# Patient Record
Sex: MALE | Race: BLACK OR AFRICAN AMERICAN | NOT HISPANIC OR LATINO | Employment: FULL TIME | URBAN - NONMETROPOLITAN AREA
[De-identification: names, ages, dates, MRNs, and addresses within clinical notes are randomized per-mention and may not be internally consistent; named-entity substitution may affect disease eponyms.]

---

## 2018-02-12 ENCOUNTER — OFFICE VISIT CONVERTED (OUTPATIENT)
Dept: FAMILY MEDICINE CLINIC | Age: 68
End: 2018-02-12
Attending: NURSE PRACTITIONER

## 2018-02-16 ENCOUNTER — OFFICE VISIT CONVERTED (OUTPATIENT)
Dept: FAMILY MEDICINE CLINIC | Age: 68
End: 2018-02-16
Attending: NURSE PRACTITIONER

## 2018-02-27 ENCOUNTER — OFFICE VISIT CONVERTED (OUTPATIENT)
Dept: FAMILY MEDICINE CLINIC | Age: 68
End: 2018-02-27
Attending: NURSE PRACTITIONER

## 2018-06-14 ENCOUNTER — OFFICE VISIT CONVERTED (OUTPATIENT)
Dept: FAMILY MEDICINE CLINIC | Age: 68
End: 2018-06-14
Attending: NURSE PRACTITIONER

## 2018-10-23 ENCOUNTER — OFFICE VISIT CONVERTED (OUTPATIENT)
Dept: FAMILY MEDICINE CLINIC | Age: 68
End: 2018-10-23
Attending: NURSE PRACTITIONER

## 2018-11-12 ENCOUNTER — OFFICE VISIT CONVERTED (OUTPATIENT)
Dept: FAMILY MEDICINE CLINIC | Age: 68
End: 2018-11-12
Attending: NURSE PRACTITIONER

## 2018-11-26 ENCOUNTER — OFFICE VISIT CONVERTED (OUTPATIENT)
Dept: FAMILY MEDICINE CLINIC | Age: 68
End: 2018-11-26
Attending: NURSE PRACTITIONER

## 2018-12-01 ENCOUNTER — OFFICE VISIT CONVERTED (OUTPATIENT)
Dept: FAMILY MEDICINE CLINIC | Age: 68
End: 2018-12-01
Attending: FAMILY MEDICINE

## 2018-12-03 ENCOUNTER — OFFICE VISIT (OUTPATIENT)
Dept: SURGERY | Facility: CLINIC | Age: 68
End: 2018-12-03

## 2018-12-03 VITALS — WEIGHT: 234 LBS | BODY MASS INDEX: 33.5 KG/M2 | HEART RATE: 56 BPM | OXYGEN SATURATION: 98 % | HEIGHT: 70 IN

## 2018-12-03 DIAGNOSIS — Z12.11 SCREEN FOR COLON CANCER: Primary | ICD-10-CM

## 2018-12-03 DIAGNOSIS — R10.30 LOWER ABDOMINAL PAIN: ICD-10-CM

## 2018-12-03 PROCEDURE — 99204 OFFICE O/P NEW MOD 45 MIN: CPT | Performed by: SURGERY

## 2018-12-03 RX ORDER — MULTIVIT-MIN/FOLIC/VIT K/LYCOP 400-300MCG
1 TABLET ORAL DAILY
COMMUNITY
End: 2022-05-20

## 2018-12-03 RX ORDER — EZETIMIBE 10 MG/1
10 TABLET ORAL
COMMUNITY
Start: 2018-10-25 | End: 2018-12-03

## 2018-12-03 RX ORDER — ALLOPURINOL 300 MG/1
300 TABLET ORAL DAILY
COMMUNITY
End: 2021-09-08

## 2018-12-03 RX ORDER — AMLODIPINE BESYLATE 10 MG/1
10 TABLET ORAL DAILY
COMMUNITY
Start: 2018-11-02 | End: 2021-09-08

## 2018-12-03 RX ORDER — ATENOLOL 50 MG/1
50 TABLET ORAL DAILY
COMMUNITY
End: 2021-09-08

## 2018-12-03 NOTE — H&P (VIEW-ONLY)
SUMMARY (A/P):    68-year-old gentleman with fairly vague symptoms of abdominal pain.  Given that he is overdue surveillance colonoscopy think this would be the best place to start with his further workup.  If colonoscopy is unrevealing then CT scan of the abdomen and pelvis would be the appropriate next step.  Additionally, I recommended that he use Lotrimin cream in the right inguinal region to see if this will help with that particular symptom.      CC:    Abdominal pain    HPI:    68-year-old gentleman presents with at least several month history of intermittent moderate pain in the mid lower abdomen as well as anteriorly on both lower extremities.  Reports associated malodorous drainage from the right inguinal region.    PSH:    Open right inguinal hernia repair 6/4/2014  Colonoscopy 2006  Coronary artery bypass grafting 2003  Lumbar disc surgery 1990 and 1992    PMH:    Arthritis  Coronary artery disease  Hypertension  Hyperlipidemia    FAMILY HISTORY:    Negative for colorectal cancer    SOCIAL HISTORY:   Denies tobacco use  Occasional alcohol use    ALLERGIES: reviewed, in Epic    MEDICATIONS: reviewed, in Epic    ROS:  No chest pain or shortness of air.  All other systems reviewed and negative other than presenting complaints.    PHYSICAL EXAM:   Constitutional: Well-developed well-nourished, no acute distress  Vital signs: Heart rate 56, weight 234 pounds, height 70 inches, BMI 33.6  Eyes: Conjunctiva normal, sclera nonicteric  ENMT: Hearing grossly normal, oral mucosa moist  Neck: Supple, no palpable mass, normal thyroid, trachea midline  Respiratory: Clear to auscultation, normal inspiratory effort  Cardiovascular: Regular rate, no murmur, no carotid bruit, no peripheral edema, no jugular venous distention  Gastrointestinal: Soft, tender to palpation in the lower abdomen but certainly with no peritoneal signs, no palpable mass, no hepatosplenomegaly, negative for hernia, bowel sounds  normal  Genitourinary: Testicles are descended and normal with no visible or palpable evidence of inguinal hernia.  There is prominence of the cord structures bilaterally concerning for but certainly not diagnostic of varicocele.  Lymphatics (palpable nodes):  cervical-negative, inguinal-negative  Skin:  Warm, dry, minimal evidence of cutaneous candidiasis in the right inguinal region  Musculoskeletal: Symmetric strength, normal gait  Psychiatric: Alert and oriented ×3, normal affect     SCOTTIE ALLEN M.D.

## 2018-12-11 ENCOUNTER — OFFICE VISIT CONVERTED (OUTPATIENT)
Dept: CARDIOLOGY | Facility: CLINIC | Age: 68
End: 2018-12-11
Attending: INTERNAL MEDICINE

## 2018-12-11 ENCOUNTER — CONVERSION ENCOUNTER (OUTPATIENT)
Dept: CARDIOLOGY | Facility: CLINIC | Age: 68
End: 2018-12-11

## 2018-12-14 ENCOUNTER — ANESTHESIA EVENT (OUTPATIENT)
Dept: GASTROENTEROLOGY | Facility: HOSPITAL | Age: 68
End: 2018-12-14

## 2018-12-14 ENCOUNTER — HOSPITAL ENCOUNTER (OUTPATIENT)
Facility: HOSPITAL | Age: 68
Setting detail: HOSPITAL OUTPATIENT SURGERY
Discharge: HOME | End: 2018-12-14
Attending: SURGERY | Admitting: SURGERY

## 2018-12-14 ENCOUNTER — ANESTHESIA (OUTPATIENT)
Dept: GASTROENTEROLOGY | Facility: HOSPITAL | Age: 68
End: 2018-12-14

## 2018-12-14 VITALS
BODY MASS INDEX: 37.04 KG/M2 | DIASTOLIC BLOOD PRESSURE: 86 MMHG | OXYGEN SATURATION: 100 % | TEMPERATURE: 98.4 F | HEIGHT: 66 IN | WEIGHT: 230.44 LBS | SYSTOLIC BLOOD PRESSURE: 159 MMHG | HEART RATE: 64 BPM | RESPIRATION RATE: 16 BRPM

## 2018-12-14 DIAGNOSIS — Z12.11 SCREEN FOR COLON CANCER: ICD-10-CM

## 2018-12-14 PROCEDURE — 45380 COLONOSCOPY AND BIOPSY: CPT | Performed by: SURGERY

## 2018-12-14 PROCEDURE — 25010000002 PROPOFOL 10 MG/ML EMULSION: Performed by: ANESTHESIOLOGY

## 2018-12-14 PROCEDURE — 88305 TISSUE EXAM BY PATHOLOGIST: CPT | Performed by: SURGERY

## 2018-12-14 RX ORDER — LIDOCAINE HYDROCHLORIDE 20 MG/ML
INJECTION, SOLUTION INFILTRATION; PERINEURAL AS NEEDED
Status: DISCONTINUED | OUTPATIENT
Start: 2018-12-14 | End: 2018-12-14 | Stop reason: SURG

## 2018-12-14 RX ORDER — SODIUM CHLORIDE 0.9 % (FLUSH) 0.9 %
3 SYRINGE (ML) INJECTION AS NEEDED
Status: DISCONTINUED | OUTPATIENT
Start: 2018-12-14 | End: 2018-12-14 | Stop reason: HOSPADM

## 2018-12-14 RX ORDER — PROPOFOL 10 MG/ML
VIAL (ML) INTRAVENOUS AS NEEDED
Status: DISCONTINUED | OUTPATIENT
Start: 2018-12-14 | End: 2018-12-14 | Stop reason: SURG

## 2018-12-14 RX ORDER — LIDOCAINE HYDROCHLORIDE 10 MG/ML
0.5 INJECTION, SOLUTION INFILTRATION; PERINEURAL ONCE AS NEEDED
Status: DISCONTINUED | OUTPATIENT
Start: 2018-12-14 | End: 2018-12-14 | Stop reason: HOSPADM

## 2018-12-14 RX ORDER — SODIUM CHLORIDE, SODIUM LACTATE, POTASSIUM CHLORIDE, CALCIUM CHLORIDE 600; 310; 30; 20 MG/100ML; MG/100ML; MG/100ML; MG/100ML
1000 INJECTION, SOLUTION INTRAVENOUS CONTINUOUS
Status: DISCONTINUED | OUTPATIENT
Start: 2018-12-14 | End: 2018-12-14 | Stop reason: HOSPADM

## 2018-12-14 RX ADMIN — PROPOFOL 200 MG: 10 INJECTION, EMULSION INTRAVENOUS at 07:10

## 2018-12-14 RX ADMIN — LIDOCAINE HYDROCHLORIDE 100 MG: 20 INJECTION, SOLUTION INFILTRATION; PERINEURAL at 07:08

## 2018-12-14 RX ADMIN — SODIUM CHLORIDE, POTASSIUM CHLORIDE, SODIUM LACTATE AND CALCIUM CHLORIDE 1000 ML: 600; 310; 30; 20 INJECTION, SOLUTION INTRAVENOUS at 06:37

## 2018-12-14 RX ADMIN — PROPOFOL 200 MG: 10 INJECTION, EMULSION INTRAVENOUS at 07:08

## 2018-12-14 NOTE — OP NOTE
PREOPERATIVE DIAGNOSIS:  Screening    POSTOPERATIVE DIAGNOSIS AND FINDINGS:  Small sigmoid polyp    PROCEDURE:  Colonoscopy to cecum with cold biopsy removal of polyp    SURGEON:  Elliott Harding MD    ANESTHESIA:  MAC    SPECIMEN(S):  Polyp (s)    DESCRIPTION:  In decubitus position digital rectal exam was normal. Colonoscope inserted under direct visualization of lumen to cecum confirmed by visualization of ileocecal valve and appendiceal orifice.    Scope slowly withdrawn circumferentially examining all mucosal surfaces.    Quality of bowel preparation good.    Maureen mucosa or malodorous a small sigmoid polyp removed completely with the cold biopsy forceps.    Tolerated well.    RECOMMENDATION FOR FUTURE SURVEILLANCE:  To be determined based on polyp pathology and issued as separate report    Elliott Harding M.D.

## 2018-12-14 NOTE — ANESTHESIA PREPROCEDURE EVALUATION
Anesthesia Evaluation     Patient summary reviewed and Nursing notes reviewed   NPO Solid Status: > 8 hours  NPO Liquid Status: > 8 hours           Airway   Mallampati: II  TM distance: >3 FB  Neck ROM: limited  Possible difficult intubation  Dental    (+) upper dentures    Pulmonary - normal exam   Cardiovascular - normal exam    Patient on routine beta blocker and Beta blocker given within 24 hours of surgery    (+) hypertension 2 medications or greater, past MI  >12 months, CAD, CABG,       Neuro/Psych  GI/Hepatic/Renal/Endo    (+) obesity,       Musculoskeletal     Abdominal    Substance History      OB/GYN          Other                      Anesthesia Plan    ASA 3     MAC     Anesthetic plan, all risks, benefits, and alternatives have been provided, discussed and informed consent has been obtained with: patient.

## 2018-12-14 NOTE — ANESTHESIA POSTPROCEDURE EVALUATION
"Patient: Mohamud Jarrett    Procedure Summary     Date:  12/14/18 Room / Location:   [facility]    Anesthesia Start:  0700 Anesthesia Stop:  0732    Procedure:  COLONOSCOPY TO CECUM WITH COLD BIOPSY POLYPECTOMY (N/A ) Diagnosis:       Screen for colon cancer      (Screen for colon cancer [Z12.11])    Surgeon:  Elliott Harding MD Provider:  Haydee Mari MD    Anesthesia Type:  MAC ASA Status:  3          Anesthesia Type: MAC  Last vitals  BP   149/81 (12/14/18 0630)   Temp   36.9 °C (98.4 °F) (12/14/18 0630)   Pulse   58 (12/14/18 0630)   Resp   16 (12/14/18 0630)     SpO2   98 % (12/14/18 0630)     Post Anesthesia Care and Evaluation    Patient location during evaluation: PACU  Patient participation: complete - patient participated  Level of consciousness: awake  Pain score: 0  Pain management: adequate  Airway patency: patent  Anesthetic complications: No anesthetic complications    Cardiovascular status: acceptable  Respiratory status: acceptable  Hydration status: acceptable    Comments: Blood pressure 149/81, pulse 58, temperature 36.9 °C (98.4 °F), temperature source Oral, resp. rate 16, height 167.6 cm (66\"), weight 105 kg (230 lb 7 oz), SpO2 98 %.    No anesthesia care post op    "

## 2018-12-17 LAB
LAB AP CASE REPORT: NORMAL
PATH REPORT.FINAL DX SPEC: NORMAL
PATH REPORT.GROSS SPEC: NORMAL

## 2018-12-19 ENCOUNTER — DOCUMENTATION (OUTPATIENT)
Dept: SURGERY | Facility: CLINIC | Age: 68
End: 2018-12-19

## 2018-12-19 NOTE — PROGRESS NOTES
ENDOSCOPY FOLLOW UP NOTE    Colonoscopy 12/14/2018    Indication:  Screening    Findings:  Small sigmoid polyp    Pathology:  Tubular adenoma    Recommendations:  Five-year surveillance

## 2018-12-20 ENCOUNTER — TELEPHONE (OUTPATIENT)
Dept: SURGERY | Facility: CLINIC | Age: 68
End: 2018-12-20

## 2018-12-20 NOTE — TELEPHONE ENCOUNTER
----- Message from Elliott Harding MD sent at 12/19/2018  6:25 AM EST -----  Please let him know that he had a small benign polyp and five-year surveillance is recommended-put in computer for reminder

## 2018-12-27 ENCOUNTER — TELEPHONE (OUTPATIENT)
Dept: SURGERY | Facility: CLINIC | Age: 68
End: 2018-12-27

## 2019-01-30 ENCOUNTER — OFFICE VISIT CONVERTED (OUTPATIENT)
Dept: FAMILY MEDICINE CLINIC | Age: 69
End: 2019-01-30
Attending: NURSE PRACTITIONER

## 2019-01-30 ENCOUNTER — HOSPITAL ENCOUNTER (OUTPATIENT)
Dept: OTHER | Facility: HOSPITAL | Age: 69
Discharge: HOME | End: 2019-01-30
Attending: NURSE PRACTITIONER

## 2019-01-30 LAB
ALBUMIN SERPL-MCNC: 3.7 G/DL (ref 3.5–5)
ALBUMIN/GLOB SERPL: 1.3 {RATIO} (ref 1.4–2.6)
ALP SERPL-CCNC: 66 U/L (ref 56–155)
ALT SERPL-CCNC: 25 U/L (ref 10–40)
ANION GAP SERPL CALC-SCNC: 15 MMOL/L (ref 8–19)
AST SERPL-CCNC: 27 U/L (ref 15–50)
BILIRUB SERPL-MCNC: 0.35 MG/DL (ref 0.2–1.3)
BUN SERPL-MCNC: 16 MG/DL (ref 5–25)
BUN/CREAT SERPL: 13 {RATIO} (ref 6–20)
CALCIUM SERPL-MCNC: 9.2 MG/DL (ref 8.7–10.4)
CHLORIDE SERPL-SCNC: 100 MMOL/L (ref 99–111)
CHOLEST SERPL-MCNC: 266 MG/DL (ref 107–200)
CHOLEST/HDLC SERPL: 5 {RATIO} (ref 3–6)
CONV CO2: 29 MMOL/L (ref 22–32)
CONV TOTAL PROTEIN: 6.6 G/DL (ref 6.3–8.2)
CREAT UR-MCNC: 1.2 MG/DL (ref 0.7–1.2)
GFR SERPLBLD BASED ON 1.73 SQ M-ARVRAT: >60 ML/MIN/{1.73_M2}
GLOBULIN UR ELPH-MCNC: 2.9 G/DL (ref 2–3.5)
GLUCOSE SERPL-MCNC: 91 MG/DL (ref 70–99)
HDLC SERPL-MCNC: 53 MG/DL (ref 40–60)
LDLC SERPL CALC-MCNC: 179 MG/DL (ref 70–100)
OSMOLALITY SERPL CALC.SUM OF ELEC: 291 MOSM/KG (ref 273–304)
POTASSIUM SERPL-SCNC: 3.9 MMOL/L (ref 3.5–5.3)
SODIUM SERPL-SCNC: 140 MMOL/L (ref 135–147)
TRIGL SERPL-MCNC: 172 MG/DL (ref 40–150)
VLDLC SERPL-MCNC: 34 MG/DL (ref 5–37)

## 2019-01-31 LAB
CONV HEPATITIS B SURFACE AG W CONFIRMATION RE: NEGATIVE
HAV IGM SERPL QL IA: NEGATIVE
HBV CORE IGM SERPL QL IA: NEGATIVE
HCV AB SER DONR QL: <0.1 S/CO RATIO (ref 0–0.9)

## 2019-06-06 ENCOUNTER — OFFICE VISIT (OUTPATIENT)
Dept: SURGERY | Facility: CLINIC | Age: 69
End: 2019-06-06

## 2019-06-06 VITALS — HEIGHT: 70 IN | WEIGHT: 242.8 LBS | HEART RATE: 58 BPM | OXYGEN SATURATION: 98 % | BODY MASS INDEX: 34.76 KG/M2

## 2019-06-06 DIAGNOSIS — R10.31 RIGHT INGUINAL PAIN: Primary | ICD-10-CM

## 2019-06-06 PROCEDURE — 99214 OFFICE O/P EST MOD 30 MIN: CPT | Performed by: SURGERY

## 2019-06-06 RX ORDER — MELOXICAM 15 MG/1
15 TABLET ORAL DAILY
Qty: 14 TABLET | Refills: 0 | Status: SHIPPED | OUTPATIENT
Start: 2019-06-06 | End: 2019-06-20

## 2019-06-08 NOTE — PROGRESS NOTES
IMPRESSION & PLAN:  1.  Right inguinal pain without evidence of recurrent hernia.  This likely represents inguinal strain and I have recommended and prescribed Mobic 15 mg p.o. daily for 2 weeks.  His recent creatinine was normal.  He is to return if his symptoms worsen or do not resolve.  2.  Personal history of adenomatous polyps, last colonoscopy 2018, I reminded him that he is due surveillance in 2023.      CC: Right inguinal pain    HPI: 69-year-old gentleman who underwent open incarcerated right inguinal hernia repair on 6/24/2014 presents with 1 month history of mild to moderate intermittent right inguinal pain that is worse with activity but has no associated bulge or mass.    ROS: Negative for unexpected weight loss.  Negative for fever chills or night sweats.  Negative for adenopathy on self-exam.     PHYSICAL EXAM:   Constitutional: Well-developed well-nourished, no acute distress  Vital signs: HR 58, weight 242 pounds, height 70 inches, BMI 34.8  Discussed with patient increased perioperative risks associated with obesity including increased risks of DVT, infection, seromas, poor wound healing and hernias (with abdominal surgery).  Eyes: Conjunctiva normal, sclera nonicteric  ENMT: Hearing grossly normal, oral mucosa moist  Neck: Supple, no palpable mass, normal thyroid, trachea midline  Respiratory: Clear to auscultation, normal inspiratory effort  Cardiovascular: Regular rate, no murmur, no carotid bruit, no peripheral edema, no jugular venous distention  Gastrointestinal: Soft, nontender, no palpable mass, no hepatosplenomegaly, negative for hernia, bowel sounds normal  Genitourinary: Testicles are descended and normal.  No palpable or visible mass.  No evidence of hernia on either side.  Lymphatics (palpable nodes):  cervical-negative, inguinal-negative  Skin:  Warm, dry, no rash on visualized skin surfaces  Musculoskeletal: Symmetric strength, normal gait  Psychiatric: Alert and oriented ×3, normal  affect     SCOTTIE ALLEN M.D.

## 2019-06-25 ENCOUNTER — TELEPHONE (OUTPATIENT)
Dept: SURGERY | Facility: CLINIC | Age: 69
End: 2019-06-25

## 2019-06-25 NOTE — TELEPHONE ENCOUNTER
Advised pt to be seen on Thursday per RMP. Pt unable to come due to work schedule. He said he would call back next week to schedule an appt.

## 2019-08-09 ENCOUNTER — OFFICE VISIT CONVERTED (OUTPATIENT)
Dept: FAMILY MEDICINE CLINIC | Age: 69
End: 2019-08-09
Attending: NURSE PRACTITIONER

## 2019-10-17 ENCOUNTER — OFFICE VISIT CONVERTED (OUTPATIENT)
Dept: FAMILY MEDICINE CLINIC | Age: 69
End: 2019-10-17
Attending: NURSE PRACTITIONER

## 2019-11-21 ENCOUNTER — OFFICE VISIT (OUTPATIENT)
Dept: SURGERY | Facility: CLINIC | Age: 69
End: 2019-11-21

## 2019-11-21 ENCOUNTER — TRANSCRIBE ORDERS (OUTPATIENT)
Dept: SURGERY | Facility: CLINIC | Age: 69
End: 2019-11-21

## 2019-11-21 VITALS — HEART RATE: 61 BPM | BODY MASS INDEX: 35.28 KG/M2 | HEIGHT: 70 IN | OXYGEN SATURATION: 98 % | WEIGHT: 246.4 LBS

## 2019-11-21 DIAGNOSIS — R10.31 RIGHT INGUINAL PAIN: Primary | ICD-10-CM

## 2019-11-21 DIAGNOSIS — R10.30 INGUINAL PAIN, UNSPECIFIED LATERALITY: ICD-10-CM

## 2019-11-21 PROCEDURE — 99213 OFFICE O/P EST LOW 20 MIN: CPT | Performed by: SURGERY

## 2019-11-21 RX ORDER — FEXOFENADINE HCL 180 MG/1
1 TABLET ORAL AS NEEDED
COMMUNITY
Start: 2017-06-22 | End: 2021-09-08 | Stop reason: SDUPTHER

## 2019-11-22 NOTE — PROGRESS NOTES
"IMPRESSION & PLAN:  69-year-old gentleman returns with persistent right inguinal pain.  No changes on exam.  Indicates that Mobic \"got rid of pain\", but recurred within weeks after stopping.  I have recommended proceeding with pain clinic appointment to evaluate for steroid injections locally.    CC: Inguinal pain    HPI: 69-year-old gentleman whom I last saw on June of this year for right inguinal pain and he did not have evidence of recurrent hernia.  I placed him on Mobic for 2 weeks and he indicated that this essentially resolved his symptoms but then symptoms recurred after cessation of Mobic.  Surgical history includes open right inguinal hernia repair for incarcerated right inguinal hernia on June 4, 2014    PE:    Awake, alert  Lungs normal inspiratory effort  Heart RRR  Abdomen: Soft and nontender, no evidence of recurrent hernia    "

## 2019-12-19 ENCOUNTER — ANESTHESIA (OUTPATIENT)
Dept: PAIN MEDICINE | Facility: HOSPITAL | Age: 69
End: 2019-12-19

## 2019-12-19 ENCOUNTER — HOSPITAL ENCOUNTER (OUTPATIENT)
Dept: PAIN MEDICINE | Facility: HOSPITAL | Age: 69
Discharge: HOME | End: 2019-12-19
Admitting: ANESTHESIOLOGY

## 2019-12-19 ENCOUNTER — ANESTHESIA EVENT (OUTPATIENT)
Dept: PAIN MEDICINE | Facility: HOSPITAL | Age: 69
End: 2019-12-19

## 2019-12-19 VITALS
BODY MASS INDEX: 32.93 KG/M2 | WEIGHT: 230 LBS | HEART RATE: 60 BPM | TEMPERATURE: 97.8 F | DIASTOLIC BLOOD PRESSURE: 91 MMHG | HEIGHT: 70 IN | OXYGEN SATURATION: 97 % | SYSTOLIC BLOOD PRESSURE: 158 MMHG | RESPIRATION RATE: 18 BRPM

## 2019-12-19 DIAGNOSIS — G57.91 NEURITIS OF LOWER EXTREMITY, RIGHT: Primary | ICD-10-CM

## 2019-12-19 PROCEDURE — G0463 HOSPITAL OUTPT CLINIC VISIT: HCPCS

## 2019-12-19 NOTE — ANESTHESIA PROCEDURE NOTES
PAIN Trigger point injection.      Patient reassessed immediately prior to procedure      Performed by  Anesthesiologist: Mp Paniagua MD  Additional Notes  There was no procedure.  The patient's appointment was for a consult only.

## 2019-12-19 NOTE — H&P
[facility]    History and Physical    Patient Name: Mohamud Jarrett  :  1950  MRN:  [id number]  Date of Admission: 2019    Subjective     Patient is a 69 y.o. male presents with chief complaint of chronic, intermitent, excruciating right groin pain.  Onset of symptoms was gradual starting several months ago.  Symptoms are associated/aggravated by activity, lifting, lying down, standing, twisting or walking for more than a few minutes. Symptoms improve with ice and rest.  On a pain scale from 0-10, he rates his pain as a 10 while at rest and also a 10 with activity.  He describes the pain is sharp and stabbing in nature.  His pain began several months after undergoing an open right inguinal hernia repair.    The following portions of the patients history were reviewed and updated as appropriate: current medications, allergies, past medical history, past surgical history, past family history, past social history and problem list                Objective     Past Medical History:   Past Medical History:   Diagnosis Date   • Arthritis    • CAD (coronary artery disease)    • Gout    • Heart attack (CMS/HCC)    • Hyperlipidemia    • Hypertension      Past Surgical History:   Past Surgical History:   Procedure Laterality Date   • CATARACT EXTRACTION W/ INTRAOCULAR LENS IMPLANT Right    • COLONOSCOPY N/A    • COLONOSCOPY N/A 2018    Procedure: COLONOSCOPY TO CECUM WITH COLD BIOPSY POLYPECTOMY;  Surgeon: Elliott Harding MD;  Location: [facility] ENDOSCOPY;  Service: General   • CORONARY ARTERY BYPASS GRAFT N/A 2003   • INGUINAL HERNIA REPAIR Right 2014    Open incarcerated inguinal hernia repair-Dr. Elliott Harding   • LUMBAR DISC SURGERY N/A ,     L4-L5, X2     Family History:   Family History   Problem Relation Age of Onset   • Heart disease Mother    • Heart disease Father      Social History:   Social History     Tobacco Use   • Smoking status: Never Smoker   • Smokeless  "tobacco: Never Used   Substance Use Topics   • Alcohol use: Yes     Comment: occassionally   • Drug use: No       Vital Signs Range for the last 24 hours  Temperature: Temp:  [36.6 °C (97.8 °F)] 36.6 °C (97.8 °F)   Temp Source: Temp src: Oral   BP: BP: (158)/(91) 158/91   Pulse: Heart Rate:  [60] 60   Respirations: Resp:  [18] 18   SPO2: SpO2:  [97 %] 97 %   O2 Amount (l/min):     O2 Devices     Weight: Weight:  [104 kg (230 lb)] 104 kg (230 lb)     Flowsheet Rows      First Filed Value   Admission Height  177.8 cm (70\") Documented at 12/19/2019 0845   Admission Weight  104 kg (230 lb) Documented at 12/19/2019 0845          --------------------------------------------------------------------------------    Current Outpatient Medications   Medication Sig Dispense Refill   • allopurinol (ZYLOPRIM) 300 MG tablet Take 300 mg by mouth Daily.     • amLODIPine (NORVASC) 10 MG tablet Take 10 mg by mouth Daily.     • aspirin 81 MG tablet Take 81 mg by mouth Daily.     • atenolol (TENORMIN) 50 MG tablet Take 50 mg by mouth Daily.     • fexofenadine (ALLEGRA) 180 MG tablet Take 1 tablet by mouth As Needed.     • Flaxseed, Linseed, (FLAX SEED OIL PO) Take 1 capsule by mouth Daily.     • Multiple Vitamin (ONE-A-DAY MENS) tablet Take 1 tablet by mouth Daily.       No current facility-administered medications for this encounter.        --------------------------------------------------------------------------------  Assessment/Plan      Anesthesia Evaluation     Patient summary reviewed and Nursing notes reviewed   NPO Solid Status: > 8 hours  NPO Liquid Status: > 2 hours           Airway   Mallampati: II  TM distance: >3 FB  Neck ROM: full  Dental - normal exam     Pulmonary - negative pulmonary ROS and normal exam    breath sounds clear to auscultation  Cardiovascular - normal exam    Rhythm: regular  Rate: normal    (+) hypertension, past MI , CAD, CABG, hyperlipidemia,   (-) angina, orthopnea, PND, URIBE      Neuro/Psych- " negative ROS  GI/Hepatic/Renal/Endo    (+) obesity,       Musculoskeletal     Abdominal  - normal exam    Abdomen: soft.  Bowel sounds: normal.   Substance History - negative use     OB/GYN negative ob/gyn ROS         Other   arthritis,          Phys Exam Other: Examination of his right groin area shows some marked tenderness but no signs of inflammation.           Diagnosis and Plan    Treatment Plan  ASA 3      Procedures: Ilioinguinal Nerve Block and Trigger Point Injection 1 or 2 muscle groups, With ultrasound,      Anesthetic plan and risks discussed with patient.          Diagnosis:    Right ilioinguinal neuritis and trigger points

## 2020-01-07 ENCOUNTER — ANESTHESIA (OUTPATIENT)
Dept: PAIN MEDICINE | Facility: HOSPITAL | Age: 70
End: 2020-01-07

## 2020-01-07 ENCOUNTER — HOSPITAL ENCOUNTER (OUTPATIENT)
Dept: PAIN MEDICINE | Facility: HOSPITAL | Age: 70
Discharge: HOME | End: 2020-01-07
Admitting: ANESTHESIOLOGY

## 2020-01-07 ENCOUNTER — ANESTHESIA EVENT (OUTPATIENT)
Dept: PAIN MEDICINE | Facility: HOSPITAL | Age: 70
End: 2020-01-07

## 2020-01-07 VITALS
OXYGEN SATURATION: 96 % | HEART RATE: 66 BPM | RESPIRATION RATE: 16 BRPM | DIASTOLIC BLOOD PRESSURE: 87 MMHG | SYSTOLIC BLOOD PRESSURE: 154 MMHG

## 2020-01-07 DIAGNOSIS — G57.91 ILIOINGUINAL NEURALGIA OF RIGHT SIDE: Primary | ICD-10-CM

## 2020-01-07 DIAGNOSIS — G57.91 NEURITIS OF LOWER EXTREMITY, RIGHT: ICD-10-CM

## 2020-01-07 PROCEDURE — 25010000002 MIDAZOLAM PER 1 MG: Performed by: ANESTHESIOLOGY

## 2020-01-07 PROCEDURE — 25010000002 METHYLPREDNISOLONE PER 80 MG: Performed by: ANESTHESIOLOGY

## 2020-01-07 RX ORDER — LIDOCAINE HYDROCHLORIDE 10 MG/ML
1 INJECTION, SOLUTION INFILTRATION; PERINEURAL ONCE
Status: DISCONTINUED | OUTPATIENT
Start: 2020-01-07 | End: 2020-01-08 | Stop reason: HOSPADM

## 2020-01-07 RX ORDER — SODIUM CHLORIDE 0.9 % (FLUSH) 0.9 %
3 SYRINGE (ML) INJECTION EVERY 12 HOURS SCHEDULED
Status: DISCONTINUED | OUTPATIENT
Start: 2020-01-07 | End: 2020-01-08 | Stop reason: HOSPADM

## 2020-01-07 RX ORDER — FENTANYL CITRATE 50 UG/ML
50 INJECTION, SOLUTION INTRAMUSCULAR; INTRAVENOUS AS NEEDED
Status: DISCONTINUED | OUTPATIENT
Start: 2020-01-07 | End: 2020-01-08 | Stop reason: HOSPADM

## 2020-01-07 RX ORDER — SODIUM CHLORIDE 0.9 % (FLUSH) 0.9 %
3-10 SYRINGE (ML) INJECTION AS NEEDED
Status: DISCONTINUED | OUTPATIENT
Start: 2020-01-07 | End: 2020-01-08 | Stop reason: HOSPADM

## 2020-01-07 RX ORDER — METHYLPREDNISOLONE ACETATE 80 MG/ML
80 INJECTION, SUSPENSION INTRA-ARTICULAR; INTRALESIONAL; INTRAMUSCULAR; SOFT TISSUE ONCE
Status: COMPLETED | OUTPATIENT
Start: 2020-01-07 | End: 2020-01-07

## 2020-01-07 RX ORDER — BUPIVACAINE HYDROCHLORIDE 2.5 MG/ML
30 INJECTION, SOLUTION INFILTRATION; PERINEURAL ONCE
Status: COMPLETED | OUTPATIENT
Start: 2020-01-07 | End: 2020-01-07

## 2020-01-07 RX ORDER — MIDAZOLAM HYDROCHLORIDE 1 MG/ML
1 INJECTION INTRAMUSCULAR; INTRAVENOUS
Status: DISCONTINUED | OUTPATIENT
Start: 2020-01-07 | End: 2020-01-08 | Stop reason: HOSPADM

## 2020-01-07 RX ORDER — LIDOCAINE HYDROCHLORIDE 10 MG/ML
0.5 INJECTION, SOLUTION INFILTRATION; PERINEURAL ONCE AS NEEDED
Status: DISCONTINUED | OUTPATIENT
Start: 2020-01-07 | End: 2020-01-08 | Stop reason: HOSPADM

## 2020-01-07 RX ADMIN — MIDAZOLAM 2 MG: 1 INJECTION INTRAMUSCULAR; INTRAVENOUS at 08:23

## 2020-01-07 RX ADMIN — BUPIVACAINE HYDROCHLORIDE 30 ML: 2.5 INJECTION, SOLUTION EPIDURAL; INFILTRATION; INTRACAUDAL; PERINEURAL at 08:25

## 2020-01-07 RX ADMIN — METHYLPREDNISOLONE ACETATE 80 MG: 80 INJECTION, SUSPENSION INTRA-ARTICULAR; INTRALESIONAL; INTRAMUSCULAR; SOFT TISSUE at 08:25

## 2020-01-07 NOTE — ANESTHESIA PROCEDURE NOTES
PAIN Peripheral block    Pre-sedation assessment completed: 1/7/2020 8:10 AM    Patient reassessed immediately prior to procedure    Start time: 1/7/2020 8:10 AM  Stop time: 1/7/2020 8:28 AM  Reason for block: procedure for pain  Performed by  Anesthesiologist: Lincoln Evans MD  Preanesthetic Checklist  Completed: patient identified, site marked, surgical consent, pre-op evaluation, timeout performed, IV checked, risks and benefits discussed and monitors and equipment checked  Prep:  Pt Position: supine  Sterile barriers:cap, gloves, mask and sterile barriers  Prep: ChloraPrep  Patient monitoring: blood pressure monitoring, continuous pulse oximetry and EKG  Procedure  Sedation:yes  Performed under: local infiltration  Guidance:landmark technique    Laterality:right  Block Type:ilioinguinal  Injection Technique:single-shot  Needle Type:Quincke  Needle Gauge:25 G  Resistance on Injection: none          Medications  Depomedrol:80 mg  Comment:bupivicaine 0.25 % - 20cc    Post Assessment  Injection Assessment: negative aspiration for heme, no paresthesia on injection and incremental injection  Patient Tolerance:comfortable throughout block  Complications:no  Additional Notes  Dx : right ilioinguinal neuralgia    RTC 2 weeks or prn

## 2020-01-07 NOTE — H&P
INTERVAL HISTORY:    The patient returns for the first ilioinguinal  steroid injection today.  They have received n/a % improvement since their last injection with a pain level of 7 /10 at its worst recently.    Conservative measures tried in the interim. Daily activities are still affected by the pain.    Radiology reports and/or previous notes have been reviewed and are consistent with their diagnosis of Post-Op Diagnosis Codes:     * Ilioinguinal neuralgia of right side [G57.91]    Alert and oriented  MP - 2  Lungs - clear  CV - rrr    Diagnosis:  Post-Op Diagnosis Codes:     * Ilioinguinal neuralgia of right side [G57.91]      Plan: right ilioinguinal steroid injection        Target : L4-5    I have encouraged them to continue:  1.  Physical therapy exercises at home as prescribed by physical therapy or from the pain clinic handout (given to the patient).  Continuation of these exercises every day, or multiple times per week, even when the patient has good pain relief, was stressed to the patient as a preventative measure to decrease the frequency and severity of future pain episodes.  2.  Continue pain medicines as already prescribed.  If patient not currently taking any, it is recommended to begin Acetaminophen 1000 mg po q 8 hours.  If other medicines containing Acetaminophen are currently prescribed, maintain daily dose at 3000mg.    3.  If they can tolerate NSAIDS, it is recommended to take Ibuprofen 600 mg po q 6 hours for 7 days during pain exacerbations.   Alternatively, they may substitute an NSAID of their choice (e.g. Aleve)  4.  Heat and ice to the affected area as tolerated for pain control.  It was discussed that heating pads can cause burns.  5.  Low impact exercise such as walking or water exercise was recommended to maintain overall health and aid in weight control.   6.  Follow up as needed for subsequent injections.  7.  Patient was counseled to abstain from tobacco products.

## 2020-01-28 ENCOUNTER — ANESTHESIA EVENT (OUTPATIENT)
Dept: PAIN MEDICINE | Facility: HOSPITAL | Age: 70
End: 2020-01-28

## 2020-01-28 ENCOUNTER — ANESTHESIA (OUTPATIENT)
Dept: PAIN MEDICINE | Facility: HOSPITAL | Age: 70
End: 2020-01-28

## 2020-01-28 ENCOUNTER — HOSPITAL ENCOUNTER (OUTPATIENT)
Dept: PAIN MEDICINE | Facility: HOSPITAL | Age: 70
Discharge: HOME | End: 2020-01-28
Admitting: ANESTHESIOLOGY

## 2020-01-28 VITALS
DIASTOLIC BLOOD PRESSURE: 94 MMHG | RESPIRATION RATE: 16 BRPM | TEMPERATURE: 98 F | SYSTOLIC BLOOD PRESSURE: 159 MMHG | OXYGEN SATURATION: 97 % | HEART RATE: 66 BPM

## 2020-01-28 DIAGNOSIS — G57.91 ILIOINGUINAL NEURALGIA OF RIGHT SIDE: ICD-10-CM

## 2020-01-28 PROCEDURE — 25010000002 MIDAZOLAM PER 1 MG: Performed by: ANESTHESIOLOGY

## 2020-01-28 PROCEDURE — 25010000002 METHYLPREDNISOLONE PER 80 MG: Performed by: ANESTHESIOLOGY

## 2020-01-28 PROCEDURE — C1755 CATHETER, INTRASPINAL: HCPCS

## 2020-01-28 RX ORDER — BUPIVACAINE HYDROCHLORIDE 2.5 MG/ML
30 INJECTION, SOLUTION INFILTRATION; PERINEURAL ONCE
Status: COMPLETED | OUTPATIENT
Start: 2020-01-28 | End: 2020-01-28

## 2020-01-28 RX ORDER — SODIUM CHLORIDE 0.9 % (FLUSH) 0.9 %
3-10 SYRINGE (ML) INJECTION AS NEEDED
Status: DISCONTINUED | OUTPATIENT
Start: 2020-01-28 | End: 2020-01-29 | Stop reason: HOSPADM

## 2020-01-28 RX ORDER — METHYLPREDNISOLONE ACETATE 80 MG/ML
80 INJECTION, SUSPENSION INTRA-ARTICULAR; INTRALESIONAL; INTRAMUSCULAR; SOFT TISSUE ONCE
Status: COMPLETED | OUTPATIENT
Start: 2020-01-28 | End: 2020-01-28

## 2020-01-28 RX ORDER — LIDOCAINE HYDROCHLORIDE 10 MG/ML
1 INJECTION, SOLUTION INFILTRATION; PERINEURAL ONCE
Status: DISCONTINUED | OUTPATIENT
Start: 2020-01-28 | End: 2020-01-29 | Stop reason: HOSPADM

## 2020-01-28 RX ORDER — LIDOCAINE HYDROCHLORIDE 10 MG/ML
0.5 INJECTION, SOLUTION INFILTRATION; PERINEURAL ONCE AS NEEDED
Status: DISCONTINUED | OUTPATIENT
Start: 2020-01-28 | End: 2020-01-29 | Stop reason: HOSPADM

## 2020-01-28 RX ORDER — CHLORAL HYDRATE 500 MG
CAPSULE ORAL
COMMUNITY
End: 2022-05-20

## 2020-01-28 RX ORDER — MIDAZOLAM HYDROCHLORIDE 1 MG/ML
1 INJECTION INTRAMUSCULAR; INTRAVENOUS
Status: DISCONTINUED | OUTPATIENT
Start: 2020-01-28 | End: 2020-01-29 | Stop reason: HOSPADM

## 2020-01-28 RX ORDER — SODIUM CHLORIDE 0.9 % (FLUSH) 0.9 %
3 SYRINGE (ML) INJECTION EVERY 12 HOURS SCHEDULED
Status: DISCONTINUED | OUTPATIENT
Start: 2020-01-28 | End: 2020-01-29 | Stop reason: HOSPADM

## 2020-01-28 RX ORDER — FENTANYL CITRATE 50 UG/ML
50 INJECTION, SOLUTION INTRAMUSCULAR; INTRAVENOUS AS NEEDED
Status: DISCONTINUED | OUTPATIENT
Start: 2020-01-28 | End: 2020-01-29 | Stop reason: HOSPADM

## 2020-01-28 RX ADMIN — BUPIVACAINE HYDROCHLORIDE 30 ML: 2.5 INJECTION, SOLUTION EPIDURAL; INFILTRATION; INTRACAUDAL; PERINEURAL at 07:52

## 2020-01-28 RX ADMIN — MIDAZOLAM 2 MG: 1 INJECTION INTRAMUSCULAR; INTRAVENOUS at 07:50

## 2020-01-28 RX ADMIN — METHYLPREDNISOLONE ACETATE 80 MG: 80 INJECTION, SUSPENSION INTRA-ARTICULAR; INTRALESIONAL; INTRAMUSCULAR; SOFT TISSUE at 07:52

## 2020-01-28 NOTE — INTERVAL H&P NOTE
[facility]  H&P reviewed. No changes since last visit.  Patient states   25-50% improvement since the last procedure/injection. Pain level 0-7/10.    Diagnosis     * Ilioinguinal neuralgia of right side [G57.91]      Airway assessed since last visit. Airway class equals: 2.

## 2020-01-28 NOTE — ANESTHESIA PROCEDURE NOTES
Right ilioinguinal nerve block    Pre-sedation assessment completed: 1/28/2020 7:44 AM    Patient reassessed immediately prior to procedure    Patient location during procedure: pain clinic  Start Time: 1/28/2020 7:44 AM  Stop Time: 1/28/2020 7:54 AM  Indication:at surgeon's request and procedure for pain  Performed By  Anesthesiologist: Lincoln Evans MD  Preanesthetic Checklist  Completed: patient identified, site marked, surgical consent, pre-op evaluation, timeout performed, IV checked, risks and benefits discussed and monitors and equipment checked  Additional Notes  Dx:  Post-Op Diagnosis Codes:     * Ilioinguinal neuralgia of right side (G57.91)  80 mg depomedrol in epid    Plan : return to clinic as needed  Prep:  Patient position: supine.  Sterile Tech:cap, gloves, mask and sterile barrier  Prep:chlorhexidine gluconate and isopropyl alcohol  Monitoring:blood pressure monitoring, EKG and continuous pulse oximetry  Procedure:Sedation: yes     Guidance: c arm pa and lat and loss of resistance  Epidural location: right ilioinguinal nerve block.  Interspace: interlaminar.  Needle Gauge:22 G  Aspiration:negative  Medications:  Depomedrol:80 mg  Preservative Free Saline:0mL  Comments:bupivacaine 0.25 %  - 20cc  Post Assessment:  Post-procedure: bandaid.  Pt Tolerance:patient tolerated the procedure well with no apparent complications  Complications:no

## 2020-03-25 ENCOUNTER — OFFICE VISIT CONVERTED (OUTPATIENT)
Dept: FAMILY MEDICINE CLINIC | Age: 70
End: 2020-03-25
Attending: FAMILY MEDICINE

## 2020-04-07 ENCOUNTER — OFFICE VISIT CONVERTED (OUTPATIENT)
Dept: FAMILY MEDICINE CLINIC | Age: 70
End: 2020-04-07
Attending: NURSE PRACTITIONER

## 2020-06-03 ENCOUNTER — HOSPITAL ENCOUNTER (OUTPATIENT)
Dept: PAIN MEDICINE | Facility: HOSPITAL | Age: 70
Discharge: HOME | End: 2020-06-03
Admitting: ANESTHESIOLOGY

## 2020-06-03 ENCOUNTER — ANESTHESIA EVENT (OUTPATIENT)
Dept: PAIN MEDICINE | Facility: HOSPITAL | Age: 70
End: 2020-06-03

## 2020-06-03 ENCOUNTER — ANESTHESIA (OUTPATIENT)
Dept: PAIN MEDICINE | Facility: HOSPITAL | Age: 70
End: 2020-06-03

## 2020-06-03 VITALS
RESPIRATION RATE: 16 BRPM | HEART RATE: 76 BPM | DIASTOLIC BLOOD PRESSURE: 99 MMHG | SYSTOLIC BLOOD PRESSURE: 159 MMHG | OXYGEN SATURATION: 97 %

## 2020-06-03 DIAGNOSIS — G57.91 NEURITIS OF LOWER EXTREMITY, RIGHT: Primary | ICD-10-CM

## 2020-06-03 PROCEDURE — C9290 INJ, BUPIVACAINE LIPOSOME: HCPCS | Performed by: ANESTHESIOLOGY

## 2020-06-03 PROCEDURE — 25010000002 MIDAZOLAM PER 1 MG: Performed by: ANESTHESIOLOGY

## 2020-06-03 PROCEDURE — 25010000003 BUPIVACAINE LIPOSOME 1.3 % SUSPENSION: Performed by: ANESTHESIOLOGY

## 2020-06-03 RX ORDER — LIDOCAINE HYDROCHLORIDE 10 MG/ML
1 INJECTION, SOLUTION INFILTRATION; PERINEURAL ONCE AS NEEDED
Status: DISCONTINUED | OUTPATIENT
Start: 2020-06-03 | End: 2020-06-04 | Stop reason: HOSPADM

## 2020-06-03 RX ORDER — SODIUM CHLORIDE 0.9 % (FLUSH) 0.9 %
1-10 SYRINGE (ML) INJECTION AS NEEDED
Status: DISCONTINUED | OUTPATIENT
Start: 2020-06-03 | End: 2020-06-04 | Stop reason: HOSPADM

## 2020-06-03 RX ORDER — FENTANYL CITRATE 50 UG/ML
50 INJECTION, SOLUTION INTRAMUSCULAR; INTRAVENOUS AS NEEDED
Status: DISCONTINUED | OUTPATIENT
Start: 2020-06-03 | End: 2020-06-04 | Stop reason: HOSPADM

## 2020-06-03 RX ORDER — SODIUM CHLORIDE 0.9 % (FLUSH) 0.9 %
3-10 SYRINGE (ML) INJECTION AS NEEDED
Status: DISCONTINUED | OUTPATIENT
Start: 2020-06-03 | End: 2020-06-04 | Stop reason: HOSPADM

## 2020-06-03 RX ORDER — SODIUM CHLORIDE 0.9 % (FLUSH) 0.9 %
3 SYRINGE (ML) INJECTION EVERY 12 HOURS SCHEDULED
Status: DISCONTINUED | OUTPATIENT
Start: 2020-06-03 | End: 2020-06-04 | Stop reason: HOSPADM

## 2020-06-03 RX ORDER — MIDAZOLAM HYDROCHLORIDE 1 MG/ML
1 INJECTION INTRAMUSCULAR; INTRAVENOUS AS NEEDED
Status: DISCONTINUED | OUTPATIENT
Start: 2020-06-03 | End: 2020-06-04 | Stop reason: HOSPADM

## 2020-06-03 RX ORDER — BUPIVACAINE HYDROCHLORIDE 2.5 MG/ML
30 INJECTION, SOLUTION EPIDURAL; INFILTRATION; INTRACAUDAL ONCE
Status: COMPLETED | OUTPATIENT
Start: 2020-06-03 | End: 2020-06-03

## 2020-06-03 RX ADMIN — MIDAZOLAM 2 MG: 1 INJECTION INTRAMUSCULAR; INTRAVENOUS at 08:39

## 2020-06-03 RX ADMIN — BUPIVACAINE 133 MG: 13.3 INJECTION, SUSPENSION, LIPOSOMAL INFILTRATION at 08:40

## 2020-06-03 RX ADMIN — BUPIVACAINE HYDROCHLORIDE 30 ML: 2.5 INJECTION, SOLUTION EPIDURAL; INFILTRATION; INTRACAUDAL; PERINEURAL at 08:38

## 2020-06-03 NOTE — H&P
INTERVAL HISTORY:    The patient returns for another Ilioinguinal nerve block injection today.  They have received 10-20% improvement since their last injection with a pain level of 8-10/10 at its worst recently.    Conservative measures tried in the interim     Current Outpatient Medications on File Prior to Encounter   Medication Sig Dispense Refill   • allopurinol (ZYLOPRIM) 300 MG tablet Take 300 mg by mouth Daily.     • amLODIPine (NORVASC) 10 MG tablet Take 10 mg by mouth Daily.     • atenolol (TENORMIN) 50 MG tablet Take 50 mg by mouth Daily.     • fexofenadine (ALLEGRA) 180 MG tablet Take 1 tablet by mouth As Needed.     • Flaxseed, Linseed, (FLAX SEED OIL PO) Take 1 capsule by mouth Daily.     • Multiple Vitamin (ONE-A-DAY MENS) tablet Take 1 tablet by mouth Daily.     • aspirin 81 MG tablet Take 81 mg by mouth Daily.     • Omega-3 Fatty Acids (FISH OIL) 1000 MG capsule capsule Take  by mouth Daily With Breakfast.       No current facility-administered medications on file prior to encounter.        Past Medical History:   Diagnosis Date   • Arthritis    • CAD (coronary artery disease)    • Gout    • Heart attack (CMS/HCC)    • Hyperlipidemia    • Hypertension    • Neuritis of lower extremity, right 12/19/2019       No hematologic infectious or constitutional symptoms  Negative screen for AUBREE      Exam:  BP (!) 185/102 Comment: pt did not take bp med this morning  Airway Mallampatti 2  Alert and oriented      Diagnosis:  Post-Op Diagnosis Codes:     * Ilioinguinal neuralgia of right side [G57.91]    Plan:  TAP (transversus abdominus plane) Block under ultrasound guidance    I have encouraged them to continue:  1.  Physical therapy exercises at home as prescribed by physical therapy or from the pain clinic handout (given to the patient).  Continuation of these exercises every day, or multiple times per week, even when the patient has good pain relief, was stressed to the patient as a preventative measure to  decrease the frequency and severity of future pain episodes.  2.  Continue pain medicines as already prescribed.  If patient not currently taking any, it is recommended to begin Acetaminophen 1000 mg po q 8 hours.  If other medicines containing Acetaminophen are currently prescribed, maintain daily dose at 3000mg.    3.  If they can tolerate NSAIDS, it is recommended to take Ibuprofen 600 mg po q 6 hours for 7 days during pain exacerbations.   Alternatively, they may substitute an NSAID of their choice (e.g. Aleve)  4.  Heat and ice to the affected area as tolerated for pain control.  It was discussed that heating pads can cause burns.  5.  Low impact exercise such as walking or water exercise was recommended to maintain overall health and aid in weight control.   6.  Follow up as needed for subsequent injections.  7.  Patient was counseled to abstain from tobacco products.

## 2020-06-03 NOTE — ANESTHESIA PROCEDURE NOTES
PAIN TAP block    Pre-sedation assessment completed: 6/3/2020 8:30 AM    Patient reassessed immediately prior to procedure    Patient location during procedure: pain clinic  Start time: 6/3/2020 8:39 AM  Stop time: 6/3/2020 8:46 AM  Reason for block: at surgeon's request  Performed by  Anesthesiologist: Guilherme Newton MD  Assisted by: Sarai Mohr RN  Preanesthetic Checklist  Completed: patient identified, site marked, surgical consent, pre-op evaluation, timeout performed, IV checked, risks and benefits discussed and monitors and equipment checked  Prep:  Pt Position: supine  Sterile barriers:gloves, mask and sterile barriers  Prep: ChloraPrep  Patient monitoring: blood pressure monitoring, continuous pulse oximetry and EKG  Procedure  Sedation:yes  Performed under: local infiltration  Guidance:ultrasound guided  ULTRASOUND INTERPRETATION. Using ultrasound guidance a 22 G gauge needle was placed in close proximity to the nerve, at which point, under ultrasound guidance anesthetic was injected in the area of the nerve and spread of the anesthesia was seen on ultrasound in close proximity thereto.  There were no abnormalities seen on ultrasound; a digital image was taken; and the patient tolerated the procedure with no complications. Images:still images obtained    Laterality:right  Block Type:TAP  Injection Technique:single-shot  Needle Type:echogenic  Needle Gauge:22 G  Resistance on Injection: none          Medications  Comment:Bupiv 0.25% 10cc  Exparel 1.3% 10 cc    Post Assessment  Injection Assessment: negative aspiration for heme, no paresthesia on injection and incremental injection  Patient Tolerance:comfortable throughout block  Complications:no

## 2020-06-09 ENCOUNTER — TELEPHONE (OUTPATIENT)
Dept: SURGERY | Facility: CLINIC | Age: 70
End: 2020-06-09

## 2020-06-09 NOTE — TELEPHONE ENCOUNTER
Pt cld and has completed the pain mgmt injections.  He does not feel like they have helped at all, especially the 3rd one.  He would like to know what his next step should be?

## 2020-06-19 ENCOUNTER — TELEPHONE (OUTPATIENT)
Dept: SURGERY | Facility: CLINIC | Age: 70
End: 2020-06-19

## 2020-06-19 RX ORDER — MELOXICAM 15 MG/1
15 TABLET ORAL DAILY
Qty: 14 TABLET | Refills: 0 | Status: SHIPPED | OUTPATIENT
Start: 2020-06-19 | End: 2020-07-03

## 2020-07-03 RX ORDER — MELOXICAM 15 MG/1
15 TABLET ORAL DAILY
Qty: 30 TABLET | Refills: 0 | Status: SHIPPED | OUTPATIENT
Start: 2020-07-03 | End: 2020-08-02

## 2020-07-30 ENCOUNTER — OFFICE VISIT CONVERTED (OUTPATIENT)
Dept: FAMILY MEDICINE CLINIC | Age: 70
End: 2020-07-30
Attending: NURSE PRACTITIONER

## 2020-08-05 RX ORDER — MELOXICAM 15 MG/1
TABLET ORAL
Qty: 30 TABLET | Refills: 0 | OUTPATIENT
Start: 2020-08-05

## 2020-08-05 NOTE — TELEPHONE ENCOUNTER
I told him at the time of his last prescription that he needed to get in with his primary care for any further refills as his kidney function needs to be monitored while on this medication

## 2020-10-08 ENCOUNTER — HOSPITAL ENCOUNTER (OUTPATIENT)
Dept: OTHER | Facility: HOSPITAL | Age: 70
Discharge: HOME | End: 2020-10-08
Attending: NURSE PRACTITIONER

## 2020-10-08 LAB
25(OH)D3 SERPL-MCNC: 11.4 NG/ML (ref 30–100)
ALBUMIN SERPL-MCNC: 3.6 G/DL (ref 3.5–5)
ALBUMIN/GLOB SERPL: 1.4 {RATIO} (ref 1.4–2.6)
ALP SERPL-CCNC: 72 U/L (ref 56–155)
ALT SERPL-CCNC: 20 U/L (ref 10–40)
ANION GAP SERPL CALC-SCNC: 14 MMOL/L (ref 8–19)
AST SERPL-CCNC: 28 U/L (ref 15–50)
BILIRUB SERPL-MCNC: 0.34 MG/DL (ref 0.2–1.3)
BUN SERPL-MCNC: 24 MG/DL (ref 5–25)
BUN/CREAT SERPL: 13 {RATIO} (ref 6–20)
CALCIUM SERPL-MCNC: 9.7 MG/DL (ref 8.7–10.4)
CHLORIDE SERPL-SCNC: 106 MMOL/L (ref 99–111)
CHOLEST SERPL-MCNC: 214 MG/DL (ref 107–200)
CHOLEST/HDLC SERPL: 4.7 {RATIO} (ref 3–6)
CONV CO2: 25 MMOL/L (ref 22–32)
CONV TOTAL PROTEIN: 6.1 G/DL (ref 6.3–8.2)
CREAT UR-MCNC: 1.91 MG/DL (ref 0.7–1.2)
GFR SERPLBLD BASED ON 1.73 SQ M-ARVRAT: 41 ML/MIN/{1.73_M2}
GLOBULIN UR ELPH-MCNC: 2.5 G/DL (ref 2–3.5)
GLUCOSE SERPL-MCNC: 111 MG/DL (ref 70–99)
HDLC SERPL-MCNC: 46 MG/DL (ref 40–60)
LDLC SERPL CALC-MCNC: 134 MG/DL (ref 70–100)
OSMOLALITY SERPL CALC.SUM OF ELEC: 297 MOSM/KG (ref 273–304)
POTASSIUM SERPL-SCNC: 4.3 MMOL/L (ref 3.5–5.3)
SODIUM SERPL-SCNC: 141 MMOL/L (ref 135–147)
TRIGL SERPL-MCNC: 172 MG/DL (ref 40–150)
VLDLC SERPL-MCNC: 34 MG/DL (ref 5–37)

## 2020-10-11 LAB
ASO AB SERPL-ACNC: 31 [IU]/ML (ref 0–200)
CONV RHEUMATOID FACTOR IGM: 15 [IU]/ML (ref 0–14)
CRP SERPL-MCNC: 2.9 MG/L (ref 0–5)
DSDNA AB SER-ACNC: NEGATIVE [IU]/ML
ENA AB SER IA-ACNC: NEGATIVE {RATIO}
ERYTHROCYTE [SEDIMENTATION RATE] IN BLOOD: 32 MM/H (ref 0–20)
PHOSPHATE SERPL-MCNC: 3.5 MG/DL (ref 2.4–4.5)
URATE SERPL-MCNC: 6.3 MG/DL (ref 3.5–8.5)

## 2021-03-17 ENCOUNTER — OFFICE VISIT CONVERTED (OUTPATIENT)
Dept: FAMILY MEDICINE CLINIC | Age: 71
End: 2021-03-17
Attending: NURSE PRACTITIONER

## 2021-03-17 ENCOUNTER — HOSPITAL ENCOUNTER (OUTPATIENT)
Dept: OTHER | Facility: HOSPITAL | Age: 71
Discharge: HOME | End: 2021-03-17
Attending: NURSE PRACTITIONER

## 2021-03-17 LAB
25(OH)D3 SERPL-MCNC: 11.4 NG/ML (ref 30–100)
ALBUMIN SERPL-MCNC: 3.4 G/DL (ref 3.5–5)
ALBUMIN/GLOB SERPL: 1.3 {RATIO} (ref 1.4–2.6)
ALP SERPL-CCNC: 90 U/L (ref 56–155)
ALT SERPL-CCNC: 21 U/L (ref 10–40)
ANION GAP SERPL CALC-SCNC: 17 MMOL/L (ref 8–19)
AST SERPL-CCNC: 27 U/L (ref 15–50)
BILIRUB SERPL-MCNC: 0.25 MG/DL (ref 0.2–1.3)
BUN SERPL-MCNC: 26 MG/DL (ref 5–25)
BUN/CREAT SERPL: 11 {RATIO} (ref 6–20)
CALCIUM SERPL-MCNC: 9 MG/DL (ref 8.7–10.4)
CHLORIDE SERPL-SCNC: 107 MMOL/L (ref 99–111)
CHOLEST SERPL-MCNC: 259 MG/DL (ref 107–200)
CHOLEST/HDLC SERPL: 5.5 {RATIO} (ref 3–6)
CONV CO2: 26 MMOL/L (ref 22–32)
CONV TOTAL PROTEIN: 6 G/DL (ref 6.3–8.2)
CREAT UR-MCNC: 2.31 MG/DL (ref 0.7–1.2)
GFR SERPLBLD BASED ON 1.73 SQ M-ARVRAT: 32 ML/MIN/{1.73_M2}
GLOBULIN UR ELPH-MCNC: 2.6 G/DL (ref 2–3.5)
GLUCOSE SERPL-MCNC: 113 MG/DL (ref 70–99)
HDLC SERPL-MCNC: 47 MG/DL (ref 40–60)
LDLC SERPL CALC-MCNC: 169 MG/DL (ref 70–100)
OSMOLALITY SERPL CALC.SUM OF ELEC: 308 MOSM/KG (ref 273–304)
POTASSIUM SERPL-SCNC: 4.1 MMOL/L (ref 3.5–5.3)
PSA SERPL-MCNC: 0.62 NG/ML (ref 0–4)
SODIUM SERPL-SCNC: 146 MMOL/L (ref 135–147)
TRIGL SERPL-MCNC: 214 MG/DL (ref 40–150)
VLDLC SERPL-MCNC: 43 MG/DL (ref 5–37)

## 2021-05-16 VITALS
DIASTOLIC BLOOD PRESSURE: 72 MMHG | BODY MASS INDEX: 33.21 KG/M2 | HEART RATE: 66 BPM | SYSTOLIC BLOOD PRESSURE: 151 MMHG | HEIGHT: 70 IN | WEIGHT: 232 LBS

## 2021-05-18 NOTE — PROGRESS NOTES
Mohamud Jarrett 1950     Office/Outpatient Visit    Visit Date: Wed, Jan 30, 2019 09:05 am    Provider: Shara Talley N.P. (Assistant: Maricel Toledo MA)    Location: [facility]        Electronically signed by Shara Talley N.P. on  02/05/2019 08:51:40 AM                             SUBJECTIVE:        CC:     Mr. Jarrett is a 68 year old White male.  This is a follow-up visit.          HPI:     saw cardiology dec 2018 with follow up april 2019         With regard to the hypertension, his current cardiac medication regimen includes amlodipine, atenolol.  He did not bring his blood pressure diary, but says that pressures have been well controlled.  He is tolerating the medication well without side effects.  Compliance with treatment has been good; he takes his medication as directed.          stable on allopurinol.  could not make his appt with ortho dr grace.  has office number and states he will reschedule that appt.          Hyperlipidemia details; current treatment includes Crestor and zetia.  Compliance with treatment has been good; he takes his medication as directed.  He denies experiencing any hypercholesterolemia related symptoms.          is  at [facility] [employer].  needs testing for all forms of hepatitis per employer request.      ROS:     CONSTITUTIONAL:  Negative for chills, fatigue, fever, and weight change.      CARDIOVASCULAR:  Negative for chest pain, palpitations, tachycardia, orthopnea, and edema.      RESPIRATORY:  Negative for cough, dyspnea, and hemoptysis.      MUSCULOSKELETAL:  Negative for arthralgias, back pain, and myalgias.      INTEGUMENTARY:  Negative for rash.      NEUROLOGICAL:  Negative for dizziness, headaches, paresthesias, and weakness.          PMH/FMH/SH:     Last Reviewed on 12/10/2018 07:57 AM by Daniel Escudero    Past Medical History:             PAST MEDICAL HISTORY         Fracture(s): right clavicle;         CURRENT MEDICAL PROVIDERS:     Cardiologist    Nephrologist         PREVENTIVE HEALTH MAINTENANCE             COLORECTAL CANCER SCREENING: Up to date (colonoscopy q10y; sigmoidoscopy q5y; Cologuard q3y) was last done dec 2018, Results are in chart; colonoscopy with the following abnormalities noted-- Polyp(s) next due          Surgical History:         Coronary Artery Bypass Graft: 4-V;      Cataract Removal: left;     back surgery x 2;    nasal fx repair;    carpal tunnel surgery; Procedures: colonoscopy 06  nml     Positive for    Hernia Repair: 2014; right inguinal; ;         Family History:         Positive for Hypertension ( brother; sister ) and Myocardial Infarction ( father; mother; sister ).  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          Tobacco/Alcohol/Supplements:     Last Reviewed on 2019 09:08 AM by Maricel Toledo    Tobacco: He has never smoked.  Non-drinker     Caffeine:  He admits to consuming caffeine via coffee ( 1 serving per day ) and tea ( one gallon servings per day ).          Substance Abuse History:     Last Reviewed on 12/10/2018 07:57 AM by Daniel Escudero    NEGATIVE         Mental Health History:     Last Reviewed on 12/10/2018 07:57 AM by Daniel Escudero        Communicable Diseases (eg STDs):     Last Reviewed on 12/10/2018 07:57 AM by Daniel Escudero            Current Problems:     Last Reviewed on 12/10/2018 07:57 AM by Daniel Escudero    Abnormal laboratory test findings without diagnosis     Impotence     Vitamin D deficiency, unspecified     Unspecified deficiency anemia     Screening for cancer of colon      Proteinuria     Elevated fasting glucose     Hyperlipidemia     Hypertension     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)     CAD     Erectile dysfunction secondary to atherosclerotic disease     Knee pain     Muscle pain         Immunizations:     zzFluzone pf-quadrivalent 3 and up 11/13/2015     Fluzone (3 + years dose) 1/4/2011     Fluzone (3 + years dose) 10/8/2012     Fluzone Quadrivalent (3+ years) 2/1/2017     Influenza A (H1N1), IM (3+ years) Monovalent 11/17/2009     Fluzone High-Dose pf (>=65 yr) 9/19/2017     Fluzone High-Dose pf (>=65 yr) 11/12/2018         Allergies:     Last Reviewed on 12/10/2018 07:57 AM by Daniel Escudero    Zocor: muscle cramps (Adverse Reaction)    Zocor: Myalgia (Adverse Reaction)    peanut butter:        Current Medications:     Last Reviewed on 12/10/2018 07:57 AM by Daniel Escudero    Diclofenac Sodium 1% Topical Gel Apply 2 grams to affected area BID     Rosuvastatin 10mg Tablet Take 1 tablet(s) by mouth daily  at bedtime     Allopurinol 300mg Tablet Take 1 tablet(s) by mouth daily     Amlodipine  10mg Tablet 1 tab daily     Atenolol 50mg Tablet 1 tab daily     Niaspan 500mg Tablets, Extended Release Take 1 tablet(s) by mouth Q PM after supper. Pre-medicate with one aspirin before supper.     Zetia 10mg Tablet 1 tab daily     MAGNESIUM     Ferrous Sulfate     Fish Oil     Vitamin D2     Aspirin (ASA) 81mg Chewable Tablet     generic allergy med from [facility]         OBJECTIVE:        Vitals:         Current: 1/30/2019 9:43:35 AM    Ht:  5 ft, 11.5 in;  Wt: 238 lbs;  BMI: 32.7    T: 97.4 F (oral);  BP: 170/75 mm Hg (right arm, sitting);  P: 57 bpm (right arm (BP Cuff), sitting);  sCr: 1.23 mg/dL;  GFR: 65.64        Repeat:     9:44:14 AM     BP:   166/74mm Hg (right arm, sitting, HR 55)         Exams:     PHYSICAL EXAM:     GENERAL:  well developed and nourished; appropriately groomed; in no apparent distress;     RESPIRATORY: normal respiratory rate and  pattern with no distress; normal breath sounds with no rales, rhonchi, wheezes or rubs;     CARDIOVASCULAR: normal rate; rhythm is regular;  no edema;     MUSCULOSKELETAL:     NEUROLOGIC: mental status: alert and oriented x 3; GROSSLY INTACT     PSYCHIATRIC:  appropriate affect and demeanor; normal speech pattern; grossly normal memory;         ASSESSMENT           414.01   I25.10  CAD              DDx:     401.1   I10  Hypertension              DDx:     274.02   M1A.00X0  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)              DDx:     272.4   E78.5  Hyperlipidemia              DDx:     V82.9   Z13.9  Special screening for other conditions; unspecified              DDx:         ORDERS:         Meds Prescribed:       Refill of: Allopurinol 300mg Tablet Take 1 tablet(s) by mouth daily  #90 (Ninety) tablet(s) Refills: 1       Refill of: Atenolol 50mg Tablet 1 tab daily  #90 (Ninety) tablet(s) Refills: 1       Refill of: Amlodipine  10mg Tablet 1 tab daily  #90 (Ninety) tablet(s) Refills: 1       Refill of: Rosuvastatin 20mg Tablet 1 tab PO qhs  #90 (Ninety) tablet(s) Refills: 1       Refill of: Niaspan (Niacin (Nicotinic Acid)) 500mg Tablets, Extended Release Take 1 tablet(s) by mouth Q PM after supper. Pre-medicate with one aspirin before supper.  #90 (Ninety) tablet(s) Refills: 1       Refill of: Zetia (Ezetimibe) 10mg Tablet 1 tab daily  #90 (Ninety) tablet(s) Refills: 1         Lab Orders:       [id number]  HTN - [facility] CMP AND LIPID: [id number]  (Send-Out)         [id number]  AH4 - [facility] Hepatitis Panel (HAAb, HbcAB, HbsAG, Hep C antibody)  (Send-Out)                   PLAN:          CAD         RECOMMENDATIONS:    - monitor symptoms    - regular monitoring of blood pressure    continue seeing cardiology as he is high risk for MI or stroke.           Hypertension     LABORATORY:  Labs ordered to be performed today include HTN/Lipid Panel: CMP, Lipid.      RECOMMENDATIONS given include: avoid  pseudoephedrine or other stimulants/decongestants in common cold remedies, decrease consumption of alcohol, perform routine monitoring of blood pressure with home blood pressure cuff, exercise, reduction of dietary salt intake, take medication as prescribed, try not to miss doses, continue with cardiology, and Reduce caffiene intake..            Prescriptions:       Refill of: Atenolol 50mg Tablet 1 tab daily  #90 (Ninety) tablet(s) Refills: 1       Refill of: Amlodipine  10mg Tablet 1 tab daily  #90 (Ninety) tablet(s) Refills: 1           [facility]:       [id number] [id number]  (Send-Out)            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)           Prescriptions:       Refill of: Allopurinol 300mg Tablet Take 1 tablet(s) by mouth daily  #90 (Ninety) tablet(s) Refills: 1          Hyperlipidemia         RECOMMENDATIONS given include: alcohol avoidance, exercise, low cholesterol/low fat diet, and weight loss.            Prescriptions:       Refill of: Rosuvastatin 20mg Tablet 1 tab PO qhs  #90 (Ninety) tablet(s) Refills: 1       Refill of: Niaspan (Niacin (Nicotinic Acid)) 500mg Tablets, Extended Release Take 1 tablet(s) by mouth Q PM after supper. Pre-medicate with one aspirin before supper.  #90 (Ninety) tablet(s) Refills: 1       Refill of: Zetia (Ezetimibe) 10mg Tablet 1 tab daily  #90 (Ninety) tablet(s) Refills: 1          Special screening for other conditions; unspecified     LABORATORY:  Labs ordered to be performed today include hepatitis panel.            Orders:       [id number]  [address] Hepatitis Panel (HAAb, HbcAB, HbsAG, Hep C antibody)  (Send-Out)               Patient Recommendations:        For  CAD:         RECOMMENDATIONS:    - monitor symptoms    - regular monitoring of blood pressure    -         For  Hypertension:     Certain decongestants and stimulants (like pseudoephedrine) in common cold remedies raise blood pressure, sometimes to dangerously high  levels. Never take with MAO inhibitors! Avoid alcohol as it can contribute to elevated blood pressure. Begin monitoring your blood pressure by brief nurse visits at our office, a home blood pressure monitor, or by checking on the machines in pharmacies or stores.  Keep a log of the readings. Maintain a regular exercise program. Reduce the amount of salt in your diet.          For  Hyperlipidemia:     Avoid alcohol as it can contribute to elevated blood pressure. Maintain a regular exercise program. Reduce the amount of cholesterol and saturated fat in your diet. Try to lose some weight; even modest weight reduction can improve your blood pressure.              CHARGE CAPTURE           **Please note: ICD descriptions below are intended for billing purposes only and may not represent clinical diagnoses**        Primary Diagnosis:         414.01 CAD            I25.10    Atherosclerotic heart disease of native coronary artery without angina pectoris              Orders:          [id number]   Office/outpatient visit; established patient, level 4  (In-House)           401.1 Hypertension            I10    Essential (primary) hypertension    274.02 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    272.4 Hyperlipidemia            E78.5    Hyperlipidemia, unspecified    V82.9 Special screening for other conditions; unspecified            Z13.9    Encounter for screening, unspecified

## 2021-05-18 NOTE — PROGRESS NOTES
Mohamud Jarrett  1950     Office/Outpatient Visit    Visit Date: Thu, Jul 30, 2020 09:41 am    Provider: Shara Talley N.P. (Assistant: Patricia Hampton MA)    Location: [facility]        Electronically signed by Shara Talley N.P. on  07/30/2020 12:25:59 PM                             Subjective:        CC: Mr. Jarrett is a 70 year old White male.  dicuss meds         HPI:           Patient presents with hyperlipidemia, unspecified.  currently on rosuvastatin and zetia.  saw cardiology dr steen dec 2018.  thinks he had echocardiogram and it was ok.  has not followed up with cardiology.  notes pedal edema x 2 months, mild.  nurse from [facility] told him it could be due to rosuvastatin use.            Additionally, he presents with history of essential (primary) hypertension.  his current cardiac medication regimen includes atenolol , amlodipine.  Compliance with treatment has been good; he takes his medication as directed.  He has been experiencing possible adverse medication effects, including edema.  Mr. Jarrett does not check his blood pressure other than at his clinic appointments.        on supplement.  due for recheck.          saw dr aldridge last month for follow up hernia repair-  told myscle aches due to his back and rec'd meloxicam which helps.  denies side effects.  requests refill of meloxicam.            Additionally, he presents with history of localized edema.  this has been present for 2 months.  The swelling has primarily involved the bilateral ankles.  The degree of edema has been stable recently.  He denies associated symptoms.  Medical history is pertinent for arthritis, coronary artery disease and hypertension.      ROS:     CONSTITUTIONAL:  Negative for chills, fatigue, fever, and weight change.      CARDIOVASCULAR:  Positive for pedal edema.   Negative for chest pain.      RESPIRATORY:  Negative for cough, dyspnea, and hemoptysis.      GASTROINTESTINAL:  Negative for  abdominal pain, heartburn, constipation, diarrhea, and stool changes.      MUSCULOSKELETAL:  Positive for arthralgias.      INTEGUMENTARY:  Negative for rash.      NEUROLOGICAL:  Negative for dizziness, headaches, paresthesias, and weakness.      PSYCHIATRIC:  Negative for anxiety, depression, and sleep disturbances.          Past Medical History / Family History / Social History:         Last Reviewed on 2020 04:15 PM by Shara Talley    Past Medical History:             PAST MEDICAL HISTORY         Fracture(s): right clavicle;     Hospitalizations: (right ileolingual neuritis dec 2019)         CURRENT MEDICAL PROVIDERS:    Cardiologist    Nephrologist         PREVENTIVE HEALTH MAINTENANCE             COLORECTAL CANCER SCREENING: Up to date (colonoscopy q10y; sigmoidoscopy q5y; Cologuard q3y) was last done dec 2018, Results are in chart; colonoscopy with the following abnormalities noted-- Polyp(s) next due          Surgical History:         Coronary Artery Bypass Graft: 4-V;     Cataract Removal: left;     back surgery x 2;    nasal fx repair;    carpal tunnel surgery; Procedures: colonoscopy 06  nml     Positive for    Hernia Repair: 2014; right inguinal; ;         Family History:         Positive for Hypertension ( brother; sister ) and Myocardial Infarction ( father; mother; sister ).  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          Tobacco/Alcohol/Supplements:     Last Reviewed on 2020 08:32 AM by Melissa Clifford    Tobacco: He has never smoked.  Non-drinker     Caffeine:  He admits to consuming caffeine via coffee ( 1 serving per day ) and tea ( one  gallon servings per day ).          Substance Abuse History:     Last Reviewed on 12/10/2018 07:57 AM by Daniel Escudero    NEGATIVE         Mental Health History:     Last Reviewed on 12/10/2018 07:57 AM by Daniel Escudero        Communicable Diseases (eg STDs):     Last Reviewed on 12/10/2018 07:57 AM by Daniel Escudero        Current Problems:     Last Reviewed on 4/07/2020 08:39 AM by Shara Talley    Hyperlipidemia, unspecified    Essential (primary) hypertension    Atherosclerotic heart disease of native coronary artery without angina pectoris    Idiopathic chronic gout, unspecified site, without tophus (tophi)    Other abnormal glucose    Nutritional anemia, unspecified    Vitamin D deficiency, unspecified    Male erectile disorder    Other general symptoms and signs    Pain in left knee    Pain in unspecified joint        Immunizations:     zzFluzone pf-quadrivalent 3 and up 11/13/2015    Fluzone (3 + years dose) 1/4/2011    Fluzone (3 + years dose) 10/8/2012    Fluzone Quadrivalent (3+ years) 2/1/2017    Influenza A (H1N1), IM (3+ years) Monovalent 11/17/2009    Fluzone High-Dose pf (>=65 yr) 9/19/2017    Fluzone High-Dose pf (>=65 yr) 11/12/2018        Allergies:     Last Reviewed on 7/30/2020 09:44 AM by Patricia Hampton    Zocor: muscle cramps  (Adverse Reaction)    Zocor: Myalgia  (Adverse Reaction)    peanut butter:          Current Medications:     Last Reviewed on 7/30/2020 09:44 AM by Patricia Hampton    generic allergy med from [facility] [place]    aspirin 81 mg oral tablet,chewable    atenoloL 50 mg oral tablet [TAKE ONE TABLET BY MOUTH DAILY]    allopurinoL 300 mg oral tablet [TAKE ONE TABLET BY MOUTH DAILY]    Zetia 10 mg oral tablet [1 tab daily]    Niaspan 500mg Tablets, Extended Release [Take 1 tablet(s) by mouth Q PM after supper. Pre-medicate with one aspirin before supper.]    Diclofenac Sodium 1% Topical Gel [Apply 2 grams to affected area BID]    Fish Oil     Vitamin D2     Ferrous Sulfate      MAGNESIUM     amLODIPine 10 mg oral tablet [TAKE ONE TABLET BY MOUTH DAILY]    rosuvastatin 10 mg oral tablet [take 1 tablet (10 mg) by oral route once daily]        Objective:        Vitals:         Historical:     3/25/2020  BP:   171/88 mm Hg ( (left arm, , sitting, );) 10/17/2019  BP:   140/67 mm Hg ( (left arm, , sitting, );) 8/9/2019  BP:   147/78 mm Hg ( (right arm, , sitting, );) 3/25/2020  Wt:   061vnw04/17/2019  Wt:   244lbs    Current: 7/30/2020 9:45:38 AM    Ht:  5 ft, 11.5 in;  Wt: 249.8 lbs;  BMI: 34.4T: 97.8 F (temporal);  BP: 145/80 mm Hg (left arm, sitting);  P: 59 bpm (left arm (BP Cuff), sitting);  sCr: 1.2 mg/dL;  GFR: 66.88        Exams:     PHYSICAL EXAM:     GENERAL:  well developed and nourished; appropriately groomed; in no apparent distress;     NECK: thyroid is non-palpable;     RESPIRATORY: normal respiratory rate and pattern with no distress; normal breath sounds with no rales, rhonchi, wheezes or rubs;     CARDIOVASCULAR: normal rate; rhythm is regular;     Peripheral Pulses: posterior tibial: 2+ amplitude, no bruits; 1+ pedal edema;     MUSCULOSKELETAL:  Normal range of motion, strength and tone;     NEUROLOGIC: mental status: alert and oriented x 3; GROSSLY INTACT     PSYCHIATRIC:  appropriate affect and demeanor; normal speech pattern; grossly normal memory;         Assessment:         E78.5   Hyperlipidemia, unspecified       I10   Essential (primary) hypertension       I25.10   Atherosclerotic heart disease of native coronary artery without angina pectoris       E55.9   Vitamin D deficiency, unspecified       M25.50   Pain in unspecified joint       R60.0   Localized edema       M1A.00X0   Idiopathic chronic gout, unspecified site, without tophus (tophi)           ORDERS:         Meds Prescribed:       [Refilled] Zetia 10 mg oral tablet [1 tab daily], #90 (ninety) tablets, Refills: 1 (one)       [Refilled] atenoloL 50 mg oral tablet [TAKE ONE TABLET BY MOUTH DAILY], #90 (ninety)  tablets, Refills: 1 (one)       [Refilled] amLODIPine 10 mg oral tablet [TAKE ONE TABLET BY MOUTH DAILY], #90 (ninety) tablets, Refills: 1 (one)       [Refilled] allopurinoL 300 mg oral tablet [TAKE ONE TABLET BY MOUTH DAILY], #90 (ninety) tablets, Refills: 1 (one)       [New Rx] meloxicam 15 mg oral tablet [take 1 tablet (15 mg) by oral route once daily prn do not take with ibuprofen or aleve-  take with food], #30 (thirty) tablets, Refills: 1 (one)         Radiology/Test Orders:       3017F  Colorectal CA screen results documented and reviewed (PV)  (In-House)              Lab Orders:       27893  RAPII - H Arthritis Profile  (Send-Out)            09443  VITD - [facility] Vitamin D, 25 Hydroxy  (Send-Out)            10420  HTNLP - [facility] CMP AND LIPID: [id number], [id number]  (Send-Out)              Other Orders:       1101F  Pt screen for fall risk; document no falls in past year or only 1 fall w/o injury in past year (WILBUR)  (In-House)                      Plan:         Hyperlipidemia, unspecified    MIPS Has had no falls or only one fall without injury in the past year Colorectal Cancer Screening is up to date and the results are in the chart           Prescriptions:       [Refilled] Zetia 10 mg oral tablet [1 tab daily], #90 (ninety) tablets, Refills: 1 (one)           Orders:       1101F  Pt screen for fall risk; document no falls in past year or only 1 fall w/o injury in past year (WILBUR)  (In-House)            3017F  Colorectal CA screen results documented and reviewed (PV)  (In-House)              Essential (primary) hypertension    LABORATORY:  Labs ordered to be performed today include HTN/Lipid Panel: CMP, Lipid.            Prescriptions:       [Refilled] atenoloL 50 mg oral tablet [TAKE ONE TABLET BY MOUTH DAILY], #90 (ninety) tablets, Refills: 1 (one)       [Refilled] amLODIPine 10 mg oral tablet [TAKE ONE TABLET BY MOUTH DAILY], #90 (ninety) tablets, Refills: 1 (one)           Orders:       50108  HTNLP - [facility] CMP AND  LIPID: [id number], [id number]  (Send-Out)              Atherosclerotic heart disease of native coronary artery without angina pectoris        RECOMMENDATIONS given include: I am most concerned with him being high risk for MI or stroke and abnormal arhtritis profile (he showed for previous rheumatology appt and was told he would have to be rescheduled and never did)  .  if I am unable to control cholesterol with a statin and zetia kajalout concerns he must see cardiology for evaluation for possible repatha..          Vitamin D deficiency, unspecified    LABORATORY:  Labs ordered to be performed today include Vitamin D.            Orders:       48187  VITD - [facility] Vitamin D, 25 Hydroxy  (Send-Out)              Pain in unspecified joint    LABORATORY:  Labs ordered to be performed today include Arthritis Profile.  must discontinue if stomach upset, elevation of bp, or impaired kidney or liver function.          Prescriptions:       [New Rx] meloxicam 15 mg oral tablet [take 1 tablet (15 mg) by oral route once daily prn do not take with ibuprofen or aleve-  take with food], #30 (thirty) tablets, Refills: 1 (one)           Orders:       98910  RAPII [facility] Arthritis Profile  (Send-Out)              Localized edema        RECOMMENDATIONS given include: limitation of excessive use of the swollen extremity, elevation of the legs as much as possible, restriction of sodium intake, and use of support hose.          Idiopathic chronic gout, unspecified site, without tophus (tophi)          Prescriptions:       [Refilled] allopurinoL 300 mg oral tablet [TAKE ONE TABLET BY MOUTH DAILY], #90 (ninety) tablets, Refills: 1 (one)             Patient Recommendations:        For  Pain in unspecified joint:    I also recommend ^.          For  Localized edema:    Limit excessive use of the swollen extremity. Elevate the swollen extremity as much as possible to reduce swelling. Restrict the use of salt in your diet. Begin wearing support hose/stockings  to improve the circulation in your legs and decrease swelling.              Charge Capture:         Primary Diagnosis:     E78.5  Hyperlipidemia, unspecified           Orders:      29648  Office/outpatient visit; established patient, level 4  (In-House)            1101F  Pt screen for fall risk; document no falls in past year or only 1 fall w/o injury in past year (WILBUR)  (In-House)            3017F  Colorectal CA screen results documented and reviewed (PV)  (In-House)              I10  Essential (primary) hypertension     I25.10  Atherosclerotic heart disease of native coronary artery without angina pectoris     E55.9  Vitamin D deficiency, unspecified     M25.50  Pain in unspecified joint     R60.0  Localized edema     M1A.00X0  Idiopathic chronic gout, unspecified site, without tophus (tophi)

## 2021-05-18 NOTE — PROGRESS NOTES
Mohamud Jarrett 1950     Office/Outpatient Visit    Visit Date: Tue, Oct 23, 2018 01:53 pm    Provider: Shara Talley N.P. (Assistant: Maricel Toledo MA)    Location: [facility]        Electronically signed by Shara Tallye N.P. on  10/31/2018 11:39:17 AM                             SUBJECTIVE:        CC:     Mr. Jarrett is a 68 year old Black or  male.  He presents with left elbow pain, unable to bend. Pt has h/o gout in that elbow.  (PT WANT FLU VACCINE TODAY)         HPI: seen with yamileth faye student     Moderate left elbow pain that is limiting flexion and movement in general. He has had to take off work. Reports taking Allopurinol but its not working.     Elbow pain noted.  This is the left elbow.  The initial onset was 4 days ago.  The patient reports a pain level (between 1 and 10) of a 8.  There was no obvious precipitating event or injury.  The location of the discomfort is lateral.  It does not radiate.  Other details: thinks it is gout.  getting recurrent, frequent episodes of gout despite taking allopurinol 300 mg daily.  only prednisone helps.  pain is making him unable to work and he wants to get back to work as soon as possible.  aware that prednisone may increase blood sugar, cause stomach upset, bone thinning and should not be taken more often than every 90 days.  has not seen rheumatology or cardiology yet.  he missed initial appts.  rheumatology then called to reschedule appt.  now to see both specialties in december. he states he follows a low purine diet.      ROS:     CONSTITUTIONAL:  Negative for chills and fever.      CARDIOVASCULAR:  Negative for chest pain and palpitations.      RESPIRATORY:  Negative for recent cough and dyspnea.  occasional SOA at times (no chest pain)     GASTROINTESTINAL:  Negative for abdominal pain, nausea and vomiting.      MUSCULOSKELETAL:  Positive for arthralgias (left elbow).   Negative for myalgias.       NEUROLOGICAL:  Negative for paresthesias and weakness.      PSYCHIATRIC:  Negative for anxiety and depression.          PMH/FMH/SH:     Last Reviewed on 2018 10:47 AM by Sejal Montes    Past Medical History:             PAST MEDICAL HISTORY         Fracture(s): right clavicle;         CURRENT MEDICAL PROVIDERS:    Nephrologist         Surgical History:         Coronary Artery Bypass Graft: 4-V;      Cataract Removal: left;     back surgery x 2;    nasal fx repair;    carpal tunnel surgery; Procedures: colonoscopy 06  nml     Positive for    Hernia Repair: 2014; right inguinal; ;         Family History:         Positive for Hypertension ( brother; sister ) and Myocardial Infarction ( father; mother; sister ).  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          Tobacco/Alcohol/Supplements:     Last Reviewed on 2018 04:17 PM by Michelle Adamson    Tobacco: He has never smoked.  Non-drinker     Caffeine:  He admits to consuming caffeine via coffee ( 1 serving per day ) and tea ( one gallon servings per day ).          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)     CAD     Erectile dysfunction secondary to atherosclerotic disease     Muscle pain         Immunizations:     zzFluzone pf-quadrivalent 3 and up 2015     Fluzone (3 + years dose)  1/4/2011     Fluzone (3 + years dose) 10/8/2012     Fluzone Quadrivalent (3+ years) 2/1/2017     Influenza A (H1N1), IM (3+ years) Monovalent 11/17/2009     Fluzone High-Dose pf (>=65 yr) 9/19/2017         Allergies:     Last Reviewed on 6/14/2018 04:13 PM by Michelle Adamson    Zocor: muscle cramps (Adverse Reaction)    Zocor: Myalgia (Adverse Reaction)    peanut butter:        Current Medications:     Last Reviewed on 10/23/2018 01:57 PM by Maricel Toledo    Rosuvastatin 10mg Tablet Take 1 tablet(s) by mouth daily  at bedtime     Allopurinol 300mg Tablet Take 1 tablet(s) by mouth daily     Amlodipine  10mg Tablet 1 tab daily     Atenolol 50mg Tablet 1 tab daily     Niaspan 500mg Tablets, Extended Release Take 1 tablet(s) by mouth Q PM after supper. Pre-medicate with one aspirin before supper.     MAGNESIUM     Ferrous Sulfate     Fish Oil     Vitamin D2     Aspirin (ASA) 81mg Chewable Tablet     generic allergy med from [facility]         OBJECTIVE:        Vitals:         Historical:     06/14/2018  BP:   134/88 mm Hg ( (left arm, , sitting, );)     06/14/2018  Wt:   247.5lbs        Current: 10/23/2018 1:59:47 PM    Ht:  5 ft, 11.5 in;  Wt: 243.6 lbs;  BMI: 33.5    T: 98 F (oral);  BP: 147/78 mm Hg (left arm, sitting);  P: 64 bpm (left arm (BP Cuff), sitting);  sCr: 1.23 mg/dL;  GFR: 66.29        Exams:     PHYSICAL EXAM:     GENERAL: Vitals recorded well developed, well nourished;  no apparent distress;     RESPIRATORY: normal appearance and symmetric expansion of chest wall; normal respiratory rate and pattern with no distress; normal breath sounds with no rales, rhonchi, wheezes or rubs;     CARDIOVASCULAR: normal rate; rhythm is regular;     Peripheral Pulses: radial: 2+ L;  trace pedal edema;     MUSCULOSKELETAL: normal gait; decreased range of motion noted in: left elbow flexion and extension;  pain with range of motion in: left elbow;     NEUROLOGIC: mental status: alert and oriented x 3;     PSYCHIATRIC:  appropriate affect and demeanor; normal speech pattern; normal thought and perception;         Procedures:     Elbow pain     1. Kenalog 60 mg given IM in the left hip; administered by SCS;  lot number [id number]; expires 10-19             ASSESSMENT           719.42   M25.522  Elbow pain              DDx:     272.4   E78.5  Hyperlipidemia              DDx:     796.4   R68.89  Abnormal laboratory test findings without diagnosis              DDx:         ORDERS:         Other Orders:       48609  Therapeutic injection  (In-House)           Kenalog, per 10 mg (x6)                 PLAN: high dose flu vaccine not in stock today.  to return next week or go to local pharmacy for high dose flu vaccine.          Elbow pain         RECOMMENDATIONS given include: RICE therapy.            Orders:       94558  Therapeutic injection  (In-House)                     Kenalog, per 10 mg (x6)          Hyperlipidemia         RECOMMENDATIONS given include: exercise, low cholesterol/low fat diet, and printed copies of june 2018 labs and provided to him and discussed results.  he is very high risk for MI or stroke.  he has myalgias on statins previously.  currently tolerating low dose crestor with no problems.   add zetia.  explain that he must see cardiiology.  if unable to get numbers down with statin and zetia will need to be evaluated for alternative methods such as repatha.  there is difficulty getting repatha approved by family practive provider.  hx of MI and need to take all precautions so that he does not experience another MI or stroke.  must see cardiology.  verbalizes understanding..           Abnormal laboratory test findings without diagnosis         RECOMMENDATIONS given include: mildly elevated rheumatoid factor and recurrent gout.  must see rheumatology.  verbalizes understanding..              Patient Recommendations:Follow up with Cardiology and Rheumatology asap due to hyperlipidemia, positive NICHOLE, and joint  pain.         For  Elbow pain:     Rest the affected area and keep it elevated as much as possible. Apply ice over the affected area. Use a compression wrap, such as an Ace bandage.          For  Hyperlipidemia:     Maintain a regular exercise program. Reduce the amount of cholesterol and saturated fat in your diet.              CHARGE CAPTURE           **Please note: ICD descriptions below are intended for billing purposes only and may not represent clinical diagnoses**        Primary Diagnosis:         719.42 Elbow pain            M25.522    Pain in left elbow              Orders:          61071   Office/outpatient visit; established patient, level 3  (In-House)             80918   Therapeutic injection  (In-House)                                           Kenalog, per 10 mg (x6)         272.4 Hyperlipidemia            E78.5    Hyperlipidemia, unspecified    796.4 Abnormal laboratory test findings without diagnosis            R68.89    Other general symptoms and signs        ADDENDUMS:      ____________________________________    Date: 11/09/2018 11:45 AM    Author: Leydi Goldsmith         Visit Note Faxed to:        User Entered Recipient; Number [phone]            Date: 11/09/2018 11:55 AM    Author: Leydi Goldsmith         Visit Note Faxed to:        User Entered Recipient; Number [phone]

## 2021-05-18 NOTE — PROGRESS NOTES
Mohamud Jarrett 1950     Office/Outpatient Visit    Visit Date: Fri, Feb 16, 2018 02:38 pm    Provider: Shara Talley N.P. (Assistant: Deb Shipman MA)    Location: [facility]        Electronically signed by Shara Talley N.P. on  02/17/2018 09:02:27 PM                             SUBJECTIVE:        CC:     Mr. Jarrett is a 67 year old Black or  male.  Right Hand         HPI:         Patient complains of hand pain.  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  pain and swelling noted the next day.  went to [facility].  hand xray negative.  dx with hand contusion.  seen here 2 days ago.  started on prednisone and allopurinol for potential gout (gets frequent flares of gout).  feel that swelling and redness are increasing near abrasion at base of right 2nd finger.  worried about infection..      ROS:     CONSTITUTIONAL:  Negative for chills, fatigue, fever, and weight change.      CARDIOVASCULAR:  Negative for chest pain, palpitations, tachycardia, orthopnea, and edema.      RESPIRATORY:  Negative for cough, dyspnea, and hemoptysis.      MUSCULOSKELETAL:  Positive for right hand pain.      INTEGUMENTARY:  Positive for right hand erythema.      NEUROLOGICAL:  Negative for dizziness, headaches, paresthesias, and weakness.      ENDOCRINE:  Negative for hair loss, heat/cold intolerance, polydipsia, and polyphagia.      PSYCHIATRIC:  Negative for anxiety, depression, and sleep disturbances.          PMH/FMH/SH:     Last Reviewed on 2/12/2018 10:47 AM by Sejal Montes    Past Medical History:             PAST MEDICAL HISTORY         Fracture(s): right clavicle;         CURRENT MEDICAL PROVIDERS:    Nephrologist         Surgical History:         Coronary Artery Bypass Graft: 4-V;      Cataract Removal: left;     back surgery x 2;    nasal fx repair;    carpal tunnel surgery; Procedures:  colonoscopy 06  nml     Positive for    Hernia Repair: 2014; right inguinal; ;         Family History:         Positive for Hypertension ( brother; sister ) and Myocardial Infarction ( father; mother; sister ).  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          Tobacco/Alcohol/Supplements:     Last Reviewed on 2018 10:32 AM by Serena Figueredo    Tobacco: He has never smoked.  Non-drinker     Caffeine:  He admits to consuming caffeine via coffee ( 1 serving per day ) and tea ( one gallon servings per day ).          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)     CAD     Erectile dysfunction secondary to atherosclerotic disease     Hand pain     Muscle pain         Immunizations:     Fluzone pf-quadrivalent 3 and up 2015     Fluzone (3 + years dose) 2011     Fluzone (3 + years dose) 10/8/2012     Fluzone Quadrivalent (3+ years) 2017     Influenza A (H1N1), IM (3+ years) Monovalent 2009     Fluzone High-Dose pf (>=65 yr) 2017         Allergies:     Last Reviewed on 2018 10:28 AM by Serena Figueredo    Zocor: muscle cramps (Adverse Reaction)    Zocor: Myalgia (Adverse Reaction)    peanut butter:        Current Medications:     Last Reviewed on 2018 02:42 PM by Deb Shipman    Allopurinol 300mg Tablet Take 1 tablet(s) by mouth daily     Rosuvastatin 10mg Tablet Take 1  tablet(s) by mouth daily  at bedtime     Amlodipine  10mg Tablet 1 tab daily     Atenolol 50mg Tablet 1 tab daily     Cyclobenzaprine HCl 10mg Tablet 1 tablet AT HS PRN     Niaspan 500mg Tablets, Extended Release Take 1 tablet(s) by mouth Q PM after supper. Pre-medicate with one aspirin before supper.     Prednisone 5mg Tablet Take 8 tablet(s) by mouth on day one, then taper by one pill daily     MAGNESIUM     Ferrous Sulfate     Fish Oil     Vitamin D2     Aspirin (ASA) 325mg [place]     generic allergy med from [facility]         OBJECTIVE:        Vitals:         Current: 2/16/2018 2:41:22 PM    Ht:  5 ft, 11.5 in;  Wt: 250 lbs;  BMI: 34.4    T: 97.3 F (oral);  BP: 153/78 mm Hg (left arm, sitting);  P: 63 bpm (left arm (BP Cuff), sitting);  sCr: 1.23 mg/dL;  GFR: 67.90        Exams:     PHYSICAL EXAM:     GENERAL: no apparent distress;     RESPIRATORY: normal respiratory rate and pattern with no distress; normal breath sounds with no rales, rhonchi, wheezes or rubs;     CARDIOVASCULAR: normal rate; rhythm is regular;     Peripheral Pulses: radial: 2+ R;     SKIN: mild erythema just beneath base of right 2nd finger;     MUSCULOSKELETAL: pain with range of motion in: flexion of right fingers2-3;     NEUROLOGIC: mental status: alert and oriented x 3; GROSSLY INTACT     PSYCHIATRIC:  appropriate affect and demeanor; normal speech pattern; grossly normal memory;         ASSESSMENT           729.5   M79.641  Hand pain              DDx:         ORDERS:         Meds Prescribed:       Doxycycline Monohydrate 100mg Tablet Take 1 tablet(s) by mouth bid x 10 days  #20 (Twenty) tablet(s) Refills: 0         Lab Orders:       [id number]  [facility] - [facility] CBC with 3 part diff  (Send-Out)         [id number]  [facility] [place] [facility] Sedimentation rate, non-automated ESR  (Send-Out)                   PLAN:          Hand pain     LABORATORY:  Labs ordered to be performed today include CBC and ESR.      not improving with steroids.  concerned about infection.  cover  as below.  follow up if not improving.  to ER if worsens.            Prescriptions:       Doxycycline Monohydrate 100mg Tablet Take 1 tablet(s) by mouth bid x 10 days  #20 (Twenty) tablet(s) Refills: 0           Orders:       [id number]  [facility] - [facility] CBC with 3 part diff  (Send-Out)         [id number]  S[facility] - [facility] Sedimentation rate, non-automated ESR  (Send-Out)               CHARGE CAPTURE           **Please note: ICD descriptions below are intended for billing purposes only and may not represent clinical diagnoses**        Primary Diagnosis:         729.5 Hand pain            M79.641    Pain in right hand              Orders:          [id number]   Office/outpatient visit; established patient, level 3  (In-House)

## 2021-05-18 NOTE — PROGRESS NOTES
"Mohamud Jarrett 1950     Office/Outpatient Visit    Visit Date: Fri, Aug 9, 2019 09:55 am    Provider: Shara Talley N.P. (Assistant: Sarah Spurling, MA)    Location: [facility]        Electronically signed by Shara Talley N.P. on  08/13/2019 09:06:28 AM                             SUBJECTIVE:        CC:     Mr. Jarrett is a 69 year old White male.  Having issues w allergies.          HPI:         Allergies noted.  states he has been wheezing \" for a while\" and wonders if due to allergies and the heat.  upon further history wheezing started 2 days ago with dry cough.  afebrile.  denies runny nose.  mild nasal congestion.  has never smoked.  exposed to co worker with pneumonia.      see HPI above         With regard to the hyperlipidemia, stopped rosuvastatin .  states it caused muscle aches and he does not want to take a statin medication.  due to hx MI and CAD he was referred to cardiology for evaluation.  saw cardiology dec 2018  .  has not followed up but is  going to.          In regard to the hypertension, his current cardiac medication regimen includes amlodipine, atenolol.  Mr. Jarrett does not check his blood pressure other than at his clinic appointments.  He is tolerating the medication well without side effects.  Compliance with treatment has been good; he takes his medication as directed.          stable on allopurinol,  denies side effects.  requests refills.  denies any recent episodes of gout.      ROS:     CONSTITUTIONAL:  Negative for chills, fatigue, fever, and weight change.      E/N/T:  Positive for nasal congestion.   Negative for frequent rhinorrhea or sore throat.      CARDIOVASCULAR:  Negative for chest pain, palpitations, tachycardia, orthopnea, and edema.      RESPIRATORY:  Positive for recent cough ( typically dry ) and frequent wheezing.   Negative for dyspnea.      NEUROLOGICAL:  Negative for dizziness, headaches, paresthesias, and weakness.      " ALLERGIC/IMMUNOLOGIC:  Positive for seasonal allergies.      PSYCHIATRIC:  Negative for anxiety, depression, and sleep disturbances.          PMH/FMH/SH:     Last Reviewed on 12/10/2018 07:57 AM by Daniel Escudero    Past Medical History:             PAST MEDICAL HISTORY         Fracture(s): right clavicle;         CURRENT MEDICAL PROVIDERS:    Cardiologist    Nephrologist         PREVENTIVE HEALTH MAINTENANCE             COLORECTAL CANCER SCREENING: Up to date (colonoscopy q10y; sigmoidoscopy q5y; Cologuard q3y) was last done dec 2018, Results are in chart; colonoscopy with the following abnormalities noted-- Polyp(s) next due          Surgical History:         Coronary Artery Bypass Graft: 4-V;      Cataract Removal: left;     back surgery x 2;    nasal fx repair;    carpal tunnel surgery; Procedures: colonoscopy 06  nml     Positive for    Hernia Repair: 2014; right inguinal; ;         Family History:         Positive for Hypertension ( brother; sister ) and Myocardial Infarction ( father; mother; sister ).  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          Tobacco/Alcohol/Supplements:     Last Reviewed on 2019 09:08 AM by Maricel Toledo    Tobacco: He has never smoked.  Non-drinker     Caffeine:  He admits to consuming caffeine via coffee ( 1 serving per day ) and tea ( one gallon servings per day ).          Substance Abuse History:     Last Reviewed on 12/10/2018 07:57 AM by Daniel Escudero    NEGATIVE         Mental Health History:     Last Reviewed on 12/10/2018 07:57 AM by Daniel Escudero        Communicable  Diseases (eg STDs):     Last Reviewed on 12/10/2018 07:57 AM by Daniel Escudero            Current Problems:     Last Reviewed on 12/10/2018 07:57 AM by Daniel Escudero    Abnormal laboratory test findings without diagnosis     Impotence     Vitamin D deficiency, unspecified     Unspecified deficiency anemia     Screening for cancer of colon     Proteinuria     Elevated fasting glucose     Hypertension     Hyperlipidemia     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)     CAD     Erectile dysfunction secondary to atherosclerotic disease     Knee pain     Muscle pain         Immunizations:     zzFluzone pf-quadrivalent 3 and up 11/13/2015     Fluzone (3 + years dose) 1/4/2011     Fluzone (3 + years dose) 10/8/2012     Fluzone Quadrivalent (3+ years) 2/1/2017     Influenza A (H1N1), IM (3+ years) Monovalent 11/17/2009     Fluzone High-Dose pf (>=65 yr) 9/19/2017     Fluzone High-Dose pf (>=65 yr) 11/12/2018         Allergies:     Last Reviewed on 1/30/2019 09:08 AM by Maricel Toledo    Zocor: muscle cramps (Adverse Reaction)    Zocor: Myalgia (Adverse Reaction)    peanut butter:        Current Medications:     Last Reviewed on 8/09/2019 09:59 AM by Spurling, Sarah C    Diclofenac Sodium 1% Topical Gel Apply 2 grams to affected area BID     Allopurinol 300mg Tablet Take 1 tablet(s) by mouth daily     Amlodipine  10mg Tablet 1 tab daily     Atenolol 50mg Tablet 1 tab daily     Niaspan 500mg Tablets, Extended Release Take 1 tablet(s) by mouth Q PM after supper. Pre-medicate with one aspirin before supper.     Zetia 10mg Tablet 1 tab daily     MAGNESIUM     Ferrous Sulfate     Fish Oil     Vitamin D2     Aspirin (ASA) 81mg Chewable Tablet     generic allergy med from [facility]         OBJECTIVE:        Vitals:         Historical:     01/30/2019  BP:   166/74 mm Hg ( (right arm, , sitting, );)     01/30/2019  BP:   170/75 mm Hg ( (right arm, , sitting, );)     01/30/2019  Wt:   238lbs        Current:  8/9/2019 10:02:12 AM    Ht:  5 ft, 11.5 in;  Wt: 243.4 lbs;  BMI: 33.5    T: 97.8 F (oral);  BP: 167/75 mm Hg (left arm, sitting);  P: 63 bpm (left arm (BP Cuff), sitting);  sCr: 1.2 mg/dL;  GFR: 67.04    O2 Sat: 96 % (room air)        Repeat:     10:03:17 AM     BP:   147/78mm Hg (right arm, sitting)         Exams:     PHYSICAL EXAM:     GENERAL: no apparent distress;     E/N/T: EARS: external auditory canal occluded by cerumen on the right;  the left TM is has fluid behind it;  NOSE: nasal mucosa is erythematous;  OROPHARYNX: posterior pharynx, including tonsils, tongue, and uvula are normal;     RESPIRATORY: normal respiratory rate and pattern with no distress; normal breath sounds with no rales, rhonchi, wheezes or rubs;     CARDIOVASCULAR: normal rate; rhythm is regular;     Peripheral Pulses: posterior tibial: 2+ amplitude, no bruits; no edema;     MUSCULOSKELETAL:  Normal range of motion, strength and tone;     NEUROLOGIC: mental status: alert and oriented x 3; GROSSLY INTACT     PSYCHIATRIC:  appropriate affect and demeanor; normal speech pattern; grossly normal memory;         Lab/Test Results:         LABORATORY RESULTS: PFTs performed by pr         Procedures:     Cerumen impaction         Cerumen/Wax removal: Cerumen impaction is noted in the right ear The degree of wax accumulation is quite dense and severe in the right ear.  With marked difficulty, using a syringe irrigation and ear currette, the wax is removed.  Removed from ear was hard balls of wax.  The patient tolerated the procedure poorly.  Complications were discomfort.      Provider returned to room to remove cerumen impaction.  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)              DDx:     388.70   H92.01  Otalgia, NOS              DDx:         ORDERS:         Meds Prescribed:       ProAir HFA (Albuterol) 90mcg/1actuation Oral Inhaler 1-2 puffs q 4-6 hrs PRN  #8.5 (Eight point Five) gm Refills: 0       Refill of: Zetia (Ezetimibe) 10mg Tablet 1 tab daily  #90 (Ninety) tablet(s) Refills: 1       Refill of: Amlodipine  10mg Tablet 1 tab daily  #90 (Ninety) tablet(s) Refills: 1       Refill of: Atenolol 50mg Tablet 1 tab daily  #90 (Ninety) tablet(s) Refills: 1       Refill of: Allopurinol 300mg Tablet Take 1 tablet(s) by mouth daily  #90 (Ninety) tablet(s) Refills: 1       Ofloxacin 0.3% Otic Solution 2-3 drops BID x 7 days  #5 (Five) ml Refills: 0         Procedures Ordered:       [id number]  Removal of impacted cerumen right ear (NURSE)  (In-House)         [id number]  Spirometry, w/graphic record, total & timed vital capacity, expiratory flow rates w/wo max ventilate  (In-House)           Other Orders:       [id number]  Noninvasive ear or pulse oximetry for oxygen saturation; single determination  (In-House)                   PLAN:          Allergies         RECOMMENDATIONS given include: plain antihistamine (claritin, zyrtec, or allegra) and flonase or nasacort nasal spray.  consider trial of singulair or referral to allergist if not improving..           Cerumen impaction         RECOMMENDATIONS given include: right TM noted to be normal after cerumen removal.  right ear canal erythematous.  ear drops sent in as below..            Orders:       [id number]  Removal of impacted cerumen right ear (NURSE)  (In-House)            Wheezing         RECOMMENDATIONS given include: avoidance of heavy exertion, identification and avoidance of asthma triggers, avoidance of cigarette smoke, and restriction noted on PFT.  reports symptoms x 2 days only.  possible bronchitis.  follow up if not improving..            Prescriptions:        ProAir HFA (Albuterol) 90mcg/1actuation Oral Inhaler 1-2 puffs q 4-6 hrs PRN  #8.5 (Eight point Five) gm Refills: 0           Orders:       [id number]  Noninvasive ear or pulse oximetry for oxygen saturation; single determination  (In-House)         13020  Spirometry, w/graphic record, total & timed vital capacity, expiratory flow rates w/wo max ventilate  (In-House)            Hyperlipidemia         RECOMMENDATIONS given include: alcohol avoidance, exercise, low cholesterol/low fat diet, weight loss, and follow up with cardiology.            Prescriptions:       Refill of: Zetia (Ezetimibe) 10mg Tablet 1 tab daily  #90 (Ninety) tablet(s) Refills: 1          Hypertension         RECOMMENDATIONS given include: perform routine monitoring of blood pressure with home blood pressure cuff, take medication as prescribed, try not to miss doses, and follow up with cardiology.            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 Avoid anything that you have been able to identify as a trigger for your asthma (for example, cigarette smoke, cat or dog hair, chemical fumes, etc.). Avoid cigarette smoke.          For  Hyperlipidemia:     Avoid alcohol as it can contribute to elevated blood pressure. Maintain a regular exercise program. Reduce the amount of cholesterol and saturated fat in your diet. Try to lose some weight; even modest weight reduction can improve your blood  pressure.          For  Hypertension:     Begin monitoring your blood pressure by brief nurse visits at our office, a home blood pressure monitor, or by checking on the machines in pharmacies or stores.  Keep a log of the readings.              CHARGE CAPTURE           **Please note: ICD descriptions below are intended for billing purposes only and may not represent clinical diagnoses**        Primary Diagnosis:         477.0 Allergies            J30.9    Allergic rhinitis, unspecified              Orders:          [id number]   Office/outpatient visit; established patient, level 4  (In-House)           380.4 Cerumen impaction            H61.21    Impacted cerumen, right ear              Orders:          [id number]   Removal of impacted cerumen right ear (NURSE)  (In-House)           786.07 Wheezing            R06.2    Wheezing              Orders:          [id number]   Noninvasive ear or pulse oximetry for oxygen saturation; single determination  (In-House)             03477   Spirometry, w/graphic record, total & timed vital capacity, expiratory flow rates w/wo max ventilate  (In-House)           272.4 Hyperlipidemia            E78.5    Hyperlipidemia, unspecified    401.1 Hypertension            I10    Essential (primary) hypertension    274.02 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

## 2021-05-18 NOTE — PROGRESS NOTES
Mohamud Jarrett 1950     Office/Outpatient Visit    Visit Date: Tue, Feb 27, 2018 04:04 pm    Provider: Shara Talley N.P. (Assistant: Carmen Irvin MA)    Location: [facility]        Electronically signed by Shara Talley N.P. on  02/28/2018 11:16:42 AM                             SUBJECTIVE:        CC:     Mr. Jarrett is a 67 year old Black or  male.  This is a follow-up visit.  on hand pain         HPI:         Patient complains of hand pain.  Other details: right hand pain over the last 20 days.  feb 9 hand xray negative- flaget.  took naproxyn.  then restarted allopurinol and completed course of prednisone with improvement of all fingers except right finger #3 with pain radiating into hand.  did receive abrasion right hand at work-  scraped hand against metal.  thought is could be infected due to increased redness.  seen here and has completed doxycycline with improvement of erythema but not of pain.  third finger with stiffness.  does not feel like gout flare..      ROS:     CONSTITUTIONAL:  Negative for chills, fatigue, fever, and weight change.      RESPIRATORY:  Negative for cough, dyspnea, and hemoptysis.      MUSCULOSKELETAL:  Positive for right hand pain.      INTEGUMENTARY:  Negative for rash.      NEUROLOGICAL:  Negative for dizziness, headaches, paresthesias, and weakness.          PMH/FMH/SH:     Last Reviewed on 2/12/2018 10:47 AM by Sejal Montes    Past Medical History:             PAST MEDICAL HISTORY         Fracture(s): right clavicle;         CURRENT MEDICAL PROVIDERS:    Nephrologist         Surgical History:         Coronary Artery Bypass Graft: 4-V;      Cataract Removal: left;     back surgery x 2;    nasal fx repair;    carpal tunnel surgery; Procedures: colonoscopy 06  nml     Positive for    Hernia Repair: june 2014; right inguinal; ;         Family History:         Positive for Hypertension ( brother; sister ) and Myocardial Infarction (  father; mother; sister ).  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          Tobacco/Alcohol/Supplements:     Last Reviewed on 2018 02:39 PM by Deb Shipman    Tobacco: He has never smoked.  Non-drinker     Caffeine:  He admits to consuming caffeine via coffee ( 1 serving per day ) and tea ( one gallon servings per day ).          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)     CAD     Erectile dysfunction secondary to atherosclerotic disease     Hand pain     Muscle pain         Immunizations:     Fluzone pf-quadrivalent 3 and up 2015     Fluzone (3 + years dose) 2011     Fluzone (3 + years dose) 10/8/2012     Fluzone Quadrivalent (3+ years) 2017     Influenza A (H1N1), IM (3+ years) Monovalent 2009     Fluzone High-Dose pf (>=65 yr) 2017         Allergies:     Last Reviewed on 2018 02:39 PM by Deb Shipman    Zocor: muscle cramps (Adverse Reaction)    Zocor: Myalgia (Adverse Reaction)    peanut butter:        Current Medications:     Last Reviewed on 2018 04:08 PM by Carmen Irvin    Allopurinol 300mg Tablet Take 1 tablet(s) by mouth daily     Rosuvastatin 10mg Tablet Take 1 tablet(s) by mouth daily  at bedtime     Amlodipine  10mg Tablet 1 tab daily     Atenolol 50mg Tablet 1 tab daily     Cyclobenzaprine HCl 10mg Tablet 1 tablet AT HS PRN     Niaspan 500mg  Tablets, Extended Release Take 1 tablet(s) by mouth Q PM after supper. Pre-medicate with one aspirin before supper.     MAGNESIUM     Ferrous Sulfate     Fish Oil     Vitamin D2     Aspirin (ASA) 325mg [place]     generic allergy med from [facility]         OBJECTIVE:        Vitals:         Historical:     02/16/2018  BP:   153/78 mm Hg ( (left arm, , sitting, );)     02/16/2018  Wt:   250lbs        Current: 2/27/2018 4:07:16 PM    Ht:  5 ft, 11.5 in;  Wt: 244.2 lbs;  BMI: 33.6    T: 97 F (oral);  BP: 143/78 mm Hg (left arm, sitting);  P: 63 bpm (left arm (BP Cuff), sitting);  sCr: 1.23 mg/dL;  GFR: 67.23        Exams:     PHYSICAL EXAM:     GENERAL:  well developed and nourished; appropriately groomed; in no apparent distress;     RESPIRATORY: normal respiratory rate and pattern with no distress; normal breath sounds with no rales, rhonchi, wheezes or rubs;     CARDIOVASCULAR: normal rate; rhythm is regular;     Peripheral Pulses: radial: 2+ R;  no edema;     MUSCULOSKELETAL: decreased range of motion noted in: flexion right third finger;  pain with range of motion in: flexion right third finger;     NEUROLOGIC: mental status: alert and oriented x 3; GROSSLY INTACT     PSYCHIATRIC:  appropriate affect and demeanor; normal speech pattern; grossly normal memory;         ASSESSMENT           729.5   M79.641  Hand pain              DDx:         ORDERS:         Meds Prescribed:       Diclofenac Sodium 1% Topical Gel Apply 2 gm(s) to affected area qid prn pain  #100 (One [place]) gm Refills: 0         Lab Orders:       [id number]  SED - [facility] Sedimentation rate, non-automated ESR  (Send-Out)           Procedures Ordered:       REFER  Referral to Specialist or Other Facility  (Send-Out)                   PLAN:          Hand pain     LABORATORY:  Labs ordered to be performed today include ESR.      REFERRALS:  Referral initiated to hand specialist.      RECOMMENDATIONS given include: discussed with dr jaffe.  refer to hand  specialist..            Prescriptions:       Diclofenac Sodium 1% Topical Gel Apply 2 gm(s) to affected area qid prn pain  #100 (One [place]) gm Refills: 0           Orders:       [id number]  [facility] Sedimentation rate, non-automated ESR  (Send-Out)         REFER  Referral to Specialist or Other Facility  (Send-Out)               Patient Recommendations:        For  Hand pain:     I also recommend hand specialist.              CHARGE CAPTURE           **Please note: ICD descriptions below are intended for billing purposes only and may not represent clinical diagnoses**        Primary Diagnosis:         729.5 Hand pain            M79.641    Pain in right hand              Orders:          [id number]   Office/outpatient visit; established patient, level 3  (In-House)

## 2021-05-18 NOTE — PROGRESS NOTES
"Mohamud Jarrett 1950     Office/Outpatient Visit    Visit Date: Wed, Mar 25, 2020 9:54 am    Provider: Moe Sorenson MD (Assistant: Patricia Hampton MA )    Location: [facility]        Electronically signed by Moe Sorenson MD on  03/25/2020 10:17:19 PM                             Subjective:        CC: Mr. Jarrett is a 69 year old White male.  feels hot, weak, right ear hurting, sweats, chills, started this morning pt gave consent to do telehealth with Shara Talley 3/25/20 //ael         HPI:           Patient complains of other malaise.  Other details: woke this am with malaise , fatigue and feeling hot.  does not have thermometer to check his temp.  constant right earache.  has never had the flu and does not know what the flu feels like.  discuss possibly coming ot offiice for flu test.  if has flu could call in [employer].  he states he would like to be seen.  put on schedule to be seen..      Arrived at office in resp clinic and says that got up this morning and ears were warm and hurting so he wondered if had the flu.  No fever...but felt \"hot\".  No cough. No N/V. Works for [employer] and they told him don't come in ...go to the doctor.      He does have hx of HTN and BP is up today, but his admits to being a little anxious.      ROS:     CONSTITUTIONAL:  Negative for chills, fatigue and fever.      CARDIOVASCULAR:  Negative for chest pain, orthopnea, paroxysmal nocturnal dyspnea and pedal edema.      RESPIRATORY:  Negative for dyspnea and cough.      GASTROINTESTINAL:  Negative for abdominal pain, heartburn, constipation, diarrhea, and stool changes.      GENITOURINARY:  Negative for dysuria and polyuria.      PSYCHIATRIC:  Negative for anxiety and depression.          Past Medical History / Family History / Social History:         Last Reviewed on 12/10/2018 07:57 AM by Daniel Escudero    Past Medical History:             PAST MEDICAL HISTORY         Fracture(s): right clavicle;     " Hospitalizations: (right ileolingual neuritis dec 2019)         CURRENT MEDICAL PROVIDERS:    Cardiologist    Nephrologist         PREVENTIVE HEALTH MAINTENANCE             COLORECTAL CANCER SCREENING: Up to date (colonoscopy q10y; sigmoidoscopy q5y; Cologuard q3y) was last done dec 2018, Results are in chart; colonoscopy with the following abnormalities noted-- Polyp(s) next due          Surgical History:         Coronary Artery Bypass Graft: 4-V;     Cataract Removal: left;     back surgery x 2;    nasal fx repair;    carpal tunnel surgery; Procedures: colonoscopy 06  nml     Positive for    Hernia Repair: 2014; right inguinal; ;         Family History:         Positive for Hypertension ( brother; sister ) and Myocardial Infarction ( father; mother; sister ).  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          Tobacco/Alcohol/Supplements:     Last Reviewed on 10/17/2019 09:29 AM by Spurling, Sarah C    Tobacco: He has never smoked.  Non-drinker     Caffeine:  He admits to consuming caffeine via coffee ( 1 serving per day ) and tea ( one gallon servings per day ).          Substance Abuse History:     Last Reviewed on 12/10/2018 07:57 AM by Daniel Escudero    NEGATIVE         Mental Health History:     Last Reviewed on 12/10/2018 07:57 AM by Daniel Escudero        Communicable Diseases (eg STDs):     Last Reviewed on 12/10/2018 07:57 AM by Daniel Escudero        Current Problems:     Last Reviewed on 3/25/2020 10:28 AM by Shara Talley    Atherosclerotic heart disease of native coronary artery without angina pectoris    Hyperlipidemia, unspecified    Essential (primary)  hypertension    Idiopathic chronic gout, unspecified site, without tophus (tophi)    Other male erectile dysfunction    Other abnormal glucose    Nutritional anemia, unspecified    Vitamin D deficiency, unspecified    Male erectile disorder    Other general symptoms and signs    Pain in left knee        Immunizations:     zzFluzone pf-quadrivalent 3 and up 11/13/2015    Fluzone (3 + years dose) 1/4/2011    Fluzone (3 + years dose) 10/8/2012    Fluzone Quadrivalent (3+ years) 2/1/2017    Influenza A (H1N1), IM (3+ years) Monovalent 11/17/2009    Fluzone High-Dose pf (>=65 yr) 9/19/2017    Fluzone High-Dose pf (>=65 yr) 11/12/2018        Allergies:     Last Reviewed on 3/25/2020 09:58 AM by Patricia Hampton    Zocor: muscle cramps  (Adverse Reaction)    Zocor: Myalgia  (Adverse Reaction)    peanut butter:          Current Medications:     Last Reviewed on 3/25/2020 09:58 AM by Patricia Hampton    generic allergy med from [facility]    Aspirin (ASA) 81mg Chewable Tablet    Allopurinol 300mg Tablet [Take 1 tablet(s) by mouth daily]    Atenolol 50mg Tablet [1 tab daily ]    Niaspan 500mg Tablets, Extended Release [Take 1 tablet(s) by mouth Q PM after supper. Pre-medicate with one aspirin before supper.]    Diclofenac Sodium 1% Topical Gel [Apply 2 grams to affected area BID]    Fish Oil     Vitamin D2     Ferrous Sulfate     MAGNESIUM    Amlodipine  10mg Tablet [1 tab daily]        Objective:        Vitals:         Current: 3/25/2020 12:25:23 PM    Ht:  5 ft, 11.5 in;  Wt: 244 lbs;  BMI: 33.6T: 96.7 F (oral);  BP: 171/88 mm Hg (left arm, sitting);  P: 60 bpm (left arm (BP Cuff), sitting);  sCr: 1.2 mg/dL;  GFR: 67.11O2 Sat: 98 % (room air)        Exams:     PHYSICAL EXAM:     GENERAL:  well developed and nourished; appropriately groomed; in no apparent distress;     EYES: Nonicteric and with unremarkable lids, iris and pupils;     NECK: carotid exam reveals no bruits;     RESPIRATORY: normal respiratory rate and pattern with no  distress; normal breath sounds with no rales, rhonchi, wheezes or rubs;     CARDIOVASCULAR: normal rate; rhythm is regular;  no systolic murmur;     LYMPHATIC: no enlargement of cervical or facial nodes;     MUSCULOSKELETAL: normal overall tone No pedal edema.;     NEUROLOGIC: No lateralizing deficit.;     PSYCHIATRIC:  appropriate affect and demeanor; normal speech pattern; grossly normal memory;         Lab/Test Results:         Performed by:: [facility] (03/25/2020),     Influenza A and B: Negative (03/25/2020),             Assessment:         R53.81   Other malaise       R53.1   Weakness       I10   Essential (primary) hypertension           ORDERS:         Lab Orders:       [id number]  Infectious agent antigen detection by immunoassay; Influenza  (In-House)            [id number]  Infectious agent antigen detection by immunoassay; Influenza  (In-House)                      Plan:         Other malaise        TESTS/PROCEDURES:  Will proceed with Flu A&B Flu A Flu B to be performed/scheduled now.            Orders:       [id number]  Infectious agent antigen detection by immunoassay; Influenza  (In-House)            [id number]  Infectious agent antigen detection by immunoassay; Influenza  (In-House)              Essential (primary) hypertensionHe was rather anxious today so I suspect his BP may be elevated as result.  Has been pretty good lately.  Will have him check BP a few times and then adjust medications if remains elevated            Charge Capture:         Primary Diagnosis:     R53.81  Other malaise           Orders:      [id number]  Office/outpatient visit; established patient, level 3  (In-House)            [id number]  Infectious agent antigen detection by immunoassay; Influenza  (In-House)            [id number]  Infectious agent antigen detection by immunoassay; Influenza  (In-House)              R53.1  Weakness     I10  Essential (primary) hypertension

## 2021-05-18 NOTE — PROGRESS NOTES
Mohamdu Jarrett  1950     Office/Outpatient Visit    Visit Date: Wed, Mar 17, 2021 08:41 am    Provider: Shara Talley N.P. (Assistant: Patricia Hampton MA)    Location: [facility]        Electronically signed by Shara Talley N.P. on  03/17/2021 07:50:42 PM                             Subjective:        CC: Mr. Jarrett is a 70 year old White male.  This is a follow-up visit.          HPI: covid vaccine at [facility] last week first dose           Patient presents with hyperlipidemia, unspecified.  Current treatment includes rosuvastatin, zetia and a low cholesterol/low fat diet.  Compliance with treatment has been good; he takes his medication as directed and maintains his low cholesterol diet.  He denies experiencing any hypercholesterolemia related symptoms.            With regard to the essential (primary) hypertension, his current cardiac medication regimen includes amlodipine, atenolol.  Compliance with treatment has been good; he takes his medication as directed.  He is tolerating the medication well without side effects.  Mr. Jarrett does not check his blood pressure other than at his clinic appointments.        declines to see cardiology.  last saw cardiology 2018          stable on allopurinol.  denies side effects.  requests refills.  denies recent flare ups of gout.  does continue with diffuse joint pain and elevated rheumatoid factor.            not currently on supplement           Concerning pain in unspecified joint, he was referred to see dr barajas rheumatologist in 2020 for elevated rheumatoid factor.  he states he went to appt and was told they forgot to call and tell him his appt needed to be rescheduled.  he decliined to reschedule.  in addition his kidney function was diminished oct 2020 and was referred to nephrology .  did not attend appt due to mis up with appt.  he states he drinks adequate water intake daily.      ROS:     CONSTITUTIONAL:  Positive for fatigue ( mild ).       CARDIOVASCULAR:  Negative for chest pain, palpitations, tachycardia, orthopnea, and edema.      RESPIRATORY:  Negative for cough, dyspnea, and hemoptysis.      GENITOURINARY:  Negative for dysuria and polyuria.      MUSCULOSKELETAL:  Positive for arthralgias, joint stiffness and myalgias.      INTEGUMENTARY:  Negative for rash.      NEUROLOGICAL:  Negative for dizziness, headaches, paresthesias, and weakness.      PSYCHIATRIC:  Negative for anxiety, depression, and sleep disturbances.          Past Medical History / Family History / Social History:         Last Reviewed on 3/17/2021 07:43 PM by Shara Talley    Past Medical History:             PAST MEDICAL HISTORY         Fracture(s): right clavicle;     Hospitalizations: (right ileolingual neuritis dec 2019)         CURRENT MEDICAL PROVIDERS:    Cardiologist: last seen     Nephrologist: last seen          PREVENTIVE HEALTH MAINTENANCE             COLORECTAL CANCER SCREENING: Up to date (colonoscopy q10y; sigmoidoscopy q5y; Cologuard q3y) was last done dec 2018, Results are in chart; colonoscopy with the following abnormalities noted-- Polyp(s) next due          Surgical History:         Coronary Artery Bypass Graft: 4-V;     Cataract Removal: left;     back surgery x 2;    nasal fx repair;    carpal tunnel surgery; Procedures: colonoscopy 06  nml     Positive for    Hernia Repair: 2014; right inguinal; ;         Family History:         Positive for Hypertension ( brother; sister ) and Myocardial Infarction ( father; mother; sister ).  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           Tobacco/Alcohol/Supplements:     Last Reviewed on 3/17/2021 08:42 AM by Patricia Hampton    Tobacco: He has never smoked.  Non-drinker     Caffeine:  He admits to consuming caffeine via coffee ( 1 serving per day ) and tea ( one gallon servings per day ).          Substance Abuse History:     Last Reviewed on 12/10/2018 07:57 AM by Daniel Escudero    NEGATIVE         Mental Health History:     Last Reviewed on 12/10/2018 07:57 AM by Daniel Escudero        Communicable Diseases (eg STDs):     Last Reviewed on 12/10/2018 07:57 AM by Daniel Escudero        Current Problems:     Last Reviewed on 4/07/2020 08:39 AM by Shara Talley    Hyperlipidemia, unspecified    Essential (primary) hypertension    Atherosclerotic heart disease of native coronary artery without angina pectoris    Idiopathic chronic gout, unspecified site, without tophus (tophi)    Other abnormal glucose    Nutritional anemia, unspecified    Vitamin D deficiency, unspecified    Male erectile disorder    Other general symptoms and signs    Pain in left knee    Pain in unspecified joint    Localized edema    Encounter for immunization    Patient's noncompliance with other medical treatment and regimen    Encounter for screening for malignant neoplasm of prostate        Immunizations:     influenza, high-dose, quadrivalent (FLUZONE HIGH-DOSE QUAD 2020-21) 10/8/2020    zzFluzone pf-quadrivalent 3 and up 11/13/2015    Fluzone (3 + years dose) 1/4/2011    Fluzone (3 + years dose) 10/8/2012    Fluzone Quadrivalent (3+ years) 2/1/2017    Influenza A (H1N1), IM (3+ years) Monovalent 11/17/2009    Fluzone High-Dose pf (>=65 yr) 9/19/2017    Fluzone High-Dose pf (>=65 yr) 11/12/2018        Allergies:     Last Reviewed on 3/17/2021 08:42 AM by Patricia Hampton    Zocor: muscle cramps  (Adverse Reaction)    Zocor: Myalgia  (Adverse Reaction)    peanut butter:          Current Medications:     Last Reviewed on 3/17/2021 08:42 AM by Patricia Hampton    generic allergy  med from [facility]    aspirin 81 mg oral tablet,chewable    atenoloL 50 mg oral tablet [TAKE ONE TABLET BY MOUTH DAILY]    allopurinoL 300 mg oral tablet [TAKE ONE TABLET BY MOUTH DAILY]    Zetia 10 mg oral tablet [1 tab daily]    Niaspan 500mg Tablets, Extended Release [Take 1 tablet(s) by mouth Q PM after supper. Pre-medicate with one aspirin before supper.]    Diclofenac Sodium 1% Topical Gel [Apply 2 grams to affected area BID]    Fish Oil     Vitamin D2     Ferrous Sulfate     MAGNESIUM     amLODIPine 10 mg oral tablet [TAKE ONE TABLET BY MOUTH DAILY]    rosuvastatin 10 mg oral tablet [TAKE ONE TABLET BY MOUTH DAILY]    meloxicam 15 mg oral tablet [TAKE ONE TABLET BY MOUTH DAILY AS NEEDED ** DO NOT TAKE WITH IBUPROFEN OR ALEVE** TAKE WITH FOOD** **MUST CALL MD FOR APPOINTMENT]    cholecalciferol (vitamin D3) 1,250 mcg (50,000 unit) oral capsule [1 tab weekly X 12 wks then take OTC Vit D 2000IU daily]        Objective:        Vitals:         Historical:     7/30/2020  BP:   145/80 mm Hg ( (left arm, , sitting, );) 7/30/2020  Wt:   249.8lbs    Current: 3/17/2021 8:44:18 AM    Ht:  5 ft, 11.5 in;  Wt: 247.6 lbs;  BMI: 34.1T: 97.6 F (temporal);  BP: 174/78 mm Hg (left arm, sitting);  P: 56 bpm (left arm (BP Cuff), sitting);  sCr: 1.91 mg/dL;  GFR: 41.86        Repeat:     8:45:34 AM  BP:   163/79mm Hg (left arm, sitting, P-58) 9:1:13 AM  BP:   144/86mm Hg (left arm, sitting)     Exams:     PHYSICAL EXAM:     GENERAL: no apparent distress;     RESPIRATORY: normal respiratory rate and pattern with no distress;     CARDIOVASCULAR: normal rate; rhythm is regular;     MUSCULOSKELETAL:  Normal range of motion, strength and tone;     NEUROLOGIC: mental status: alert and oriented x 3; GROSSLY INTACT     PSYCHIATRIC:  appropriate affect and demeanor; normal speech pattern; grossly normal memory;         Assessment:         E78.5   Hyperlipidemia, unspecified       I10   Essential (primary) hypertension       I25.10    Atherosclerotic heart disease of native coronary artery without angina pectoris       M1A.00X0   Idiopathic chronic gout, unspecified site, without tophus (tophi)       E55.9   Vitamin D deficiency, unspecified       M25.50   Pain in unspecified joint       Z12.5   Encounter for screening for malignant neoplasm of prostate       Z91.19   Patient's noncompliance with other medical treatment and regimen           ORDERS:         Meds Prescribed:       [Refilled] Zetia 10 mg oral tablet [1 tab daily], #90 (ninety) tablets, Refills: 0 (zero)       [Refilled] rosuvastatin 10 mg oral tablet [TAKE ONE TABLET BY MOUTH DAILY], #90 (ninety) tablets, Refills: 0 (zero)       [Refilled] amLODIPine 10 mg oral tablet [TAKE ONE TABLET BY MOUTH DAILY], #90 (ninety) tablets, Refills: 0 (zero)       [Refilled] atenoloL 50 mg oral tablet [TAKE ONE TABLET BY MOUTH DAILY], #90 (ninety) tablets, Refills: 0 (zero)       [Refilled] allopurinoL 300 mg oral tablet [TAKE ONE TABLET BY MOUTH DAILY], #90 (ninety) tablets, Refills: 0 (zero)         Radiology/Test Orders:       [id number]  Colorectal CA screen results documented and reviewed (PV)  (In-House)              Lab Orders:       [id number]  VITD - [facility] Vitamin D, 25 Hydroxy  (Send-Out)            *  PRSAS Medicare screening PSA  (Send-Out)            [id number]  HTNLP - [facility] CMP AND LIPID: [id number], [id number]  (Send-Out)            APPTO  Appointment need  (In-House)                      Plan:         Hyperlipidemia, unspecified        RECOMMENDATIONS given include: alcohol avoidance, exercise, and low cholesterol/low fat diet.  MIPS Vaccines Flu and Pneumonia updated in Shot record Colorectal Cancer Screening is up to date and the results are in the chart           Prescriptions:       [Refilled] Zetia 10 mg oral tablet [1 tab daily], #90 (ninety) tablets, Refills: 0 (zero)       [Refilled] rosuvastatin 10 mg oral tablet [TAKE ONE TABLET BY MOUTH DAILY], #90 (ninety) tablets, Refills: 0 (zero)            Orders:       [id number]  Colorectal CA screen results documented and reviewed (PV)  (In-House)              Essential (primary) hypertension    LABORATORY:  Labs ordered to be performed today include HTN/Lipid Panel: CMP, Lipid.      RECOMMENDATIONS given include: perform routine monitoring of blood pressure with home blood pressure cuff, reduction of dietary salt intake, and take medication as prescribed, try not to miss doses.            Prescriptions:       [Refilled] amLODIPine 10 mg oral tablet [TAKE ONE TABLET BY MOUTH DAILY], #90 (ninety) tablets, Refills: 0 (zero)       [Refilled] atenoloL 50 mg oral tablet [TAKE ONE TABLET BY MOUTH DAILY], #90 (ninety) tablets, Refills: 0 (zero)           Orders:       [id number]  HTNLP - [facility] CMP AND LIPID: [id number], [id number]  (Send-Out)              Atherosclerotic heart disease of native coronary artery without angina pectoris        RECOMMENDATIONS given include: denies concerns about cardiac status..          Idiopathic chronic gout, unspecified site, without tophus (tophi)          Prescriptions:       [Refilled] allopurinoL 300 mg oral tablet [TAKE ONE TABLET BY MOUTH DAILY], #90 (ninety) tablets, Refills: 0 (zero)         Vitamin D deficiency, unspecified    LABORATORY:  Labs ordered to be performed today include Vitamin D.            Orders:       [id number]  VITD - [facility] Vitamin D, 25 Hydroxy  (Send-Out)              Pain in unspecified joint        copies of october labs printed for pt and reviewed with focus on impaired kidney function and elevated rheumatoid factor.  I cannot stress enought how important it is to see rheumatology and nephrology at this time and as soon as possible.  in my opinion all of the antiinflammatories he is taking for joint pain is affecting his kidney function.  not acting to change this now could result in permanent kidney damage.  he has numbers to rheumatology and nephrology (referrals are still current) and schedule appts asap.  pt verbalizes  understanding.          Encounter for screening for malignant neoplasm of prostate    LABORATORY:  Labs ordered to be performed today include PSA.            Orders:       *  PRSAS Medicare screening PSA  (Send-Out)              Patient's noncompliance with other medical treatment and regimensee above.        FOLLOW-UP: Schedule a follow-up visit in 3 months.:.            Orders:       APPTO  Appointment need  (In-House)                  Patient Recommendations:        For  Hyperlipidemia, unspecified:    Avoid alcohol as it can contribute to elevated blood pressure. Maintain a regular exercise program. Reduce the amount of cholesterol and saturated fat in your diet.          For  Essential (primary) hypertension:    Begin monitoring your blood pressure by brief nurse visits at our office, a home blood pressure monitor, or by checking on the machines in pharmacies or stores.  Keep a log of the readings. Reduce the amount of salt in your diet.          For  Patient's noncompliance with other medical treatment and regimen:    Schedule a follow-up visit in 3 months.                APPOINTMENT INFORMATION:        Monday Tuesday Wednesday Thursday Friday Saturday Sunday            Time:___________________AM  PM   Date:_____________________             Charge Capture:         Primary Diagnosis:     E78.5  Hyperlipidemia, unspecified           Orders:      [id number]  Colorectal CA screen results documented and reviewed (PV)  (In-House)              I10  Essential (primary) hypertension     I25.10  Atherosclerotic heart disease of native coronary artery without angina pectoris     M1A.00X0  Idiopathic chronic gout, unspecified site, without tophus (tophi)     E55.9  Vitamin D deficiency, unspecified     M25.50  Pain in unspecified joint     Z12.5  Encounter for screening for malignant neoplasm of prostate     Z91.19  Patient's noncompliance with other medical treatment and regimen           Orders:      APPTO   Appointment need  (In-House)                  ADDENDUMS:      ____________________________________    Addendum: 03/23/2021 08:08 PM - Shara Talley        add [id number]. lj

## 2021-05-18 NOTE — PROGRESS NOTES
Mohamud Jarrett 1950     Office/Outpatient Visit    Visit Date:  02:42 pm    Provider: Carola Peraza N.P. (Assistant: Jocelyne Shipman LPN)    Location: [facility]        Electronically signed by Carola Peraza N.P. on  2018 09:42:22 AM                             SUBJECTIVE:        CC:     Mr. Jarrett is a 68 year old White male.  He presents with knee pain.          HPI:         Knee pain: Pt states L knee pain x 3 weeks. He was seen in our office with fluid drained from knee. Showed normal counts with elevated WBC.  Pt states no injury. Used otc meds without much relief.     ROS:     CONSTITUTIONAL:  Negative for chills, fatigue, fever and weight change.      CARDIOVASCULAR:  Negative for chest pain, orthopnea, paroxysmal nocturnal dyspnea and pedal edema.      RESPIRATORY:  Negative for dyspnea and cough.      GASTROINTESTINAL:  Negative for abdominal pain, heartburn, constipation, diarrhea, and stool changes.      MUSCULOSKELETAL:  Positive for knee pain.      PSYCHIATRIC:  Negative for anxiety and depression.          PM/FM/:     Last Reviewed on 2018 02:05 PM by Carola Peraza    Past Medical History:             PAST MEDICAL HISTORY         Fracture(s): right clavicle;         CURRENT MEDICAL PROVIDERS:    Nephrologist         Surgical History:         Coronary Artery Bypass Graft: 4-V;      Cataract Removal: left;     back surgery x 2;    nasal fx repair;    carpal tunnel surgery; Procedures: colonoscopy 06  nml     Positive for    Hernia Repair: 2014; right inguinal; ;         Family History:         Positive for Hypertension ( brother; sister ) and Myocardial Infarction ( father; mother; sister ).  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          Tobacco/Alcohol/Supplements:     Last Reviewed on 11/12/2018 02:05 PM by Carola Preaza    Tobacco: He has never smoked.  Non-drinker     Caffeine:  He admits to consuming caffeine via coffee ( 1 serving per day ) and tea ( one gallon servings per day ).          Substance Abuse History:     Last Reviewed on 11/12/2018 02:05 PM by Carola Peraza    NEGATIVE         Mental Health History:     Last Reviewed on 11/12/2018 02:05 PM by Carola Peraza        Communicable Diseases (eg STDs):     Last Reviewed on 11/12/2018 02:05 PM by Carola Peraza            Current Problems:     Last Reviewed on 11/12/2018 02:05 PM by Carola Peraza    Abnormal laboratory test findings without diagnosis     Impotence     Vitamin D deficiency, unspecified     Unspecified deficiency anemia     Screening for cancer of colon     Proteinuria     Elevated fasting glucose     Hyperlipidemia     Hypertension     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)     CAD     Erectile dysfunction secondary to atherosclerotic disease     Knee pain     Elbow pain     Muscle pain         Immunizations:     zzFluzone pf-quadrivalent 3 and up 11/13/2015     Fluzone (3 + years dose) 1/4/2011     Fluzone (3 + years dose) 10/8/2012     Fluzone Quadrivalent (3+ years) 2/1/2017     Influenza A (H1N1), IM (3+ years) Monovalent 11/17/2009     Fluzone High-Dose pf (>=65 yr) 9/19/2017     Fluzone High-Dose pf (>=65 yr) 11/12/2018         Allergies:     Last Reviewed on 11/12/2018 02:05 PM by Carola Peraza    Zocor: muscle cramps (Adverse Reaction)    Zocor: Myalgia (Adverse Reaction)    peanut butter:        Current Medications:     Last Reviewed on 11/12/2018 02:05 PM by Carola Peraza    Rosuvastatin 10mg Tablet Take 1 tablet(s) by mouth daily  at  bedtime     Allopurinol 300mg Tablet Take 1 tablet(s) by mouth daily     Amlodipine  10mg Tablet 1 tab daily     Atenolol 50mg Tablet 1 tab daily     Niaspan 500mg Tablets, Extended Release Take 1 tablet(s) by mouth Q PM after supper. Pre-medicate with one aspirin before supper.     Colchicine 0.6mg Capsules Take two capsules now and one capsule in one hour     Zetia 10mg Tablet 1 tab daily     MAGNESIUM     Ferrous Sulfate     Fish Oil     Vitamin D2     Aspirin (ASA) 81mg Chewable Tablet     generic allergy med from [facility]         OBJECTIVE:        Vitals:         Current: 11/26/2018 2:49:51 PM    Ht:  5 ft, 11.5 in;  Wt: 236.2 lbs;  BMI: 32.5    T: 98 F (oral);  BP: 154/73 mm Hg (right arm, sitting);  P: 58 bpm (right arm (BP Cuff), sitting);  sCr: 1.23 mg/dL;  GFR: 65.43        Exams:     PHYSICAL EXAM:     GENERAL: Vitals recorded well developed, well nourished;  well groomed;  no apparent distress;     EYES: lids and lacrimal system are normal in appearance; extraocular movements intact; conjunctiva and cornea are normal; PERRLA;     NECK:  supple, full ROM; no thyromegaly; no carotid bruits;     RESPIRATORY: normal respiratory rate and pattern with no distress; normal breath sounds with no rales, rhonchi, wheezes or rubs;     CARDIOVASCULAR: normal rate; rhythm is regular;  normal S1; normal S2; no systolic murmur; no cyanosis; no edema;     SKIN:  no significant rashes or lesions; no suspicious moles;     MUSCULOSKELETAL: L knee tenderness and crepitus.;     NEUROLOGICAL:  cranial nerves, motor and sensory function, reflexes, gait and coordination are all intact;     PSYCHIATRIC:  appropriate affect and demeanor; normal speech pattern; grossly normal memory;         ASSESSMENT:           719.46   M25.562  Knee pain              DDx:         ORDERS:         Meds Prescribed:       Bactrim DS (Trimethoprim/Sulfamethoxazole ) Tablet Take 1 tablet(s) by mouth q12h for 10 days  #20 (Twenty) tablet(s) Refills: 0          Radiology/Test Orders:       [id number]  Left  radiologic examination, knee; three views  (Send-Out)                   PLAN:          Knee pain         RADIOLOGY:  I have ordered a left knee x-ray to be done today.            Prescriptions:       Bactrim DS (Trimethoprim/Sulfamethoxazole ) Tablet Take 1 tablet(s) by mouth q12h for 10 days  #20 (Twenty) tablet(s) Refills: 0           Orders:       [id number]  Left  radiologic examination, knee; three views  (Send-Out)             Patient Education Handouts:       Knee Pain - PTC              CHARGE CAPTURE:           Primary Diagnosis:     719.46 Knee pain            M25.562    Pain in left knee              Orders:          [id number]   Office/outpatient visit; established patient, level 3  (In-House)

## 2021-05-18 NOTE — PROGRESS NOTES
Mohamud Jarrett 1950     Office/Outpatient Visit    Visit Date: Thu, Oct 17, 2019 09:26 am    Provider: Shara Talley N.P. (Assistant: Sarah Spurling, MA)    Location: [facility]        Electronically signed by Shara Talley N.P. on  10/22/2019 05:58:22 PM                             SUBJECTIVE:        CC:     Mr. Jarrett is a 69 year old White male.  Pt is here for a cough today.  Wants his flu shot today if he can get it.          HPI:         Mr. Jarrett presents with cough.  It has been present for the past 4 days.  Respiratory symptoms include progressive, dry cough, sinus pressure and wheezing.   He denies chest tightness or shortness of breath.  Other symptoms include nasal congestion, green nasal discharge and sinus pain/pressure.  He denies body aches or fever.  He reports recent exposure to illness from co-workers.  He has already tried to relieve the symptoms with antihistamines.      ROS:     CONSTITUTIONAL:  Positive for chills and fatigue.   Negative for fever.      E/N/T:  Positive for nasal congestion, frequent rhinorrhea and hoarseness.   Negative for sore throat.      CARDIOVASCULAR:  Negative for chest pain, palpitations, tachycardia, orthopnea, and edema.      RESPIRATORY:  Positive for recent cough.      GASTROINTESTINAL:  Negative for diarrhea, nausea and vomiting.          [facility]/:     Last Reviewed on 12/10/2018 07:57 AM by Daniel Escudero    Past Medical History:             PAST MEDICAL HISTORY         Fracture(s): right clavicle;         CURRENT MEDICAL PROVIDERS:    Cardiologist    Nephrologist         PREVENTIVE HEALTH MAINTENANCE             COLORECTAL CANCER SCREENING: Up to date (colonoscopy q10y; sigmoidoscopy q5y; Cologuard q3y) was last done dec 2018, Results are in chart; colonoscopy with the following abnormalities noted-- Polyp(s) next due 2023         Surgical History:         Coronary Artery Bypass Graft: 4-V;      Cataract Removal: left;     back  surgery x 2;    nasal fx repair;    carpal tunnel surgery; Procedures: colonoscopy 06  nml     Positive for    Hernia Repair: 2014; right inguinal; ;         Family History:         Positive for Hypertension ( brother; sister ) and Myocardial Infarction ( father; mother; sister ).  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          Tobacco/Alcohol/Supplements:     Last Reviewed on 10/17/2019 09:29 AM by Spurling, Sarah C    Tobacco: He has never smoked.  Non-drinker     Caffeine:  He admits to consuming caffeine via coffee ( 1 serving per day ) and tea ( one gallon servings per day ).          Substance Abuse History:     Last Reviewed on 12/10/2018 07:57 AM by Daniel Escudero    NEGATIVE         Mental Health History:     Last Reviewed on 12/10/2018 07:57 AM by Daniel Escudero        Communicable Diseases (eg STDs):     Last Reviewed on 12/10/2018 07:57 AM by Daniel Escudero            Current Problems:     Last Reviewed on 12/10/2018 07:57 AM by Daniel Escudero    Abnormal laboratory test findings without diagnosis     Impotence     Vitamin D deficiency, unspecified     Unspecified deficiency anemia     Screening for cancer of colon     Proteinuria     Elevated fasting glucose     Hyperlipidemia     Hypertension     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)     CAD     Erectile dysfunction secondary to atherosclerotic disease     Knee pain     Muscle pain         Immunizations:     zzFluzone pf-quadrivalent 3 and up 2015     Fluzone (3 + years dose) 2011     Fluzone (3 + years dose) 10/8/2012     Fluzone Quadrivalent (3+ years) 2017      Influenza A (H1N1), IM (3+ years) Monovalent 11/17/2009     Fluzone High-Dose pf (>=65 yr) 9/19/2017     Fluzone High-Dose pf (>=65 yr) 11/12/2018         Allergies:     Last Reviewed on 8/09/2019 09:59 AM by Spurling, Sarah C    Zocor: muscle cramps (Adverse Reaction)    Zocor: Myalgia (Adverse Reaction)    peanut butter:        Current Medications:     Last Reviewed on 10/17/2019 09:29 AM by Spurling, Sarah C    Allopurinol 300mg Tablet Take 1 tablet(s) by mouth daily     Amlodipine  10mg Tablet 1 tab daily     Atenolol 50mg Tablet 1 tab daily     Diclofenac Sodium 1% Topical Gel Apply 2 grams to affected area BID     Niaspan 500mg Tablets, Extended Release Take 1 tablet(s) by mouth Q PM after supper. Pre-medicate with one aspirin before supper.     MAGNESIUM     Ferrous Sulfate     Fish Oil     Vitamin D2     Aspirin (ASA) 81mg Chewable Tablet     generic allergy med from [facility]         OBJECTIVE:        Vitals:         Historical:     08/09/2019  BP:   147/78 mm Hg ( (right arm, , sitting, );)     08/09/2019  Wt:   243.4lbs        Current: 10/17/2019 9:31:16 AM    Ht:  5 ft, 11.5 in;  Wt: 244 lbs;  BMI: 33.6    T: 97.7 F (oral);  BP: 140/67 mm Hg (left arm, sitting);  P: 57 bpm (left arm (BP Cuff), sitting);  sCr: 1.2 mg/dL;  GFR: 67.11        Exams:     PHYSICAL EXAM:     GENERAL: tired-appearing;     E/N/T: EARS: bilateral TMs are normal;  NOSE: nasal mucosa is erythematous;  turbinates are mildly swollen bilaterally;  bilateral frontal sinus tenderness present; OROPHARYNX: posterior pharynx, including tonsils, tongue, and uvula are normal;     RESPIRATORY: normal respiratory rate and pattern with no distress; normal breath sounds with no rales, rhonchi, wheezes or rubs;     CARDIOVASCULAR: normal rate; rhythm is regular;     LYMPHATIC: no enlargement of cervical or facial nodes;     MUSCULOSKELETAL:  Normal range of motion, strength and tone;     NEUROLOGIC: mental status: alert and oriented x 3; GROSSLY  INTACT     PSYCHIATRIC:  appropriate affect and demeanor; normal speech pattern; grossly normal memory;         ASSESSMENT           461.1   J01.10  Acute frontal sinusitis              DDx:         ORDERS:         Meds Prescribed:       Amoxicillin 875mg Tablet 1 BID  #14 (Fourteen) tablet(s) Refills: 0                 PLAN:          Acute frontal sinusitis         RECOMMENDATIONS given include: rest, increased oral fluid intake, and symptomatic tx with coricidin hbp.   may return next week for flu shot if improved.  follow up if not improving..            Prescriptions:       Amoxicillin 875mg Tablet 1 BID  #14 (Fourteen) tablet(s) Refills: 0             CHARGE CAPTURE           **Please note: ICD descriptions below are intended for billing purposes only and may not represent clinical diagnoses**        Primary Diagnosis:         461.1 Acute frontal sinusitis            J01.10    Acute frontal sinusitis, unspecified              Orders:          [id number]   Office/outpatient visit; established patient, level 3  (In-House)

## 2021-05-18 NOTE — PROGRESS NOTES
Mohamud Jarrett 1950     Office/Outpatient Visit    Visit Date: Sat, Dec 1, 2018 11:20 am    Provider: Daniel Escudero MD (Assistant: Sarah Spurling, MA)    Location: [facility]        Electronically signed by Daniel Escudero MD on  12/10/2018 08:11:28 AM                             SUBJECTIVE:        CC:     Mr. Jarrett is a 68 year old White male.  Pt came in a few days ago, he first went to the ER, and was Rpua, she drained patients left knee, she did a great job he said, and he has done it again, and he did xrays afterwards, he said that he is grateful for what he did, but something is going on and he is in pain and he wants to see a specialist.          HPI:     Knee pain for a couple weeks. He went to [facility] ER and was told to prop up his leg/knee for the swelling. He then saw Carloa Peraza 11/12 and knee was drained, and this helped. he returned to clinic and had another drainage of his right knee, which again helped but swelling has returned. He has an appointment with orthopedic surgery on 12/10 but he feels he cannot wait that long.     ROS:     CONSTITUTIONAL:  Negative for fatigue and fever.      EYES:  Negative for blurred vision.      E/N/T:  Negative for diminished hearing and nasal congestion.      CARDIOVASCULAR:  Negative for chest pain and palpitations.      RESPIRATORY:  Negative for recent cough and dyspnea.      GASTROINTESTINAL:  Negative for abdominal pain, constipation, diarrhea, nausea and vomiting.      MUSCULOSKELETAL:  Positive for arthralgias.   Negative for myalgias.      NEUROLOGICAL:  Negative for paresthesias and weakness.      PSYCHIATRIC:  Negative for anxiety, depression and sleep disturbance.          PMH/FMH/SH:     Last Reviewed on 12/10/2018 07:57 AM by Daniel Escudero    Past Medical History:             PAST MEDICAL HISTORY         Fracture(s): right clavicle;         CURRENT MEDICAL PROVIDERS:    Nephrologist         Surgical History:          Coronary Artery Bypass Graft: 4-V;      Cataract Removal: left;     back surgery x 2;    nasal fx repair;    carpal tunnel surgery; Procedures: colonoscopy 06  nml     Positive for    Hernia Repair: 2014; right inguinal; ;         Family History:         Positive for Hypertension ( brother; sister ) and Myocardial Infarction ( father; mother; sister ).  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          Tobacco/Alcohol/Supplements:     Last Reviewed on 12/10/2018 07:57 AM by Daniel Escudero    Tobacco: He has never smoked.  Non-drinker     Caffeine:  He admits to consuming caffeine via coffee ( 1 serving per day ) and tea ( one gallon servings per day ).          Substance Abuse History:     Last Reviewed on 12/10/2018 07:57 AM by Daniel Escudero    NEGATIVE         Mental Health History:     Last Reviewed on 12/10/2018 07:57 AM by Daniel Escudero        Communicable Diseases (eg STDs):     Last Reviewed on 12/10/2018 07:57 AM by Daniel Escudero            Current Problems:     Last Reviewed on 12/10/2018 07:57 AM by Daniel Escudero    Abnormal laboratory test findings without diagnosis     Impotence     Vitamin D deficiency, unspecified     Unspecified deficiency anemia     Screening for cancer of colon     Proteinuria     Elevated fasting glucose     Hyperlipidemia     Hypertension     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)     CAD     Erectile dysfunction secondary to atherosclerotic disease     Knee pain     Elbow pain     Muscle pain         Immunizations:     zzFluzone pf-quadrivalent 3 and up 2015      Fluzone (3 + years dose) 1/4/2011     Fluzone (3 + years dose) 10/8/2012     Fluzone Quadrivalent (3+ years) 2/1/2017     Influenza A (H1N1), IM (3+ years) Monovalent 11/17/2009     Fluzone High-Dose pf (>=65 yr) 9/19/2017     Fluzone High-Dose pf (>=65 yr) 11/12/2018         Allergies:     Last Reviewed on 12/10/2018 07:57 AM by Daniel Escudero    Zocor: muscle cramps (Adverse Reaction)    Zocor: Myalgia (Adverse Reaction)    peanut butter:        Current Medications:     Last Reviewed on 12/10/2018 07:57 AM by Daniel Escudero    Rosuvastatin 10mg Tablet Take 1 tablet(s) by mouth daily  at bedtime     Allopurinol 300mg Tablet Take 1 tablet(s) by mouth daily     Amlodipine  10mg Tablet 1 tab daily     Atenolol 50mg Tablet 1 tab daily     Niaspan 500mg Tablets, Extended Release Take 1 tablet(s) by mouth Q PM after supper. Pre-medicate with one aspirin before supper.     Colchicine 0.6mg Capsules Take two capsules now and one capsule in one hour     Zetia 10mg Tablet 1 tab daily     MAGNESIUM     Ferrous Sulfate     Fish Oil     Vitamin D2     Aspirin (ASA) 81mg Chewable Tablet     generic allergy med from [facility]     Diclofenac Sodium 1% Topical Gel Apply 2 grams to affected area BID         OBJECTIVE:        Vitals:         Current: 12/1/2018 11:24:40 AM    Ht:  5 ft, 11.5 in;  Wt: 233.8 lbs;  BMI: 32.2    T: 98.3 F (oral);  BP: 149/77 mm Hg (right arm, sitting);  P: 61 bpm (right arm (BP Cuff), sitting);  sCr: 1.23 mg/dL;  GFR: 65.15        Exams:     PHYSICAL EXAM:     GENERAL: Vitals recorded well developed, well nourished;  well groomed;  no apparent distress;     EYES: lids and lacrimal system are normal in appearance; extraocular movements intact; conjunctiva and cornea are normal; PERRLA;     NECK:  supple, full ROM; no thyromegaly; no carotid bruits;     RESPIRATORY: normal respiratory rate and pattern with no distress; normal breath sounds with no rales, rhonchi, wheezes or rubs;     CARDIOVASCULAR:  "normal rate; rhythm is regular;  normal S1; normal S2; no systolic murmur; no cyanosis; no edema;     SKIN:  no significant rashes or lesions; no suspicious moles;     MUSCULOSKELETAL: gait: affected by a left leg limp;  L knee tenderness, crepitus, and mild swelling.;     NEUROLOGICAL:  cranial nerves, motor and sensory function, reflexes, gait and coordination are all intact;     PSYCHIATRIC:  appropriate affect and demeanor; normal speech pattern; grossly normal memory;         ASSESSMENT           719.46   M25.562  Knee pain              DDx:         ORDERS:         Procedures Ordered:       REFER  Referral to Specialist or Other Facility  (Send-Out; Stat)           Other Orders:         Initiating visit for Comprehensive Care Management  (In-House)                   PLAN:          Knee pain Pt presents with chronic left knee pain s/p drainage x 2 in this clinic recently. Clinic visit was somewhat difficult as patient repeatedly asks \"are you going to give me something for pain?\". When patient is counseled he should see an orthopedic surgeon he replies it will have to be between 9 or 9:30am or else he won't show up because he does not want to miss work. Chart review reveals he has missed several specialists appointments including 3 with cardiology. Pt has an appointment with ortho in 10 days but feels cannot wait that long so a referral request is made to be scheduled as soon as possible.         REFERRALS:  Referral initiated to an orthopedist ( Pt needs a 9 or 9:30am appointment so he doesn't miss work ).  Plan of care review today and extensive time spent with patient due to chronic conditions.            Orders:       REFER  Referral to Specialist or Other Facility  (Send-Out; Stat)           Initiating visit for Comprehensive Care Management  (In-House)               CHARGE CAPTURE           **Please note: ICD descriptions below are intended for billing purposes only and may not represent clinical " diagnoses**        Primary Diagnosis:         719.46 Knee pain            M25.562    Pain in left knee              Orders:          [id number]   Office/outpatient visit; established patient, level 3  (In-House)                Initiating visit for Comprehensive Care Management  (In-House)               ADDENDUMS:      ____________________________________    Addendum: 12/10/2018 09:24 AM - Leydi Goldsmith         Visit Note Faxed to:        User Entered Recipient; Number [phone]

## 2021-05-18 NOTE — PROGRESS NOTES
Mohamud Jarrett 1950     Office/Outpatient Visit    Visit Date: Thu, Jun 14, 2018 04:10 pm    Provider: Shara Talley N.P. (Assistant: Michelle Adamson MA)    Location: [facility]        Electronically signed by Shara Talley N.P. on  06/14/2018 06:44:53 PM                             SUBJECTIVE:        CC:     Mr. Jarrett is a 68 year old Black or  male.  muscle cramps         HPI:         Mr. Jarrett presents with myalgia.  Mr. Jarrett presents for evaluation of pain.  This pain is localized to the shoulders, arms, legs.  He characterizes it as aching.  It is of mild intensity.  He estimates that the frequency of pain is several times daily.  Associated symptoms include arthralgias.  He denies nausea or paresthesias.  Pertinent medical history includes hypertension and gout -  not taking allopurinol due to price increase at [facility].  insurance wants him to use [employer] [place]..          Concerning hypertension, mr. Jarrett presents with hypertension.  His current cardiac medication regimen includes atenolol, amlodipine.  He did not bring his blood pressure diary, but says that pressures have been well controlled.  He has been experiencing possible adverse medication effects, including impotence.  Compliance with treatment has been good; he takes his medication as directed.          Hyperlipidemia details; current treatment includes Crestor and a low cholesterol/low fat diet.  crestor too expensive and restarted left over pravastatin at home.  previous myalgia on statins.  known CAD.      see HPI above.  is following low purine diet more recently.  no current flare.  does get flare once per month.     ROS:     CONSTITUTIONAL:  Negative for chills, fatigue, fever, and weight change.      CARDIOVASCULAR:  Negative for chest pain, palpitations, tachycardia, orthopnea, and edema.      RESPIRATORY:  Negative for cough, dyspnea, and hemoptysis.      GASTROINTESTINAL:  Negative for abdominal  pain, heartburn, constipation, diarrhea, and stool changes.      GENITOURINARY:  Positive for (concerned due to previous ingunal hernia repair surgery.  wants to see dr aldridge again.  denies current hernia.).   Negative for dysuria, hematuria, nocturia, polyuria or change in urine stream.      MUSCULOSKELETAL:  Positive for arthralgias, joint stiffness and (late day) myalgias.      INTEGUMENTARY:  Negative for rash.      NEUROLOGICAL:  Negative for dizziness, headaches, paresthesias, and weakness.      ENDOCRINE:  Negative for hair loss, heat/cold intolerance, polydipsia, and polyphagia.      PSYCHIATRIC:  Negative for anxiety, depression, and sleep disturbances.          PMH/FMH/SH:     Last Reviewed on 2018 10:47 AM by Sejal Montes    Past Medical History:             PAST MEDICAL HISTORY         Fracture(s): right clavicle;         CURRENT MEDICAL PROVIDERS:    Nephrologist         Surgical History:         Coronary Artery Bypass Graft: 4-V;      Cataract Removal: left;     back surgery x 2;    nasal fx repair;    carpal tunnel surgery; Procedures: colonoscopy 06  nml     Positive for    Hernia Repair: 2014; right inguinal; ;         Family History:         Positive for Hypertension ( brother; sister ) and Myocardial Infarction ( father; mother; sister ).  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          Tobacco/Alcohol/Supplements:     Last Reviewed on 2018 04:07 PM by Carmen Irvin    Tobacco: He has never smoked.  Non-drinker     Caffeine:  He admits to consuming caffeine via coffee ( 1 serving per day ) and  tea ( one gallon servings per day ).          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)     CAD     Erectile dysfunction secondary to atherosclerotic disease     Muscle pain         Immunizations:     zzFluzone pf-quadrivalent 3 and up 11/13/2015     Fluzone (3 + years dose) 1/4/2011     Fluzone (3 + years dose) 10/8/2012     Fluzone Quadrivalent (3+ years) 2/1/2017     Influenza A (H1N1), IM (3+ years) Monovalent 11/17/2009     Fluzone High-Dose pf (>=65 yr) 9/19/2017         Allergies:     Last Reviewed on 6/14/2018 04:13 PM by Michelle Adamson    Zocor: muscle cramps (Adverse Reaction)    Zocor: Myalgia (Adverse Reaction)    peanut butter:        Current Medications:     Last Reviewed on 6/14/2018 04:16 PM by Michelle Adamson    Allopurinol 300mg Tablet Take 1 tablet(s) by mouth daily     Rosuvastatin 10mg Tablet Take 1 tablet(s) by mouth daily  at bedtime     Amlodipine  10mg Tablet 1 tab daily     Atenolol 50mg Tablet 1 tab daily     Cyclobenzaprine HCl 10mg Tablet 1 tablet AT HS PRN     Niaspan 500mg Tablets, Extended Release Take 1 tablet(s) by mouth Q PM after supper. Pre-medicate with one aspirin before supper.     MAGNESIUM     Ferrous Sulfate     Fish Oil     Vitamin D2     Aspirin (ASA) 325mg Caplet     generic allergy med from [facility]         OBJECTIVE:        Vitals:         Historical:     02/27/2018  BP:   143/78 mm Hg ( (left arm, , sitting, );)     02/27/2018  Wt:   244.2lbs        Current: 6/14/2018 4:13:26 PM    Ht:  5 ft, 11.5 in;  Wt: 247.5 lbs;  BMI: 34.0    T: 98.1 F (oral);  BP: 147/84 mm Hg (left arm, sitting);  P: 61 bpm (left arm (BP Cuff), sitting);  sCr: 1.23 mg/dL;  GFR: 66.74        Repeat:     4:32:27 PM     BP:   134/88mm Hg (left arm, sitting)          Exams:     PHYSICAL EXAM:     GENERAL:  well developed and nourished; appropriately groomed; in no apparent distress;     RESPIRATORY: normal respiratory rate and pattern with no distress; normal breath sounds with no rales, rhonchi, wheezes or rubs;     CARDIOVASCULAR: normal rate; rhythm is regular;     Peripheral Pulses: posterior tibial: 2+ amplitude, no bruits; no edema;     GENITOURINARY: penis: declines;;     MUSCULOSKELETAL:  Normal range of motion, strength and tone;     NEUROLOGIC: mental status: alert and oriented x 3; GROSSLY INTACT     PSYCHIATRIC:  appropriate affect and demeanor; normal speech pattern; grossly normal memory;         ASSESSMENT           729.1   M79.1  Myalgia              DDx:     401.1   I10  Hypertension              DDx:     272.4   E78.5  Hyperlipidemia              DDx:     274.02   M1A.00X0  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)              DDx:     268.9   E55.9  Vitamin D deficiency, unspecified              DDx:     729.5   M79.604   M79.605  Leg pain              DDx:     302.72   F52.21  Impotence              DDx:         ORDERS:         Meds Prescribed:       Baclofen 10mg Tablet 1 tab po TID prn for pain/muscle pain  #30 (Thirty) tablet(s) Refills: 0       Refill of: Amlodipine  10mg Tablet 1 tab daily  #90 (Ninety) tablet(s) Refills: 1       Refill of: Atenolol 50mg Tablet 1 tab daily  #90 (Ninety) tablet(s) Refills: 1       Refill of: Allopurinol 300mg Tablet Take 1 tablet(s) by mouth daily  #90 (Ninety) tablet(s) Refills: 1         Lab Orders:       38442  HTNLP - H CMP AND LIPID: [id number], 02386  (Send-Out)         85339  CK - [facility]- CK total  (Send-Out)         97050  VITD - H Vitamin D, 25 Hydroxy  (Send-Out)         67405  RAPII - H Arthritis Profile  (Send-Out)         21963  BDCB2 - [facility] CBC w/o diff  (Send-Out)                   PLAN:          Myalgia     LABORATORY:  Labs ordered to be performed today include CK, total.             Prescriptions:       Baclofen 10mg Tablet 1 tab po TID prn for pain/muscle pain  #30 (Thirty) tablet(s) Refills: 0           Orders:       09848  CK - [facility]- CK total  (Send-Out)            Hypertension     LABORATORY:  Labs ordered to be performed today include HTN/Lipid Panel: CMP, Lipid.      REFERRALS:  Referral initiated to a cardiologist ( has known CAD with HTN and hyperlipidemia.  recurrent intolerances to statins.  repatha tx was to be too expensive.  refer to cardiology for furhter evaluation.  [facility] appt preferred. ).            Prescriptions:       Refill of: Amlodipine  10mg Tablet 1 tab daily  #90 (Ninety) tablet(s) Refills: 1       Refill of: Atenolol 50mg Tablet 1 tab daily  #90 (Ninety) tablet(s) Refills: 1           Orders:       43437  HTNLP - H CMP AND LIPID: [id number], 50112  (Send-Out)            Hyperlipidemia see above.         RECOMMENDATIONS given include: exercise and low cholesterol/low fat diet.           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            Orders:       88580  VITD - H Vitamin D, 25 Hydroxy  (Send-Out)            Leg pain     LABORATORY:  Labs ordered to be performed today include Arthritis Profile and CBC W/O DIFF.            Orders:       82032  RAPII - H Arthritis Profile  (Send-Out)         60478  BDCB2 - H CBC w/o diff  (Send-Out)            Impotence         he feels as if due to previous inguinal hernia repair.  could be due to HTN meds.  referring to cardiology for evaluation.              Patient Recommendations:        For  Hyperlipidemia:     Maintain a regular exercise program. Reduce the amount of cholesterol and saturated fat in your diet.          For  Leg pain:     I also recommend ^.               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    268.9 Vitamin D deficiency, unspecified            E55.9    Vitamin D deficiency, unspecified    729.5 Leg pain            M79.604    Pain in right leg           M79.605    Pain in left leg    302.72 Impotence            F52.21    Male erectile disorder        ADDENDUMS:      ____________________________________    Date: 06/15/2018 12:30 PM    Author: Tracey Henderson         Visit Note Faxed to:        Elliott Harding (Surgery, General); Number [phone]            Date: 06/20/2018 10:59 AM    Author: Tracey Henderson         Visit Note Faxed to:        Leonel Antonio  (Cardiology); Number [phone]            Date: 07/11/2018 01:10 PM    Author: Stephanie Mcclure         Visit Note Faxed to:        Evin Mckee  (Surgery, Orthopedic); Number [phone]            Date: 07/11/2018 01:11 PM    Author: Stephanie Mcclure         Visit Note Faxed to:        Sheryl Mckee (Rheumatology); Number [phone]

## 2021-05-18 NOTE — PROGRESS NOTES
Mohamud Jarrett 1950     Office/Outpatient Visit    Visit Date:  01:39 pm    Provider: Carola Peraza N.P. (Assistant: Mukund Ram)    Location: [facility]        Electronically signed by Carola Peraza N.P. on  2018 04:42:19 PM                             SUBJECTIVE:        CC: (has not taken the zetia)     Mr. Jarrett is a 68 year old White male.  knee pain, for about 3 days         HPI:         Patient to be evaluated for knee pain.  Pt states L knee pain x 3 days. He hit his knee at work on a cart. He states the floor was wet and he hit the side of the cart.      ROS:     CONSTITUTIONAL:  Negative for chills, fatigue, fever and weight change.      CARDIOVASCULAR:  Negative for chest pain, orthopnea, paroxysmal nocturnal dyspnea and pedal edema.      RESPIRATORY:  Negative for dyspnea and cough.      GASTROINTESTINAL:  Negative for abdominal pain, heartburn, constipation, diarrhea, and stool changes.      MUSCULOSKELETAL:  Positive for L knee pain.      PSYCHIATRIC:  Negative for anxiety and depression.          PMH/FMH/SH:     Last Reviewed on 2018 02:05 PM by Carola Peraza    Past Medical History:             PAST MEDICAL HISTORY         Fracture(s): right clavicle;         CURRENT MEDICAL PROVIDERS:    Nephrologist         Surgical History:         Coronary Artery Bypass Graft: 4-V;      Cataract Removal: left;     back surgery x 2;    nasal fx repair;    carpal tunnel surgery; Procedures: colonoscopy 06  nml     Positive for    Hernia Repair: 2014; right inguinal; ;         Family History:         Positive for Hypertension ( brother; sister ) and Myocardial Infarction ( father; mother; sister ).  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          Tobacco/Alcohol/Supplements:     Last Reviewed on 11/12/2018 02:05 PM by Carola Peraza    Tobacco: He has never smoked.  Non-drinker     Caffeine:  He admits to consuming caffeine via coffee ( 1 serving per day ) and tea ( one gallon servings per day ).          Substance Abuse History:     Last Reviewed on 11/12/2018 02:05 PM by Carola Peraza    NEGATIVE         Mental Health History:     Last Reviewed on 11/12/2018 02:05 PM by Carola Peraza        Communicable Diseases (eg STDs):     Last Reviewed on 11/12/2018 02:05 PM by Carola Peraza            Current Problems:     Last Reviewed on 11/12/2018 02:05 PM by Carola Peraza    Abnormal laboratory test findings without diagnosis     Impotence     Vitamin D deficiency, unspecified     Unspecified deficiency anemia     Screening for cancer of colon     Proteinuria     Elevated fasting glucose     Hyperlipidemia     Hypertension     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)     CAD     Erectile dysfunction secondary to atherosclerotic disease     Elbow pain     Muscle pain         Immunizations:     zzFluzone pf-quadrivalent 3 and up 11/13/2015     Fluzone (3 + years dose) 1/4/2011     Fluzone (3 + years dose) 10/8/2012     Fluzone Quadrivalent (3+ years) 2/1/2017     Influenza A (H1N1), IM (3+ years) Monovalent 11/17/2009     Fluzone High-Dose pf (>=65 yr) 9/19/2017         Allergies:     Last Reviewed on 11/12/2018 02:05 PM by Carola Peraza    Zocor: muscle cramps (Adverse Reaction)    Zocor: Myalgia (Adverse Reaction)        Current Medications:     Last Reviewed on 11/12/2018 02:05 PM by Carola Peraza    Rosuvastatin 10mg Tablet Take 1 tablet(s) by mouth daily  at bedtime     Allopurinol 300mg Tablet Take 1 tablet(s) by mouth daily      Amlodipine  10mg Tablet 1 tab daily     Atenolol 50mg Tablet 1 tab daily     Niaspan 500mg Tablets, Extended Release Take 1 tablet(s) by mouth Q PM after supper. Pre-medicate with one aspirin before supper.     Prednisone 5mg Tablet 8 pills day 1, 6 pills day 2, 4 pills on day three, 2 pills on day 4, and 1 pill on day 5     Zetia 10mg Tablet 1 tab daily     MAGNESIUM     Ferrous Sulfate     Fish Oil     Vitamin D2     Aspirin (ASA) 81mg Chewable Tablet     generic allergy med from [facility]     Colchicine 0.6mg Capsules Take two capsules now and one capsule in one hour         OBJECTIVE:        Vitals:         Current: 11/12/2018 1:47:16 PM    Ht:  5 ft, 11.5 in;  Wt: 240.8 lbs;  BMI: 33.1    T: 97.5 F (oral);  BP: 158/82 mm Hg (right arm, sitting);  P: 65 bpm (right arm (BP Cuff), sitting);  sCr: 1.23 mg/dL;  GFR: 65.97        Exams:     PHYSICAL EXAM:     GENERAL: Vitals recorded well developed, well nourished;  well groomed;  no apparent distress;     EYES: lids and lacrimal system are normal in appearance; extraocular movements intact; conjunctiva and cornea are normal; PERRLA;     NECK:  supple, full ROM; no thyromegaly; no carotid bruits;     RESPIRATORY: normal respiratory rate and pattern with no distress; normal breath sounds with no rales, rhonchi, wheezes or rubs;     CARDIOVASCULAR: normal rate; rhythm is regular;  normal S1; normal S2; no systolic murmur; no cyanosis; no edema;     GASTROINTESTINAL: nontender, nondistended; no hepatosplenomegaly or masses; no bruits;     SKIN:  no significant rashes or lesions; no suspicious moles;     MUSCULOSKELETAL: L knee tenderness, worse on lateral side. Limited ROM;     NEUROLOGICAL:  cranial nerves, motor and sensory function, reflexes, gait and coordination are all intact;     PSYCHIATRIC:  appropriate affect and demeanor; normal speech pattern; grossly normal memory;         Procedures:     Knee pain     Procedure Note:  Arthrocentesis/Injection     Verbal  informed consent was obtained.  The site is prepped with alcohol.  The joint is injected with 1 cc of Triamcinolone.  The needle is carefully introduced into the joint space. Aspiration of Malodorous yellow brownish fluid, 3cc removed. is obtained.  No complications.  The specimen is sent for routine path plus special studies ( acid fast bacilli, cell count and differential, and bacterial culture ).          Influenza vaccination     1. Influenza high dose 0.5 ml unit dose, ABN signed given IM in the right upper arm; administered by mlb Regarding contraindications to an Influenza vaccine: Denies moderate/severe illness with/without fever; serious reaction to eggs, egg proteins, gentamicin, gelatin, arginine, neomycin or polymixin; serious reaction after recieving previous influenza vaccines; and history of Guillain-Orleans Syndrome.              ASSESSMENT:           719.46   M25.562  Knee pain              DDx:     V04.81   Z23  Influenza vaccination              DDx:         ORDERS:         Lab Orders:       00530  Cell count, miscellaneous body fluid, except blood  (Send-Out)         20008  AFBCS - [facility] Culture, tubercle or other acid-fast bacilli any source  (Send-Out)           Procedures Ordered:       10111  Fluzone High Dose  (In-House)         19778  Arthrocentesis aspir&/inj major jt/bursa w/o us  (In-House)           Other Orders:         Administration of influenza virus vaccine (x1)         Kenalog, per 10 mg (x4)                 PLAN:          Knee pain           Orders:       34893  Cell count, miscellaneous body fluid, except blood  (Send-Out)         41500  AFBCS - [facility] Culture, tubercle or other acid-fast bacilli any source  (Send-Out)         43973  Arthrocentesis aspir&/inj major jt/bursa w/o us  (In-House)                     Kenalog, per 10 mg (x4)           Patient Education Handouts:       Knee Pain - PTC           Influenza vaccination           Orders:       97617  Fluzone High  Dose  (In-House)                     Administration of influenza virus vaccine (x1)             CHARGE CAPTURE:           Primary Diagnosis:     719.46 Knee pain            M25.562    Pain in left knee              Orders:          99391   Office/outpatient visit; established patient, level 3  (In-House)             31484   Arthrocentesis aspir&/inj major jt/bursa w/o us  (In-House)                                           Kenalog, per 10 mg (x4)         V04.81 Influenza vaccination            Z23    Encounter for immunization              Orders:          36882   Fluzone High Dose  (In-House)                                           Administration of influenza virus vaccine (x1)

## 2021-05-18 NOTE — PROGRESS NOTES
Mohamud Jarrett  1950     Office/Outpatient Visit    Visit Date: Tue, Apr 7, 2020 08:32 am    Provider: Shara Talley N.P. (Assistant: Melissa Clifford RN)    Location: [facility]        Electronically signed by Shara Talley N.P. on  04/08/2020 04:26:23 PM                             Subjective:        CC: Mr. Jarrett is a 69 year old White male.  Follow up/medication refills         HPI: telehealth due to covid 19          Patient presents with hyperlipidemia, unspecified.  Current treatment includes zetia.  stopped taking crestor due to concerns of causing muscle aches.  muscle aches have persisted.  seeing pain mgmt for back pain (injections only provide short term relief) and had slight elevation of rheumatoid factor previously for which he was referred to rheumatology.  did not keep appt.  states son was in car accident on day of that appt and never rescheduled.  states he hurts daily.  not worsening..  Compliance with treatment has been poor.  He reports muscle pain.  previously tried lipitor and pravastatin.            Dx with essential (primary) hypertension; his current cardiac medication regimen includes atenolol, amlodipine.            cardiology consult dec 2018.  last labs jan 2019           stable on allopurinol 300 mg daily . denies side effects.  requests refills.      ROS:     CONSTITUTIONAL:  Positive for fatigue.   Negative for chills or fever.      E/N/T:  Negative for hearing problems, E/N/T pain, congestion, rhinorrhea, epistaxis, hoarseness, and dental problems.      CARDIOVASCULAR:  Negative for chest pain, palpitations, tachycardia, orthopnea, and edema.      RESPIRATORY:  Negative for cough, dyspnea, and hemoptysis.      MUSCULOSKELETAL:  Positive for arthralgias, back pain ( recurrent; seeing pain mgmt for epidural injections.  relief only lasts days and not weeks. ), joint stiffness, limb pain ( bilateral leg pain ) and myalgias.      INTEGUMENTARY:  Negative for  rash.      NEUROLOGICAL:  Negative for dizziness, headaches, paresthesias, and weakness.      PSYCHIATRIC:  Negative for anxiety, depression, and sleep disturbances.          Past Medical History / Family History / Social History:         Last Reviewed on 2020 04:15 PM by Shara Talley    Past Medical History:             PAST MEDICAL HISTORY         Fracture(s): right clavicle;     Hospitalizations: (right ileolingual neuritis dec 2019)         CURRENT MEDICAL PROVIDERS:    Cardiologist    Nephrologist         PREVENTIVE HEALTH MAINTENANCE             COLORECTAL CANCER SCREENING: Up to date (colonoscopy q10y; sigmoidoscopy q5y; Cologuard q3y) was last done dec 2018, Results are in chart; colonoscopy with the following abnormalities noted-- Polyp(s) next due          Surgical History:         Coronary Artery Bypass Graft: 4-V;     Cataract Removal: left;     back surgery x 2;    nasal fx repair;    carpal tunnel surgery; Procedures: colonoscopy 06  nml     Positive for    Hernia Repair: 2014; right inguinal; ;         Family History:         Positive for Hypertension ( brother; sister ) and Myocardial Infarction ( father; mother; sister ).  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          Tobacco/Alcohol/Supplements:     Last Reviewed on 2020 08:32 AM by Melissa Clifford    Tobacco: He has never smoked.  Non-drinker     Caffeine:  He admits to consuming caffeine via coffee ( 1 serving per day ) and tea ( one gallon servings per day ).          Substance Abuse History:     Last Reviewed on 12/10/2018 07:57 AM by Daniel Escudero    NEGATIVE         Mental Health  History:     Last Reviewed on 12/10/2018 07:57 AM by Daniel Escudero        Communicable Diseases (eg STDs):     Last Reviewed on 12/10/2018 07:57 AM by Daniel Escudero        Current Problems:     Last Reviewed on 4/07/2020 08:39 AM by Shara Talley    Hyperlipidemia, unspecified    Essential (primary) hypertension    Atherosclerotic heart disease of native coronary artery without angina pectoris    Idiopathic chronic gout, unspecified site, without tophus (tophi)    Other abnormal glucose    Nutritional anemia, unspecified    Vitamin D deficiency, unspecified    Male erectile disorder    Other general symptoms and signs    Pain in left knee        Immunizations:     zzFluzone pf-quadrivalent 3 and up 11/13/2015    Fluzone (3 + years dose) 1/4/2011    Fluzone (3 + years dose) 10/8/2012    Fluzone Quadrivalent (3+ years) 2/1/2017    Influenza A (H1N1), IM (3+ years) Monovalent 11/17/2009    Fluzone High-Dose pf (>=65 yr) 9/19/2017    Fluzone High-Dose pf (>=65 yr) 11/12/2018        Allergies:     Last Reviewed on 4/07/2020 08:33 AM by Melissa Clifford    Zocor: muscle cramps  (Adverse Reaction)    Zocor: Myalgia  (Adverse Reaction)    peanut butter:          Current Medications:     Last Reviewed on 4/07/2020 08:33 AM by Melissa Clifford    generic allergy med from [facility]    aspirin 81 mg oral tablet,chewable    atenoloL 50 mg oral tablet [TAKE ONE TABLET BY MOUTH DAILY]    allopurinoL 300 mg oral tablet [TAKE ONE TABLET BY MOUTH DAILY]    Zetia 10 mg oral tablet [1 tab daily]    Niaspan 500mg Tablets, Extended Release [Take 1 tablet(s) by mouth Q PM after supper. Pre-medicate with one aspirin before supper.]    Diclofenac Sodium 1% Topical Gel [Apply 2 grams to affected area BID]    Fish Oil     Vitamin D2     Ferrous Sulfate     MAGNESIUM     amLODIPine 10 mg oral tablet [TAKE ONE TABLET BY MOUTH DAILY]        Assessment:         E78.5   Hyperlipidemia, unspecified       I10   Essential (primary) hypertension        I25.10   Atherosclerotic heart disease of native coronary artery without angina pectoris       M1A.00X0   Idiopathic chronic gout, unspecified site, without tophus (tophi)       M25.50   Pain in unspecified joint           ORDERS:         Meds Prescribed:       [Refilled] Zetia 10 mg oral tablet [1 tab daily], #90 (ninety) tablets, Refills: 1 (one)       [New Rx] rosuvastatin 10 mg oral tablet [take 1 tablet (10 mg) by oral route once daily], #90 (ninety) tablets, Refills: 1 (one)       [Refilled] amLODIPine 10 mg oral tablet [TAKE ONE TABLET BY MOUTH DAILY], #90 (ninety) tablets, Refills: 1 (one)       [Refilled] atenoloL 50 mg oral tablet [TAKE ONE TABLET BY MOUTH DAILY], #90 (ninety) tablets, Refills: 1 (one)       [Refilled] allopurinoL 300 mg oral tablet [TAKE ONE TABLET BY MOUTH DAILY], #90 (ninety) tablets, Refills: 1 (one)         Lab Orders:       FUTURE  Future order to be done at patients convenience  (Send-Out)            [id number]  [facility] CMP AND LIPID: [id number], [id number]  (Send-Out)              Procedures Ordered:       REFER  Referral to Specialist or Other Facility  (Send-Out)                      Plan:         Hyperlipidemia, unspecified        RECOMMENDATIONS given include: alcohol avoidance, exercise, low cholesterol/low fat diet, and zetia does not work well unless taken with a statin.  if cannot tolerate statins needs to follow up with cardiology to see about alternative methods such as repatha.  he is very high risk for MI or stroke.  he is aware.  regarding his pain strongly recommend he see rheumatology and continue with pain mgmt injections.  high cholesterol and low viatmin d levels can contribute to pain also.  take vitamin d supplement and tx hyperlipidemia.  he wants to try crestor low dose again and will need to get labs done in the near term.  to assess effectiveness.  if he indeed notes increased muscle aches on crestor he will call office so it can be documented and adressed..   Telehealth: Verbal consent obtained for visit to occur via phone call; Total time spent was 20 minutes; [id number]--[facility] E/M 11-20 minutes           Prescriptions:       [Refilled] Zetia 10 mg oral tablet [1 tab daily], #90 (ninety) tablets, Refills: 1 (one)       [New Rx] rosuvastatin 10 mg oral tablet [take 1 tablet (10 mg) by oral route once daily], #90 (ninety) tablets, Refills: 1 (one)         Essential (primary) hypertension        FOLLOW-UP TESTING #1: FOLLOW-UP LABORATORY:  Labs to be scheduled in the future include HTN/Lipid Panel: CMP, Lipid.   Patient to schedule in 2 months.            Prescriptions:       [Refilled] amLODIPine 10 mg oral tablet [TAKE ONE TABLET BY MOUTH DAILY], #90 (ninety) tablets, Refills: 1 (one)       [Refilled] atenoloL 50 mg oral tablet [TAKE ONE TABLET BY MOUTH DAILY], #90 (ninety) tablets, Refills: 1 (one)           Orders:       FUTURE  Future order to be done at patients convenience  (Send-Out)            [id number]  [facility] CMP AND LIPID: [id number], [id number]  (Send-Out)              Atherosclerotic heart disease of native coronary artery without angina pectoriscontrol bp and cholesterol        Idiopathic chronic gout, unspecified site, without tophus (tophi)          Prescriptions:       [Refilled] allopurinoL 300 mg oral tablet [TAKE ONE TABLET BY MOUTH DAILY], #90 (ninety) tablets, Refills: 1 (one)         Pain in unspecified joint        REFERRALS:  Referral initiated to a rheumatologist ( Dr Sheryl Mckee; Dr. Lavinia Pompa ).            Orders:       REFER  Referral to Specialist or Other Facility  (Send-Out)                  Patient Recommendations:        For  Hyperlipidemia, unspecified:    Avoid alcohol as it can contribute to elevated blood pressure. Maintain a regular exercise program. Reduce the amount of cholesterol and saturated fat in your diet.          For  Essential (primary) hypertension:            The following laboratory testing has been ordered: Schedule the above  testing in 2 months.              Charge Capture:         Primary Diagnosis:     E78.5  Hyperlipidemia, unspecified           Orders:      [id number]  Phys/QHP telephone evaluation 11-20 minutes  (In-House)              I10  Essential (primary) hypertension     I25.10  Atherosclerotic heart disease of native coronary artery without angina pectoris     M1A.00X0  Idiopathic chronic gout, unspecified site, without tophus (tophi)     M25.50  Pain in unspecified joint

## 2021-05-18 NOTE — PROGRESS NOTES
Mohamud Jarrett 1950     Office/Outpatient Visit    Visit Date: Mon, Feb 12, 2018 10:23 am    Provider: Sejal Montes N.P. (Assistant: Serena Figueredo MA)    Location: [facility]        Electronically signed by Sejal Montes N.P. on  02/12/2018 11:34:37 AM                             SUBJECTIVE:        CC:     Mr. Jarrett is a 67 year old Black or  male.  He is here today following a transition of care from the emergency department ( [facility] 2/9/18- for contusion given naprosyn 500mg ).          HPI:         Patient complains of hand pain.  Today's visit is for evaluation of right hand pain.  The initial onset of discomfort was 6 days ago.  There was no apparent precipitating injury.  He is left handed.  Mr. Jarrett presents in follow up from ER. He was seen in the ER on 2-9-18.  He was diagnosed with pain and swelling of right hand.  The following radiology tests were done: right hand, no acute bony abnormality.  The patient received the following prescriptions: Naprosyn.  The patient's course has not better.      ROS:     CONSTITUTIONAL:  Negative for fever.      CARDIOVASCULAR:  Negative for chest pain, palpitations, tachycardia, orthopnea, and edema.      RESPIRATORY:  Negative for cough, dyspnea, and hemoptysis.      INTEGUMENTARY:  Positive for redness and swelling in right hand.      NEUROLOGICAL:  Negative for dizziness, headaches, paresthesias, and weakness.          PMH/FMH/SH:     Last Reviewed on 2/12/2018 10:47 AM by Sejal Montes    Past Medical History:             PAST MEDICAL HISTORY         Fracture(s): right clavicle;         CURRENT MEDICAL PROVIDERS:    Nephrologist         Surgical History:         Coronary Artery Bypass Graft: 4-V;      Cataract Removal: left;     back surgery x 2;    nasal fx repair;    carpal tunnel surgery; Procedures: colonoscopy 06  nml     Positive for    Hernia Repair: june 2014; right inguinal; ;         Family History:          Positive for Hypertension ( brother; sister ) and Myocardial Infarction ( father; mother; sister ).  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          Tobacco/Alcohol/Supplements:     Last Reviewed on 2018 10:32 AM by Serena Figueredo    Tobacco: He has never smoked.  Non-drinker     Caffeine:  He admits to consuming caffeine via coffee ( 1 serving per day ) and tea ( one gallon servings per day ).          Substance Abuse History:     NEGATIVE             Immunizations:     Fluzone pf-quadrivalent 3 and up 2015     Fluzone (3 + years dose) 2011     Fluzone (3 + years dose) 10/8/2012     Fluzone Quadrivalent (3+ years) 2017     Influenza A (H1N1), IM (3+ years) Monovalent 2009     Fluzone High-Dose pf (>=65 yr) 2017         Allergies:     Last Reviewed on 2018 10:28 AM by Serena Figueredo    Zocor: muscle cramps (Adverse Reaction)    Zocor: Myalgia (Adverse Reaction)    peanut butter:        Current Medications:     Last Reviewed on 2018 10:32 AM by Serena Figueredo    Rosuvastatin 10mg Tablet Take 1 tablet(s) by mouth daily  at bedtime     Amlodipine  10mg Tablet 1 tab daily     Atenolol 50mg Tablet 1 tab daily     Cyclobenzaprine HCl 10mg Tablet 1 tablet AT HS PRN     Niaspan 500mg Tablets, Extended Release Take 1 tablet(s) by mouth Q PM after supper. Pre-medicate with one aspirin before supper.     MAGNESIUM     Ferrous Sulfate     Fish Oil     Vitamin D2     Aspirin (ASA) 325mg Caplet     [facility]     Allopurinol 300mg Tablet Take 1 tablet(s) by mouth daily (was on  this in the past, most recently on uloric, was out, prefers allopurinol, ab)         OBJECTIVE:        Vitals:         Current: 2/12/2018 10:27:11 AM    Ht:  5 ft, 11.5 in;  Wt: 249 lbs;  BMI: 34.2    T: 97.1 F (oral);  BP: 143/72 mm Hg (left arm, sitting);  P: 63 bpm (left arm (BP Cuff), sitting);  sCr: 1.23 mg/dL;  GFR: 67.79        Exams:     PHYSICAL EXAM:     GENERAL: Vitals recorded well developed, well nourished;  well groomed;  no apparent distress;     RESPIRATORY: normal respiratory rate and pattern with no distress; normal breath sounds with no rales, rhonchi, wheezes or rubs;     CARDIOVASCULAR: normal rate; rhythm is regular;  no systolic murmur;     SKIN: right hand warm to touch; skin intact     MUSCULOSKELETAL: pain with ROM  right hand, swelling and red to dorsum of hand;     PSYCHIATRIC:  appropriate affect and demeanor; normal speech pattern; grossly normal memory;         ASSESSMENT           729.5   M79.641  Hand pain              DDx:     274.02   M1A.00X0  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)              DDx:         ORDERS:         Meds Prescribed:       Prednisone 5mg Tablet Take 8 tablet(s) by mouth on day one, then taper by one pill daily  #36 (Thirty Six) tablet(s) Refills: 0       Refill of: Allopurinol 300mg Tablet Take 1 tablet(s) by mouth daily  #90 (Ninety) tablet(s) Refills: 0                 PLAN:          Hand pain he ran out of uloric, prefers allopurinol, ate at [facility]  and feels like that flared his symptoms, discussed blood glucose and elevated sugars and prednisone, watch his carbs /reviewed his hand x-ray         FOLLOW-UP: Advised to call if there is no improvement in 4 day(s).            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) work on diet, reviewed his last uric acid  lab in 7-2017           Prescriptions:       Refill of: Allopurinol 300mg Tablet Take 1 tablet(s) by mouth daily  #90 (Ninety) tablet(s) Refills: 0             CHARGE CAPTURE           **Please note: ICD descriptions below are intended for billing purposes only and may not represent clinical diagnoses**        Primary Diagnosis:         729.5 Hand pain            M79.641    Pain in right hand              Orders:          92140   Office/outpatient visit; established patient, level 4  (In-House)           274.02 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

## 2021-06-17 ENCOUNTER — TELEPHONE (OUTPATIENT)
Dept: SURGERY | Facility: CLINIC | Age: 71
End: 2021-06-17

## 2021-06-17 NOTE — TELEPHONE ENCOUNTER
Patient called the office today requesting a refill on his Meloxicam. Advised the patient that his last request from 8/2020 was denied and he needs to request this from his PCP. Patient states that he did not know this. I apologized that maybe our office did not notify him, but unfortunately he still needs to contact his PCP for refills. Patient states that he will call his PCP and ask, but if they say no he will call back to request this again. Informed him that if he needs a refill he will need an appointment with Dr. Harding and Dr. Harding will have to approve of an appointment for this type of refill as routine medication still has to be managed by the patient's PCP since kidney function needs to be monitored.

## 2021-07-01 VITALS
WEIGHT: 244 LBS | TEMPERATURE: 97.7 F | HEIGHT: 72 IN | HEART RATE: 57 BPM | BODY MASS INDEX: 33.05 KG/M2 | SYSTOLIC BLOOD PRESSURE: 140 MMHG | DIASTOLIC BLOOD PRESSURE: 67 MMHG

## 2021-07-01 VITALS
HEART RATE: 61 BPM | HEIGHT: 72 IN | SYSTOLIC BLOOD PRESSURE: 134 MMHG | BODY MASS INDEX: 33.52 KG/M2 | DIASTOLIC BLOOD PRESSURE: 88 MMHG | WEIGHT: 247.5 LBS | TEMPERATURE: 98.1 F

## 2021-07-01 VITALS
BODY MASS INDEX: 33.08 KG/M2 | TEMPERATURE: 97 F | HEIGHT: 72 IN | SYSTOLIC BLOOD PRESSURE: 143 MMHG | HEART RATE: 63 BPM | WEIGHT: 244.2 LBS | DIASTOLIC BLOOD PRESSURE: 78 MMHG

## 2021-07-01 VITALS
TEMPERATURE: 97.8 F | OXYGEN SATURATION: 96 % | DIASTOLIC BLOOD PRESSURE: 78 MMHG | SYSTOLIC BLOOD PRESSURE: 147 MMHG | HEART RATE: 63 BPM | BODY MASS INDEX: 32.97 KG/M2 | HEIGHT: 72 IN | WEIGHT: 243.4 LBS

## 2021-07-01 VITALS
HEART RATE: 64 BPM | SYSTOLIC BLOOD PRESSURE: 147 MMHG | WEIGHT: 243.6 LBS | TEMPERATURE: 98 F | HEIGHT: 72 IN | DIASTOLIC BLOOD PRESSURE: 78 MMHG | BODY MASS INDEX: 33 KG/M2

## 2021-07-01 VITALS
HEIGHT: 72 IN | HEART RATE: 63 BPM | BODY MASS INDEX: 33.72 KG/M2 | DIASTOLIC BLOOD PRESSURE: 72 MMHG | SYSTOLIC BLOOD PRESSURE: 143 MMHG | TEMPERATURE: 97.1 F | WEIGHT: 249 LBS

## 2021-07-01 VITALS
HEIGHT: 72 IN | TEMPERATURE: 97.3 F | DIASTOLIC BLOOD PRESSURE: 78 MMHG | HEART RATE: 63 BPM | SYSTOLIC BLOOD PRESSURE: 153 MMHG | BODY MASS INDEX: 33.86 KG/M2 | WEIGHT: 250 LBS

## 2021-07-01 VITALS
HEART RATE: 58 BPM | HEIGHT: 72 IN | BODY MASS INDEX: 31.99 KG/M2 | WEIGHT: 236.2 LBS | TEMPERATURE: 98 F | DIASTOLIC BLOOD PRESSURE: 73 MMHG | SYSTOLIC BLOOD PRESSURE: 154 MMHG

## 2021-07-01 VITALS
TEMPERATURE: 98.3 F | HEIGHT: 72 IN | BODY MASS INDEX: 31.67 KG/M2 | DIASTOLIC BLOOD PRESSURE: 77 MMHG | SYSTOLIC BLOOD PRESSURE: 149 MMHG | HEART RATE: 61 BPM | WEIGHT: 233.8 LBS

## 2021-07-01 VITALS
DIASTOLIC BLOOD PRESSURE: 82 MMHG | TEMPERATURE: 97.5 F | HEART RATE: 65 BPM | WEIGHT: 240.8 LBS | HEIGHT: 72 IN | SYSTOLIC BLOOD PRESSURE: 158 MMHG | BODY MASS INDEX: 32.61 KG/M2

## 2021-07-01 VITALS
TEMPERATURE: 97.4 F | HEART RATE: 57 BPM | WEIGHT: 238 LBS | DIASTOLIC BLOOD PRESSURE: 74 MMHG | HEIGHT: 72 IN | BODY MASS INDEX: 32.23 KG/M2 | SYSTOLIC BLOOD PRESSURE: 166 MMHG

## 2021-07-02 VITALS
DIASTOLIC BLOOD PRESSURE: 80 MMHG | HEART RATE: 59 BPM | WEIGHT: 249.8 LBS | SYSTOLIC BLOOD PRESSURE: 145 MMHG | TEMPERATURE: 97.8 F | HEIGHT: 72 IN | BODY MASS INDEX: 33.83 KG/M2

## 2021-07-02 VITALS
BODY MASS INDEX: 33.54 KG/M2 | SYSTOLIC BLOOD PRESSURE: 144 MMHG | WEIGHT: 247.6 LBS | DIASTOLIC BLOOD PRESSURE: 86 MMHG | HEIGHT: 72 IN | TEMPERATURE: 97.6 F | HEART RATE: 56 BPM

## 2021-07-02 VITALS
BODY MASS INDEX: 33.05 KG/M2 | DIASTOLIC BLOOD PRESSURE: 88 MMHG | SYSTOLIC BLOOD PRESSURE: 171 MMHG | HEIGHT: 72 IN | TEMPERATURE: 96.7 F | HEART RATE: 60 BPM | WEIGHT: 244 LBS | OXYGEN SATURATION: 98 %

## 2021-07-06 ENCOUNTER — OFFICE VISIT (OUTPATIENT)
Dept: FAMILY MEDICINE CLINIC | Age: 71
End: 2021-07-06

## 2021-07-06 ENCOUNTER — TELEPHONE (OUTPATIENT)
Dept: FAMILY MEDICINE CLINIC | Age: 71
End: 2021-07-06

## 2021-07-06 VITALS
DIASTOLIC BLOOD PRESSURE: 75 MMHG | TEMPERATURE: 98.4 F | WEIGHT: 233 LBS | HEIGHT: 71 IN | HEART RATE: 62 BPM | SYSTOLIC BLOOD PRESSURE: 152 MMHG | BODY MASS INDEX: 32.62 KG/M2

## 2021-07-06 DIAGNOSIS — R05.9 COUGH: Primary | ICD-10-CM

## 2021-07-06 DIAGNOSIS — N18.32 STAGE 3B CHRONIC KIDNEY DISEASE (HCC): ICD-10-CM

## 2021-07-06 DIAGNOSIS — R76.8 RHEUMATOID FACTOR POSITIVE: ICD-10-CM

## 2021-07-06 PROBLEM — D63.1 ANEMIA OF CHRONIC RENAL FAILURE: Status: ACTIVE | Noted: 2021-04-12

## 2021-07-06 PROBLEM — N18.9 ANEMIA OF CHRONIC RENAL FAILURE: Status: ACTIVE | Noted: 2021-04-12

## 2021-07-06 PROBLEM — M10.9 GOUT, UNSPECIFIED: Status: ACTIVE | Noted: 2021-07-06

## 2021-07-06 LAB
EXPIRATION DATE: NORMAL
EXPIRATION DATE: NORMAL
FLUAV AG UPPER RESP QL IA.RAPID: NOT DETECTED
FLUBV AG UPPER RESP QL IA.RAPID: NOT DETECTED
INTERNAL CONTROL: NORMAL
INTERNAL CONTROL: NORMAL
Lab: NORMAL
Lab: NORMAL
S PYO AG THROAT QL: NEGATIVE
SARS-COV-2 AG UPPER RESP QL IA.RAPID: NOT DETECTED

## 2021-07-06 PROCEDURE — 87880 STREP A ASSAY W/OPTIC: CPT | Performed by: NURSE PRACTITIONER

## 2021-07-06 PROCEDURE — 99213 OFFICE O/P EST LOW 20 MIN: CPT | Performed by: NURSE PRACTITIONER

## 2021-07-06 PROCEDURE — 87081 CULTURE SCREEN ONLY: CPT | Performed by: NURSE PRACTITIONER

## 2021-07-06 PROCEDURE — 87428 SARSCOV & INF VIR A&B AG IA: CPT | Performed by: NURSE PRACTITIONER

## 2021-07-06 RX ORDER — METHYLPREDNISOLONE 4 MG/1
TABLET ORAL
Qty: 21 TABLET | Refills: 0 | Status: SHIPPED | OUTPATIENT
Start: 2021-07-06 | End: 2021-11-05

## 2021-07-06 RX ORDER — DEXTROMETHORPHAN HYDROBROMIDE AND PROMETHAZINE HYDROCHLORIDE 15; 6.25 MG/5ML; MG/5ML
5 SYRUP ORAL 4 TIMES DAILY PRN
Qty: 180 ML | Refills: 0 | Status: SHIPPED | OUTPATIENT
Start: 2021-07-06 | End: 2022-03-03

## 2021-07-06 RX ORDER — ALBUTEROL SULFATE 90 UG/1
2 AEROSOL, METERED RESPIRATORY (INHALATION) EVERY 4 HOURS PRN
Qty: 8.5 G | Refills: 0 | Status: SHIPPED | OUTPATIENT
Start: 2021-07-06 | End: 2021-09-08 | Stop reason: SDUPTHER

## 2021-07-06 NOTE — ASSESSMENT & PLAN NOTE
It is very important that he reschedule his nephrology appointment.  Treating his chronic pain is very limited with impaired kidney function.  Choices for treatment are limited as his most common medicines taken are too hard on the kidneys.  He will also not metabolize medicines well with impaired kidney function.  He verbalizes understanding for the reason to see nephrology

## 2021-07-06 NOTE — ASSESSMENT & PLAN NOTE
Negative for Covid flu and strep today.  Treat symptomatically with prednisone, Promethazine DM, and albuterol inhaler as needed shortness of breath.  Follow-up if not improving.  Okay to provide a note for work for today and tomorrow.  Promethazine DM may cause drowsiness.

## 2021-07-06 NOTE — ASSESSMENT & PLAN NOTE
Agreeable to referral to a different rheumatologist.  Very important for him to complete his specialist evaluations.

## 2021-07-06 NOTE — TELEPHONE ENCOUNTER
HUB TO READ    CALLED PATIENT TO INFORM HIM HE CAN COME IN OFFICE ANY TIME TODAY TO PICK-UP DOCTOR'S NOTE.

## 2021-07-06 NOTE — PROGRESS NOTES
Chief Complaint  Cough (started yesterday )    Jana Mallory is a 71-year-old male here today with cough of 1 day onset.  He does feel mildly short of breath intermittently.  Denies fever, sudden loss of taste or smell, vomiting, or diarrhea.  No specific ill contacts but does work in the public at [employer].  He has had 2 doses of Covid vaccination.  Symptoms hit him suddenly yesterday and the cough is interrupting his sleep.  On a side note his kidney function was pretty significantly impaired when he was last here March 17.  He was referred to nephrology.  That office states he canceled the appointment.  Patient states that they had to reschedule his appointment and gave him my fax number to call back and work is preventing him from rescheduling the appointment.  He also had an abdominal hernia repair per Dr. Harding surgeon a few months ago and he feels as if he is having more muscle cramping in his legs since the surgery.  Of note he has had some abnormal rheumatological labs in the past and has declined going back to see rheumatology as a gentleman he was seen in [place] was seen a couple times and did nothing for him.  I again stressed that we are limited on how to treat his chronic pain due to the risk to his kidney health and both issues need to be evaluated thoroughly.          Mohamud Jarrett presents to [facility]    Review of Systems   Constitutional: Positive for fatigue. Negative for chills and fever.   HENT: Positive for ear pain, postnasal drip, rhinorrhea and sore throat. Negative for ear discharge and sinus pressure.    Respiratory: Positive for cough and shortness of breath. Negative for wheezing.    Cardiovascular: Negative.    Gastrointestinal: Negative for constipation, diarrhea, nausea and vomiting.   Musculoskeletal: Positive for arthralgias and myalgias.        Objective   Vital Signs:   Vitals:    07/06/21 1102 07/06/21 1106   BP: 170/82 152/75  "  BP Location: Left arm Left arm   Patient Position: Sitting Sitting   Pulse: 63 62   Temp: 98.4 °F (36.9 °C)    Weight: 106 kg (233 lb)    Height: 181.6 cm (71.5\")       Physical Exam  Vitals reviewed.   Constitutional:       General: He is not in acute distress.     Appearance: Normal appearance. He is well-developed.   HENT:      Right Ear: Tympanic membrane normal.      Left Ear: Tympanic membrane normal.      Nose: Nose normal.      Mouth/Throat:      Mouth: Mucous membranes are moist.      Pharynx: No oropharyngeal exudate or posterior oropharyngeal erythema.   Cardiovascular:      Rate and Rhythm: Normal rate and regular rhythm.      Heart sounds: Normal heart sounds.   Pulmonary:      Effort: Pulmonary effort is normal.      Breath sounds: Normal breath sounds.   Musculoskeletal:      Right lower leg: No edema.      Left lower leg: No edema.   Skin:     General: Skin is warm and dry.   Neurological:      General: No focal deficit present.      Mental Status: He is alert.   Psychiatric:         Attention and Perception: Attention normal.         Mood and Affect: Mood and affect normal.         Behavior: Behavior normal.          Result Review :              Assessment and Plan    Diagnoses and all orders for this visit:    1. Cough (Primary)  Assessment & Plan:  Negative for Covid flu and strep today.  Treat symptomatically with prednisone, Promethazine DM, and albuterol inhaler as needed shortness of breath.  Follow-up if not improving.  Okay to provide a note for work for today and tomorrow.  Promethazine DM may cause drowsiness.    Orders:  -     POCT SARS-CoV-2 Antigen SAMIR + Flu  -     POCT rapid strep A  -     methylPREDNISolone (MEDROL) 4 MG dose pack; Take as directed on package instructions.  Dispense: 21 tablet; Refill: 0  -     promethazine-dextromethorphan (PROMETHAZINE-DM) 6.25-15 MG/5ML syrup; Take 5 mL by mouth 4 (Four) Times a Day As Needed for Cough. May cause drowsiness  Dispense: 180 mL; " Refill: 0  -     albuterol sulfate  (90 Base) MCG/ACT inhaler; Inhale 2 puffs Every 4 (Four) Hours As Needed for Wheezing.  Dispense: 8.5 g; Refill: 0    2. Stage 3b chronic kidney disease (CMS/HCC)  Assessment & Plan:  It is very important that he reschedule his nephrology appointment.  Treating his chronic pain is very limited with impaired kidney function.  Choices for treatment are limited as his most common medicines taken are too hard on the kidneys.  He will also not metabolize medicines well with impaired kidney function.  He verbalizes understanding for the reason to see nephrology    Orders:  -     Comprehensive metabolic panel; Future    3. Rheumatoid factor positive  Assessment & Plan:  Agreeable to referral to a different rheumatologist.  Very important for him to complete his specialist evaluations.    Orders:  -     Ambulatory Referral to Rheumatology      Follow Up    No follow-ups on file.  Patient was given instructions and counseling regarding his condition or for health maintenance advice. Please see specific information pulled into the AVS if appropriate.

## 2021-07-06 NOTE — TELEPHONE ENCOUNTER
Caller: Mohamud Jarrett    Relationship: Self    Best call back number: [phone]     What form or medical record are you requesting: WORK EXCUSE    How would you like to receive the form or medical records (pick-up, mail, fax): PICKUP     Timeframe paperwork needed: PATIENT WILL PICKUP THIS AFTERNOON BY 230PM, 07/06/2021

## 2021-07-08 LAB — BACTERIA SPEC AEROBE CULT: NORMAL

## 2021-07-22 ENCOUNTER — TELEPHONE (OUTPATIENT)
Dept: FAMILY MEDICINE CLINIC | Age: 71
End: 2021-07-22

## 2021-09-08 DIAGNOSIS — R05.9 COUGH: ICD-10-CM

## 2021-09-08 RX ORDER — ALLOPURINOL 300 MG/1
TABLET ORAL
Qty: 30 TABLET | Refills: 0 | Status: SHIPPED | OUTPATIENT
Start: 2021-09-08 | End: 2021-10-15

## 2021-09-08 RX ORDER — ROSUVASTATIN CALCIUM 10 MG/1
TABLET, COATED ORAL
Qty: 30 TABLET | Refills: 0 | Status: SHIPPED | OUTPATIENT
Start: 2021-09-08 | End: 2021-11-05

## 2021-09-08 RX ORDER — MELOXICAM 15 MG/1
TABLET ORAL
Qty: 30 TABLET | Refills: 0 | Status: SHIPPED | OUTPATIENT
Start: 2021-09-08 | End: 2021-11-05

## 2021-09-08 RX ORDER — ATENOLOL 50 MG/1
TABLET ORAL
Qty: 30 TABLET | Refills: 0 | Status: SHIPPED | OUTPATIENT
Start: 2021-09-08 | End: 2021-10-15

## 2021-09-08 RX ORDER — AMLODIPINE BESYLATE 10 MG/1
TABLET ORAL
Qty: 30 TABLET | Refills: 0 | Status: SHIPPED | OUTPATIENT
Start: 2021-09-08 | End: 2021-10-15

## 2021-09-08 NOTE — TELEPHONE ENCOUNTER
Caller: Mohamud Jarrett    Relationship: Self    Best call back number:  [phone]    Medication needed:   amLODIPine (NORVASC) 10 MG tablet   allopurinol (ZYLOPRIM) 300 MG tablet   atenolol (TENORMIN) 50 MG tablet    Requested Prescriptions     Pending Prescriptions Disp Refills   • albuterol sulfate  (90 Base) MCG/ACT inhaler 8.5 g 0     Sig: Inhale 2 puffs Every 4 (Four) Hours As Needed for Wheezing.   • fexofenadine (ALLEGRA) 180 MG tablet       Sig: Take 1 tablet by mouth As Needed.   • methylPREDNISolone (MEDROL) 4 MG dose pack 21 tablet 0     Sig: Take as directed on package instructions.       When do you need the refill by: AS SOON AS POSSIBLE    Does the patient have less than a 3 day supply:  [x] Yes  [] No    What is the patient's preferred pharmacy: [facility] [place], [place] - [address] [place] [address] [phone] [facility] [phone] FX

## 2021-09-08 NOTE — TELEPHONE ENCOUNTER
Caller: Mohamud Jarrett    Relationship: Self    Best call back number: [phone]    What specialty or service is being requested: RHEUMATOLOGY    What is the provider, practice or medical service name: UNKNOWN, [facility]    What is the office location: UNKNOWN, [facility]    What is the office phone number: UNKNOWN, [facility]    Any additional details: PATIENT HAS A REFERRAL FOR RHEUMATOLOGY CURRENTLY HOWEVER THE OFFICE HE WAS REFERRED TO CANNOT GET HIM AN APPOINTMENT UNTIL April 2022. HE'D LIKE TO BE REFERRED TO AN OFFICE WITH A SOONER APPOINTMENT.

## 2021-09-09 NOTE — TELEPHONE ENCOUNTER
I have not seen this patient for over 3 years.  I would consider Janet Talley to be his primary care provider.  It is okay with me to refer to rheumatology.  Dr. Pompa would be fine in [place].  Thanks.

## 2021-09-13 RX ORDER — FEXOFENADINE HCL 180 MG/1
180 TABLET ORAL DAILY
Qty: 90 TABLET | Refills: 0 | Status: SHIPPED | OUTPATIENT
Start: 2021-09-13 | End: 2022-03-03

## 2021-09-13 RX ORDER — METHYLPREDNISOLONE 4 MG/1
TABLET ORAL
Qty: 21 TABLET | Refills: 0 | OUTPATIENT
Start: 2021-09-13

## 2021-09-13 RX ORDER — ALBUTEROL SULFATE 90 UG/1
2 AEROSOL, METERED RESPIRATORY (INHALATION) EVERY 4 HOURS PRN
Qty: 8.5 G | Refills: 0 | Status: SHIPPED | OUTPATIENT
Start: 2021-09-13 | End: 2022-03-03

## 2021-09-13 NOTE — TELEPHONE ENCOUNTER
Refilled albuterol and allegra.  Did not refill medrol dose pack.  He needs to see nephrology and rheumatology for his symptoms of pain and diminished kidney function.

## 2021-10-15 RX ORDER — AMLODIPINE BESYLATE 10 MG/1
TABLET ORAL
Qty: 30 TABLET | Refills: 0 | Status: SHIPPED | OUTPATIENT
Start: 2021-10-15 | End: 2021-10-22 | Stop reason: SDUPTHER

## 2021-10-15 RX ORDER — ATENOLOL 50 MG/1
TABLET ORAL
Qty: 30 TABLET | Refills: 0 | Status: SHIPPED | OUTPATIENT
Start: 2021-10-15 | End: 2021-10-22 | Stop reason: SDUPTHER

## 2021-10-15 RX ORDER — ALLOPURINOL 300 MG/1
TABLET ORAL
Qty: 30 TABLET | Refills: 0 | Status: SHIPPED | OUTPATIENT
Start: 2021-10-15 | End: 2021-10-22 | Stop reason: SDUPTHER

## 2021-10-21 RX ORDER — ALLOPURINOL 300 MG/1
TABLET ORAL
Qty: 90 TABLET | OUTPATIENT
Start: 2021-10-21

## 2021-10-21 RX ORDER — AMLODIPINE BESYLATE 10 MG/1
TABLET ORAL
Qty: 90 TABLET | OUTPATIENT
Start: 2021-10-21

## 2021-10-22 RX ORDER — ALLOPURINOL 300 MG/1
300 TABLET ORAL DAILY
Qty: 30 TABLET | Refills: 0 | Status: SHIPPED | OUTPATIENT
Start: 2021-10-22 | End: 2021-11-05

## 2021-10-22 RX ORDER — AMLODIPINE BESYLATE 10 MG/1
10 TABLET ORAL DAILY
Qty: 30 TABLET | Refills: 0 | Status: SHIPPED | OUTPATIENT
Start: 2021-10-22 | End: 2021-11-05

## 2021-10-22 RX ORDER — ATENOLOL 50 MG/1
50 TABLET ORAL DAILY
Qty: 30 TABLET | Refills: 0 | Status: SHIPPED | OUTPATIENT
Start: 2021-10-22 | End: 2021-11-05

## 2021-10-29 ENCOUNTER — TELEPHONE (OUTPATIENT)
Dept: FAMILY MEDICINE CLINIC | Age: 71
End: 2021-10-29

## 2021-10-29 NOTE — TELEPHONE ENCOUNTER
Hub to read      Tried to call patient and VM box was full and could not leave a message. Shara Talley does not have any openings on  11/5 but there are other providers that do.  If he calls back see if he is OK seeing a different provider on 11/5.

## 2021-10-29 NOTE — TELEPHONE ENCOUNTER
Caller: Mohamud Jarrett    Relationship to patient: Self    Best call back number: [phone]     Patient is needing: PATIENT CALLED IN AND SAID THAT HE MISSED HIS APPOINTMENT THIS MORNING . HE WANTS TO SEE SERAFIN AMAYA AGAIN. HE NEEDS AN APPOINTMENT ON Friday TH 5TH. HE ONLY WANTS TO SEE HER AND WANTS A REALLY EARLY APPOINTMENT IF POSSIBLE. HE IS AFRAID OF RUNNING OUT OF MEDICATIONS.PLEASE CALL PATIENT TO SCHEDULE.

## 2021-11-02 NOTE — PROGRESS NOTES
"Chief Complaint  Hypertension (Follow up - med refills ), Hyperlipidemia, and Gout    Subjective          Mohamud Jarrett presents to [facility]  Patient is here for med refills.  He is a patient of Shara Talley.    HLD: He is taking rosuvastatin 10 mg daily.  His last lipid panel was March 2021, which showed elevated triglycerides and cholesterol.  His LDL was 169.  He is tolerating the medication.    HTN: He is taking amlodipine 10 mg daily, chlothalidone 25 mg daily, and atenolol 50 mg daily.  His BP is a little high today.  He feels rushed today as he had to scrap frost of the windows.    Gout: He is taking allopurinol 300 mg daily.  He hasn't had any flare-ups w/ gout.    He had been taking meloxicam since his hernia surgery last year.  He has run out of medication and feels that his upper leg pain and spasms worsened.  He feels that the meloxicam did help w/ his leg pain.        Objective   Vital Signs:   /71 (BP Location: Right arm, Patient Position: Sitting, Cuff Size: Large Adult)   Pulse 65   Temp 98.1 °F (36.7 °C) (Oral)   Ht 181.6 cm (71.5\")   Wt 105 kg (232 lb)   SpO2 99%   BMI 31.91 kg/m²     Physical Exam  Constitutional:       General: He is not in acute distress.     Appearance: Normal appearance. He is normal weight.   HENT:      Head: Normocephalic.   Eyes:      Pupils: Pupils are equal, round, and reactive to light.      Visual Fields: Right eye visual fields normal and left eye visual fields normal.   Neck:      Trachea: Trachea normal.   Cardiovascular:      Rate and Rhythm: Normal rate and regular rhythm.      Heart sounds: Normal heart sounds.   Pulmonary:      Effort: Pulmonary effort is normal.      Breath sounds: Normal breath sounds and air entry.   Musculoskeletal:      Right lower leg: No edema.      Left lower leg: No edema.   Skin:     General: Skin is warm and dry.   Neurological:      Mental Status: He is alert and oriented to person, " place, and time.   Psychiatric:         Mood and Affect: Mood and affect normal.         Behavior: Behavior normal.         Thought Content: Thought content normal.        Result Review :   The following data was reviewed by: POLI Mata on 11/05/2021:  PSA Screen (03/17/2021 09:26)  Vitamin D 25 Hydroxy (03/17/2021 09:26)  Lipid Panel (03/17/2021 09:26)                   Assessment and Plan    Diagnoses and all orders for this visit:    1. Mixed hyperlipidemia (Primary)  Assessment & Plan:  Will check lipid panel    Orders:  -     CBC (No Diff); Future  -     Comprehensive Metabolic Panel; Future  -     Lipid Panel; Future  -     rosuvastatin (CRESTOR) 10 MG tablet; Take 1 tablet by mouth Daily.  Dispense: 90 tablet; Refill: 0    2. Gout, unspecified cause, unspecified chronicity, unspecified site  Assessment & Plan:  Allopurinol refilled.    Orders:  -     CBC (No Diff); Future  -     Comprehensive Metabolic Panel; Future  -     allopurinol (ZYLOPRIM) 300 MG tablet; Take 1 tablet by mouth Daily.  Dispense: 90 tablet; Refill: 0    3. Primary hypertension  Assessment & Plan:  Hypertension is unchanged.  Continue current treatment regimen.  Dietary sodium restriction.  Weight loss.  Continue current medications.  Blood pressure will be reassessed at the next regular appointment.    Orders:  -     CBC (No Diff); Future  -     Comprehensive Metabolic Panel; Future  -     Lipid Panel; Future  -     amLODIPine (NORVASC) 10 MG tablet; Take 1 tablet by mouth Daily.  Dispense: 90 tablet; Refill: 0  -     atenolol (TENORMIN) 50 MG tablet; Take 1 tablet by mouth Daily.  Dispense: 90 tablet; Refill: 0  -     chlorthalidone (HYGROTON) 25 MG tablet; Take 1 tablet by mouth Daily.  Dispense: 90 tablet; Refill: 0    4. Bilateral leg pain  Assessment & Plan:  The patient is to have his labs done before he gets his meloxicam refill.  He does have a history of decreased kidney function, but he has not had it checked in over  a year.  If his labs come back, and his kidney function has not improved, he is not to take the meloxicam.    Orders:  -     CBC (No Diff); Future  -     Comprehensive Metabolic Panel; Future  -     meloxicam (MOBIC) 15 MG tablet; Take 1 tablet by mouth Daily.  Dispense: 90 tablet; Refill: 0        Follow Up   Return in about 3 months (around 2/5/2022).  Patient was given instructions and counseling regarding his condition or for health maintenance advice. Please see specific information pulled into the AVS if appropriate.

## 2021-11-05 ENCOUNTER — OFFICE VISIT (OUTPATIENT)
Dept: FAMILY MEDICINE CLINIC | Age: 71
End: 2021-11-05

## 2021-11-05 VITALS
OXYGEN SATURATION: 99 % | WEIGHT: 232 LBS | HEIGHT: 72 IN | DIASTOLIC BLOOD PRESSURE: 71 MMHG | HEART RATE: 65 BPM | BODY MASS INDEX: 31.42 KG/M2 | SYSTOLIC BLOOD PRESSURE: 151 MMHG | TEMPERATURE: 98.1 F

## 2021-11-05 DIAGNOSIS — M79.604 BILATERAL LEG PAIN: ICD-10-CM

## 2021-11-05 DIAGNOSIS — M10.9 GOUT, UNSPECIFIED CAUSE, UNSPECIFIED CHRONICITY, UNSPECIFIED SITE: ICD-10-CM

## 2021-11-05 DIAGNOSIS — E78.2 MIXED HYPERLIPIDEMIA: Primary | ICD-10-CM

## 2021-11-05 DIAGNOSIS — M79.605 BILATERAL LEG PAIN: ICD-10-CM

## 2021-11-05 DIAGNOSIS — I10 PRIMARY HYPERTENSION: ICD-10-CM

## 2021-11-05 PROCEDURE — 99214 OFFICE O/P EST MOD 30 MIN: CPT | Performed by: NURSE PRACTITIONER

## 2021-11-05 RX ORDER — ATENOLOL 50 MG/1
50 TABLET ORAL DAILY
Qty: 90 TABLET | Refills: 0 | Status: SHIPPED | OUTPATIENT
Start: 2021-11-05 | End: 2022-03-03 | Stop reason: SDUPTHER

## 2021-11-05 RX ORDER — MELOXICAM 15 MG/1
15 TABLET ORAL DAILY
Qty: 90 TABLET | Refills: 0 | Status: SHIPPED | OUTPATIENT
Start: 2021-11-05 | End: 2022-05-20

## 2021-11-05 RX ORDER — CHLORTHALIDONE 25 MG/1
TABLET ORAL
COMMUNITY
Start: 2021-08-18 | End: 2021-11-05

## 2021-11-05 RX ORDER — CHLORTHALIDONE 25 MG/1
25 TABLET ORAL DAILY
Qty: 90 TABLET | Refills: 0 | Status: SHIPPED | OUTPATIENT
Start: 2021-11-05 | End: 2022-03-03 | Stop reason: SDUPTHER

## 2021-11-05 RX ORDER — ALLOPURINOL 300 MG/1
300 TABLET ORAL DAILY
Qty: 90 TABLET | Refills: 0 | Status: SHIPPED | OUTPATIENT
Start: 2021-11-05 | End: 2022-03-03 | Stop reason: SDUPTHER

## 2021-11-05 RX ORDER — ROSUVASTATIN CALCIUM 10 MG/1
10 TABLET, COATED ORAL DAILY
Qty: 90 TABLET | Refills: 0 | Status: SHIPPED | OUTPATIENT
Start: 2021-11-05 | End: 2022-03-03 | Stop reason: SDUPTHER

## 2021-11-05 RX ORDER — AMLODIPINE BESYLATE 10 MG/1
10 TABLET ORAL DAILY
Qty: 90 TABLET | Refills: 0 | Status: SHIPPED | OUTPATIENT
Start: 2021-11-05 | End: 2022-03-03 | Stop reason: SDUPTHER

## 2021-11-05 NOTE — ASSESSMENT & PLAN NOTE
Allopurinol refilled.  
Hypertension is unchanged.  Continue current treatment regimen.  Dietary sodium restriction.  Weight loss.  Continue current medications.  Blood pressure will be reassessed at the next regular appointment.  
The patient is to have his labs done before he gets his meloxicam refill.  He does have a history of decreased kidney function, but he has not had it checked in over a year.  If his labs come back, and his kidney function has not improved, he is not to take the meloxicam.  
Will check lipid panel  
no

## 2021-11-13 NOTE — TELEPHONE ENCOUNTER
I did not address the refills.  Please forward to her thanks.   30-year-old female PMH Marfan syndrome presented to the ED with chest pain, JACKSON and palpitations, sent in by cardiology to see EP. Pacemaker interrogation revealed PVCs up to 312 beats per hour since March 2021. Patient admitted to tele, overnight was sinus 90s-110s without any PVCs. Echo did not reveal any acute valvular abnormalities and signs of heart failure. Losartan stopped, patient now with improved symptoms. 30-year-old female PMH Marfan syndrome presented to the ED with chest pain, JACKSON and palpitations, sent in by cardiology to see EP. Pacemaker interrogation revealed PVCs up to 312 beats per hour since March 2021. Patient admitted to [facility], overnight was sinus 90s-110s without any PVCs. Echo did not reveal any acute valvular abnormalities and signs of heart failure. Losartan stopped, patient now with improved symptoms. On 11/14 she was able to ambulate without any symptoms or PVCs on monitor. Patient was medically optimized, stable and ready for discharge. Plan of care and return precautions were discussed with the patient who verbally stated understanding.

## 2022-03-03 ENCOUNTER — OFFICE VISIT (OUTPATIENT)
Dept: FAMILY MEDICINE CLINIC | Age: 72
End: 2022-03-03

## 2022-03-03 ENCOUNTER — LAB (OUTPATIENT)
Dept: LAB | Facility: HOSPITAL | Age: 72
End: 2022-03-03

## 2022-03-03 VITALS
DIASTOLIC BLOOD PRESSURE: 73 MMHG | BODY MASS INDEX: 31.15 KG/M2 | WEIGHT: 230 LBS | HEIGHT: 72 IN | HEART RATE: 60 BPM | OXYGEN SATURATION: 98 % | SYSTOLIC BLOOD PRESSURE: 154 MMHG

## 2022-03-03 DIAGNOSIS — M79.10 MYALGIA: ICD-10-CM

## 2022-03-03 DIAGNOSIS — E55.9 VITAMIN D DEFICIENCY: ICD-10-CM

## 2022-03-03 DIAGNOSIS — M10.9 GOUT, UNSPECIFIED CAUSE, UNSPECIFIED CHRONICITY, UNSPECIFIED SITE: ICD-10-CM

## 2022-03-03 DIAGNOSIS — R76.8 RHEUMATOID FACTOR POSITIVE: ICD-10-CM

## 2022-03-03 DIAGNOSIS — E78.2 MIXED HYPERLIPIDEMIA: ICD-10-CM

## 2022-03-03 DIAGNOSIS — I10 PRIMARY HYPERTENSION: Primary | ICD-10-CM

## 2022-03-03 DIAGNOSIS — N18.32 STAGE 3B CHRONIC KIDNEY DISEASE: ICD-10-CM

## 2022-03-03 DIAGNOSIS — I10 PRIMARY HYPERTENSION: ICD-10-CM

## 2022-03-03 LAB
ALBUMIN SERPL-MCNC: 3.8 G/DL (ref 3.5–5.2)
ALBUMIN/GLOB SERPL: 1.3 G/DL
ALP SERPL-CCNC: 102 U/L (ref 39–117)
ALT SERPL W P-5'-P-CCNC: 18 U/L (ref 1–41)
ANION GAP SERPL CALCULATED.3IONS-SCNC: 11 MMOL/L (ref 5–15)
AST SERPL-CCNC: 24 U/L (ref 1–40)
BILIRUB SERPL-MCNC: 0.3 MG/DL (ref 0–1.2)
BUN SERPL-MCNC: 55 MG/DL (ref 8–23)
BUN/CREAT SERPL: 15 (ref 7–25)
CALCIUM SPEC-SCNC: 9.1 MG/DL (ref 8.6–10.5)
CHLORIDE SERPL-SCNC: 106 MMOL/L (ref 98–107)
CHOLEST SERPL-MCNC: 281 MG/DL (ref 0–200)
CHROMATIN AB SERPL-ACNC: 20 IU/ML (ref 0–14)
CK SERPL-CCNC: 164 U/L (ref 20–200)
CO2 SERPL-SCNC: 25 MMOL/L (ref 22–29)
CREAT SERPL-MCNC: 3.67 MG/DL (ref 0.76–1.27)
EGFRCR SERPLBLD CKD-EPI 2021: 17.1 ML/MIN/1.73
GLOBULIN UR ELPH-MCNC: 2.9 GM/DL
GLUCOSE SERPL-MCNC: 97 MG/DL (ref 65–99)
HDLC SERPL-MCNC: 41 MG/DL (ref 40–60)
LDLC SERPL CALC-MCNC: 193 MG/DL (ref 0–100)
LDLC/HDLC SERPL: 4.67 {RATIO}
POTASSIUM SERPL-SCNC: 5.2 MMOL/L (ref 3.5–5.2)
PROT SERPL-MCNC: 6.7 G/DL (ref 6–8.5)
SODIUM SERPL-SCNC: 142 MMOL/L (ref 136–145)
TRIGL SERPL-MCNC: 242 MG/DL (ref 0–150)
URATE SERPL-MCNC: 6.3 MG/DL (ref 3.4–7)
VLDLC SERPL-MCNC: 47 MG/DL (ref 5–40)

## 2022-03-03 PROCEDURE — 84550 ASSAY OF BLOOD/URIC ACID: CPT

## 2022-03-03 PROCEDURE — 82306 VITAMIN D 25 HYDROXY: CPT

## 2022-03-03 PROCEDURE — 86431 RHEUMATOID FACTOR QUANT: CPT

## 2022-03-03 PROCEDURE — 80061 LIPID PANEL: CPT

## 2022-03-03 PROCEDURE — 82550 ASSAY OF CK (CPK): CPT

## 2022-03-03 PROCEDURE — 99214 OFFICE O/P EST MOD 30 MIN: CPT | Performed by: NURSE PRACTITIONER

## 2022-03-03 PROCEDURE — 80053 COMPREHEN METABOLIC PANEL: CPT

## 2022-03-03 PROCEDURE — 36415 COLL VENOUS BLD VENIPUNCTURE: CPT

## 2022-03-03 RX ORDER — CHLORTHALIDONE 25 MG/1
25 TABLET ORAL DAILY
Qty: 90 TABLET | Refills: 0 | Status: SHIPPED | OUTPATIENT
Start: 2022-03-03 | End: 2022-05-20

## 2022-03-03 RX ORDER — ROSUVASTATIN CALCIUM 10 MG/1
10 TABLET, COATED ORAL DAILY
Qty: 90 TABLET | Refills: 0 | Status: SHIPPED | OUTPATIENT
Start: 2022-03-03 | End: 2022-05-20

## 2022-03-03 RX ORDER — ATENOLOL 50 MG/1
50 TABLET ORAL DAILY
Qty: 90 TABLET | Refills: 0 | Status: SHIPPED | OUTPATIENT
Start: 2022-03-03 | End: 2022-05-20 | Stop reason: ALTCHOICE

## 2022-03-03 RX ORDER — AMLODIPINE BESYLATE 10 MG/1
10 TABLET ORAL DAILY
Qty: 90 TABLET | Refills: 0 | Status: SHIPPED | OUTPATIENT
Start: 2022-03-03 | End: 2022-08-30 | Stop reason: SDUPTHER

## 2022-03-03 RX ORDER — ALLOPURINOL 300 MG/1
300 TABLET ORAL DAILY
Qty: 90 TABLET | Refills: 0 | Status: SHIPPED | OUTPATIENT
Start: 2022-03-03 | End: 2022-06-12

## 2022-03-03 NOTE — ASSESSMENT & PLAN NOTE
Again explained to patient the importance of seeing a rheumatologist as I do not know the significance of his persistently positive rheumatoid factor.  Common treatments for joint pain are contraindicated with him due to his kidney function.  Declines to see a nephrologist.  I am concerned that he is delaying his care and that when there is a schedule conflict with a specialist he is not calling the office prior to his appointment to reschedule for more suitable time.

## 2022-03-03 NOTE — ASSESSMENT & PLAN NOTE
Current blood pressure seems to be his normal readings.  Blood pressure is stable.  Encouraged him to monitor blood pressure at home and report any elevated readings.

## 2022-03-03 NOTE — PROGRESS NOTES
"Chief Complaint  Hyperlipidemia (3 month ), Hypertension, and Gout    Subjective  Patient is a 71-year-old male here today in follow-up on hypertension.  He takes chlorthalidone 25 mg daily, atenolol 50 mg daily, and amlodipine 10 mg daily.  He denies side effects and requests refills.    For hyperlipidemia he takes rosuvastatin 10 mg at bedtime.  He denies side effects and requests refills.  He is no longer following up with cardiology.  He has had significantly high cholesterol levels in the past and had coronary bypass graft.  He declines to follow-up with cardiology due to his work schedule.    States he has not had any flares of gout since he has been taking allopurinol 300 mg daily.  He denies side effects and requests refills.  He continues to report joint and muscle pain.  Elevated rheumatoid factor and has not seen a rheumatologist yet.  This office has referred him to rheumatologist numerous times and he states he does not keep the appointments due to his work schedule.  He also has chronic kidney disease which complicates his care and that the NSAIDs he states help his symptoms are harmful to his kidneys.  He declines to see nephrology.        Mohamud Jarrett presents to [facility]    Review of Systems   Respiratory: Negative.    Cardiovascular: Negative.    Musculoskeletal: Positive for arthralgias and myalgias.   Neurological: Negative.         Objective   Vital Signs:   Vitals:    03/03/22 1351   BP: 154/73   BP Location: Left arm   Patient Position: Sitting   Cuff Size: Large Adult   Pulse: 60   SpO2: 98%   Weight: 104 kg (230 lb)   Height: 181.6 cm (71.5\")      Physical Exam  Vitals reviewed.   Constitutional:       General: He is not in acute distress.     Appearance: Normal appearance. He is well-developed.   Cardiovascular:      Rate and Rhythm: Normal rate and regular rhythm.      Heart sounds: Normal heart sounds.   Pulmonary:      Effort: Pulmonary effort is " normal.      Breath sounds: Normal breath sounds.   Musculoskeletal:      Right lower leg: No edema.      Left lower leg: No edema.      Comments: Equal hand grasp   Skin:     General: Skin is warm and dry.   Neurological:      General: No focal deficit present.      Mental Status: He is alert.   Psychiatric:         Attention and Perception: Attention normal.         Mood and Affect: Mood and affect normal.         Behavior: Behavior normal.          Result Review :     CMP    CMP 3/17/21   Glucose 113 (A)   BUN 26 (A)   Creatinine 2.31 (A)   Sodium 146   Potassium 4.1   Chloride 107   Calcium 9.0   Albumin 3.4 (A)   Total Bilirubin 0.25   Alkaline Phosphatase 90   AST (SGOT) 27   ALT (SGPT) 21   (A) Abnormal value       Comments are available for some flowsheets but are not being displayed.                 Lipid Panel    Lipid Panel 3/17/21   Total Cholesterol 259 (A)   Triglycerides 214 (A)   HDL Cholesterol 47   VLDL Cholesterol 43 (A)   LDL Cholesterol  169 (A)   (A) Abnormal value       Comments are available for some flowsheets but are not being displayed.               Electrolytes    Electrolytes 3/17/21   Sodium 146   Potassium 4.1   Chloride 107   Calcium 9.0      Comments are available for some flowsheets but are not being displayed.           Renal Profile    Renal Profile 3/17/21   BUN 26 (A)   Creatinine 2.31 (A)   (A) Abnormal value                     Assessment and Plan    Diagnoses and all orders for this visit:    1. Primary hypertension (Primary)  Assessment & Plan:  Current blood pressure seems to be his normal readings.  Blood pressure is stable.  Encouraged him to monitor blood pressure at home and report any elevated readings.    Orders:  -     Lipid panel; Future  -     Comprehensive metabolic panel; Future  -     amLODIPine (NORVASC) 10 MG tablet; Take 1 tablet by mouth Daily.  Dispense: 90 tablet; Refill: 0  -     chlorthalidone (HYGROTON) 25 MG tablet; Take 1 tablet by mouth Daily.   Dispense: 90 tablet; Refill: 0  -     atenolol (TENORMIN) 50 MG tablet; Take 1 tablet by mouth Daily.  Dispense: 90 tablet; Refill: 0    2. Mixed hyperlipidemia  -     Lipid panel; Future  -     Comprehensive metabolic panel; Future  -     rosuvastatin (CRESTOR) 10 MG tablet; Take 1 tablet by mouth Daily.  Dispense: 90 tablet; Refill: 0    3. Rheumatoid factor positive  Assessment & Plan:  Again explained to patient the importance of seeing a rheumatologist as I do not know the significance of his persistently positive rheumatoid factor.  Common treatments for joint pain are contraindicated with him due to his kidney function.  Declines to see a nephrologist.  I am concerned that he is delaying his care and that when there is a schedule conflict with a specialist he is not calling the office prior to his appointment to reschedule for more suitable time.      Orders:  -     Rheumatoid Factor, Quant; Future    4. Stage 3b chronic kidney disease (HCC)  Assessment & Plan:  Stressed the importance of maintaining kidney health and not take medications that could further worsen his kidney function.  Would strongly recommend he see a nephrologist.  Patient declines.      5. Gout, unspecified cause, unspecified chronicity, unspecified site  -     Uric acid; Future  -     allopurinol (ZYLOPRIM) 300 MG tablet; Take 1 tablet by mouth Daily.  Dispense: 90 tablet; Refill: 0    6. Myalgia  -     CK; Future    7. Vitamin D deficiency  -     Vitamin D 25 hydroxy; Future      Follow Up    No follow-ups on file.  Patient was given instructions and counseling regarding his condition or for health maintenance advice. Please see specific information pulled into the AVS if appropriate.

## 2022-03-03 NOTE — ASSESSMENT & PLAN NOTE
Stressed the importance of maintaining kidney health and not take medications that could further worsen his kidney function.  Would strongly recommend he see a nephrologist.  Patient declines.

## 2022-03-04 LAB — 25(OH)D3 SERPL-MCNC: 31.1 NG/ML (ref 30–100)

## 2022-03-07 DIAGNOSIS — N18.4 STAGE 4 CHRONIC KIDNEY DISEASE: Primary | ICD-10-CM

## 2022-03-08 NOTE — PROGRESS NOTES
I agree with referral to nephrologist due to marked worsening of kidney function.  Please check status of referral to nephrology.  Vitamin D and uric acid level are currently normal.  His rheumatoid factor remains elevated and I still recommend he see a rheumatologist.  No sign of muscle breakdown.  Cholesterol levels are higher.  I would hold off on making any changes on cholesterol medicine until he gets his kidney function evaluated.

## 2022-04-14 ENCOUNTER — TELEPHONE (OUTPATIENT)
Dept: SURGERY | Facility: CLINIC | Age: 72
End: 2022-04-14

## 2022-04-14 NOTE — TELEPHONE ENCOUNTER
Patient call stating he had surgery in 2019 of right inguinal hernia. Mr. Mallory stated his been in pain 4 to 5 months now. He will like to see you  soon. Can he see Luigi?   Please advise  Thank you

## 2022-05-18 ENCOUNTER — TELEPHONE (OUTPATIENT)
Dept: FAMILY MEDICINE CLINIC | Age: 72
End: 2022-05-18

## 2022-05-18 NOTE — TELEPHONE ENCOUNTER
Caller: Mohamud Jarrett    Relationship to patient: Self    Best call back number: [phone]    New or established patient?  [] New  [x] Established    Date of discharge: 05/13/2022    Facility discharged from: [facility]     Diagnosis/Symptoms: ISSUES WITH HIS HEART AND KIDNEY     Specialty Only: Did you see a [facility] provider?    [] Yes  [x] No

## 2022-05-20 ENCOUNTER — OFFICE VISIT (OUTPATIENT)
Dept: FAMILY MEDICINE CLINIC | Age: 72
End: 2022-05-20

## 2022-05-20 VITALS
SYSTOLIC BLOOD PRESSURE: 136 MMHG | BODY MASS INDEX: 29.26 KG/M2 | TEMPERATURE: 98.8 F | HEART RATE: 71 BPM | WEIGHT: 216 LBS | DIASTOLIC BLOOD PRESSURE: 75 MMHG | HEIGHT: 72 IN | OXYGEN SATURATION: 98 %

## 2022-05-20 DIAGNOSIS — N18.4 ANEMIA OF CHRONIC RENAL FAILURE, STAGE 4 (SEVERE): ICD-10-CM

## 2022-05-20 DIAGNOSIS — D63.1 ANEMIA OF CHRONIC RENAL FAILURE, STAGE 4 (SEVERE): ICD-10-CM

## 2022-05-20 DIAGNOSIS — N18.4 CHRONIC RENAL DISEASE, STAGE IV: ICD-10-CM

## 2022-05-20 DIAGNOSIS — I10 PRIMARY HYPERTENSION: Primary | ICD-10-CM

## 2022-05-20 DIAGNOSIS — E78.2 MIXED HYPERLIPIDEMIA: ICD-10-CM

## 2022-05-20 PROBLEM — Z12.11 SCREEN FOR COLON CANCER: Status: RESOLVED | Noted: 2018-12-03 | Resolved: 2022-05-20

## 2022-05-20 PROBLEM — R05.9 COUGH: Status: RESOLVED | Noted: 2021-07-06 | Resolved: 2022-05-20

## 2022-05-20 PROBLEM — N18.9 CHRONIC KIDNEY DISEASE: Status: ACTIVE | Noted: 2022-05-20

## 2022-05-20 PROCEDURE — 1111F DSCHRG MED/CURRENT MED MERGE: CPT | Performed by: NURSE PRACTITIONER

## 2022-05-20 PROCEDURE — 99214 OFFICE O/P EST MOD 30 MIN: CPT | Performed by: NURSE PRACTITIONER

## 2022-05-20 RX ORDER — FERROUS SULFATE 325(65) MG
325 TABLET ORAL DAILY
COMMUNITY
Start: 2022-05-13 | End: 2023-03-16 | Stop reason: ALTCHOICE

## 2022-05-20 RX ORDER — ROSUVASTATIN CALCIUM 20 MG/1
20 TABLET, COATED ORAL DAILY
COMMUNITY
Start: 2022-04-27 | End: 2022-06-23 | Stop reason: SDUPTHER

## 2022-05-20 RX ORDER — ASPIRIN 81 MG/1
81 TABLET ORAL DAILY
COMMUNITY
Start: 2022-04-27

## 2022-05-20 RX ORDER — CLOPIDOGREL BISULFATE 75 MG/1
1 TABLET ORAL DAILY
COMMUNITY
Start: 2022-05-13

## 2022-05-20 RX ORDER — BUMETANIDE 1 MG/1
1 TABLET ORAL DAILY
COMMUNITY
Start: 2022-05-13 | End: 2023-03-16 | Stop reason: ALTCHOICE

## 2022-05-20 RX ORDER — CARVEDILOL 25 MG/1
TABLET ORAL 2 TIMES DAILY WITH MEALS
COMMUNITY
Start: 2022-05-17 | End: 2023-03-16 | Stop reason: DRUGHIGH

## 2022-05-20 RX ORDER — ISOSORBIDE MONONITRATE 60 MG/1
2 TABLET, EXTENDED RELEASE ORAL DAILY
COMMUNITY
Start: 2022-05-13 | End: 2022-09-22 | Stop reason: SDUPTHER

## 2022-05-20 RX ORDER — SEVELAMER CARBONATE 800 MG/1
1 TABLET, FILM COATED ORAL 3 TIMES DAILY
COMMUNITY
Start: 2022-05-13 | End: 2023-03-16 | Stop reason: ALTCHOICE

## 2022-05-20 NOTE — ASSESSMENT & PLAN NOTE
Is established with nephrology.  Reports compliance with low-sodium diet and intent to be compliant with specialist appointments

## 2022-05-20 NOTE — ASSESSMENT & PLAN NOTE
Previous intolerance to simvastatin but tolerating rosuvastatin without difficulty.  Continue following with cardiology.

## 2022-05-20 NOTE — PROGRESS NOTES
"Chief Complaint  Hospital Follow Up Visit ([facility] )    Subjective  Patient is a 72-year-old male who is here today to follow-up after his recent hospitalization at [facility].  He initially went to [facility] ER on April 27 with chest pain.  He was therefore transferred to [facility] where he remained and he was discharged on May 13.  He has history of CABG procedure x3 previously.  Chest x-ray noted mild interstitial edema and he had an abnormal Lexiscan Cardiolite stress test.  Echocardiogram noted grade 1 systolic dysfunction.  He also had a left renal biopsy on May 9 and results are not available yet.  His medication regimen for hypertension has been modified with the exception of remaining on amlodipine.  They defer angiography at this time awaiting to see how well he can manage his angina and hypertension on medications.  Patient states he did determine he has 2 blockages.  He will follow-up with nephrologist and cardiology on May 23.  He does note fatigue on Bumex.  He states he is following his low-sodium diet.        Mohamud Jarrett presents to [facility]          Objective   Vital Signs:   Vitals:    05/20/22 1016   BP: 136/75   BP Location: Left arm   Patient Position: Sitting   Cuff Size: Adult   Pulse: 71   Temp: 98.8 °F (37.1 °C)   TempSrc: Oral   SpO2: 98%   Weight: 98 kg (216 lb)   Height: 181.6 cm (71.5\")      Body mass index is 29.71 kg/m².  Physical Exam  Vitals reviewed.   Constitutional:       General: He is not in acute distress.     Appearance: Normal appearance. He is well-developed.   Cardiovascular:      Rate and Rhythm: Normal rate and regular rhythm.      Heart sounds: Normal heart sounds.   Pulmonary:      Effort: Pulmonary effort is normal.      Breath sounds: Normal breath sounds.   Musculoskeletal:      Right lower leg: No edema.      Left lower leg: No edema.   Skin:     General: Skin is warm and dry.   Neurological:      General: No focal " deficit present.      Mental Status: He is alert.   Psychiatric:         Attention and Perception: Attention normal.         Mood and Affect: Mood and affect normal.         Behavior: Behavior normal.          Result Review :     CMP    CMP 3/3/22   Glucose 97   BUN 55 (A)   Creatinine 3.67 (A)   Sodium 142   Potassium 5.2   Chloride 106   Calcium 9.1   Albumin 3.80   Total Bilirubin 0.3   Alkaline Phosphatase 102   AST (SGOT) 24   ALT (SGPT) 18   (A) Abnormal value                  Lipid Panel    Lipid Panel 3/3/22   Total Cholesterol 281 (A)   Triglycerides 242 (A)   HDL Cholesterol 41   VLDL Cholesterol 47 (A)   LDL Cholesterol  193 (A)   LDL/HDL Ratio 4.67   (A) Abnormal value                             Assessment and Plan    Diagnoses and all orders for this visit:    1. Primary hypertension (Primary)  Assessment & Plan:  Blood pressure is under good control today.  Continue management per cardiology.      2. Chronic renal disease, stage IV (HCC)  Assessment & Plan:  Is established with nephrology.  Reports compliance with low-sodium diet and intent to be compliant with specialist appointments        3. Mixed hyperlipidemia  Assessment & Plan:  Previous intolerance to simvastatin but tolerating rosuvastatin without difficulty.  Continue following with cardiology.      4. Anemia of chronic renal failure, stage 4 (severe) (HCC)  Assessment & Plan:  Continue iron supplementation.  Recheck labs in approximately 4 to 6 weeks.        Follow Up    No follow-ups on file.  Patient was given instructions and counseling regarding his condition or for health maintenance advice. Please see specific information pulled into the AVS if appropriate.

## 2022-05-23 ENCOUNTER — LAB (OUTPATIENT)
Dept: LAB | Facility: HOSPITAL | Age: 72
End: 2022-05-23

## 2022-05-23 ENCOUNTER — TRANSCRIBE ORDERS (OUTPATIENT)
Dept: LAB | Facility: HOSPITAL | Age: 72
End: 2022-05-23

## 2022-05-23 DIAGNOSIS — N18.4 CHRONIC KIDNEY DISEASE (CKD), STAGE IV (SEVERE): Primary | ICD-10-CM

## 2022-05-23 DIAGNOSIS — N18.4 CHRONIC KIDNEY DISEASE (CKD), STAGE IV (SEVERE): ICD-10-CM

## 2022-05-23 LAB
ALBUMIN SERPL-MCNC: 3.9 G/DL (ref 3.5–5.2)
ANION GAP SERPL CALCULATED.3IONS-SCNC: 10.5 MMOL/L (ref 5–15)
BASOPHILS # BLD AUTO: 0.09 10*3/MM3 (ref 0–0.2)
BASOPHILS NFR BLD AUTO: 1.3 % (ref 0–1.5)
BUN SERPL-MCNC: 38 MG/DL (ref 8–23)
BUN/CREAT SERPL: 9.9 (ref 7–25)
CALCIUM SPEC-SCNC: 10 MG/DL (ref 8.6–10.5)
CHLORIDE SERPL-SCNC: 103 MMOL/L (ref 98–107)
CO2 SERPL-SCNC: 24.5 MMOL/L (ref 22–29)
CREAT SERPL-MCNC: 3.83 MG/DL (ref 0.76–1.27)
DEPRECATED RDW RBC AUTO: 49.9 FL (ref 37–54)
EGFRCR SERPLBLD CKD-EPI 2021: 16.2 ML/MIN/1.73
EOSINOPHIL # BLD AUTO: 0.84 10*3/MM3 (ref 0–0.4)
EOSINOPHIL NFR BLD AUTO: 11.7 % (ref 0.3–6.2)
ERYTHROCYTE [DISTWIDTH] IN BLOOD BY AUTOMATED COUNT: 13.7 % (ref 12.3–15.4)
GLUCOSE SERPL-MCNC: 100 MG/DL (ref 65–99)
HCT VFR BLD AUTO: 36.1 % (ref 37.5–51)
HGB BLD-MCNC: 11.6 G/DL (ref 13–17.7)
IMM GRANULOCYTES # BLD AUTO: 0.02 10*3/MM3 (ref 0–0.05)
IMM GRANULOCYTES NFR BLD AUTO: 0.3 % (ref 0–0.5)
LYMPHOCYTES # BLD AUTO: 2.1 10*3/MM3 (ref 0.7–3.1)
LYMPHOCYTES NFR BLD AUTO: 29.3 % (ref 19.6–45.3)
MCH RBC QN AUTO: 31.8 PG (ref 26.6–33)
MCHC RBC AUTO-ENTMCNC: 32.1 G/DL (ref 31.5–35.7)
MCV RBC AUTO: 98.9 FL (ref 79–97)
MONOCYTES # BLD AUTO: 0.64 10*3/MM3 (ref 0.1–0.9)
MONOCYTES NFR BLD AUTO: 8.9 % (ref 5–12)
NEUTROPHILS NFR BLD AUTO: 3.47 10*3/MM3 (ref 1.7–7)
NEUTROPHILS NFR BLD AUTO: 48.5 % (ref 42.7–76)
NRBC BLD AUTO-RTO: 0 /100 WBC (ref 0–0.2)
PHOSPHATE SERPL-MCNC: 4.4 MG/DL (ref 2.5–4.5)
PLATELET # BLD AUTO: 298 10*3/MM3 (ref 140–450)
PMV BLD AUTO: 10 FL (ref 6–12)
POTASSIUM SERPL-SCNC: 4.6 MMOL/L (ref 3.5–5.2)
RBC # BLD AUTO: 3.65 10*6/MM3 (ref 4.14–5.8)
SODIUM SERPL-SCNC: 138 MMOL/L (ref 136–145)
WBC NRBC COR # BLD: 7.16 10*3/MM3 (ref 3.4–10.8)

## 2022-05-23 PROCEDURE — 85025 COMPLETE CBC W/AUTO DIFF WBC: CPT

## 2022-05-23 PROCEDURE — 36415 COLL VENOUS BLD VENIPUNCTURE: CPT

## 2022-05-23 PROCEDURE — 80069 RENAL FUNCTION PANEL: CPT

## 2022-06-10 DIAGNOSIS — E78.2 MIXED HYPERLIPIDEMIA: ICD-10-CM

## 2022-06-10 DIAGNOSIS — M10.9 GOUT, UNSPECIFIED CAUSE, UNSPECIFIED CHRONICITY, UNSPECIFIED SITE: ICD-10-CM

## 2022-06-10 DIAGNOSIS — I10 PRIMARY HYPERTENSION: ICD-10-CM

## 2022-06-10 RX ORDER — CHLORTHALIDONE 25 MG/1
TABLET ORAL
Qty: 90 TABLET | Refills: 0 | OUTPATIENT
Start: 2022-06-10

## 2022-06-10 RX ORDER — ROSUVASTATIN CALCIUM 10 MG/1
TABLET, COATED ORAL
Qty: 90 TABLET | Refills: 0 | OUTPATIENT
Start: 2022-06-10

## 2022-06-12 RX ORDER — ALLOPURINOL 300 MG/1
TABLET ORAL
Qty: 90 TABLET | Refills: 0 | Status: SHIPPED | OUTPATIENT
Start: 2022-06-12 | End: 2022-06-23 | Stop reason: SDUPTHER

## 2022-06-23 ENCOUNTER — OFFICE VISIT (OUTPATIENT)
Dept: FAMILY MEDICINE CLINIC | Age: 72
End: 2022-06-23

## 2022-06-23 VITALS
SYSTOLIC BLOOD PRESSURE: 152 MMHG | WEIGHT: 216 LBS | BODY MASS INDEX: 29.26 KG/M2 | HEIGHT: 72 IN | OXYGEN SATURATION: 99 % | DIASTOLIC BLOOD PRESSURE: 84 MMHG | HEART RATE: 75 BPM

## 2022-06-23 DIAGNOSIS — I10 ESSENTIAL (PRIMARY) HYPERTENSION: ICD-10-CM

## 2022-06-23 DIAGNOSIS — E78.2 MIXED HYPERLIPIDEMIA: Primary | ICD-10-CM

## 2022-06-23 DIAGNOSIS — M10.9 GOUT, UNSPECIFIED CAUSE, UNSPECIFIED CHRONICITY, UNSPECIFIED SITE: ICD-10-CM

## 2022-06-23 PROBLEM — D63.1 ANEMIA IN CHRONIC KIDNEY DISEASE: Status: ACTIVE | Noted: 2022-05-23

## 2022-06-23 PROBLEM — E55.9 VITAMIN D DEFICIENCY: Status: ACTIVE | Noted: 2022-05-23

## 2022-06-23 PROBLEM — R80.9 PROTEINURIA: Status: ACTIVE | Noted: 2022-05-23

## 2022-06-23 PROBLEM — I25.810 ATHEROSCLEROSIS OF CORONARY ARTERY BYPASS GRAFT(S) WITHOUT ANGINA PECTORIS: Status: ACTIVE | Noted: 2022-05-23

## 2022-06-23 PROBLEM — R60.9 EDEMA: Status: ACTIVE | Noted: 2022-05-23

## 2022-06-23 PROBLEM — N18.4 CHRONIC KIDNEY DISEASE, STAGE 4 (SEVERE): Status: ACTIVE | Noted: 2022-05-23

## 2022-06-23 PROBLEM — N18.32 STAGE 3B CHRONIC KIDNEY DISEASE: Status: RESOLVED | Noted: 2017-06-30 | Resolved: 2022-06-23

## 2022-06-23 PROBLEM — N18.9 ANEMIA IN CHRONIC KIDNEY DISEASE: Status: ACTIVE | Noted: 2022-05-23

## 2022-06-23 PROCEDURE — 99213 OFFICE O/P EST LOW 20 MIN: CPT | Performed by: NURSE PRACTITIONER

## 2022-06-23 RX ORDER — ALLOPURINOL 300 MG/1
300 TABLET ORAL DAILY
Qty: 90 TABLET | Refills: 0 | Status: SHIPPED | OUTPATIENT
Start: 2022-06-23 | End: 2022-08-30 | Stop reason: SDUPTHER

## 2022-06-23 RX ORDER — ROSUVASTATIN CALCIUM 20 MG/1
20 TABLET, COATED ORAL DAILY
Qty: 90 TABLET | Refills: 0 | Status: SHIPPED | OUTPATIENT
Start: 2022-06-23 | End: 2022-08-30 | Stop reason: SDUPTHER

## 2022-06-23 NOTE — PROGRESS NOTES
"Chief Complaint  Hyperlipidemia (3 month ); Hypertension; Chronic renal disease, stage IV; Anemia of chronic renal failure; and Gout    Subjective  Patient is a 72-year-old male who is here today to follow-up regarding chronic gout.  He states he has had no flares of gout since starting allopurinol which he is currently taking 300 mg once daily.  Denies side effects and requests refills.    For hyperlipidemia he is taking rosuvastatin 20 mg daily.  Denies side effects and requests refills.  Has had previous intolerance and myalgia on previous statin medication.  He is currently following with cardiology and states he will discuss cholesterol levels with him and follow-up visit.          Mohamud Jarrett presents to [facility]          Objective   Vital Signs:   Vitals:    06/23/22 1348   BP: 152/84   BP Location: Left arm   Patient Position: Sitting   Cuff Size: Adult   Pulse: 75   SpO2: 99%   Weight: 98 kg (216 lb)   Height: 181.6 cm (71.5\")      Body mass index is 29.71 kg/m².  Physical Exam  Vitals reviewed.   Constitutional:       General: He is not in acute distress.     Appearance: Normal appearance. He is well-developed.   Neck:      Thyroid: No thyroid mass, thyromegaly or thyroid tenderness.   Cardiovascular:      Rate and Rhythm: Normal rate and regular rhythm.      Pulses:           Posterior tibial pulses are 2+ on the right side and 2+ on the left side.      Heart sounds: Normal heart sounds.   Pulmonary:      Effort: Pulmonary effort is normal.      Breath sounds: Normal breath sounds.   Musculoskeletal:      Right lower leg: No edema.      Left lower leg: No edema.   Skin:     General: Skin is warm and dry.   Neurological:      General: No focal deficit present.      Mental Status: He is alert.   Psychiatric:         Attention and Perception: Attention normal.         Mood and Affect: Mood and affect normal.         Behavior: Behavior normal.          Result Review : "     CMP    CMP 3/3/22 5/23/22   Glucose 97 100 (A)   BUN 55 (A) 38 (A)   Creatinine 3.67 (A) 3.83 (A)   Sodium 142 138   Potassium 5.2 4.6   Chloride 106 103   Calcium 9.1 10.0   Albumin 3.80 3.90   Total Bilirubin 0.3    Alkaline Phosphatase 102    AST (SGOT) 24    ALT (SGPT) 18    (A) Abnormal value            CBC    CBC 5/23/22   WBC 7.16   RBC 3.65 (A)   Hemoglobin 11.6 (A)   Hematocrit 36.1 (A)   MCV 98.9 (A)   MCH 31.8   MCHC 32.1   RDW 13.7   Platelets 298   (A) Abnormal value            CBC w/diff    CBC w/Diff 5/23/22   WBC 7.16   RBC 3.65 (A)   Hemoglobin 11.6 (A)   Hematocrit 36.1 (A)   MCV 98.9 (A)   MCH 31.8   MCHC 32.1   RDW 13.7   Platelets 298   Neutrophil Rel % 48.5   Immature Granulocyte Rel % 0.3   Lymphocyte Rel % 29.3   Monocyte Rel % 8.9   Eosinophil Rel % 11.7 (A)   Basophil Rel % 1.3   (A) Abnormal value            Lipid Panel    Lipid Panel 3/3/22   Total Cholesterol 281 (A)   Triglycerides 242 (A)   HDL Cholesterol 41   VLDL Cholesterol 47 (A)   LDL Cholesterol  193 (A)   LDL/HDL Ratio 4.67   (A) Abnormal value                             Assessment and Plan    Diagnoses and all orders for this visit:    1. Mixed hyperlipidemia (Primary)  -     rosuvastatin (CRESTOR) 20 MG tablet; Take 1 tablet by mouth Daily.  Dispense: 90 tablet; Refill: 0    2. Essential (primary) hypertension  Assessment & Plan:  Continue with cardiology      3. Gout, unspecified cause, unspecified chronicity, unspecified site  -     allopurinol (ZYLOPRIM) 300 MG tablet; Take 1 tablet by mouth Daily.  Dispense: 90 tablet; Refill: 0      Follow Up    No follow-ups on file.  Patient was given instructions and counseling regarding his condition or for health maintenance advice. Please see specific information pulled into the AVS if appropriate.

## 2022-07-11 ENCOUNTER — TRANSCRIBE ORDERS (OUTPATIENT)
Dept: LAB | Facility: HOSPITAL | Age: 72
End: 2022-07-11

## 2022-07-11 ENCOUNTER — LAB (OUTPATIENT)
Dept: LAB | Facility: HOSPITAL | Age: 72
End: 2022-07-11

## 2022-07-11 DIAGNOSIS — N18.4 CHRONIC KIDNEY DISEASE, STAGE IV (SEVERE): Primary | ICD-10-CM

## 2022-07-11 DIAGNOSIS — N18.4 CHRONIC KIDNEY DISEASE, STAGE IV (SEVERE): ICD-10-CM

## 2022-07-11 LAB
25(OH)D3 SERPL-MCNC: 28.9 NG/ML (ref 30–100)
BACTERIA UR QL AUTO: NORMAL /HPF
BILIRUB UR QL STRIP: NEGATIVE
CLARITY UR: CLEAR
COLOR UR: YELLOW
CREAT UR-MCNC: 60.5 MG/DL
FERRITIN SERPL-MCNC: 120 NG/ML (ref 30–400)
GLUCOSE UR STRIP-MCNC: NEGATIVE MG/DL
HGB UR QL STRIP.AUTO: NEGATIVE
HYALINE CASTS UR QL AUTO: NORMAL /LPF
IRON 24H UR-MRATE: 65 MCG/DL (ref 59–158)
IRON SATN MFR SERPL: 18 % (ref 20–50)
KETONES UR QL STRIP: NEGATIVE
LEUKOCYTE ESTERASE UR QL STRIP.AUTO: NEGATIVE
NITRITE UR QL STRIP: NEGATIVE
PH UR STRIP.AUTO: 6 [PH] (ref 5–8)
PROT ?TM UR-MCNC: 192.7 MG/DL
PROT UR QL STRIP: ABNORMAL
PROT/CREAT UR: 3.19 MG/G{CREAT}
PTH-INTACT SERPL-MCNC: 89.5 PG/ML (ref 15–65)
RBC # UR STRIP: NORMAL /HPF
REF LAB TEST METHOD: NORMAL
SP GR UR STRIP: 1.01 (ref 1–1.03)
SQUAMOUS #/AREA URNS HPF: NORMAL /HPF
TIBC SERPL-MCNC: 355 MCG/DL (ref 298–536)
TRANSFERRIN SERPL-MCNC: 238 MG/DL (ref 200–360)
URATE SERPL-MCNC: 5.8 MG/DL (ref 3.4–7)
UROBILINOGEN UR QL STRIP: ABNORMAL
WBC # UR STRIP: NORMAL /HPF

## 2022-07-11 PROCEDURE — 83540 ASSAY OF IRON: CPT

## 2022-07-11 PROCEDURE — 84156 ASSAY OF PROTEIN URINE: CPT

## 2022-07-11 PROCEDURE — 81001 URINALYSIS AUTO W/SCOPE: CPT

## 2022-07-11 PROCEDURE — 82306 VITAMIN D 25 HYDROXY: CPT

## 2022-07-11 PROCEDURE — 82570 ASSAY OF URINE CREATININE: CPT

## 2022-07-11 PROCEDURE — 84466 ASSAY OF TRANSFERRIN: CPT

## 2022-07-11 PROCEDURE — 83970 ASSAY OF PARATHORMONE: CPT

## 2022-07-11 PROCEDURE — 82728 ASSAY OF FERRITIN: CPT

## 2022-07-11 PROCEDURE — 84550 ASSAY OF BLOOD/URIC ACID: CPT

## 2022-07-11 PROCEDURE — 36415 COLL VENOUS BLD VENIPUNCTURE: CPT

## 2022-07-21 ENCOUNTER — LAB (OUTPATIENT)
Dept: LAB | Facility: HOSPITAL | Age: 72
End: 2022-07-21

## 2022-07-21 ENCOUNTER — TRANSCRIBE ORDERS (OUTPATIENT)
Dept: LAB | Facility: HOSPITAL | Age: 72
End: 2022-07-21

## 2022-07-21 DIAGNOSIS — N18.4 CHRONIC KIDNEY DISEASE, STAGE IV (SEVERE): Primary | ICD-10-CM

## 2022-07-21 DIAGNOSIS — N18.4 CHRONIC KIDNEY DISEASE, STAGE IV (SEVERE): ICD-10-CM

## 2022-07-21 LAB
ANION GAP SERPL CALCULATED.3IONS-SCNC: 13 MMOL/L (ref 5–15)
BUN SERPL-MCNC: 58 MG/DL (ref 8–23)
BUN/CREAT SERPL: 12.3 (ref 7–25)
CALCIUM SPEC-SCNC: 9.5 MG/DL (ref 8.6–10.5)
CHLORIDE SERPL-SCNC: 100 MMOL/L (ref 98–107)
CO2 SERPL-SCNC: 25 MMOL/L (ref 22–29)
CREAT SERPL-MCNC: 4.7 MG/DL (ref 0.76–1.27)
EGFRCR SERPLBLD CKD-EPI 2021: 12.6 ML/MIN/1.73
GLUCOSE SERPL-MCNC: 102 MG/DL (ref 65–99)
POTASSIUM SERPL-SCNC: 4.6 MMOL/L (ref 3.5–5.2)
SODIUM SERPL-SCNC: 138 MMOL/L (ref 136–145)

## 2022-07-21 PROCEDURE — 36415 COLL VENOUS BLD VENIPUNCTURE: CPT

## 2022-07-21 PROCEDURE — 80048 BASIC METABOLIC PNL TOTAL CA: CPT

## 2022-08-30 ENCOUNTER — HOSPITAL ENCOUNTER (OUTPATIENT)
Dept: GENERAL RADIOLOGY | Facility: HOSPITAL | Age: 72
Discharge: HOME | End: 2022-08-30

## 2022-08-30 ENCOUNTER — OFFICE VISIT (OUTPATIENT)
Dept: FAMILY MEDICINE CLINIC | Age: 72
End: 2022-08-30

## 2022-08-30 ENCOUNTER — LAB (OUTPATIENT)
Dept: LAB | Facility: HOSPITAL | Age: 72
End: 2022-08-30

## 2022-08-30 ENCOUNTER — TELEPHONE (OUTPATIENT)
Dept: FAMILY MEDICINE CLINIC | Age: 72
End: 2022-08-30

## 2022-08-30 VITALS
DIASTOLIC BLOOD PRESSURE: 75 MMHG | HEART RATE: 65 BPM | WEIGHT: 224 LBS | BODY MASS INDEX: 30.34 KG/M2 | HEIGHT: 72 IN | OXYGEN SATURATION: 98 % | SYSTOLIC BLOOD PRESSURE: 164 MMHG

## 2022-08-30 DIAGNOSIS — E78.2 MIXED HYPERLIPIDEMIA: ICD-10-CM

## 2022-08-30 DIAGNOSIS — Z00.00 WELL ADULT EXAM: Primary | ICD-10-CM

## 2022-08-30 DIAGNOSIS — M54.2 NECK PAIN: ICD-10-CM

## 2022-08-30 DIAGNOSIS — I25.810 ATHEROSCLEROSIS OF CORONARY ARTERY BYPASS GRAFT OF NATIVE HEART WITHOUT ANGINA PECTORIS: ICD-10-CM

## 2022-08-30 DIAGNOSIS — M10.9 GOUT, UNSPECIFIED CAUSE, UNSPECIFIED CHRONICITY, UNSPECIFIED SITE: ICD-10-CM

## 2022-08-30 DIAGNOSIS — I10 PRIMARY HYPERTENSION: ICD-10-CM

## 2022-08-30 DIAGNOSIS — N18.4 CHRONIC KIDNEY DISEASE, STAGE 4 (SEVERE): ICD-10-CM

## 2022-08-30 DIAGNOSIS — Z23 NEED FOR COVID-19 VACCINE: ICD-10-CM

## 2022-08-30 LAB
ALBUMIN SERPL-MCNC: 3.9 G/DL (ref 3.5–5.2)
ALBUMIN/GLOB SERPL: 1.3 G/DL
ALP SERPL-CCNC: 80 U/L (ref 39–117)
ALT SERPL W P-5'-P-CCNC: 8 U/L (ref 1–41)
ANION GAP SERPL CALCULATED.3IONS-SCNC: 7.8 MMOL/L (ref 5–15)
AST SERPL-CCNC: 16 U/L (ref 1–40)
BILIRUB SERPL-MCNC: 0.3 MG/DL (ref 0–1.2)
BUN SERPL-MCNC: 41 MG/DL (ref 8–23)
BUN/CREAT SERPL: 10.4 (ref 7–25)
CALCIUM SPEC-SCNC: 8.9 MG/DL (ref 8.6–10.5)
CHLORIDE SERPL-SCNC: 106 MMOL/L (ref 98–107)
CHOLEST SERPL-MCNC: 144 MG/DL (ref 0–200)
CO2 SERPL-SCNC: 27.2 MMOL/L (ref 22–29)
CREAT SERPL-MCNC: 3.96 MG/DL (ref 0.76–1.27)
EGFRCR SERPLBLD CKD-EPI 2021: 15.5 ML/MIN/1.73
GLOBULIN UR ELPH-MCNC: 2.9 GM/DL
GLUCOSE SERPL-MCNC: 96 MG/DL (ref 65–99)
HDLC SERPL-MCNC: 47 MG/DL (ref 40–60)
LDLC SERPL CALC-MCNC: 74 MG/DL (ref 0–100)
LDLC/HDLC SERPL: 1.51 {RATIO}
POTASSIUM SERPL-SCNC: 4.6 MMOL/L (ref 3.5–5.2)
PROT SERPL-MCNC: 6.8 G/DL (ref 6–8.5)
SODIUM SERPL-SCNC: 141 MMOL/L (ref 136–145)
TRIGL SERPL-MCNC: 130 MG/DL (ref 0–150)
VLDLC SERPL-MCNC: 23 MG/DL (ref 5–40)

## 2022-08-30 PROCEDURE — 36415 COLL VENOUS BLD VENIPUNCTURE: CPT

## 2022-08-30 PROCEDURE — 91305 COVID-19 (PFIZER) 12+ YRS: CPT | Performed by: NURSE PRACTITIONER

## 2022-08-30 PROCEDURE — 72040 X-RAY EXAM NECK SPINE 2-3 VW: CPT

## 2022-08-30 PROCEDURE — 80061 LIPID PANEL: CPT

## 2022-08-30 PROCEDURE — 80053 COMPREHEN METABOLIC PANEL: CPT

## 2022-08-30 PROCEDURE — 1170F FXNL STATUS ASSESSED: CPT | Performed by: NURSE PRACTITIONER

## 2022-08-30 PROCEDURE — 1159F MED LIST DOCD IN RCRD: CPT | Performed by: NURSE PRACTITIONER

## 2022-08-30 PROCEDURE — G0439 PPPS, SUBSEQ VISIT: HCPCS | Performed by: NURSE PRACTITIONER

## 2022-08-30 PROCEDURE — 0054A COVID-19 (PFIZER) 12+ YRS: CPT | Performed by: NURSE PRACTITIONER

## 2022-08-30 RX ORDER — AMLODIPINE BESYLATE 10 MG/1
10 TABLET ORAL DAILY
Qty: 90 TABLET | Refills: 1 | Status: SHIPPED | OUTPATIENT
Start: 2022-08-30 | End: 2022-11-22 | Stop reason: SDUPTHER

## 2022-08-30 RX ORDER — ALLOPURINOL 300 MG/1
300 TABLET ORAL DAILY
Qty: 90 TABLET | Refills: 1 | Status: SHIPPED | OUTPATIENT
Start: 2022-08-30 | End: 2022-11-22 | Stop reason: SDUPTHER

## 2022-08-30 RX ORDER — ROSUVASTATIN CALCIUM 20 MG/1
20 TABLET, COATED ORAL DAILY
Qty: 90 TABLET | Refills: 1 | Status: SHIPPED | OUTPATIENT
Start: 2022-08-30 | End: 2022-11-22 | Stop reason: DRUGHIGH

## 2022-08-30 NOTE — PROGRESS NOTES
Subsequent Medicare Wellness Visit  The ABC's of Medicare Wellness Visit  Chief Complaint   Patient presents with   • Medicare Wellness-subsequent       Subjective   History of Present Illness      Mohamud is a 72 y.o. male who presents   for a Subsequent Medicare Wellness Visit.    He is currently seeing Daniel Lyons cardiology and Dr. Sinclair nephrology for chronic kidney disease.  He would like to return to work but has been advised not to return to work at this time per specialist.  He would like a second opinion from cardiology and nephrology.    For hyperlipidemia he is taking Crestor 20 mg at bedtime.  He denies side effects and requests refills from this office.  Also requests refill of amlodipine 10 mg daily.  Denies medication side effects.  Carvedilol, bumetanide, clopidogrel, and isosorbide are being refilled by cardiology.    Denies episodes of gout since starting allopurinol 300 mg daily.  Denies side effects and requests refills.  He recently declined rheumatology referral again.  I again explained the balance of managing his chronic pain and his abnormal rheumatoid factor and how managing those symptoms can further worsen his kidney function.  He agrees to see a rheumatologist to better manage and evaluate these findings.    Intermittent neck stiffness and pain not related to any injury.  Did go to urgent care in July for evaluation and received a muscle relaxer.  He has not had any imaging of his cervical spine to his knowledge.  He does report some intermittent left upper arm pain not related to any injury.  He has had a complete cardiac work-up as recent as his April hospitalization at [facility] and continues to be managed by cardiology.  He denies any numbness or tingling.    Does the patient have evidence of cognitive impairment?   No    Asprin use counseling:Taking ASA appropriately as indicated    Recent Hospitalizations:  Recently treated at the following:  Other: [facility] april  2022    Advanced Care Planning:  ACP discussion was declined by the patient. Patient has an advance directive (not in EMR), copy requested.    The following portions of the patient's history were reviewed   and updated as appropriate: allergies, current medications, past family history, past medical history, past social history, past surgical history and problem list.    Compared to one year ago, the patient feels his   physical health is the same.  Compared to one year ago, the patient feels his   mental health is the same.    Reviewed chart for potential of high risk medication in the elderly:  yes  Reviewed chart for potential of harmful drug interactions in the elderly:  yes    BMI is >= 30 and <35. (Class 1 Obesity). The following options were offered after discussion;: weight loss educational material (shared in after visit summary)       HEALTH RISK ASSESSMENT BEGIN  Fall Risk Screen:  CYNDIE Fall Risk Assessment was completed, and patient is at LOW risk for falls.Assessment completed on:8/30/2022    Depression Screen:   PHQ-2/PHQ-9 Depression Screening 8/30/2022   Retired PHQ-9 Total Score -   Retired Total Score -   Little Interest or Pleasure in Doing Things 0-->not at all   Feeling Down, Depressed or Hopeless 0-->not at all   PHQ-9: Brief Depression Severity Measure Score 0       Current Medical Providers:  Patient Care Team:  Shara Talley APRN as PCP - General (Nurse Practitioner)  Moe Bower MD as Consulting Physician (Cardiology)  Daniel Lyons APRN (Family Medicine)  Silas Muniz MD as Consulting Physician (Nephrology)  Miriam Corbin APRN as Nurse Practitioner (Nurse Practitioner)    Smoking Status:  Social History     Tobacco Use   Smoking Status Never Smoker   Smokeless Tobacco Never Used       Alcohol Consumption:  Social History     Substance and Sexual Activity   Alcohol Use Yes    Comment: occassionally       Health Habits and Functional and Cognitive  Screening:  Functional & Cognitive Status 8/30/2022   Do you have difficulty preparing food and eating? No   Do you have difficulty bathing yourself, getting dressed or grooming yourself? No   Do you have difficulty using the toilet? No   Do you have difficulty moving around from place to place? No   Do you have trouble with steps or getting out of a bed or a chair? No   Current Diet Well Balanced Diet   Dental Exam Up to date   Eye Exam Up to date   Exercise (times per week) 7 times per week   Current Exercises Include Walking   Do you need help using the phone?  No   Are you deaf or do you have serious difficulty hearing?  No   Do you need help with transportation? No   Do you need help shopping? No   Do you need help preparing meals?  No   Do you need help with housework?  No   Do you need help with laundry? No   Do you need help taking your medications? No   Do you need help managing money? No   Do you ever drive or ride in a car without wearing a seat belt? No   Have you felt unusual stress, anger or loneliness in the last month? No   Who do you live with? Child   If you need help, do you have trouble finding someone available to you? No   Have you been bothered in the last four weeks by sexual problems? No   Do you have difficulty concentrating, remembering or making decisions? No       Age-appropriate Screening Schedule:  Refer to the list below for future screening recommendations based on patient's age, sex and/or medical conditions. Orders for these recommended tests are listed in the plan section. The patient has been provided with a written plan.    Health Maintenance   Topic Date Due   • TDAP/TD VACCINES (1 - Tdap) Never done   • ZOSTER VACCINE (1 of 2) Never done   • INFLUENZA VACCINE  10/01/2022   • LIPID PANEL  03/03/2023     HEALTH RISK ASSESSMENT END    Outpatient Medications Prior to Visit   Medication Sig Dispense Refill   • aspirin 81 MG EC tablet Take 81 mg by mouth Daily.     • bumetanide  "(BUMEX) 1 MG tablet Take 1 tablet by mouth Daily.     • carvedilol (COREG) 12.5 MG tablet 25 mg by mouth twice daily     • clopidogrel (PLAVIX) 75 MG tablet Take 1 tablet by mouth Daily.     • ferrous sulfate 325 (65 FE) MG tablet Take 325 mg by mouth Daily.     • isosorbide mononitrate (IMDUR) 60 MG 24 hr tablet Take 2 tablets by mouth Daily.     • sevelamer (RENVELA) 800 MG tablet Take 1 tablet by mouth 3 (Three) Times a Day.     • allopurinol (ZYLOPRIM) 300 MG tablet Take 1 tablet by mouth Daily. 90 tablet 0   • amLODIPine (NORVASC) 10 MG tablet Take 1 tablet by mouth Daily. 90 tablet 0   • rosuvastatin (CRESTOR) 20 MG tablet Take 1 tablet by mouth Daily. 90 tablet 0     No facility-administered medications prior to visit.       Patient Active Problem List   Diagnosis   • Neuritis of lower extremity, right   • Anemia of chronic renal failure   • Gout   • Essential (primary) hypertension   • Hyperlipidemia   • Seasonal allergies   • Rheumatoid factor positive   • Bilateral leg pain   • Chronic kidney disease, stage 4 (severe) (HCC)   • Anemia in chronic kidney disease   • Atherosclerosis of coronary artery bypass graft(s) without angina pectoris   • Edema   • Proteinuria   • Vitamin D deficiency   • Well adult exam   • Need for COVID-19 vaccine   • Neck pain            Objective      Vitals:    08/30/22 0901   BP: 164/75   BP Location: Left arm   Patient Position: Sitting   Cuff Size: Large Adult   Pulse: 65   SpO2: 98%   Weight: 102 kg (224 lb)   Height: 181.6 cm (71.5\")       Physical Exam  Vitals reviewed.   Constitutional:       General: He is not in acute distress.     Appearance: Normal appearance. He is well-developed.   Cardiovascular:      Rate and Rhythm: Normal rate and regular rhythm.      Pulses:           Posterior tibial pulses are 2+ on the right side and 2+ on the left side.      Heart sounds: Normal heart sounds.   Pulmonary:      Effort: Pulmonary effort is normal.      Breath sounds: Normal " breath sounds.   Musculoskeletal:      Right lower leg: No edema.      Left lower leg: No edema.   Skin:     General: Skin is warm and dry.   Neurological:      General: No focal deficit present.      Mental Status: He is alert.   Psychiatric:         Attention and Perception: Attention normal.         Mood and Affect: Mood and affect normal.         Behavior: Behavior normal.             Result Review :           Lab Results - Last 18 Months   Lab Units 07/21/22  1346 07/11/22  1009 05/23/22  1123 03/03/22  1433 03/17/21  0926   BUN mg/dL 58*  --  38* 55* 26*   CREATININE mg/dL 4.70*  --  3.83* 3.67* 2.31*   SODIUM mmol/L 138  --  138 142 146   POTASSIUM mmol/L 4.6  --  4.6 5.2 4.1   CHLORIDE mmol/L 100  --  103 106 107   CALCIUM mg/dL 9.5  --  10.0 9.1 9.0   ALBUMIN g/dL  --   --  3.90 3.80 3.4*   BILIRUBIN mg/dL  --   --   --  0.3 0.25   ALK PHOS U/L  --   --   --  102 90   AST (SGOT) U/L  --   --   --  24 27   ALT (SGPT) U/L  --   --   --  18 21   CHOLESTEROL mg/dL  --   --   --   --  259*   TRIGLYCERIDES mg/dL  --   --   --  242* 214*   HDL CHOL mg/dL  --   --   --  41 47   VLDL CHOL mg/dL  --   --   --  47* 43*   LDL CHOL mg/dL  --   --   --  193* 169*   LDL/HDL RATIO   --   --   --  4.67  --    CK TOTAL U/L  --   --   --  164  --    WBC 10*3/mm3  --   --  7.16  --   --    RBC 10*6/mm3  --   --  3.65*  --   --    HEMATOCRIT %  --   --  36.1*  --   --    MCV fL  --   --  98.9*  --   --    MCH pg  --   --  31.8  --   --    PSA ng/mL  --   --   --   --  0.62   VIT D 25 HYDROXY ng/ml  --  28.9*  --  31.1 11.4*   URIC ACID mg/dL  --  5.8  --  6.3  --        The following data was reviewed by: POLI Fajardo on 08/30/2022:    Assessment & Plan   Assessment and Plan      Diagnoses and all orders for this visit:    1. Well adult exam (Primary)  Assessment & Plan:  Preventive care measures are discussed.      2. Need for COVID-19 vaccine  -     COVID-19 Vaccine (Pfizer) Gray Cap    3. Neck pain  Assessment &  Plan:  Further treatment pending x-ray results.    Orders:  -     XR Spine Cervical 2 or 3 View; Future    4. Mixed hyperlipidemia  -     Ambulatory Referral to Cardiology  -     Lipid panel; Future  -     rosuvastatin (CRESTOR) 20 MG tablet; Take 1 tablet by mouth Daily.  Dispense: 90 tablet; Refill: 1    5. Gout, unspecified cause, unspecified chronicity, unspecified site  -     allopurinol (ZYLOPRIM) 300 MG tablet; Take 1 tablet by mouth Daily.  Dispense: 90 tablet; Refill: 1    6. Primary hypertension  -     Ambulatory Referral to Cardiology  -     Comprehensive metabolic panel; Future  -     amLODIPine (NORVASC) 10 MG tablet; Take 1 tablet by mouth Daily.  Dispense: 90 tablet; Refill: 1    7. Atherosclerosis of coronary artery bypass graft of native heart without angina pectoris  -     Ambulatory Referral to Cardiology    8. Chronic kidney disease, stage 4 (severe) (HCC)  -     Ambulatory Referral to Nephrology      Medicare Risks and Personalized Health Plan  CMS Preventative Services Quick Reference  Advance Directive Discussion  Cardiovascular risk  Colon Cancer Screening  Immunizations Discussed/Encouraged (specific immunizations; Prevnar 20 (Pneumococcal 20-valent conjugate), Shingrix and COVID19 )    The above risks/problems have been discussed with the patient.  Pertinent information has been shared with the patient in the   After Visit Summary. Follow up plans and orders are seen below   in the Assessment/Plan Section.    I spent 40 minutes caring for Mohamud on this date of service. This time includes time spent by me in the following activities:preparing for the visit, reviewing tests, obtaining and/or reviewing a separately obtained history, performing a medically appropriate examination and/or evaluation , counseling and educating the patient/family/caregiver, ordering medications, tests, or procedures and documenting information in the medical record    Follow Up   No follow-ups on file.     An  After Visit Summary and PPPS were given to the patient.

## 2022-08-30 NOTE — TELEPHONE ENCOUNTER
Patient is agreeable to see Dr. Chicas, rheumatologist regarding positive rheumatoid factor and arthralgia.  Referral note dated August 24 states he declined referral.  Patient agrees to referral today.

## 2022-08-31 ENCOUNTER — TRANSCRIBE ORDERS (OUTPATIENT)
Dept: FAMILY MEDICINE CLINIC | Age: 72
End: 2022-08-31

## 2022-08-31 DIAGNOSIS — R76.8 RHEUMATOID FACTOR POSITIVE: Primary | ICD-10-CM

## 2022-08-31 NOTE — TELEPHONE ENCOUNTER
NOTED. IT LOOKS LIKE THIS REFERRAL WAS FROM 07/2021. I WILL PLACE A NEW ORDER AND PEND IT TO YOU FOR SIGNATURE PLEASE.     THANKS   MLB

## 2022-09-01 NOTE — PROGRESS NOTES
Cholesterol looks  so much better.  Blood sugar and liver function is normal.  Kidney function is stable.

## 2022-09-01 NOTE — PROGRESS NOTES
Moderate degenerative/ arthritic change.  I recommend physical therapy for neck pain.  If symptoms persist from that point , MRI of cervical spine is recommended.

## 2022-09-22 ENCOUNTER — OFFICE VISIT (OUTPATIENT)
Dept: CARDIOLOGY | Facility: CLINIC | Age: 72
End: 2022-09-22

## 2022-09-22 VITALS
WEIGHT: 225 LBS | HEART RATE: 66 BPM | SYSTOLIC BLOOD PRESSURE: 159 MMHG | HEIGHT: 71 IN | BODY MASS INDEX: 31.5 KG/M2 | DIASTOLIC BLOOD PRESSURE: 82 MMHG

## 2022-09-22 DIAGNOSIS — I25.118 CORONARY ARTERY DISEASE INVOLVING NATIVE CORONARY ARTERY OF NATIVE HEART WITH OTHER FORM OF ANGINA PECTORIS: Primary | ICD-10-CM

## 2022-09-22 DIAGNOSIS — E78.2 MIXED DYSLIPIDEMIA: ICD-10-CM

## 2022-09-22 DIAGNOSIS — Z95.1 S/P CABG (CORONARY ARTERY BYPASS GRAFT): ICD-10-CM

## 2022-09-22 DIAGNOSIS — I10 ESSENTIAL HYPERTENSION: ICD-10-CM

## 2022-09-22 DIAGNOSIS — N18.5 CKD (CHRONIC KIDNEY DISEASE) STAGE 5, GFR LESS THAN 15 ML/MIN: ICD-10-CM

## 2022-09-22 PROCEDURE — 99204 OFFICE O/P NEW MOD 45 MIN: CPT | Performed by: INTERNAL MEDICINE

## 2022-09-22 RX ORDER — NITROGLYCERIN 0.4 MG/1
0.4 TABLET SUBLINGUAL
Qty: 60 TABLET | Refills: 3 | Status: SHIPPED | OUTPATIENT
Start: 2022-09-22

## 2022-09-22 RX ORDER — RANOLAZINE 500 MG/1
500 TABLET, EXTENDED RELEASE ORAL 2 TIMES DAILY
Qty: 60 TABLET | Refills: 5 | Status: SHIPPED | OUTPATIENT
Start: 2022-09-22 | End: 2022-10-04 | Stop reason: SDUPTHER

## 2022-09-22 RX ORDER — ISOSORBIDE MONONITRATE 120 MG/1
120 TABLET, EXTENDED RELEASE ORAL DAILY
Qty: 90 TABLET | Refills: 3 | Status: SHIPPED | OUTPATIENT
Start: 2022-09-22 | End: 2023-03-16 | Stop reason: ALTCHOICE

## 2022-09-23 ENCOUNTER — TELEPHONE (OUTPATIENT)
Dept: CARDIOLOGY | Facility: CLINIC | Age: 72
End: 2022-09-23

## 2022-09-23 ENCOUNTER — OFFICE VISIT (OUTPATIENT)
Dept: FAMILY MEDICINE CLINIC | Age: 72
End: 2022-09-23

## 2022-09-23 VITALS
WEIGHT: 227 LBS | BODY MASS INDEX: 31.78 KG/M2 | DIASTOLIC BLOOD PRESSURE: 83 MMHG | SYSTOLIC BLOOD PRESSURE: 166 MMHG | OXYGEN SATURATION: 98 % | HEIGHT: 71 IN | HEART RATE: 76 BPM

## 2022-09-23 DIAGNOSIS — N18.4 CHRONIC KIDNEY DISEASE, STAGE 4 (SEVERE): ICD-10-CM

## 2022-09-23 DIAGNOSIS — I10 ESSENTIAL (PRIMARY) HYPERTENSION: ICD-10-CM

## 2022-09-23 DIAGNOSIS — E78.2 MIXED HYPERLIPIDEMIA: Primary | ICD-10-CM

## 2022-09-23 PROBLEM — R60.9 EDEMA: Status: RESOLVED | Noted: 2022-05-23 | Resolved: 2022-09-23

## 2022-09-23 PROBLEM — Z00.00 WELL ADULT EXAM: Status: RESOLVED | Noted: 2022-08-30 | Resolved: 2022-09-23

## 2022-09-23 PROBLEM — Z23 NEED FOR COVID-19 VACCINE: Status: RESOLVED | Noted: 2022-08-30 | Resolved: 2022-09-23

## 2022-09-23 PROCEDURE — 99213 OFFICE O/P EST LOW 20 MIN: CPT | Performed by: NURSE PRACTITIONER

## 2022-09-23 NOTE — TELEPHONE ENCOUNTER
A PA for [employer] was started and approved.     PA Case: [id number], Status: Approved, Coverage Starts on: 6/24/2022 12:00:00 AM, Coverage Ends on: 9/23/2023 12:00:00 AM.

## 2022-09-23 NOTE — PROGRESS NOTES
"Chief Complaint  Hypertension (3 month - med refills), Hyperlipidemia, Chronic Kidney Disease, Gout, and Atherosclerosis of coronary artery bypass graft(s) without     Subjective  Patient is a 72-year-old male who is in today in follow-up regarding chronic kidney disease and coronary artery disease and hypertension.  He saw interventional cardiologist yesterday, Dr Delgadillo.  The plan is to perform a cardiac catheterization and patient realizes this could worsen his kidney function where dialysis may be required.  Patient feels as if his quality of life currently is decreased as he is not able to be active or work.  He will soon see nephrology for second opinion.    Medications were all refilled at her recent Medicare wellness visit.  Copies of his recent labs are provided to patient and reviewed.  Cholesterol levels are markedly improved.        Mohamud Jarrett presents to [facility]          Objective   Vital Signs:   Vitals:    09/23/22 1540   BP: 166/83   BP Location: Left arm   Patient Position: Sitting   Cuff Size: Large Adult   Pulse: 76   SpO2: 98%   Weight: 103 kg (227 lb)   Height: 180.3 cm (71\")      Body mass index is 31.66 kg/m².  Physical Exam  Vitals reviewed.   Constitutional:       General: He is not in acute distress.     Appearance: Normal appearance. He is well-developed.   Cardiovascular:      Rate and Rhythm: Normal rate and regular rhythm.      Heart sounds: Normal heart sounds.   Pulmonary:      Effort: Pulmonary effort is normal.      Breath sounds: Normal breath sounds.   Musculoskeletal:      Right lower leg: No edema.      Left lower leg: No edema.   Skin:     General: Skin is warm and dry.   Neurological:      General: No focal deficit present.      Mental Status: He is alert.   Psychiatric:         Attention and Perception: Attention normal.         Mood and Affect: Mood and affect normal.         Behavior: Behavior normal.          Result Review : "     CMP    CMP 5/23/22 7/21/22 8/30/22   Glucose 100 (A) 102 (A) 96   BUN 38 (A) 58 (A) 41 (A)   Creatinine 3.83 (A) 4.70 (A) 3.96 (A)   Sodium 138 138 141   Potassium 4.6 4.6 4.6   Chloride 103 100 106   Calcium 10.0 9.5 8.9   Albumin 3.90  3.90   Total Bilirubin   0.3   Alkaline Phosphatase   80   AST (SGOT)   16   ALT (SGPT)   8   (A) Abnormal value            CBC    CBC 5/23/22   WBC 7.16   RBC 3.65 (A)   Hemoglobin 11.6 (A)   Hematocrit 36.1 (A)   MCV 98.9 (A)   MCH 31.8   MCHC 32.1   RDW 13.7   Platelets 298   (A) Abnormal value            CBC w/diff    CBC w/Diff 5/23/22   WBC 7.16   RBC 3.65 (A)   Hemoglobin 11.6 (A)   Hematocrit 36.1 (A)   MCV 98.9 (A)   MCH 31.8   MCHC 32.1   RDW 13.7   Platelets 298   Neutrophil Rel % 48.5   Immature Granulocyte Rel % 0.3   Lymphocyte Rel % 29.3   Monocyte Rel % 8.9   Eosinophil Rel % 11.7 (A)   Basophil Rel % 1.3   (A) Abnormal value            Lipid Panel    Lipid Panel 3/3/22 8/30/22   Total Cholesterol 281 (A) 144   Triglycerides 242 (A) 130   HDL Cholesterol 41 47   VLDL Cholesterol 47 (A) 23   LDL Cholesterol  193 (A) 74   LDL/HDL Ratio 4.67 1.51   (A) Abnormal value                             Assessment and Plan    Diagnoses and all orders for this visit:    1. Mixed hyperlipidemia (Primary)  Assessment & Plan:  Continue care per cardiology.      2. Essential (primary) hypertension  Assessment & Plan:  Continue care per cardiology.      3. Chronic kidney disease, stage 4 (severe) (HCC)  Assessment & Plan:  Continue care per nephrology and follow-up here in 6 months or sooner if concerns.        Follow Up    No follow-ups on file.  Patient was given instructions and counseling regarding his condition or for health maintenance advice. Please see specific information pulled into the AVS if appropriate.

## 2022-10-04 ENCOUNTER — OFFICE VISIT (OUTPATIENT)
Dept: CARDIOLOGY | Facility: CLINIC | Age: 72
End: 2022-10-04

## 2022-10-04 VITALS
WEIGHT: 220 LBS | HEART RATE: 71 BPM | BODY MASS INDEX: 30.8 KG/M2 | HEIGHT: 71 IN | DIASTOLIC BLOOD PRESSURE: 79 MMHG | SYSTOLIC BLOOD PRESSURE: 155 MMHG

## 2022-10-04 DIAGNOSIS — I25.118 CORONARY ARTERY DISEASE INVOLVING NATIVE CORONARY ARTERY OF NATIVE HEART WITH OTHER FORM OF ANGINA PECTORIS: ICD-10-CM

## 2022-10-04 PROBLEM — D63.1 ANEMIA OF CHRONIC RENAL FAILURE: Status: RESOLVED | Noted: 2021-04-12 | Resolved: 2022-10-04

## 2022-10-04 PROBLEM — M54.2 NECK PAIN: Status: RESOLVED | Noted: 2022-08-30 | Resolved: 2022-10-04

## 2022-10-04 PROBLEM — I25.10 CORONARY ARTERY DISEASE: Status: ACTIVE | Noted: 2022-05-23

## 2022-10-04 PROBLEM — N18.9 ANEMIA OF CHRONIC RENAL FAILURE: Status: RESOLVED | Noted: 2021-04-12 | Resolved: 2022-10-04

## 2022-10-04 PROBLEM — G57.91: Status: RESOLVED | Noted: 2019-12-19 | Resolved: 2022-10-04

## 2022-10-04 PROBLEM — M79.605 BILATERAL LEG PAIN: Status: RESOLVED | Noted: 2021-11-05 | Resolved: 2022-10-04

## 2022-10-04 PROBLEM — E55.9 VITAMIN D DEFICIENCY: Status: RESOLVED | Noted: 2022-05-23 | Resolved: 2022-10-04

## 2022-10-04 PROBLEM — R80.9 PROTEINURIA: Status: RESOLVED | Noted: 2022-05-23 | Resolved: 2022-10-04

## 2022-10-04 PROBLEM — M79.604 BILATERAL LEG PAIN: Status: RESOLVED | Noted: 2021-11-05 | Resolved: 2022-10-04

## 2022-10-04 PROBLEM — R76.8 RHEUMATOID FACTOR POSITIVE: Status: RESOLVED | Noted: 2021-07-06 | Resolved: 2022-10-04

## 2022-10-04 PROCEDURE — 99214 OFFICE O/P EST MOD 30 MIN: CPT

## 2022-10-04 RX ORDER — RANOLAZINE 500 MG/1
500 TABLET, EXTENDED RELEASE ORAL 2 TIMES DAILY
Qty: 60 TABLET | Refills: 5 | Status: SHIPPED | OUTPATIENT
Start: 2022-10-04 | End: 2022-10-10

## 2022-10-04 NOTE — PROGRESS NOTES
Chief Complaint  Hypertension, Chest Pain, and Hyperlipidemia    Subjective        History of Present Illness  Mohamud Jarrett presents to [facility] CARDIOLOGY   Mohamud is a 72-year-old male who presents for follow-up.  He has a past medical history significant for coronary artery disease, status post remote CABG about 20 years ago, hypertension, dyslipidemia, chronic kidney disease stage V.  He was seen by Dr. Soares approximately 2 weeks ago after an abnormal myocardial perfusion scan demonstrated inferior/inferolateral scar with ellis-infarct ischemia.  He has been treated medically given his underlying chronic kidney disease.  He continues to complain of left-sided chest discomfort that radiates to his left arm with mild to moderate effort.  He denies any associated symptoms.  He states it is relieved with rest.  He reports it is interfering with his activities of daily living and quality of life.  He has no dyspnea, palpitations, edema, syncope or presyncope.  PMH  Past Medical History:   Diagnosis Date   • Anemia of chronic renal failure 4/12/2021   • Arthritis    • Bilateral leg pain 11/5/2021   • CAD (coronary artery disease)    • Gout    • Heart attack (HCC)    • Hyperlipidemia    • Hypertension    • Neck pain 8/30/2022   • Neuritis of lower extremity, right 12/19/2019   • Proteinuria 5/23/2022   • Rheumatoid factor positive 7/6/2021   • Seasonal allergies 3/13/2014   • Vitamin D deficiency 5/23/2022         ALLERGY  Allergies   Allergen Reactions   • Simvastatin Unknown - High Severity   • Peanut (Diagnostic) Rash   • Peanut Butter Flavor Rash          SURGICALHX  Past Surgical History:   Procedure Laterality Date   • CATARACT EXTRACTION W/ INTRAOCULAR LENS IMPLANT Right    • COLONOSCOPY N/A 2006   • COLONOSCOPY N/A 12/14/2018    Procedure: COLONOSCOPY TO CECUM WITH COLD BIOPSY POLYPECTOMY;  Surgeon: Elliott Harding MD;  Location: [facility] ENDOSCOPY;  Service: General   • CORONARY  ARTERY BYPASS GRAFT N/A 06/20/2003   • INGUINAL HERNIA REPAIR Right 06/04/2014    Open incarcerated inguinal hernia repair-Dr. Elliott Harding   • LUMBAR DISC SURGERY N/A 1990, 1992    L4-L5, X2          SOC  Social History     Socioeconomic History   • Marital status:    Tobacco Use   • Smoking status: Never Smoker   • Smokeless tobacco: Never Used   Vaping Use   • Vaping Use: Never used   Substance and Sexual Activity   • Alcohol use: Not Currently     Comment: occassionally   • Drug use: No   • Sexual activity: Defer         FAMHX  Family History   Problem Relation Age of Onset   • Heart disease Mother    • Heart disease Father           MEDSIGONLY  Current Outpatient Medications on File Prior to Visit   Medication Sig   • allopurinol (ZYLOPRIM) 300 MG tablet Take 1 tablet by mouth Daily.   • amLODIPine (NORVASC) 10 MG tablet Take 1 tablet by mouth Daily.   • aspirin 81 MG EC tablet Take 81 mg by mouth Daily.   • bumetanide (BUMEX) 1 MG tablet Take 1 tablet by mouth Daily.   • carvedilol (COREG) 25 MG tablet 2 (Two) Times a Day With Meals. 25 mg by mouth twice daily   • clopidogrel (PLAVIX) 75 MG tablet Take 1 tablet by mouth Daily.   • ferrous sulfate 325 (65 FE) MG tablet Take 325 mg by mouth Daily.   • isosorbide mononitrate (IMDUR) 120 MG 24 hr tablet Take 1 tablet by mouth Daily.   • nitroglycerin (NITROSTAT) 0.4 MG SL tablet Place 1 tablet under the tongue Every 5 (Five) Minutes As Needed for Chest Pain (Do not take more than 3 at one time. Go to ER or call 911 if chest pain persists). Take no more than 3 doses in 15 minutes.   • rosuvastatin (CRESTOR) 20 MG tablet Take 1 tablet by mouth Daily.   • sevelamer (RENVELA) 800 MG tablet Take 1 tablet by mouth 3 (Three) Times a Day.   • [DISCONTINUED] ranolazine (Ranexa) 500 MG 12 hr tablet Take 1 tablet by mouth 2 (Two) Times a Day.     No current facility-administered medications on file prior to visit.         Objective   Vitals:    10/04/22 1248  "10/04/22 1250   BP: 151/77 155/79   Pulse: 73 71   Weight: 99.8 kg (220 lb)    Height: 180.3 cm (71\")          Physical Exam  Constitutional:       General: He is awake. He is not in acute distress.     Appearance: Normal appearance.   HENT:      Head: Normocephalic.      Nose: Nose normal. No congestion.   Eyes:      Extraocular Movements: Extraocular movements intact.      Conjunctiva/sclera: Conjunctivae normal.      Pupils: Pupils are equal, round, and reactive to light.   Neck:      Thyroid: No thyromegaly.      Vascular: No JVD.   Cardiovascular:      Rate and Rhythm: Normal rate and regular rhythm.      Chest Wall: PMI is not displaced.      Pulses: Normal pulses.      Heart sounds: Normal heart sounds, S1 normal and S2 normal. No murmur heard.    No friction rub. No gallop. No S3 or S4 sounds.   Pulmonary:      Effort: Pulmonary effort is normal.      Breath sounds: Normal breath sounds. No wheezing, rhonchi or rales.   Abdominal:      General: Bowel sounds are normal.      Palpations: Abdomen is soft.      Tenderness: There is no abdominal tenderness.   Musculoskeletal:      Cervical back: No tenderness.      Right lower leg: No edema.      Left lower leg: No edema.   Lymphadenopathy:      Cervical: No cervical adenopathy.   Skin:     General: Skin is warm and dry.      Capillary Refill: Capillary refill takes less than 2 seconds.      Coloration: Skin is not cyanotic.      Findings: No petechiae or rash.      Nails: There is no clubbing.   Neurological:      Mental Status: He is alert.   Psychiatric:         Mood and Affect: Mood normal.         Behavior: Behavior is cooperative.           Result Review     The following data was reviewed by POLI Jha on 10/04/22.    No results found for: PROBNP  CMP    CMP 5/23/22 7/21/22 8/30/22   Glucose 100 (A) 102 (A) 96   BUN 38 (A) 58 (A) 41 (A)   Creatinine 3.83 (A) 4.70 (A) 3.96 (A)   Sodium 138 138 141   Potassium 4.6 4.6 4.6   Chloride 103 100 106 "   Calcium 10.0 9.5 8.9   Albumin 3.90  3.90   Total Bilirubin   0.3   Alkaline Phosphatase   80   AST (SGOT)   16   ALT (SGPT)   8   (A) Abnormal value            CBC w/diff    CBC w/Diff 5/23/22   WBC 7.16   RBC 3.65 (A)   Hemoglobin 11.6 (A)   Hematocrit 36.1 (A)   MCV 98.9 (A)   MCH 31.8   MCHC 32.1   RDW 13.7   Platelets 298   Neutrophil Rel % 48.5   Immature Granulocyte Rel % 0.3   Lymphocyte Rel % 29.3   Monocyte Rel % 8.9   Eosinophil Rel % 11.7 (A)   Basophil Rel % 1.3   (A) Abnormal value             No results found for: TSH   Lipid Panel    Lipid Panel 3/3/22 8/30/22   Total Cholesterol 281 (A) 144   Triglycerides 242 (A) 130   HDL Cholesterol 41 47   VLDL Cholesterol 47 (A) 23   LDL Cholesterol  193 (A) 74   LDL/HDL Ratio 4.67 1.51   (A) Abnormal value              EKG in the office demonstrates sinus rhythm with a rate of 70, repolarization abnormality suggestive of ischemia in the anterolateral leads.           Assessment and Plan   Diagnoses and all orders for this visit:    1. Coronary artery disease involving native coronary artery of native heart with other form of angina pectoris (HCC)  Assessment & Plan:  He was seen in the office 2 weeks ago with the plan at that time to start on Ranexa 500 mg twice daily.  However he never picked up this medication from the pharmacy.  He does report chest pain that interferes with his activities of daily living and advised he is willing to proceed with LHC despite potential consequences to his kidneys.  Advised him to proceed with the Ranexa 500 mg twice daily today and we will follow-up in 2 weeks.  At that time if he continues with exertional angina we will proceed with LHC.    Orders:  -     ranolazine (Ranexa) 500 MG 12 hr tablet; Take 1 tablet by mouth 2 (Two) Times a Day.  Dispense: 60 tablet; Refill: 5          Follow Up   Return in about 2 weeks (around 10/18/2022) for With .    Patient was given instructions and counseling regarding his  condition or for health maintenance advice. Please see specific information pulled into the AVS if appropriate.     Concepción Reyes, APRN  10/04/22  13:24 EDT    Dictated Utilizing Dragon Dictation

## 2022-10-04 NOTE — ASSESSMENT & PLAN NOTE
He was seen in the office 2 weeks ago with the plan at that time to start on Ranexa 500 mg twice daily.  However he never picked up this medication from the pharmacy.  He does report chest pain that interferes with his activities of daily living and advised he is willing to proceed with LHC despite potential consequences to his kidneys.  Advised him to proceed with the Ranexa 500 mg twice daily today and we will follow-up in 2 weeks.  At that time if he continues with exertional angina we will proceed with LHC.

## 2022-10-05 ENCOUNTER — TELEPHONE (OUTPATIENT)
Dept: CARDIOLOGY | Facility: CLINIC | Age: 72
End: 2022-10-05

## 2022-10-05 NOTE — TELEPHONE ENCOUNTER
Pt called and stated that he went to  the Ranexa and its over $80 a bottle, he cant afford this. He wants an alternative.    Please advise.

## 2022-10-07 ENCOUNTER — LAB (OUTPATIENT)
Dept: LAB | Facility: HOSPITAL | Age: 72
End: 2022-10-07

## 2022-10-07 ENCOUNTER — TRANSCRIBE ORDERS (OUTPATIENT)
Dept: ADMINISTRATIVE | Facility: HOSPITAL | Age: 72
End: 2022-10-07

## 2022-10-07 DIAGNOSIS — N18.30 STAGE 3 CHRONIC KIDNEY DISEASE, UNSPECIFIED WHETHER STAGE 3A OR 3B CKD: Primary | ICD-10-CM

## 2022-10-07 DIAGNOSIS — E83.42 HYPOMAGNESEMIA: ICD-10-CM

## 2022-10-07 DIAGNOSIS — I10 ESSENTIAL HYPERTENSION, MALIGNANT: ICD-10-CM

## 2022-10-07 DIAGNOSIS — N18.30 STAGE 3 CHRONIC KIDNEY DISEASE, UNSPECIFIED WHETHER STAGE 3A OR 3B CKD: ICD-10-CM

## 2022-10-07 LAB
ALBUMIN SERPL-MCNC: 4.1 G/DL (ref 3.5–5.2)
ALBUMIN UR-MCNC: 136.6 MG/DL
ALBUMIN/GLOB SERPL: 1.5 G/DL
ALP SERPL-CCNC: 83 U/L (ref 39–117)
ALT SERPL W P-5'-P-CCNC: 12 U/L (ref 1–41)
ANION GAP SERPL CALCULATED.3IONS-SCNC: 11.9 MMOL/L (ref 5–15)
AST SERPL-CCNC: 20 U/L (ref 1–40)
BACTERIA UR QL AUTO: ABNORMAL /HPF
BASOPHILS # BLD AUTO: 0.11 10*3/MM3 (ref 0–0.2)
BASOPHILS NFR BLD AUTO: 1.8 % (ref 0–1.5)
BILIRUB SERPL-MCNC: <0.2 MG/DL (ref 0–1.2)
BILIRUB UR QL STRIP: NEGATIVE
BUN SERPL-MCNC: 59 MG/DL (ref 8–23)
BUN/CREAT SERPL: 11 (ref 7–25)
CALCIUM SPEC-SCNC: 9 MG/DL (ref 8.6–10.5)
CHLORIDE SERPL-SCNC: 107 MMOL/L (ref 98–107)
CLARITY UR: CLEAR
CO2 SERPL-SCNC: 23.1 MMOL/L (ref 22–29)
COLOR UR: YELLOW
CREAT SERPL-MCNC: 5.34 MG/DL (ref 0.76–1.27)
CREAT UR-MCNC: 43 MG/DL
CREAT UR-MCNC: 54 MG/DL
DEPRECATED RDW RBC AUTO: 50.4 FL (ref 37–54)
EGFRCR SERPLBLD CKD-EPI 2021: 10.8 ML/MIN/1.73
EOSINOPHIL # BLD AUTO: 0.4 10*3/MM3 (ref 0–0.4)
EOSINOPHIL NFR BLD AUTO: 6.5 % (ref 0.3–6.2)
ERYTHROCYTE [DISTWIDTH] IN BLOOD BY AUTOMATED COUNT: 14.8 % (ref 12.3–15.4)
GLOBULIN UR ELPH-MCNC: 2.7 GM/DL
GLUCOSE SERPL-MCNC: 104 MG/DL (ref 65–99)
GLUCOSE UR STRIP-MCNC: NEGATIVE MG/DL
HCT VFR BLD AUTO: 33.6 % (ref 37.5–51)
HGB BLD-MCNC: 11.3 G/DL (ref 13–17.7)
HGB UR QL STRIP.AUTO: ABNORMAL
IMM GRANULOCYTES # BLD AUTO: 0.01 10*3/MM3 (ref 0–0.05)
IMM GRANULOCYTES NFR BLD AUTO: 0.2 % (ref 0–0.5)
KETONES UR QL STRIP: NEGATIVE
LEUKOCYTE ESTERASE UR QL STRIP.AUTO: NEGATIVE
LYMPHOCYTES # BLD AUTO: 2.17 10*3/MM3 (ref 0.7–3.1)
LYMPHOCYTES NFR BLD AUTO: 35.1 % (ref 19.6–45.3)
MAGNESIUM SERPL-MCNC: 2.4 MG/DL (ref 1.6–2.4)
MCH RBC QN AUTO: 31.3 PG (ref 26.6–33)
MCHC RBC AUTO-ENTMCNC: 33.6 G/DL (ref 31.5–35.7)
MCV RBC AUTO: 93.1 FL (ref 79–97)
MICROALBUMIN/CREAT UR: 3176.7 MG/G
MONOCYTES # BLD AUTO: 0.82 10*3/MM3 (ref 0.1–0.9)
MONOCYTES NFR BLD AUTO: 13.3 % (ref 5–12)
NEUTROPHILS NFR BLD AUTO: 2.67 10*3/MM3 (ref 1.7–7)
NEUTROPHILS NFR BLD AUTO: 43.1 % (ref 42.7–76)
NITRITE UR QL STRIP: NEGATIVE
NRBC BLD AUTO-RTO: 0 /100 WBC (ref 0–0.2)
PH UR STRIP.AUTO: 5.5 [PH] (ref 5–8)
PHOSPHATE SERPL-MCNC: 5.4 MG/DL (ref 2.5–4.5)
PLATELET # BLD AUTO: 253 10*3/MM3 (ref 140–450)
PMV BLD AUTO: 10.6 FL (ref 6–12)
POTASSIUM SERPL-SCNC: 4.2 MMOL/L (ref 3.5–5.2)
PROT ?TM UR-MCNC: 227 MG/DL
PROT SERPL-MCNC: 6.8 G/DL (ref 6–8.5)
PROT UR QL STRIP: ABNORMAL
PROT/CREAT UR: 4.2 MG/G{CREAT}
PTH-INTACT SERPL-MCNC: 106 PG/ML (ref 15–65)
RBC # BLD AUTO: 3.61 10*6/MM3 (ref 4.14–5.8)
RBC # UR STRIP: ABNORMAL /HPF
REF LAB TEST METHOD: ABNORMAL
SODIUM SERPL-SCNC: 142 MMOL/L (ref 136–145)
SP GR UR STRIP: 1.02 (ref 1–1.03)
SQUAMOUS #/AREA URNS HPF: ABNORMAL /HPF
UROBILINOGEN UR QL STRIP: ABNORMAL
WBC # UR STRIP: ABNORMAL /HPF
WBC NRBC COR # BLD: 6.18 10*3/MM3 (ref 3.4–10.8)

## 2022-10-07 PROCEDURE — 36415 COLL VENOUS BLD VENIPUNCTURE: CPT

## 2022-10-07 PROCEDURE — 83970 ASSAY OF PARATHORMONE: CPT

## 2022-10-07 PROCEDURE — 81001 URINALYSIS AUTO W/SCOPE: CPT

## 2022-10-07 PROCEDURE — 84100 ASSAY OF PHOSPHORUS: CPT

## 2022-10-07 PROCEDURE — 80053 COMPREHEN METABOLIC PANEL: CPT

## 2022-10-07 PROCEDURE — 82043 UR ALBUMIN QUANTITATIVE: CPT

## 2022-10-07 PROCEDURE — 82570 ASSAY OF URINE CREATININE: CPT

## 2022-10-07 PROCEDURE — 85025 COMPLETE CBC W/AUTO DIFF WBC: CPT

## 2022-10-07 PROCEDURE — 83735 ASSAY OF MAGNESIUM: CPT

## 2022-10-07 PROCEDURE — 84156 ASSAY OF PROTEIN URINE: CPT

## 2022-10-10 DIAGNOSIS — I25.118 CORONARY ARTERY DISEASE INVOLVING NATIVE CORONARY ARTERY OF NATIVE HEART WITH OTHER FORM OF ANGINA PECTORIS: ICD-10-CM

## 2022-10-10 RX ORDER — RANOLAZINE 500 MG/1
500 TABLET, EXTENDED RELEASE ORAL 2 TIMES DAILY
Qty: 60 TABLET | Refills: 5 | Status: SHIPPED | OUTPATIENT
Start: 2022-10-10 | End: 2022-10-20 | Stop reason: SDUPTHER

## 2022-10-20 ENCOUNTER — OFFICE VISIT (OUTPATIENT)
Dept: CARDIOLOGY | Facility: CLINIC | Age: 72
End: 2022-10-20

## 2022-10-20 VITALS
BODY MASS INDEX: 31.22 KG/M2 | HEART RATE: 74 BPM | WEIGHT: 223 LBS | DIASTOLIC BLOOD PRESSURE: 83 MMHG | HEIGHT: 71 IN | SYSTOLIC BLOOD PRESSURE: 153 MMHG

## 2022-10-20 DIAGNOSIS — E78.2 MIXED DYSLIPIDEMIA: ICD-10-CM

## 2022-10-20 DIAGNOSIS — N18.5 CKD (CHRONIC KIDNEY DISEASE) STAGE 5, GFR LESS THAN 15 ML/MIN: ICD-10-CM

## 2022-10-20 DIAGNOSIS — I25.118 CORONARY ARTERY DISEASE INVOLVING NATIVE CORONARY ARTERY OF NATIVE HEART WITH OTHER FORM OF ANGINA PECTORIS: Primary | ICD-10-CM

## 2022-10-20 DIAGNOSIS — I10 ESSENTIAL HYPERTENSION: ICD-10-CM

## 2022-10-20 DIAGNOSIS — Z95.1 S/P CABG (CORONARY ARTERY BYPASS GRAFT): ICD-10-CM

## 2022-10-20 PROCEDURE — 99214 OFFICE O/P EST MOD 30 MIN: CPT | Performed by: INTERNAL MEDICINE

## 2022-10-20 RX ORDER — HYDRALAZINE HYDROCHLORIDE 25 MG/1
25 TABLET, FILM COATED ORAL 3 TIMES DAILY
Qty: 90 TABLET | Refills: 11 | Status: SHIPPED | OUTPATIENT
Start: 2022-10-20

## 2022-10-20 RX ORDER — RANOLAZINE 1000 MG/1
1000 TABLET, EXTENDED RELEASE ORAL 2 TIMES DAILY
Qty: 60 TABLET | Refills: 11 | Status: SHIPPED | OUTPATIENT
Start: 2022-10-20 | End: 2023-03-16 | Stop reason: ALTCHOICE

## 2022-10-20 NOTE — PROGRESS NOTES
Chief Complaint  Coronary Artery Disease, S/P CABG, and Hypertension    Subjective      Patient returns clinic to follow-up on CAD/angina.  He has history of CABG about 10 years ago.  In addition, he has history of hypertension, dyslipidemia and chronic kidney disease stage V.  He has abnormal myocardial perfusion scan was trading inferior/inferolateral scar with ellis-infarct ischemia.  He was having anginal pain and cardiac catheterization was recommended.  However, recently he was started on Ranexa 500 mg twice daily and reports resolution of his angina and shortness of breath.  He would like to hold off on cardiac catheterization at this time which is reasonable.  He has no complaints or concerns today.  He has been able to walk a mile and a half without significant limitations.      Past Medical History:   Diagnosis Date   • Anemia of chronic renal failure 4/12/2021   • Arthritis    • Bilateral leg pain 11/5/2021   • CAD (coronary artery disease)    • Gout    • Heart attack (HCC)    • Hyperlipidemia    • Hypertension    • Neck pain 8/30/2022   • Neuritis of lower extremity, right 12/19/2019   • Proteinuria 5/23/2022   • Rheumatoid factor positive 7/6/2021   • Seasonal allergies 3/13/2014   • Vitamin D deficiency 5/23/2022         Current Outpatient Medications:   •  allopurinol (ZYLOPRIM) 300 MG tablet, Take 1 tablet by mouth Daily., Disp: 90 tablet, Rfl: 1  •  amLODIPine (NORVASC) 10 MG tablet, Take 1 tablet by mouth Daily., Disp: 90 tablet, Rfl: 1  •  aspirin 81 MG EC tablet, Take 81 mg by mouth Daily., Disp: , Rfl:   •  bumetanide (BUMEX) 1 MG tablet, Take 1 tablet by mouth Daily., Disp: , Rfl:   •  carvedilol (COREG) 25 MG tablet, 2 (Two) Times a Day With Meals. 25 mg by mouth twice daily, Disp: , Rfl:   •  clopidogrel (PLAVIX) 75 MG tablet, Take 1 tablet by mouth Daily., Disp: , Rfl:   •  ferrous sulfate 325 (65 FE) MG tablet, Take 325 mg by mouth Daily., Disp: , Rfl:   •  isosorbide mononitrate (IMDUR) 120  "MG 24 hr tablet, Take 1 tablet by mouth Daily., Disp: 90 tablet, Rfl: 3  •  nitroglycerin (NITROSTAT) 0.4 MG SL tablet, Place 1 tablet under the tongue Every 5 (Five) Minutes As Needed for Chest Pain (Do not take more than 3 at one time. Go to ER or call 911 if chest pain persists). Take no more than 3 doses in 15 minutes., Disp: 60 tablet, Rfl: 3  •  ranolazine (Ranexa) 1000 MG 12 hr tablet, Take 1 tablet by mouth 2 (Two) Times a Day., Disp: 60 tablet, Rfl: 11  •  rosuvastatin (CRESTOR) 20 MG tablet, Take 1 tablet by mouth Daily., Disp: 90 tablet, Rfl: 1  •  sevelamer (RENVELA) 800 MG tablet, Take 1 tablet by mouth 3 (Three) Times a Day., Disp: , Rfl:   •  hydrALAZINE (APRESOLINE) 25 MG tablet, Take 1 tablet by mouth 3 (Three) Times a Day., Disp: 90 tablet, Rfl: 11    Medications Discontinued During This Encounter   Medication Reason   • ranolazine (Ranexa) 500 MG 12 hr tablet Reorder     Allergies   Allergen Reactions   • Simvastatin Unknown - High Severity   • Peanut (Diagnostic) Rash   • Peanut Butter Flavor Rash        Social History     Tobacco Use   • Smoking status: Never   • Smokeless tobacco: Never   Vaping Use   • Vaping Use: Never used   Substance Use Topics   • Alcohol use: Not Currently     Comment: occassionally   • Drug use: No       Family History   Problem Relation Age of Onset   • Heart disease Mother    • Heart disease Father         Objective     /83 (BP Location: Left arm, Cuff Size: Large Adult)   Pulse 74   Ht 180.3 cm (71\")   Wt 101 kg (223 lb)   BMI 31.10 kg/m²       Physical Exam    General Appearance:   · no acute distress  · Alert and oriented x3  HENT:   · lips not cyanotic  · Atraumatic  Neck:  · No jvd   · supple  Respiratory:  · no respiratory distress  · normal breath sounds  · no rales  Cardiovascular:  · no S3, no S4   · no murmur  · no rub  Extremities  · No cyanosis  · lower extremity edema: none    Skin:   · warm, dry  · No rashes      Result Review :     No results " found for: PROBNP  CMP    CMP 7/21/22 8/30/22 10/7/22   Glucose 102 (A) 96 104 (A)   BUN 58 (A) 41 (A) 59 (A)   Creatinine 4.70 (A) 3.96 (A) 5.34 (A)   Sodium 138 141 142   Potassium 4.6 4.6 4.2   Chloride 100 106 107   Calcium 9.5 8.9 9.0   Albumin  3.90 4.10   Total Bilirubin  0.3 <0.2   Alkaline Phosphatase  80 83   AST (SGOT)  16 20   ALT (SGPT)  8 12   (A) Abnormal value            CBC w/diff    CBC w/Diff 5/23/22 10/7/22   WBC 7.16 6.18   RBC 3.65 (A) 3.61 (A)   Hemoglobin 11.6 (A) 11.3 (A)   Hematocrit 36.1 (A) 33.6 (A)   MCV 98.9 (A) 93.1   MCH 31.8 31.3   MCHC 32.1 33.6   RDW 13.7 14.8   Platelets 298 253   Neutrophil Rel % 48.5 43.1   Immature Granulocyte Rel % 0.3 0.2   Lymphocyte Rel % 29.3 35.1   Monocyte Rel % 8.9 13.3 (A)   Eosinophil Rel % 11.7 (A) 6.5 (A)   Basophil Rel % 1.3 1.8 (A)   (A) Abnormal value             No results found for: TSH   No results found for: FREET4   No results found for: DDIMERQUANT  Magnesium   Date Value Ref Range Status   10/07/2022 2.4 1.6 - 2.4 mg/dL Final      No results found for: DIGOXIN   No results found for: TROPONINT        Lipid Panel    Lipid Panel 3/3/22 8/30/22   Total Cholesterol 281 (A) 144   Triglycerides 242 (A) 130   HDL Cholesterol 41 47   VLDL Cholesterol 47 (A) 23   LDL Cholesterol  193 (A) 74   LDL/HDL Ratio 4.67 1.51   (A) Abnormal value            No results found for: POCTROP                   Diagnoses and all orders for this visit:    1. Coronary artery disease involving native coronary artery of native heart with other form of angina pectoris (HCC) (Primary)  -     ranolazine (Ranexa) 1000 MG 12 hr tablet; Take 1 tablet by mouth 2 (Two) Times a Day.  Dispense: 60 tablet; Refill: 11    2. S/P CABG (coronary artery bypass graft)    3. CKD (chronic kidney disease) stage 5, GFR less than 15 ml/min ([facility])    4. Essential hypertension    5. Mixed dyslipidemia    Other orders  -     hydrALAZINE (APRESOLINE) 25 MG tablet; Take 1 tablet by mouth 3  (Three) Times a Day.  Dispense: 90 tablet; Refill: 11      Assessment:    -CAD: Status post previous CABG.  He was having lifestyle limiting angina which seems to have resolved with Ranexa.  Ranexa dose will be increased with 1000 mg twice daily.  Continue other antianginal and CAD medications.  Of note, recent myocardial perfusion scan showed inferior/inferolateral scar with ellis-infarct ischemia.     -Hypertension: His blood pressure has been elevated.  Hydralazine 25 mg 3 times daily will be added to his current regimen for better blood pressure control.  Blood pressure monitoring was recommended.  He will report to us if his blood pressure remains elevated.    -Chronic kidney disease stage V, followed by nephrology.    -Mixed dyslipidemia: Recent lipid profile was noted.  Continue current medications.      Follow Up     Return in about 3 months (around 1/20/2023) for With Concepción ASHTON.        Patient was given instructions and counseling regarding his condition or for health maintenance advice. Please see specific information pulled into the AVS if appropriate.

## 2022-11-08 ENCOUNTER — TELEPHONE (OUTPATIENT)
Dept: FAMILY MEDICINE CLINIC | Age: 72
End: 2022-11-08

## 2022-11-08 NOTE — TELEPHONE ENCOUNTER
Caller: JHONATHAN AT [facility]    Relationship to patient: Other    Best call back number: [phone]  New or established patient?  [] New  [x] Established    Date of discharge: 10/16/22    Facility discharged from: [facility]    Diagnosis/Symptoms: CORONARY ARTERY DISEASE AND PULMONARY EDEMA     Length of stay (If applicable): 10 DAYS    Specialty Only: Did you see a [employer] provider?    [] Yes  [x] No

## 2022-11-22 ENCOUNTER — OFFICE VISIT (OUTPATIENT)
Dept: FAMILY MEDICINE CLINIC | Age: 72
End: 2022-11-22

## 2022-11-22 VITALS
HEIGHT: 71 IN | DIASTOLIC BLOOD PRESSURE: 78 MMHG | BODY MASS INDEX: 29.82 KG/M2 | SYSTOLIC BLOOD PRESSURE: 144 MMHG | HEART RATE: 86 BPM | OXYGEN SATURATION: 98 % | WEIGHT: 213 LBS

## 2022-11-22 DIAGNOSIS — N18.4 CHRONIC KIDNEY DISEASE, STAGE 4 (SEVERE): ICD-10-CM

## 2022-11-22 DIAGNOSIS — I25.110 CORONARY ARTERY DISEASE INVOLVING NATIVE HEART WITH UNSTABLE ANGINA PECTORIS, UNSPECIFIED VESSEL OR LESION TYPE: ICD-10-CM

## 2022-11-22 DIAGNOSIS — I10 ESSENTIAL (PRIMARY) HYPERTENSION: ICD-10-CM

## 2022-11-22 DIAGNOSIS — M10.9 GOUT, UNSPECIFIED CAUSE, UNSPECIFIED CHRONICITY, UNSPECIFIED SITE: Primary | ICD-10-CM

## 2022-11-22 DIAGNOSIS — I10 PRIMARY HYPERTENSION: ICD-10-CM

## 2022-11-22 PROCEDURE — 99214 OFFICE O/P EST MOD 30 MIN: CPT | Performed by: NURSE PRACTITIONER

## 2022-11-22 RX ORDER — ROSUVASTATIN CALCIUM 40 MG/1
TABLET, COATED ORAL
COMMUNITY
Start: 2022-11-12 | End: 2023-03-16 | Stop reason: ALTCHOICE

## 2022-11-22 RX ORDER — ALLOPURINOL 300 MG/1
300 TABLET ORAL DAILY
Qty: 90 TABLET | Refills: 1 | Status: SHIPPED | OUTPATIENT
Start: 2022-11-22 | End: 2023-03-16 | Stop reason: DRUGHIGH

## 2022-11-22 RX ORDER — AMLODIPINE BESYLATE 10 MG/1
10 TABLET ORAL DAILY
Qty: 90 TABLET | Refills: 1 | Status: SHIPPED | OUTPATIENT
Start: 2022-11-22 | End: 2023-03-16 | Stop reason: ALTCHOICE

## 2022-11-22 NOTE — ASSESSMENT & PLAN NOTE
Refill amlodipine so he does not run out prior to seeing cardiology.  I will defer future refills to cardiologist.  Cardiology will need to be seen on a regular basis.  Also recommend he discuss time off work and further documentation with cardiology and nephrology.

## 2022-11-22 NOTE — PROGRESS NOTES
Chief Complaint  Hospital Follow Up Visit    Subjective          Mohamud Jarrett presents to [facility]     Patient is a 72-year-old male who is here today to follow-up after recent hospitalization at [facility].  He was admitted for cardiac catheterization on November 7 and was discharged on November 12.  He remained in the hospital due to elevated blood pressure.  He did require a couple of cardiac stents and will follow-up with cardiology on December 12.  He was seen by nephrologist and was told he could follow-up with a provider in [place].  He does not want to follow-up with a provider in [place] and is currently waiting to get an appointment with nephrology practice in [place].  He calls the [place] nephrologist office once per week and is told that they are backed up on their schedule and they will contact with appointment time.  In review of epic charting from his hospitalization in [facility], there is a note stating he is going to follow-up with a nephrologist in [place] on January 5.  Patient is not aware of this information.  I have printed this off for his records.  This has the name, and appointment time of nephrology visit along with office number.  I have also included office number of nephrologist that saw him recently at [facility].  It is very important that he see nephrology.  He does not want to start dialysis.  He would rather do an alternate treatment that was discussed with him while at [facility].  Cardiology and nephrology do not recommend that he return to work at this time.  He has not worked in several months but does not want [employer] to release him from employment.  He fully intends to return to work if at all possible.  His dosage of Crestor was increased from 40 mg daily to 40 mg daily during his recent hospitalization.    Regarding hypertension, he does not monitor blood pressure at home but states he will purchase a blood pressure  "cuff to start monitoring blood pressure at home.  He does state he needs a refill of his amlodipine 10 mg daily.  I will provide refill at this time but it would be reasonable to defer future refills to cardiology.    He has had no current symptoms of gout since taking allopurinol 300 mg daily.  Denies side effects and requests refills.  He continues to decline referral to rheumatology despite numerous attempts.  Does have a positive rheumatoid factor     Objective   Vital Signs:   Vitals:    11/22/22 1052 11/22/22 1128   BP: 152/79 144/78   BP Location: Left arm Right arm   Patient Position: Sitting Sitting   Cuff Size: Large Adult Adult   Pulse: 86    SpO2: 98%    Weight: 96.6 kg (213 lb)    Height: 180.3 cm (71\")       Body mass index is 29.71 kg/m².  Physical Exam  Vitals reviewed.   Constitutional:       General: He is not in acute distress.     Appearance: Normal appearance. He is well-developed.   Cardiovascular:      Rate and Rhythm: Normal rate and regular rhythm.      Heart sounds: Normal heart sounds.   Pulmonary:      Effort: Pulmonary effort is normal.      Breath sounds: Normal breath sounds.   Musculoskeletal:      Right lower leg: No edema.      Left lower leg: No edema.   Skin:     General: Skin is warm and dry.   Neurological:      General: No focal deficit present.      Mental Status: He is alert.   Psychiatric:         Attention and Perception: Attention normal.         Mood and Affect: Mood and affect normal.         Behavior: Behavior normal.           Current Outpatient Medications:   •  allopurinol (ZYLOPRIM) 300 MG tablet, Take 1 tablet by mouth Daily., Disp: 90 tablet, Rfl: 1  •  amLODIPine (NORVASC) 10 MG tablet, Take 1 tablet by mouth Daily., Disp: 90 tablet, Rfl: 1  •  aspirin 81 MG EC tablet, Take 81 mg by mouth Daily., Disp: , Rfl:   •  bumetanide (BUMEX) 1 MG tablet, Take 1 tablet by mouth Daily., Disp: , Rfl:   •  carvedilol (COREG) 25 MG tablet, 2 (Two) Times a Day With Meals. 25 mg " by mouth twice daily, Disp: , Rfl:   •  clopidogrel (PLAVIX) 75 MG tablet, Take 1 tablet by mouth Daily., Disp: , Rfl:   •  ferrous sulfate 325 (65 FE) MG tablet, Take 325 mg by mouth Daily., Disp: , Rfl:   •  hydrALAZINE (APRESOLINE) 25 MG tablet, Take 1 tablet by mouth 3 (Three) Times a Day., Disp: 90 tablet, Rfl: 11  •  isosorbide mononitrate (IMDUR) 120 MG 24 hr tablet, Take 1 tablet by mouth Daily., Disp: 90 tablet, Rfl: 3  •  ranolazine (Ranexa) 1000 MG 12 hr tablet, Take 1 tablet by mouth 2 (Two) Times a Day., Disp: 60 tablet, Rfl: 11  •  rosuvastatin (CRESTOR) 40 MG tablet, , Disp: , Rfl:   •  sevelamer (RENVELA) 800 MG tablet, Take 1 tablet by mouth 3 (Three) Times a Day., Disp: , Rfl:   •  nitroglycerin (NITROSTAT) 0.4 MG SL tablet, Place 1 tablet under the tongue Every 5 (Five) Minutes As Needed for Chest Pain (Do not take more than 3 at one time. Go to ER or call 911 if chest pain persists). Take no more than 3 doses in 15 minutes., Disp: 60 tablet, Rfl: 3     Diabetic Foot Exam Performed  Result Review :     CMP    CMP 7/21/22 8/30/22 10/7/22   Glucose 102 (A) 96 104 (A)   BUN 58 (A) 41 (A) 59 (A)   Creatinine 4.70 (A) 3.96 (A) 5.34 (A)   Sodium 138 141 142   Potassium 4.6 4.6 4.2   Chloride 100 106 107   Calcium 9.5 8.9 9.0   Albumin  3.90 4.10   Total Bilirubin  0.3 <0.2   Alkaline Phosphatase  80 83   AST (SGOT)  16 20   ALT (SGPT)  8 12   (A) Abnormal value            CBC    CBC 5/23/22 10/7/22   WBC 7.16 6.18   RBC 3.65 (A) 3.61 (A)   Hemoglobin 11.6 (A) 11.3 (A)   Hematocrit 36.1 (A) 33.6 (A)   MCV 98.9 (A) 93.1   MCH 31.8 31.3   MCHC 32.1 33.6   RDW 13.7 14.8   Platelets 298 253   (A) Abnormal value            CBC w/diff    CBC w/Diff 5/23/22 10/7/22   WBC 7.16 6.18   RBC 3.65 (A) 3.61 (A)   Hemoglobin 11.6 (A) 11.3 (A)   Hematocrit 36.1 (A) 33.6 (A)   MCV 98.9 (A) 93.1   MCH 31.8 31.3   MCHC 32.1 33.6   RDW 13.7 14.8   Platelets 298 253   Neutrophil Rel % 48.5 43.1   Immature Granulocyte Rel %  0.3 0.2   Lymphocyte Rel % 29.3 35.1   Monocyte Rel % 8.9 13.3 (A)   Eosinophil Rel % 11.7 (A) 6.5 (A)   Basophil Rel % 1.3 1.8 (A)   (A) Abnormal value            Lipid Panel    Lipid Panel 3/3/22 8/30/22   Total Cholesterol 281 (A) 144   Triglycerides 242 (A) 130   HDL Cholesterol 41 47   VLDL Cholesterol 47 (A) 23   LDL Cholesterol  193 (A) 74   LDL/HDL Ratio 4.67 1.51   (A) Abnormal value                         Assessment and Plan    Diagnoses and all orders for this visit:    1. Gout, unspecified cause, unspecified chronicity, unspecified site (Primary)  -     allopurinol (ZYLOPRIM) 300 MG tablet; Take 1 tablet by mouth Daily.  Dispense: 90 tablet; Refill: 1    2. Essential (primary) hypertension  Assessment & Plan:  Refill amlodipine so he does not run out prior to seeing cardiology.  I will defer future refills to cardiologist.  Cardiology will need to be seen on a regular basis.  Also recommend he discuss time off work and further documentation with cardiology and nephrology.      3. Chronic kidney disease, stage 4 (severe) (HCC)  Assessment & Plan:  He is to let me know if he is unable to get adequate follow-up through nephrology.      4. Coronary artery disease involving native heart with unstable angina pectoris, unspecified vessel or lesion type (HCC)  Assessment & Plan:  Continue to follow with cardiology as planned      5. Primary hypertension  -     amLODIPine (NORVASC) 10 MG tablet; Take 1 tablet by mouth Daily.  Dispense: 90 tablet; Refill: 1      Follow Up    No follow-ups on file.  Patient was given instructions and counseling regarding his condition or for health maintenance advice. Please see specific information pulled into the AVS if appropriate.

## 2023-03-03 ENCOUNTER — TELEPHONE (OUTPATIENT)
Dept: FAMILY MEDICINE CLINIC | Age: 73
End: 2023-03-03

## 2023-03-03 NOTE — TELEPHONE ENCOUNTER
Caller: [facility]    Relationship to patient: Home Health    Best call back number: [phone]    Patient is needing: SENDING PATIENT BACK TO HOSPITAL FOR SUSPECTED HERNIA. PATIENT FELT A POP WHEN PATIENT WAS STRAINING WHILE HAVING A BOWEL MOVEMENT WITH EXTREME PAIN AND BURNING.

## 2023-03-06 ENCOUNTER — TELEPHONE (OUTPATIENT)
Dept: FAMILY MEDICINE CLINIC | Age: 73
End: 2023-03-06
Payer: MEDICARE

## 2023-03-06 NOTE — TELEPHONE ENCOUNTER
SCHEDULED PT FOR HOSP FU. HE WAS RELEASED ON 03/01/23. HE WAS ADMITTED FOR KIDNEY AND HEART PROBLEMS. WILL BE STARTING DIALYSIS.     MB 03/06/23

## 2023-03-16 ENCOUNTER — OFFICE VISIT (OUTPATIENT)
Dept: FAMILY MEDICINE CLINIC | Age: 73
End: 2023-03-16
Payer: MEDICARE

## 2023-03-16 VITALS
WEIGHT: 178.2 LBS | TEMPERATURE: 98 F | HEART RATE: 85 BPM | DIASTOLIC BLOOD PRESSURE: 78 MMHG | HEIGHT: 71 IN | BODY MASS INDEX: 24.95 KG/M2 | SYSTOLIC BLOOD PRESSURE: 136 MMHG | OXYGEN SATURATION: 99 %

## 2023-03-16 DIAGNOSIS — D63.1 ANEMIA IN STAGE 4 CHRONIC KIDNEY DISEASE: ICD-10-CM

## 2023-03-16 DIAGNOSIS — I50.9 ACUTE ON CHRONIC CONGESTIVE HEART FAILURE, UNSPECIFIED HEART FAILURE TYPE: Primary | ICD-10-CM

## 2023-03-16 DIAGNOSIS — Z99.2 DEPENDENCE ON RENAL DIALYSIS: ICD-10-CM

## 2023-03-16 DIAGNOSIS — N18.4 ANEMIA IN STAGE 4 CHRONIC KIDNEY DISEASE: ICD-10-CM

## 2023-03-16 PROBLEM — D68.9 COAGULATION DEFECT, UNSPECIFIED (HCC): Status: ACTIVE | Noted: 2023-02-25

## 2023-03-16 PROBLEM — T78.2XXA ANAPHYLACTIC SHOCK, UNSPECIFIED, INITIAL ENCOUNTER: Status: ACTIVE | Noted: 2023-02-25

## 2023-03-16 PROBLEM — M10.30 GOUT DUE TO RENAL IMPAIRMENT, UNSPECIFIED SITE: Status: ACTIVE | Noted: 2023-02-25

## 2023-03-16 PROBLEM — I34.0 MITRAL VALVE REGURGITATION: Status: ACTIVE | Noted: 2022-12-05

## 2023-03-16 PROBLEM — D50.9 IRON DEFICIENCY ANEMIA: Status: ACTIVE | Noted: 2023-02-25

## 2023-03-16 PROBLEM — I50.22 CHRONIC SYSTOLIC (CONGESTIVE) HEART FAILURE: Status: ACTIVE | Noted: 2023-02-25

## 2023-03-16 PROBLEM — E78.00 PURE HYPERCHOLESTEROLEMIA: Status: ACTIVE | Noted: 2022-12-12

## 2023-03-16 PROBLEM — Z95.5 STENTED CORONARY ARTERY: Status: ACTIVE | Noted: 2022-12-12

## 2023-03-16 RX ORDER — LISINOPRIL 10 MG/1
10 TABLET ORAL
COMMUNITY
End: 2023-03-16 | Stop reason: ALTCHOICE

## 2023-03-16 RX ORDER — CARVEDILOL 3.12 MG/1
3.12 TABLET ORAL 2 TIMES DAILY WITH MEALS
COMMUNITY

## 2023-03-16 RX ORDER — ASPIRIN 81 MG
1 TABLET, DELAYED RELEASE (ENTERIC COATED) ORAL
COMMUNITY
Start: 2023-03-02

## 2023-03-16 RX ORDER — CLOPIDOGREL BISULFATE 75 MG/1
75 TABLET ORAL DAILY
COMMUNITY
Start: 2023-01-12 | End: 2023-03-16 | Stop reason: SDUPTHER

## 2023-03-16 RX ORDER — LEVOFLOXACIN 250 MG/1
TABLET ORAL
COMMUNITY
Start: 2023-03-01

## 2023-03-16 RX ORDER — ATORVASTATIN CALCIUM 80 MG/1
TABLET, FILM COATED ORAL
COMMUNITY
Start: 2023-02-16

## 2023-03-16 RX ORDER — NITROGLYCERIN 0.4 MG/1
0.4 TABLET SUBLINGUAL DAILY
COMMUNITY
Start: 2022-09-22 | End: 2023-03-16 | Stop reason: SDUPTHER

## 2023-03-16 RX ORDER — ATENOLOL 25 MG/1
25 TABLET ORAL
COMMUNITY

## 2023-03-16 RX ORDER — ALLOPURINOL 100 MG/1
50 TABLET ORAL DAILY
COMMUNITY

## 2023-03-16 RX ORDER — TAMSULOSIN HYDROCHLORIDE 0.4 MG/1
CAPSULE ORAL
COMMUNITY
Start: 2023-03-01

## 2023-03-16 RX ORDER — LANTHANUM CARBONATE 500 MG/1
1 TABLET, CHEWABLE ORAL 3 TIMES DAILY
COMMUNITY
Start: 2023-03-02 | End: 2023-04-04 | Stop reason: ALTCHOICE

## 2023-03-16 RX ORDER — METHOCARBAMOL 500 MG/1
TABLET, FILM COATED ORAL
COMMUNITY
Start: 2023-02-16

## 2023-03-16 RX ORDER — MIDODRINE HYDROCHLORIDE 5 MG/1
TABLET ORAL
COMMUNITY
Start: 2023-03-01 | End: 2023-04-04 | Stop reason: ALTCHOICE

## 2023-03-16 NOTE — PROGRESS NOTES
"Chief Complaint  Hospital Follow Up Visit (Kidney & cardiac issues. Patient started dialysis )    Subjective          Mohamud Jarrett presents to [facility]     Patient is a 72-year-old male who was recently hospitalized at [facility] from February 8 to March 1.  Discharge diagnosis was acute on chronic congestive heart failure and renal failure and has now been placed on dialysis on Tuesdays Thursdays and Saturdays.  He will be seeing Dr. Fournier, cardiology on March 24 and Mina Byrd, vascular surgeon, to change type of dialysis shunt.  Patient states he will change shunt from chest to his arm.  He was treated in the hospital by Dr. Dexter.  He reports his appetite is good he does exhibit fatigue.  He request a handicap placard for his review near miss car as he does tire if he has to ambulate for more than 200 feet without resting.  Denies problems with sleep.  He does have noted anemia due to his chronic kidney disease.  Labs are being monitored routinely by nephrology.     Objective   Vital Signs:   Vitals:    03/16/23 1044   BP: 136/78   BP Location: Left arm   Patient Position: Sitting   Cuff Size: Adult   Pulse: 85   Temp: 98 °F (36.7 °C)   TempSrc: Oral   SpO2: 99%   Weight: 80.8 kg (178 lb 3.2 oz)   Height: 180.3 cm (71\")      Body mass index is 24.85 kg/m².  Physical Exam  Vitals reviewed.   Constitutional:       General: He is not in acute distress.     Appearance: Normal appearance. He is well-developed.   Cardiovascular:      Rate and Rhythm: Normal rate and regular rhythm.      Heart sounds: Normal heart sounds.   Pulmonary:      Effort: Pulmonary effort is normal.      Breath sounds: Normal breath sounds.   Musculoskeletal:      Right lower leg: No edema.      Left lower leg: No edema.   Skin:     General: Skin is warm and dry.   Neurological:      General: No focal deficit present.      Mental Status: He is alert.   Psychiatric:         Attention and " Perception: Attention normal.         Mood and Affect: Mood and affect normal.         Behavior: Behavior normal.           Current Outpatient Medications:   •  allopurinol (ZYLOPRIM) 100 MG tablet, Take 50 mg by mouth Daily., Disp: , Rfl:   •  carvedilol (COREG) 3.125 MG tablet, Take 1 tablet by mouth 2 (Two) Times a Day With Meals., Disp: , Rfl:   •  aspirin 81 MG EC tablet, Take 1 tablet by mouth Daily., Disp: , Rfl:   •  atenolol (TENORMIN) 25 MG tablet, Take 1 tablet by mouth., Disp: , Rfl:   •  atorvastatin (LIPITOR) 80 MG tablet, , Disp: , Rfl:   •  clopidogrel (PLAVIX) 75 MG tablet, Take 1 tablet by mouth Daily., Disp: , Rfl:   •  Docusate Sodium 100 MG capsule, Take 1 tablet by mouth., Disp: , Rfl:   •  hydrALAZINE (APRESOLINE) 25 MG tablet, Take 1 tablet by mouth 3 (Three) Times a Day., Disp: 90 tablet, Rfl: 11  •  lanthanum (FOSRENOL) 500 MG chewable tablet, Chew 1 tablet 3 (Three) Times a Day., Disp: , Rfl:   •  levoFLOXacin (LEVAQUIN) 250 MG tablet, , Disp: , Rfl:   •  linaclotide (LINZESS) 145 MCG capsule capsule, Take 2 capsules by mouth Daily., Disp: , Rfl:   •  methocarbamol (ROBAXIN) 500 MG tablet, , Disp: , Rfl:   •  midodrine (PROAMATINE) 5 MG tablet, , Disp: , Rfl:   •  nitroglycerin (NITROSTAT) 0.4 MG SL tablet, Place 1 tablet under the tongue Every 5 (Five) Minutes As Needed for Chest Pain (Do not take more than 3 at one time. Go to ER or call 911 if chest pain persists). Take no more than 3 doses in 15 minutes., Disp: 60 tablet, Rfl: 3  •  tamsulosin (FLOMAX) 0.4 MG capsule 24 hr capsule, , Disp: , Rfl:    Past Medical History:   Diagnosis Date   • Anemia of chronic renal failure 4/12/2021   • Arthritis    • Bilateral leg pain 11/5/2021   • CAD (coronary artery disease)    • Gout    • Heart attack (HCC)    • Hyperlipidemia    • Hypertension    • Neck pain 8/30/2022   • Neuritis of lower extremity, right 12/19/2019   • Proteinuria 5/23/2022   • Rheumatoid factor positive 7/6/2021   • Seasonal  allergies 3/13/2014   • Vitamin D deficiency 5/23/2022         Result Review :     CMP    CMP 7/21/22 8/30/22 10/7/22   Glucose 102 (A) 96 104 (A)   BUN 58 (A) 41 (A) 59 (A)   Creatinine 4.70 (A) 3.96 (A) 5.34 (A)   eGFR 12.6 (A) 15.5 (A) 10.8 (A)   Sodium 138 141 142   Potassium 4.6 4.6 4.2   Chloride 100 106 107   Calcium 9.5 8.9 9.0   Total Protein  6.8 6.8   Albumin  3.90 4.10   Globulin  2.9 2.7   Total Bilirubin  0.3 <0.2   Alkaline Phosphatase  80 83   AST (SGOT)  16 20   ALT (SGPT)  8 12   Albumin/Globulin Ratio  1.3 1.5   BUN/Creatinine Ratio 12.3 10.4 11.0   Anion Gap 13.0 7.8 11.9   (A) Abnormal value       Comments are available for some flowsheets but are not being displayed.           CBC    CBC 5/23/22 10/7/22   WBC 7.16 6.18   RBC 3.65 (A) 3.61 (A)   Hemoglobin 11.6 (A) 11.3 (A)   Hematocrit 36.1 (A) 33.6 (A)   MCV 98.9 (A) 93.1   MCH 31.8 31.3   MCHC 32.1 33.6   RDW 13.7 14.8   Platelets 298 253   (A) Abnormal value            CBC w/diff    CBC w/Diff 5/23/22 10/7/22   WBC 7.16 6.18   RBC 3.65 (A) 3.61 (A)   Hemoglobin 11.6 (A) 11.3 (A)   Hematocrit 36.1 (A) 33.6 (A)   MCV 98.9 (A) 93.1   MCH 31.8 31.3   MCHC 32.1 33.6   RDW 13.7 14.8   Platelets 298 253   Neutrophil Rel % 48.5 43.1   Immature Granulocyte Rel % 0.3 0.2   Lymphocyte Rel % 29.3 35.1   Monocyte Rel % 8.9 13.3 (A)   Eosinophil Rel % 11.7 (A) 6.5 (A)   Basophil Rel % 1.3 1.8 (A)   (A) Abnormal value            Lipid Panel    Lipid Panel 8/30/22   Total Cholesterol 144   Triglycerides 130   HDL Cholesterol 47   VLDL Cholesterol 23   LDL Cholesterol  74   LDL/HDL Ratio 1.51               Renal Profile    Renal Profile 7/21/22 8/30/22 10/7/22   BUN 58 (A) 41 (A) 59 (A)   Creatinine 4.70 (A) 3.96 (A) 5.34 (A)   (A) Abnormal value                         Assessment and Plan    Diagnoses and all orders for this visit:    1. Acute on chronic congestive heart failure, unspecified heart failure type (HCC) (Primary)  Assessment & Plan:  Continue  per cardiology.      2. Dependence on renal dialysis (HCC)  Assessment & Plan:  Continue per nephrologist.      3. Anemia in stage 4 chronic kidney disease (HCC)  Assessment & Plan:  Form for handicap placard for automobile was provided.  It is certainly reasonable that his anemia and dialysis treatment will contribute to some fatigue and weakness.  Patient denies any other concerns today.  He has appointments lined up with dialysis and specialists.        Follow Up    No follow-ups on file.  Patient was given instructions and counseling regarding his condition or for health maintenance advice. Please see specific information pulled into the AVS if appropriate.

## 2023-03-22 NOTE — ASSESSMENT & PLAN NOTE
Form for handicap placard for automobile was provided.  It is certainly reasonable that his anemia and dialysis treatment will contribute to some fatigue and weakness.  Patient denies any other concerns today.  He has appointments lined up with dialysis and specialists.

## 2023-04-04 ENCOUNTER — OFFICE VISIT (OUTPATIENT)
Dept: FAMILY MEDICINE CLINIC | Age: 73
End: 2023-04-04
Payer: MEDICARE

## 2023-04-04 VITALS
WEIGHT: 178 LBS | OXYGEN SATURATION: 100 % | HEIGHT: 71 IN | HEART RATE: 82 BPM | DIASTOLIC BLOOD PRESSURE: 62 MMHG | SYSTOLIC BLOOD PRESSURE: 129 MMHG | BODY MASS INDEX: 24.92 KG/M2

## 2023-04-04 DIAGNOSIS — N18.4 CHRONIC KIDNEY DISEASE, STAGE 4 (SEVERE): ICD-10-CM

## 2023-04-04 DIAGNOSIS — Z99.2 DEPENDENCE ON RENAL DIALYSIS: ICD-10-CM

## 2023-04-04 DIAGNOSIS — I50.22 CHRONIC SYSTOLIC (CONGESTIVE) HEART FAILURE: Primary | ICD-10-CM

## 2023-04-04 PROBLEM — I50.9 ACUTE ON CHRONIC CONGESTIVE HEART FAILURE: Status: RESOLVED | Noted: 2023-02-07 | Resolved: 2023-04-04

## 2023-04-04 PROBLEM — T78.2XXA ANAPHYLACTIC SHOCK, UNSPECIFIED, INITIAL ENCOUNTER: Status: RESOLVED | Noted: 2023-02-25 | Resolved: 2023-04-04

## 2023-04-04 PROCEDURE — 3078F DIAST BP <80 MM HG: CPT | Performed by: NURSE PRACTITIONER

## 2023-04-04 PROCEDURE — 3074F SYST BP LT 130 MM HG: CPT | Performed by: NURSE PRACTITIONER

## 2023-04-04 PROCEDURE — 99213 OFFICE O/P EST LOW 20 MIN: CPT | Performed by: NURSE PRACTITIONER

## 2023-04-04 RX ORDER — HYDROCODONE BITARTRATE AND ACETAMINOPHEN 5; 325 MG/1; MG/1
TABLET ORAL
COMMUNITY
Start: 2023-03-28 | End: 2023-04-21

## 2023-04-04 RX ORDER — HEPARIN SODIUM 1000 [USP'U]/ML
INJECTION INTRAVENOUS; SUBCUTANEOUS
COMMUNITY
Start: 2023-03-28 | End: 2024-03-23

## 2023-04-04 NOTE — PROGRESS NOTES
"Chief Complaint  paperwork (Discuss FMLA/short term disability )    Subjective          Mohamud Jarrett presents to [facility]     Patient is a 72-year-old male who is currently being treated for congestive heart failure and kidney failure with dependence on renal dialysis.  He was most recently hospitalized from February 8 to March 1.  Has not been to work since February 8.  He is currently established with nephrology and cardiology.  He needs a short-term disability form completed.  He states cardiology and nephrology tell him that primary care provider must complete the form.  Specialist have given the indication of him being off work until July or potentially August.  Patient is continuing with some generalized fatigue and weakness.  While hospitalized he had a device inserted at which point he can receive dialysis 3 times per week.     Objective   Vital Signs:   Vitals:    04/04/23 1003   BP: 129/62   BP Location: Left arm   Patient Position: Sitting   Cuff Size: Large Adult   Pulse: 82   SpO2: 100%   Weight: 80.7 kg (178 lb)   Height: 180.3 cm (71\")      Body mass index is 24.83 kg/m².  Physical Exam  Vitals reviewed.   Constitutional:       General: He is not in acute distress.     Appearance: Normal appearance. He is well-developed.   Cardiovascular:      Rate and Rhythm: Normal rate and regular rhythm.      Heart sounds: Normal heart sounds.   Pulmonary:      Effort: Pulmonary effort is normal.      Breath sounds: Normal breath sounds.   Musculoskeletal:      Right lower leg: No edema.      Left lower leg: No edema.   Skin:     General: Skin is warm and dry.   Neurological:      General: No focal deficit present.      Mental Status: He is alert.   Psychiatric:         Attention and Perception: Attention normal.         Mood and Affect: Mood and affect normal.         Behavior: Behavior normal.           Current Outpatient Medications:   •  allopurinol (ZYLOPRIM) 100 MG " tablet, Take 50 mg by mouth Daily., Disp: , Rfl:   •  atenolol (TENORMIN) 25 MG tablet, Take 1 tablet by mouth., Disp: , Rfl:   •  atorvastatin (LIPITOR) 80 MG tablet, , Disp: , Rfl:   •  carvedilol (COREG) 3.125 MG tablet, Take 1 tablet by mouth 2 (Two) Times a Day With Meals., Disp: , Rfl:   •  clopidogrel (PLAVIX) 75 MG tablet, Take 1 tablet by mouth Daily., Disp: , Rfl:   •  Docusate Sodium 100 MG capsule, Take 1 tablet by mouth., Disp: , Rfl:   •  Heparin Sodium, Porcine, (heparin, porcine,) 1000 units/mL injection, Heparin Sodium (Porcine) 1,000 Units/mL Systemic, Disp: , Rfl:   •  hydrALAZINE (APRESOLINE) 25 MG tablet, Take 1 tablet by mouth 3 (Three) Times a Day., Disp: 90 tablet, Rfl: 11  •  HYDROcodone-acetaminophen (NORCO) 5-325 MG per tablet, , Disp: , Rfl:   •  levoFLOXacin (LEVAQUIN) 250 MG tablet, , Disp: , Rfl:   •  linaclotide (LINZESS) 145 MCG capsule capsule, Take 2 capsules by mouth Daily., Disp: , Rfl:   •  methocarbamol (ROBAXIN) 500 MG tablet, , Disp: , Rfl:   •  Methoxy PEG-Epoetin Beta (MIRCERA IJ), 100 mcg Every 14 (Fourteen) Days., Disp: , Rfl:   •  nitroglycerin (NITROSTAT) 0.4 MG SL tablet, Place 1 tablet under the tongue Every 5 (Five) Minutes As Needed for Chest Pain (Do not take more than 3 at one time. Go to ER or call 911 if chest pain persists). Take no more than 3 doses in 15 minutes., Disp: 60 tablet, Rfl: 3  •  tamsulosin (FLOMAX) 0.4 MG capsule 24 hr capsule, , Disp: , Rfl:   •  aspirin 81 MG EC tablet, Take 1 tablet by mouth Daily., Disp: , Rfl:   •  lanthanum (FOSRENOL) 500 MG chewable tablet, Chew 1 tablet 3 (Three) Times a Day. (Patient not taking: Reported on 4/4/2023), Disp: , Rfl:   •  midodrine (PROAMATINE) 5 MG tablet, , Disp: , Rfl:    Past Medical History:   Diagnosis Date   • Anemia of chronic renal failure 4/12/2021   • Arthritis    • Bilateral leg pain 11/5/2021   • CAD (coronary artery disease)    • Gout    • Heart attack    • Hyperlipidemia    • Hypertension     • Neck pain 8/30/2022   • Neuritis of lower extremity, right 12/19/2019   • Proteinuria 5/23/2022   • Rheumatoid factor positive 7/6/2021   • Seasonal allergies 3/13/2014   • Vitamin D deficiency 5/23/2022         Result Review :     CMP    CMP 7/21/22 8/30/22 10/7/22   Glucose 102 (A) 96 104 (A)   BUN 58 (A) 41 (A) 59 (A)   Creatinine 4.70 (A) 3.96 (A) 5.34 (A)   eGFR 12.6 (A) 15.5 (A) 10.8 (A)   Sodium 138 141 142   Potassium 4.6 4.6 4.2   Chloride 100 106 107   Calcium 9.5 8.9 9.0   Total Protein  6.8 6.8   Albumin  3.90 4.10   Globulin  2.9 2.7   Total Bilirubin  0.3 <0.2   Alkaline Phosphatase  80 83   AST (SGOT)  16 20   ALT (SGPT)  8 12   Albumin/Globulin Ratio  1.3 1.5   BUN/Creatinine Ratio 12.3 10.4 11.0   Anion Gap 13.0 7.8 11.9   (A) Abnormal value       Comments are available for some flowsheets but are not being displayed.           CBC    CBC 5/23/22 10/7/22   WBC 7.16 6.18   RBC 3.65 (A) 3.61 (A)   Hemoglobin 11.6 (A) 11.3 (A)   Hematocrit 36.1 (A) 33.6 (A)   MCV 98.9 (A) 93.1   MCH 31.8 31.3   MCHC 32.1 33.6   RDW 13.7 14.8   Platelets 298 253   (A) Abnormal value            CBC w/diff    CBC w/Diff 5/23/22 10/7/22   WBC 7.16 6.18   RBC 3.65 (A) 3.61 (A)   Hemoglobin 11.6 (A) 11.3 (A)   Hematocrit 36.1 (A) 33.6 (A)   MCV 98.9 (A) 93.1   MCH 31.8 31.3   MCHC 32.1 33.6   RDW 13.7 14.8   Platelets 298 253   Neutrophil Rel % 48.5 43.1   Immature Granulocyte Rel % 0.3 0.2   Lymphocyte Rel % 29.3 35.1   Monocyte Rel % 8.9 13.3 (A)   Eosinophil Rel % 11.7 (A) 6.5 (A)   Basophil Rel % 1.3 1.8 (A)   (A) Abnormal value            Lipid Panel    Lipid Panel 8/30/22   Total Cholesterol 144   Triglycerides 130   HDL Cholesterol 47   VLDL Cholesterol 23   LDL Cholesterol  74   LDL/HDL Ratio 1.51                            Assessment and Plan    Diagnoses and all orders for this visit:    1. Chronic systolic (congestive) heart failure (Primary)  Assessment & Plan:  It is clear my opinion that he would be unable  to work at this time.  The exact length of time that he should not work is ultimately up to his specialists and not me.  I encouraged him to discuss further with his specialist as his care continues.  I will complete his paperwork until August 1, 2023.      2. Dependence on renal dialysis    3. Chronic kidney disease, stage 4 (severe)      Follow Up    No follow-ups on file.  Patient was given instructions and counseling regarding his condition or for health maintenance advice. Please see specific information pulled into the AVS if appropriate.

## 2023-04-04 NOTE — ASSESSMENT & PLAN NOTE
It is clear my opinion that he would be unable to work at this time.  The exact length of time that he should not work is ultimately up to his specialists and not me.  I encouraged him to discuss further with his specialist as his care continues.  I will complete his paperwork until August 1, 2023.

## 2023-04-21 ENCOUNTER — OFFICE VISIT (OUTPATIENT)
Dept: SURGERY | Facility: CLINIC | Age: 73
End: 2023-04-21
Payer: MEDICARE

## 2023-04-21 VITALS — BODY MASS INDEX: 24.98 KG/M2 | WEIGHT: 178.4 LBS | HEIGHT: 71 IN

## 2023-04-21 DIAGNOSIS — K40.90 LEFT INGUINAL HERNIA: Primary | ICD-10-CM

## 2023-04-21 PROCEDURE — 1160F RVW MEDS BY RX/DR IN RCRD: CPT | Performed by: PHYSICIAN ASSISTANT

## 2023-04-21 PROCEDURE — 99204 OFFICE O/P NEW MOD 45 MIN: CPT | Performed by: PHYSICIAN ASSISTANT

## 2023-04-21 PROCEDURE — 1159F MED LIST DOCD IN RCRD: CPT | Performed by: PHYSICIAN ASSISTANT

## 2023-04-21 RX ORDER — MIDODRINE HYDROCHLORIDE 5 MG/1
5 TABLET ORAL DAILY
COMMUNITY

## 2023-04-21 RX ORDER — RANOLAZINE 500 MG/1
500 TABLET, EXTENDED RELEASE ORAL 2 TIMES DAILY
COMMUNITY
Start: 2023-04-10

## 2023-04-21 RX ORDER — LANTHANUM CARBONATE 500 MG/1
500 TABLET, CHEWABLE ORAL DAILY
COMMUNITY

## 2023-04-21 NOTE — H&P (VIEW-ONLY)
ASSESSMENT/PLAN:    This is a 72-year-old gentleman presenting with a symptomatic left inguinal hernia which he wishes to have repaired.  Given his chronic kidney disease and cardiac history, I discussed an open left inguinal hernia repair under monitored anesthesia and he understands the nature of the procedure and the risks including but not limited to bleeding, infection, use of mesh, and recurrence.  We will obtain cardiac clearance prior to the procedure for him to come off of his Plavix for 5 days.  All questions were answered at the time of this visit and they were willing to proceed with all recommendations.    CC:     Left inguinal hernia    HPI:    This is a 72-year-old gentleman presenting to the office today as a self-referral after having last seen Dr. Harding in 2019.  Over the last several weeks he has noticed a painful bulge in his left groin that appeared after a round of constipation.  While straining on the toilet he felt a pop in his left groin and has since had pain in this left groin.  The bulge does come and go particularly with activity.  He denies having nausea, vomiting, abdominal distention, abdominal pain, difficulty with urination or difficulty with bowel movements.    ENDOSCOPY:   • Colonoscopy 2018 with history of polypectomy    SOCIAL HISTORY:   • Denies tobacco use  • Occasional alcohol use    FAMILY HISTORY:    • Colorectal cancer: None    PREVIOUS ABDOMINAL SURGERY    • Open right inguinal hernia repair    OTHER SURGERY  Past Surgical History:   Procedure Laterality Date   • ARTERIOVENOUS FISTULA Right    • CARPAL TUNNEL RELEASE     • CATARACT EXTRACTION W/ INTRAOCULAR LENS IMPLANT     • COLONOSCOPY N/A 2006   • COLONOSCOPY N/A 12/14/2018    Procedure: COLONOSCOPY TO CECUM WITH COLD BIOPSY POLYPECTOMY;  Surgeon: Elliott Harding MD;  Location: [facility] ENDOSCOPY;  Service: General   • CORONARY ANGIOPLASTY WITH STENT PLACEMENT  11/09/2022   • CORONARY ARTERY BYPASS GRAFT N/A  06/20/2003   • INGUINAL HERNIA REPAIR Right 06/04/2014    Open incarcerated inguinal hernia repair-Dr. Elliott Harding   • LUMBAR DISC SURGERY N/A 1990, 1992    L4-L5, X2       PAST MEDICAL HISTORY:    Past Medical History:   Diagnosis Date   • Anemia of chronic renal failure 4/12/2021   • Arthritis    • Bilateral leg pain 11/5/2021   • CAD (coronary artery disease)    • Gout    • Heart attack    • Hyperlipidemia    • Hypertension    • Neck pain 8/30/2022   • Neuritis of lower extremity, right 12/19/2019   • Proteinuria 5/23/2022   • Rheumatoid factor positive 7/6/2021   • Seasonal allergies 3/13/2014   • Vitamin D deficiency 5/23/2022       MEDICATIONS:     Current Outpatient Medications:   •  allopurinol (ZYLOPRIM) 100 MG tablet, Take 50 mg by mouth Daily., Disp: , Rfl:   •  aspirin 81 MG EC tablet, Take 1 tablet by mouth Daily., Disp: , Rfl:   •  atorvastatin (LIPITOR) 80 MG tablet, Take 1 tablet by mouth Daily., Disp: , Rfl:   •  carvedilol (COREG) 3.125 MG tablet, Take 1 tablet by mouth 2 (Two) Times a Day With Meals., Disp: , Rfl:   •  clopidogrel (PLAVIX) 75 MG tablet, Take 1 tablet by mouth Daily., Disp: , Rfl:   •  Docusate Sodium 100 MG capsule, Take 1 tablet by mouth 2 (Two) Times a Day., Disp: , Rfl:   •  Heparin Sodium, Porcine, (heparin, porcine,) 1000 units/mL injection, Heparin Sodium (Porcine) 1,000 Units/mL Systemic, Disp: , Rfl:   •  hydrALAZINE (APRESOLINE) 25 MG tablet, Take 1 tablet by mouth 3 (Three) Times a Day., Disp: 90 tablet, Rfl: 11  •  lanthanum (FOSRENOL) 500 MG chewable tablet, Chew 1 tablet., Disp: , Rfl:   •  linaclotide (LINZESS) 145 MCG capsule capsule, Take 2 capsules by mouth Daily., Disp: , Rfl:   •  methocarbamol (ROBAXIN) 500 MG tablet, Take 1 tablet by mouth 3 (Three) Times a Day., Disp: , Rfl:   •  Methoxy PEG-Epoetin Beta (MIRCERA IJ), 100 mcg Every 14 (Fourteen) Days., Disp: , Rfl:   •  midodrine (PROAMATINE) 5 MG tablet, Take 1 tablet by mouth Daily., Disp: , Rfl:   •   "nitroglycerin (NITROSTAT) 0.4 MG SL tablet, Place 1 tablet under the tongue Every 5 (Five) Minutes As Needed for Chest Pain (Do not take more than 3 at one time. Go to ER or call 911 if chest pain persists). Take no more than 3 doses in 15 minutes., Disp: 60 tablet, Rfl: 3  •  ranolazine (RANEXA) 500 MG 12 hr tablet, Take 1 tablet by mouth 2 (Two) Times a Day., Disp: , Rfl:   •  tamsulosin (FLOMAX) 0.4 MG capsule 24 hr capsule, Take 1 capsule by mouth Daily., Disp: , Rfl:     ALLERGIES:   Allergies   Allergen Reactions   • Peanut (Diagnostic) Shortness Of Breath and Rash   • Peanut Butter Flavor Shortness Of Breath and Rash   • Simvastatin Unknown - High Severity     Other reaction(s): muscle cramps       PHYSICAL EXAM:   • Constitutional: Well-developed well-nourished, no acute distress  • Vital signs:   o Height 71\"  o Weight 178  o BMI 24.9  • Neck: Supple, no palpable mass, trachea midline  • Respiratory: Clear to auscultation, normal inspiratory effort  • Cardiovascular: Regular rate, no murmur, no carotid bruit  • Gastrointestinal: Soft, nontender  • : Moderate sized left inguinal hernia, easily reducible, mildly tender, no right inguinal hernia, normal testes bilaterally  • Psychiatric: Alert and oriented ×3, normal affect         Luigi Cain PA-C    "

## 2023-04-21 NOTE — PROGRESS NOTES
ASSESSMENT/PLAN:    This is a 72-year-old gentleman presenting with a symptomatic left inguinal hernia which he wishes to have repaired.  Given his chronic kidney disease and cardiac history, I discussed an open left inguinal hernia repair under monitored anesthesia and he understands the nature of the procedure and the risks including but not limited to bleeding, infection, use of mesh, and recurrence.  We will obtain cardiac clearance prior to the procedure for him to come off of his Plavix for 5 days.  All questions were answered at the time of this visit and they were willing to proceed with all recommendations.    CC:     Left inguinal hernia    HPI:    This is a 72-year-old gentleman presenting to the office today as a self-referral after having last seen Dr. Harding in 2019.  Over the last several weeks he has noticed a painful bulge in his left groin that appeared after a round of constipation.  While straining on the toilet he felt a pop in his left groin and has since had pain in this left groin.  The bulge does come and go particularly with activity.  He denies having nausea, vomiting, abdominal distention, abdominal pain, difficulty with urination or difficulty with bowel movements.    ENDOSCOPY:   • Colonoscopy 2018 with history of polypectomy    SOCIAL HISTORY:   • Denies tobacco use  • Occasional alcohol use    FAMILY HISTORY:    • Colorectal cancer: None    PREVIOUS ABDOMINAL SURGERY    • Open right inguinal hernia repair    OTHER SURGERY  Past Surgical History:   Procedure Laterality Date   • ARTERIOVENOUS FISTULA Right    • CARPAL TUNNEL RELEASE     • CATARACT EXTRACTION W/ INTRAOCULAR LENS IMPLANT     • COLONOSCOPY N/A 2006   • COLONOSCOPY N/A 12/14/2018    Procedure: COLONOSCOPY TO CECUM WITH COLD BIOPSY POLYPECTOMY;  Surgeon: Elliott Harding MD;  Location: [facility] ENDOSCOPY;  Service: General   • CORONARY ANGIOPLASTY WITH STENT PLACEMENT  11/09/2022   • CORONARY ARTERY BYPASS GRAFT N/A  06/20/2003   • INGUINAL HERNIA REPAIR Right 06/04/2014    Open incarcerated inguinal hernia repair-Dr. Elliott Harding   • LUMBAR DISC SURGERY N/A 1990, 1992    L4-L5, X2       PAST MEDICAL HISTORY:    Past Medical History:   Diagnosis Date   • Anemia of chronic renal failure 4/12/2021   • Arthritis    • Bilateral leg pain 11/5/2021   • CAD (coronary artery disease)    • Gout    • Heart attack    • Hyperlipidemia    • Hypertension    • Neck pain 8/30/2022   • Neuritis of lower extremity, right 12/19/2019   • Proteinuria 5/23/2022   • Rheumatoid factor positive 7/6/2021   • Seasonal allergies 3/13/2014   • Vitamin D deficiency 5/23/2022       MEDICATIONS:     Current Outpatient Medications:   •  allopurinol (ZYLOPRIM) 100 MG tablet, Take 50 mg by mouth Daily., Disp: , Rfl:   •  aspirin 81 MG EC tablet, Take 1 tablet by mouth Daily., Disp: , Rfl:   •  atorvastatin (LIPITOR) 80 MG tablet, Take 1 tablet by mouth Daily., Disp: , Rfl:   •  carvedilol (COREG) 3.125 MG tablet, Take 1 tablet by mouth 2 (Two) Times a Day With Meals., Disp: , Rfl:   •  clopidogrel (PLAVIX) 75 MG tablet, Take 1 tablet by mouth Daily., Disp: , Rfl:   •  Docusate Sodium 100 MG capsule, Take 1 tablet by mouth 2 (Two) Times a Day., Disp: , Rfl:   •  Heparin Sodium, Porcine, (heparin, porcine,) 1000 units/mL injection, Heparin Sodium (Porcine) 1,000 Units/mL Systemic, Disp: , Rfl:   •  hydrALAZINE (APRESOLINE) 25 MG tablet, Take 1 tablet by mouth 3 (Three) Times a Day., Disp: 90 tablet, Rfl: 11  •  lanthanum (FOSRENOL) 500 MG chewable tablet, Chew 1 tablet., Disp: , Rfl:   •  linaclotide (LINZESS) 145 MCG capsule capsule, Take 2 capsules by mouth Daily., Disp: , Rfl:   •  methocarbamol (ROBAXIN) 500 MG tablet, Take 1 tablet by mouth 3 (Three) Times a Day., Disp: , Rfl:   •  Methoxy PEG-Epoetin Beta (MIRCERA IJ), 100 mcg Every 14 (Fourteen) Days., Disp: , Rfl:   •  midodrine (PROAMATINE) 5 MG tablet, Take 1 tablet by mouth Daily., Disp: , Rfl:   •   "nitroglycerin (NITROSTAT) 0.4 MG SL tablet, Place 1 tablet under the tongue Every 5 (Five) Minutes As Needed for Chest Pain (Do not take more than 3 at one time. Go to ER or call 911 if chest pain persists). Take no more than 3 doses in 15 minutes., Disp: 60 tablet, Rfl: 3  •  ranolazine (RANEXA) 500 MG 12 hr tablet, Take 1 tablet by mouth 2 (Two) Times a Day., Disp: , Rfl:   •  tamsulosin (FLOMAX) 0.4 MG capsule 24 hr capsule, Take 1 capsule by mouth Daily., Disp: , Rfl:     ALLERGIES:   Allergies   Allergen Reactions   • Peanut (Diagnostic) Shortness Of Breath and Rash   • Peanut Butter Flavor Shortness Of Breath and Rash   • Simvastatin Unknown - High Severity     Other reaction(s): muscle cramps       PHYSICAL EXAM:   • Constitutional: Well-developed well-nourished, no acute distress  • Vital signs:   o Height 71\"  o Weight 178  o BMI 24.9  • Neck: Supple, no palpable mass, trachea midline  • Respiratory: Clear to auscultation, normal inspiratory effort  • Cardiovascular: Regular rate, no murmur, no carotid bruit  • Gastrointestinal: Soft, nontender  • : Moderate sized left inguinal hernia, easily reducible, mildly tender, no right inguinal hernia, normal testes bilaterally  • Psychiatric: Alert and oriented ×3, normal affect         Luigi Cain PA-C    "

## 2023-04-24 RX ORDER — FERROUS SULFATE 325(65) MG
325 TABLET ORAL
COMMUNITY

## 2023-04-24 RX ORDER — BUMETANIDE 1 MG/1
1 TABLET ORAL DAILY
COMMUNITY

## 2023-04-24 RX ORDER — ROSUVASTATIN CALCIUM 20 MG/1
20 TABLET, COATED ORAL DAILY
COMMUNITY

## 2023-04-25 ENCOUNTER — ANESTHESIA EVENT (OUTPATIENT)
Dept: PERIOP | Facility: HOSPITAL | Age: 73
End: 2023-04-25
Payer: MEDICARE

## 2023-04-25 ENCOUNTER — TELEPHONE (OUTPATIENT)
Dept: FAMILY MEDICINE CLINIC | Age: 73
End: 2023-04-25

## 2023-04-25 NOTE — TELEPHONE ENCOUNTER
Caller: EUGENE    Relationship: Other    Best call back number: [phone]    What form or medical record are you requesting: OFFICE NOTES PERTAINING TO CONDITION PREVENTING PATIENT FROM WORKING AND TREATMENT PLAN     Who is requesting this form or medical record from you: [employer]    How would you like to receive the form or medical records (pick-up, mail, fax): FAX  If fax, what is the fax number: [phone]  CLAIM# [id number]      Timeframe paperwork needed: ASAP    Additional notes: EUGENE FROM [employer] STATED THEY NEED PCP TO FAX OVER OFFICE NOTES REGARDING PATIENT CONDITION AND CURRENT TREATMENT PLAN SINCE PATIENT IS NOT WORKING CURRENTLY  : LUPIS

## 2023-04-26 ENCOUNTER — HOSPITAL ENCOUNTER (OUTPATIENT)
Facility: HOSPITAL | Age: 73
Setting detail: HOSPITAL OUTPATIENT SURGERY
Discharge: HOME | End: 2023-04-26
Attending: SURGERY | Admitting: SURGERY
Payer: MEDICARE

## 2023-04-26 ENCOUNTER — ANESTHESIA (OUTPATIENT)
Dept: PERIOP | Facility: HOSPITAL | Age: 73
End: 2023-04-26
Payer: MEDICARE

## 2023-04-26 VITALS
SYSTOLIC BLOOD PRESSURE: 118 MMHG | HEART RATE: 66 BPM | WEIGHT: 181.6 LBS | TEMPERATURE: 97.5 F | RESPIRATION RATE: 19 BRPM | BODY MASS INDEX: 26.06 KG/M2 | DIASTOLIC BLOOD PRESSURE: 52 MMHG | OXYGEN SATURATION: 96 %

## 2023-04-26 DIAGNOSIS — K40.90 LEFT INGUINAL HERNIA: ICD-10-CM

## 2023-04-26 LAB
ANION GAP SERPL CALCULATED.3IONS-SCNC: 11.1 MMOL/L (ref 5–15)
BUN SERPL-MCNC: 47 MG/DL (ref 8–23)
BUN/CREAT SERPL: 8.6 (ref 7–25)
CALCIUM SPEC-SCNC: 9.4 MG/DL (ref 8.6–10.5)
CHLORIDE SERPL-SCNC: 101 MMOL/L (ref 98–107)
CO2 SERPL-SCNC: 25.9 MMOL/L (ref 22–29)
CREAT SERPL-MCNC: 5.47 MG/DL (ref 0.76–1.27)
EGFRCR SERPLBLD CKD-EPI 2021: 10.5 ML/MIN/1.73
GLUCOSE SERPL-MCNC: 101 MG/DL (ref 65–99)
POTASSIUM SERPL-SCNC: 5.5 MMOL/L (ref 3.5–5.2)
SODIUM SERPL-SCNC: 138 MMOL/L (ref 136–145)

## 2023-04-26 PROCEDURE — C1781 MESH (IMPLANTABLE): HCPCS | Performed by: SURGERY

## 2023-04-26 PROCEDURE — 25010000002 PROPOFOL 200 MG/20ML EMULSION: Performed by: NURSE ANESTHETIST, CERTIFIED REGISTERED

## 2023-04-26 PROCEDURE — 80048 BASIC METABOLIC PNL TOTAL CA: CPT | Performed by: ANESTHESIOLOGY

## 2023-04-26 PROCEDURE — 25010000002 DEXAMETHASONE PER 1 MG: Performed by: ANESTHESIOLOGY

## 2023-04-26 PROCEDURE — 25010000002 MIDAZOLAM PER 1MG: Performed by: ANESTHESIOLOGY

## 2023-04-26 PROCEDURE — 0 CEFAZOLIN SODIUM-DEXTROSE 2-3 GM-%(50ML) RECONSTITUTED SOLUTION: Performed by: PHYSICIAN ASSISTANT

## 2023-04-26 PROCEDURE — 49505 PRP I/HERN INIT REDUC >5 YR: CPT | Performed by: SURGERY

## 2023-04-26 PROCEDURE — 25010000002 ONDANSETRON PER 1 MG: Performed by: ANESTHESIOLOGY

## 2023-04-26 PROCEDURE — 25010000002 PHENYLEPHRINE 10 MG/ML SOLUTION: Performed by: NURSE ANESTHETIST, CERTIFIED REGISTERED

## 2023-04-26 DEVICE — MESH FLUT SHT 3X6IN: Type: IMPLANTABLE DEVICE | Site: ABDOMEN | Status: FUNCTIONAL

## 2023-04-26 RX ORDER — SODIUM CHLORIDE 0.9 % (FLUSH) 0.9 %
10 SYRINGE (ML) INJECTION AS NEEDED
Status: DISCONTINUED | OUTPATIENT
Start: 2023-04-26 | End: 2023-04-26 | Stop reason: HOSPADM

## 2023-04-26 RX ORDER — ONDANSETRON 2 MG/ML
4 INJECTION INTRAMUSCULAR; INTRAVENOUS ONCE AS NEEDED
Status: COMPLETED | OUTPATIENT
Start: 2023-04-26 | End: 2023-04-26

## 2023-04-26 RX ORDER — CEFAZOLIN SODIUM 2 G/50ML
2 SOLUTION INTRAVENOUS ONCE
Status: COMPLETED | OUTPATIENT
Start: 2023-04-26 | End: 2023-04-26

## 2023-04-26 RX ORDER — MIDAZOLAM HYDROCHLORIDE 2 MG/2ML
0.5 INJECTION, SOLUTION INTRAMUSCULAR; INTRAVENOUS
Status: DISCONTINUED | OUTPATIENT
Start: 2023-04-26 | End: 2023-04-26 | Stop reason: HOSPADM

## 2023-04-26 RX ORDER — LIDOCAINE HYDROCHLORIDE 10 MG/ML
0.5 INJECTION, SOLUTION EPIDURAL; INFILTRATION; INTRACAUDAL; PERINEURAL ONCE AS NEEDED
Status: DISCONTINUED | OUTPATIENT
Start: 2023-04-26 | End: 2023-04-26 | Stop reason: HOSPADM

## 2023-04-26 RX ORDER — FAMOTIDINE 10 MG/ML
20 INJECTION, SOLUTION INTRAVENOUS
Status: COMPLETED | OUTPATIENT
Start: 2023-04-26 | End: 2023-04-26

## 2023-04-26 RX ORDER — SODIUM CHLORIDE 0.9 % (FLUSH) 0.9 %
10 SYRINGE (ML) INJECTION EVERY 12 HOURS SCHEDULED
Status: DISCONTINUED | OUTPATIENT
Start: 2023-04-26 | End: 2023-04-26 | Stop reason: HOSPADM

## 2023-04-26 RX ORDER — PROPOFOL 10 MG/ML
INJECTION, EMULSION INTRAVENOUS AS NEEDED
Status: DISCONTINUED | OUTPATIENT
Start: 2023-04-26 | End: 2023-04-26 | Stop reason: SURG

## 2023-04-26 RX ORDER — HYDROCODONE BITARTRATE AND ACETAMINOPHEN 5; 325 MG/1; MG/1
1 TABLET ORAL EVERY 4 HOURS PRN
Qty: 20 TABLET | Refills: 0 | Status: SHIPPED | OUTPATIENT
Start: 2023-04-26 | End: 2023-05-08

## 2023-04-26 RX ORDER — DEXAMETHASONE SODIUM PHOSPHATE 4 MG/ML
8 INJECTION, SOLUTION INTRA-ARTICULAR; INTRALESIONAL; INTRAMUSCULAR; INTRAVENOUS; SOFT TISSUE ONCE
Status: COMPLETED | OUTPATIENT
Start: 2023-04-26 | End: 2023-04-26

## 2023-04-26 RX ORDER — SODIUM CHLORIDE 9 MG/ML
40 INJECTION, SOLUTION INTRAVENOUS AS NEEDED
Status: DISCONTINUED | OUTPATIENT
Start: 2023-04-26 | End: 2023-04-26 | Stop reason: HOSPADM

## 2023-04-26 RX ORDER — KETAMINE HYDROCHLORIDE 10 MG/ML
INJECTION INTRAMUSCULAR; INTRAVENOUS AS NEEDED
Status: DISCONTINUED | OUTPATIENT
Start: 2023-04-26 | End: 2023-04-26 | Stop reason: SURG

## 2023-04-26 RX ORDER — PHENYLEPHRINE HYDROCHLORIDE 10 MG/ML
INJECTION INTRAVENOUS AS NEEDED
Status: DISCONTINUED | OUTPATIENT
Start: 2023-04-26 | End: 2023-04-26 | Stop reason: SURG

## 2023-04-26 RX ORDER — DEXMEDETOMIDINE HYDROCHLORIDE 100 UG/ML
INJECTION, SOLUTION INTRAVENOUS AS NEEDED
Status: DISCONTINUED | OUTPATIENT
Start: 2023-04-26 | End: 2023-04-26 | Stop reason: SURG

## 2023-04-26 RX ORDER — ONDANSETRON 4 MG/1
4 TABLET, FILM COATED ORAL EVERY 6 HOURS PRN
Qty: 10 TABLET | Refills: 0 | Status: SHIPPED | OUTPATIENT
Start: 2023-04-26 | End: 2023-05-08

## 2023-04-26 RX ORDER — SODIUM CHLORIDE, SODIUM LACTATE, POTASSIUM CHLORIDE, CALCIUM CHLORIDE 600; 310; 30; 20 MG/100ML; MG/100ML; MG/100ML; MG/100ML
9 INJECTION, SOLUTION INTRAVENOUS CONTINUOUS
Status: DISCONTINUED | OUTPATIENT
Start: 2023-04-26 | End: 2023-04-26 | Stop reason: HOSPADM

## 2023-04-26 RX ORDER — LIDOCAINE HYDROCHLORIDE AND EPINEPHRINE 10; 10 MG/ML; UG/ML
INJECTION, SOLUTION INFILTRATION; PERINEURAL AS NEEDED
Status: DISCONTINUED | OUTPATIENT
Start: 2023-04-26 | End: 2023-04-26 | Stop reason: HOSPADM

## 2023-04-26 RX ADMIN — FAMOTIDINE 20 MG: 10 INJECTION, SOLUTION INTRAVENOUS at 14:12

## 2023-04-26 RX ADMIN — KETAMINE HYDROCHLORIDE 10 MG: 10 INJECTION INTRAMUSCULAR; INTRAVENOUS at 14:55

## 2023-04-26 RX ADMIN — PROPOFOL INJECTABLE EMULSION 2 MG: 10 INJECTION, EMULSION INTRAVENOUS at 14:47

## 2023-04-26 RX ADMIN — DEXMEDETOMIDINE 8 MCG: 100 INJECTION, SOLUTION, CONCENTRATE INTRAVENOUS at 14:44

## 2023-04-26 RX ADMIN — PROPOFOL INJECTABLE EMULSION 50 MCG/KG/MIN: 10 INJECTION, EMULSION INTRAVENOUS at 14:41

## 2023-04-26 RX ADMIN — PROPOFOL INJECTABLE EMULSION 20 MG: 10 INJECTION, EMULSION INTRAVENOUS at 14:40

## 2023-04-26 RX ADMIN — PHENYLEPHRINE HYDROCHLORIDE 100 MCG: 10 INJECTION INTRAVENOUS at 14:56

## 2023-04-26 RX ADMIN — PHENYLEPHRINE HYDROCHLORIDE 100 MCG: 10 INJECTION INTRAVENOUS at 15:37

## 2023-04-26 RX ADMIN — ONDANSETRON 4 MG: 2 INJECTION INTRAMUSCULAR; INTRAVENOUS at 14:12

## 2023-04-26 RX ADMIN — CEFAZOLIN SODIUM 2 G: 2 SOLUTION INTRAVENOUS at 14:45

## 2023-04-26 RX ADMIN — KETAMINE HYDROCHLORIDE 10 MG: 10 INJECTION INTRAMUSCULAR; INTRAVENOUS at 14:50

## 2023-04-26 RX ADMIN — PROPOFOL INJECTABLE EMULSION 20 MG: 10 INJECTION, EMULSION INTRAVENOUS at 14:43

## 2023-04-26 RX ADMIN — DEXAMETHASONE SODIUM PHOSPHATE 8 MG: 4 INJECTION, SOLUTION INTRAMUSCULAR; INTRAVENOUS at 14:12

## 2023-04-26 RX ADMIN — SODIUM CHLORIDE, POTASSIUM CHLORIDE, SODIUM LACTATE AND CALCIUM CHLORIDE 9 ML/HR: 600; 310; 30; 20 INJECTION, SOLUTION INTRAVENOUS at 13:53

## 2023-04-26 RX ADMIN — DEXMEDETOMIDINE 8 MCG: 100 INJECTION, SOLUTION, CONCENTRATE INTRAVENOUS at 14:50

## 2023-04-26 RX ADMIN — DEXMEDETOMIDINE 8 MCG: 100 INJECTION, SOLUTION, CONCENTRATE INTRAVENOUS at 14:38

## 2023-04-26 RX ADMIN — PROPOFOL INJECTABLE EMULSION 20 MG: 10 INJECTION, EMULSION INTRAVENOUS at 14:53

## 2023-04-26 RX ADMIN — KETAMINE HYDROCHLORIDE 10 MG: 10 INJECTION INTRAMUSCULAR; INTRAVENOUS at 14:40

## 2023-04-26 RX ADMIN — KETAMINE HYDROCHLORIDE 10 MG: 10 INJECTION INTRAMUSCULAR; INTRAVENOUS at 14:44

## 2023-04-26 RX ADMIN — MIDAZOLAM HYDROCHLORIDE 0.5 MG: 1 INJECTION, SOLUTION INTRAMUSCULAR; INTRAVENOUS at 14:28

## 2023-04-26 NOTE — ANESTHESIA POSTPROCEDURE EVALUATION
Patient: Mohamud Jarrett    Procedure Summary     Date: 04/26/23 Room / Location:  [facility] /  [facility]    Anesthesia Start: 1435 Anesthesia Stop: 1550    Procedure: OPEN LEFT INGUINAL HERNIA REPAIR (Left: Abdomen) Diagnosis:       Left inguinal hernia      (Left inguinal hernia [K40.90])    Surgeons: Elliott Harding MD Provider: Edouard Mccain CRNA    Anesthesia Type: MAC ASA Status: 4          Anesthesia Type: MAC    Vitals  Vitals Value Taken Time   /55 04/26/23 1630   Temp     Pulse 68 04/26/23 1630   Resp 22 04/26/23 1630   SpO2 99 % 04/26/23 1630           Post Anesthesia Care and Evaluation    Patient location during evaluation: bedside  Patient participation: complete - patient participated  Level of consciousness: awake and alert  Pain score: 0  Pain management: adequate    Airway patency: patent  Anesthetic complications: No anesthetic complications  PONV Status: none  Cardiovascular status: acceptable  Respiratory status: acceptable  Hydration status: acceptable

## 2023-04-26 NOTE — ANESTHESIA PREPROCEDURE EVALUATION
Anesthesia Evaluation     Patient summary reviewed and Nursing notes reviewed   NPO Solid Status: > 8 hours  NPO Liquid Status: > 2 hours           Airway   Mallampati: III  TM distance: >3 FB  Neck ROM: full  Possible difficult intubation  Dental    (+) upper dentures    Pulmonary - negative pulmonary ROS    breath sounds clear to auscultation  Cardiovascular - normal exam    ECG reviewed  PT is on anticoagulation therapy  Patient on routine beta blocker and Beta blocker not taken-may be given intraoperatively  Rhythm: regular  Rate: normal    (+) hypertension well controlled 2 medications or greater, valvular problems/murmurs MR, past MI  >12 months, CAD, CABG, angina (last episode 1 week ago) with exertion, CHF , hyperlipidemia,       Neuro/Psych  (+) numbness, psychiatric history Anxiety,    GI/Hepatic/Renal/Endo    (+)   renal disease ESRD and dialysis,     Musculoskeletal     (+) neck pain,   Abdominal  - normal exam    Abdomen: soft.   Substance History   (+) alcohol use (occasionally),      OB/GYN          Other   arthritis,      ROS/Med Hx Other: LHC: Diagnostic Summary  LHC:  - 80% stenosis of distal LM extending into ostial LCX, dominant LCX.  - 70% stenosis of proximal RCA.  - LIMA to LAD patent  - SVG to RCA occluded.  - Elevated LVEDP  - No gradient was identified across the aortic valve on pullback.  Final diagnosis: - 80% stenosis of distal LM extending into ostial LCX, dominant LCX.  - 70% stenosis of proximal RCA.  - LIMA to LAD patent  - SVG to RCA occluded.  - Elevated LVEDP    Called [facility] cardiology to discuss cardiac risk assessment. Dr. Harding spoke with cardiologist who cleared patient for surgery. Medical management at this time by cardiologist. Pt to follow up with cards in 2 weeks.                   Anesthesia Plan    ASA 4     MAC     intravenous induction     Anesthetic plan, risks, benefits, and alternatives have been provided, discussed and informed consent has been obtained with:  patient.  Pre-procedure education provided  Use of blood products discussed with patient  Consented to blood products.   Plan discussed with CRNA.        CODE STATUS:

## 2023-04-26 NOTE — OP NOTE
PREOPERATIVE DIAGNOSIS:  Left inguinal hernia    POSTOPERATIVE DIAGNOSIS (FINDINGS):  Moderately large indirect left inguinal hernia    PROCEDURE:  Open left inguinal hernia repair    SURGEON:  Elliott Harding MD    ASSISTANT:  Rebecca Aviles was responsible for performing the following activities: suction, irrigation, suturing, closing, retraction, and placing dressing, and their skilled assistance was necessary for the success of this case.    ANESTHESIA:  MAC is Dr. Harding's, Ljubic    History-surgery and everything went just    EBL:  Minimal    SPECIMEN(S):  none    DESCRIPTION:  In supine position under sedation prepped and draped usual sterile manner.  Initially 10 mL of half percent Marcaine with epinephrine were infiltrated in the subcutaneous plane.  Deeper infiltration was then performed with 1% lidocaine with epinephrine.  An oblique incision was made and carried to and through the external oblique fascia.  The ilioinguinal nerve was identified and divided proximally.  The spermatic cord was skeletonized  a moderately large indirect hernia sac from the cord structures and reducing it to the preperitoneal plane.  A generous medial relaxing incision was made and I closed the conjoined aponeurosis to the transversalis fascia to imbricate a smaller direct inguinal hernia and provide closure of the internal ring to an appropriate size.  A 3 x 6 polypropylene mesh was then placed on the inguinal floor and secured inferiorly to the shelving edge of Poupart's ligament with interrupted 0 Ethibond.  Medially and superiorly to the conjoined aponeurosis with 0 Ethibond.  A slit was made for the cord structures and the inferior aspect of the superior portion of the mesh was stitched to the shelving edge of Poupart's ligament lateral to the cord structures to complete the new internal ring.  Good hemostasis was noted.  External bleak was closed with running 3-0 Vicryl.  Sherine's with running 3-0 Vicryl.  Skin  with 3-0 Vicryl deep dermal and 5-0 Vicryl subcuticular followed by Exofin.  Tolerated well.    Elliott Harding M.D.

## 2023-04-26 NOTE — NURSING NOTE
Discharge instructions reviewed with patient and son at bedside. Education on surgical site infection and new prescriptions given. Patient has stable vital signs and denies pain at this time. No questions or concerns from patient or son. Patient transferred into personal vehicle without issue.

## 2023-04-28 ENCOUNTER — TELEPHONE (OUTPATIENT)
Dept: SURGERY | Facility: CLINIC | Age: 73
End: 2023-04-28
Payer: MEDICARE

## 2023-04-28 NOTE — TELEPHONE ENCOUNTER
Caller: BHARAT QUACH   Best call back number: [phone]    Who are you requesting to speak with (clinical staff, provider,  specific staff member): CLINICAL     What was the call regarding: PT HAD HERNIA REPAIR ON 04/26 W/ DR. ALLEN.   HE STATES HE NOTICED YESTERDAY A LOT OF SWELLING AND SOME PAIN.  HE'S INQUIRING IF HE SHOULD BE CONCERNED.      Do you require a callback:YES

## 2023-04-28 NOTE — TELEPHONE ENCOUNTER
Returned call to patient.  Advised him that pain and swelling is normal following surgery.  Advised patient to take pain medication as directed and apply an ice pack to the left groin area.  He voiced understanding.

## 2023-05-05 ENCOUNTER — TELEPHONE (OUTPATIENT)
Dept: SURGERY | Facility: CLINIC | Age: 73
End: 2023-05-05
Payer: MEDICARE

## 2023-05-05 NOTE — TELEPHONE ENCOUNTER
Patient called to report pain and swelling s/p open left inguinal hernia repair 4/26/23. Pt states he has been applying ice to the area. He is out of his pain medication and has not tried taking ibuprofen or Tylenol. Pt states he does not like taking narcotics. Pt states the area is bruised and that it is uncomfortable when he drives. No fever, redness or drainage. Advised pt that he can take Tylenol or ibuprofen for pain if needed and that he can continue to apply ice to the area in short increments throughout the day. Also advised that he can apply a heating pad to the area if needed and to avoid placing ice/heat directly on the skin. I rescheduled patient's post-op appointment to Monday 5/8.

## 2023-05-08 ENCOUNTER — OFFICE VISIT (OUTPATIENT)
Dept: SURGERY | Facility: CLINIC | Age: 73
End: 2023-05-08
Payer: MEDICARE

## 2023-05-08 DIAGNOSIS — K40.90 LEFT INGUINAL HERNIA: Primary | ICD-10-CM

## 2023-05-11 NOTE — PROGRESS NOTES
CC: Postop    History of Presenting Illness:   This is a 73-year-old male status post open left inguinal hernia repair. He complains of pain and swelling in his left groin at his incision site. He has been alternating ice and heat and has had little improvement in his symptoms. He states he did take some ibuprofen last night and this gave him some relief. Denies any fevers. Reports chills and weight loss, this is chronic since starting dialsysis. Reports normal bowel movements.    Surgical History: Open left inguinal hernia repair with mesh placement 4/26/2023 Dr. Harding    Pathology: None    Review of Systems:  Constitutional: Negative for fevers  Respiratory: negative for SOB, cough, hemoptysis or wheezing  Cardiovascular: negative for chest pain, palpitations or peripheral edema  Gastrointestinal: Positive for left groin pain, swelling, and bruising, denies nausea, vomiting, diarrhea, constipation    Physical Exam:  BMI: 26.06  General: alert and oriented, appropriate, no acute distress  Respiratory: There is good bilateral chest expansion, no use of accessory muscles is noted  Cardiovascular: No jugular venous distention or peripheral edema is seen  Gastrointestinal: Soft, nontender, incision in the left groin with swelling/hematoma and ecchymosis, swelling does not extend into his scrotum    Assessment and Plan:   73-year-old male status post open left inguinal hernia pair with mesh placement.  Overall, he is doing well since surgery.  At his incision site, in the left groin, there is some swelling and likely a hematoma with associated ecchymosis.  Dr. Harding and I reviewed with him this is within normal limits postoperatively. Discussed the swelling and hematoma will resolve over time.  He can take Tylenol as needed for pain, and continue to alternate between ice packs and heat.  Activity restrictions discussed, no heavy lifting more than 15 pounds until 6/1/2023.  Follow-up in the office in 1 month. Call  the office sooner with any questions or concerns.    Yasmin Fox PA-C    [facility] - General Surgery   [address], [address]  [place] [address]    [address], [address]  [place] [address]    Office: [phone]  Fax: [phone]

## 2023-05-18 ENCOUNTER — TELEPHONE (OUTPATIENT)
Dept: FAMILY MEDICINE CLINIC | Age: 73
End: 2023-05-18

## 2023-05-18 NOTE — TELEPHONE ENCOUNTER
Caller: Mohamud Jarrett    Relationship: Self    Best call back number: [phone]    What medication are you requesting: MEDICATION FOR MUSCLE CRAMPS    What are your current symptoms: CRAMPING IN LEGS AND FEET    How long have you been experiencing symptoms: SINCE STARTING DIALYSIS     Have you had these symptoms before:    [] Yes  [x] No    Have you been treated for these symptoms before:   [] Yes  [x] No    If a prescription is needed, what is your preferred pharmacy and phone number: [facility] [id number] [place] - [address] [facility] [phone] [facility] [phone] FX     Additional notes: PATIENT IS HAVING CRAMPS IN FEET AND LEGS WHICH ARE AFFECTING HIS ABILITY TO STAND AND ALSO TO DRIVE   HE FELL AFTER DIALYSIS THIS MORNING HE  HAS AN APPOINTMENT ON 06/01/2023 AND WOULD LIKE A MEDICATION TO LAST AT LEAST UNTIL THEN PLEASE NOTIFY WHEN SENT

## 2023-05-20 NOTE — TELEPHONE ENCOUNTER
Appropriate treatment for muscle cramps depends on cause.  Previously referred to rheumatology for elevated rheumatoid factor and further evaluation.  Potassium mildly elevated per labs done about 2 weeks ago.  Recommend checking ck level, BMP, uric acid and vitamin d to determine appropriate treatment.

## 2023-05-22 DIAGNOSIS — R25.2 MUSCLE CRAMPS: Primary | ICD-10-CM

## 2023-05-22 DIAGNOSIS — N18.4 CHRONIC KIDNEY DISEASE, STAGE 4 (SEVERE): ICD-10-CM

## 2023-05-22 NOTE — TELEPHONE ENCOUNTER
Spoke to pt, he is agreeable to having labs drawn.  He will come tomorrow after dialysis.  I have pended orders.  Can you please sign them.

## 2023-05-23 ENCOUNTER — LAB (OUTPATIENT)
Dept: LAB | Facility: HOSPITAL | Age: 73
End: 2023-05-23
Payer: MEDICARE

## 2023-05-23 DIAGNOSIS — N18.4 CHRONIC KIDNEY DISEASE, STAGE 4 (SEVERE): ICD-10-CM

## 2023-05-23 DIAGNOSIS — R25.2 MUSCLE CRAMPS: ICD-10-CM

## 2023-05-23 LAB
25(OH)D3 SERPL-MCNC: 41.1 NG/ML (ref 30–100)
ANION GAP SERPL CALCULATED.3IONS-SCNC: 13.6 MMOL/L (ref 5–15)
BUN SERPL-MCNC: 22 MG/DL (ref 8–23)
BUN/CREAT SERPL: 6.5 (ref 7–25)
CALCIUM SPEC-SCNC: 9.7 MG/DL (ref 8.6–10.5)
CHLORIDE SERPL-SCNC: 95 MMOL/L (ref 98–107)
CK SERPL-CCNC: 93 U/L (ref 20–200)
CO2 SERPL-SCNC: 29.4 MMOL/L (ref 22–29)
CREAT SERPL-MCNC: 3.4 MG/DL (ref 0.76–1.27)
EGFRCR SERPLBLD CKD-EPI 2021: 18.5 ML/MIN/1.73
GLUCOSE SERPL-MCNC: 91 MG/DL (ref 65–99)
POTASSIUM SERPL-SCNC: 3.8 MMOL/L (ref 3.5–5.2)
SODIUM SERPL-SCNC: 138 MMOL/L (ref 136–145)
URATE SERPL-MCNC: 1.9 MG/DL (ref 3.4–7)

## 2023-05-23 PROCEDURE — 82550 ASSAY OF CK (CPK): CPT

## 2023-05-23 PROCEDURE — 80048 BASIC METABOLIC PNL TOTAL CA: CPT

## 2023-05-23 PROCEDURE — 36415 COLL VENOUS BLD VENIPUNCTURE: CPT

## 2023-05-23 PROCEDURE — 84550 ASSAY OF BLOOD/URIC ACID: CPT

## 2023-05-23 PROCEDURE — 82306 VITAMIN D 25 HYDROXY: CPT

## 2023-05-24 NOTE — PROGRESS NOTES
Patient called to request medication for muscle cramps of legs and feet.  I ordered tests to look for potential underlying cause.  Potassium and sodium are normal.  Uric acid level is low.  Vitamin d level and creatine kinase are normal.  Has severe kidney disease with some improvement of numbers.  I do not have any suggestions for treatment due to concern for his kidney function.  He is anemic and cbc showed some improvement on April 20.  Any further suggestions are appreciated.

## 2023-05-25 ENCOUNTER — TELEPHONE (OUTPATIENT)
Dept: CARDIOLOGY | Facility: CLINIC | Age: 73
End: 2023-05-25

## 2023-05-25 NOTE — TELEPHONE ENCOUNTER
----- Message from POLI Michelle sent at 5/25/2023  9:05 AM EDT -----  Lets have patient discontinue his atorvastatin and see if his muscle cramps improved.  ----- Message -----  From: Concepción Reyes APRN  Sent: 5/24/2023   3:18 PM EDT  To: POLI Michelle      ----- Message -----  From: Shara Talley APRN  Sent: 5/24/2023  10:30 AM EDT  To: Moe Bower MD, John Sinclair MD, #    Patient called to request medication for muscle cramps of legs and feet.  I ordered tests to look for potential underlying cause.  Potassium and sodium are normal.  Uric acid level is low.  Vitamin d level and creatine kinase are normal.  Has severe kidney disease with some improvement of numbers.  I do not have any suggestions for treatment due to concern for his kidney function.  He is anemic and cbc showed some improvement on April 20.  Any further suggestions are appreciated.

## 2023-05-27 DIAGNOSIS — E78.2 MIXED HYPERLIPIDEMIA: ICD-10-CM

## 2023-05-30 RX ORDER — ROSUVASTATIN CALCIUM 10 MG/1
TABLET, COATED ORAL
Qty: 90 TABLET | Refills: 0 | OUTPATIENT
Start: 2023-05-30

## 2023-06-01 ENCOUNTER — OFFICE VISIT (OUTPATIENT)
Dept: FAMILY MEDICINE CLINIC | Age: 73
End: 2023-06-01

## 2023-06-01 VITALS
HEIGHT: 70 IN | HEART RATE: 74 BPM | DIASTOLIC BLOOD PRESSURE: 76 MMHG | BODY MASS INDEX: 26.37 KG/M2 | TEMPERATURE: 98.4 F | WEIGHT: 184.2 LBS | OXYGEN SATURATION: 98 % | SYSTOLIC BLOOD PRESSURE: 167 MMHG

## 2023-06-01 DIAGNOSIS — M79.10 MYALGIA: Primary | ICD-10-CM

## 2023-06-01 DIAGNOSIS — Z23 NEED FOR COVID-19 VACCINE: ICD-10-CM

## 2023-06-01 RX ORDER — AMLODIPINE BESYLATE 10 MG/1
10 TABLET ORAL DAILY
COMMUNITY
Start: 2023-05-27

## 2023-06-01 RX ORDER — CALCIUM ACETATE 667 MG/1
1 TABLET ORAL 3 TIMES DAILY
COMMUNITY
Start: 2023-05-18

## 2023-06-02 NOTE — ASSESSMENT & PLAN NOTE
Patient is not to take atorvastatin per cardiology.  His phone numbers in the chart are updated.  He is to let cardiology know if this change is not beneficial.  He is not to be taking any statin medication and see if it improves his myalgia.  Advised him I have no control over length of dialysis treatments.  Continue to talk to cardiology and nephrology about his concerns.

## 2023-06-02 NOTE — PROGRESS NOTES
"Chief Complaint  Spasms (Muscle spasms in legs, pt believes it may be due to dialysis ) and Fatigue (Pt states he's extremely fatigued since starting dialysis )    Subjective          Mohamud Jarrett presents to [facility]     Patient is a 73-year-old male who is here today to discuss muscle spasms in his legs that he feels as if is due to dialysis.  He also reports fatigue since starting dialysis.  He asked if there is anything I can do to help shorten the duration of his dialysis treatments as it takes a lot of his day.  His dialysis treatments in the hospital were 2-1/2 hours where current outpatient dialysis takes 4 hours.    Regarding myalgia and patient request for medication to treat myalgia and muscle cramps of legs, I recently ordered some lab work that was collected May 23 to include vitamin D and CK level which were both normal.  Uric acid level was not elevated and was actually low, blood sugar, potassium, sodium, calcium were all normal.  Kidney function is abnormal but showing some improvement compared to 1 month ago.  He is end-stage renal disease on dialysis.  I therefore sent a message to his cardiologist and nephrologist to seek their input as I could not find a distinct cause that can be treated.  He is not a candidate for anti-inflammatories due to his kidney function and he is also on anticoagulants for his cardiac condition.  In patient's chart I see that cardiology has been attempting to reach patient and advise him to stop taking atorvastatin and see if that helps his symptoms.  Patient has not been aware of anyone trying to contact him.     Objective   Vital Signs:   Vitals:    06/01/23 1551   BP: 167/76   BP Location: Right arm   Patient Position: Sitting   Cuff Size: Adult   Pulse: 74   Temp: 98.4 °F (36.9 °C)   TempSrc: Oral   SpO2: 98%   Weight: 83.6 kg (184 lb 3.2 oz)   Height: 177.8 cm (70\")      Body mass index is 26.43 kg/m².  Physical Exam  Vitals " IUD Placement Procedure Note    Kelly Nevarez  2003  [id number]    Pt had visit for menorrhagia earlier today and reviewed options for management. She desired IUD. Reviewed insertion procedure as well as expected bleeding pattern and pt wished to proceed.     The patient was counseled on the risks (including including risk of infection, uterine perforation, cramping), benefits (high efficacy, low maintenance birth control, reduced risk of uterine cancer and hyperplasia, improvement in menstrual bleeding), and alternatives of the procedure. Verbal and written consent were obtained.  A UPT was negative prior to the procedure, just finished her menses    Technique: The patient was placed in the dorsal lithotomy position.  A speculum was placed in the vagina and the cervix was cleaned with betadine swabsx3. The anterior cervical lip was grasped with a tenaculum. The Mirena IUD was loaded, advanced to the fundus, pulled back 1cm, deployed and advanced to the fundus. Using the insertor as a sound, the uterus sounded to 8 cm. IUD strings were cut 3cm from the external cervical os. The patient tolerated the procedure well and there were no complications. EBL: 0cc.     Discussed option of returning to clinic for a string check. She will attempt at home and return if not able to palpate      Kathleen Nguyen MD  3/30/2023 10:29 AM      reviewed.   Constitutional:       General: He is not in acute distress.     Appearance: Normal appearance. He is well-developed.   Cardiovascular:      Rate and Rhythm: Normal rate and regular rhythm.      Heart sounds: Normal heart sounds.   Pulmonary:      Effort: Pulmonary effort is normal.      Breath sounds: Normal breath sounds.   Musculoskeletal:      Right lower leg: No edema.      Left lower leg: No edema.   Skin:     General: Skin is warm and dry.   Neurological:      General: No focal deficit present.      Mental Status: He is alert.   Psychiatric:         Attention and Perception: Attention normal.         Mood and Affect: Mood and affect normal.         Behavior: Behavior normal.           Current Outpatient Medications:   •  allopurinol (ZYLOPRIM) 100 MG tablet, Take 50 mg by mouth Daily., Disp: , Rfl:   •  amLODIPine (NORVASC) 10 MG tablet, Take 1 tablet by mouth Daily., Disp: , Rfl:   •  aspirin 81 MG EC tablet, Take 1 tablet by mouth Daily., Disp: , Rfl:   •  bumetanide (BUMEX) 1 MG tablet, Take 1 tablet by mouth Daily., Disp: , Rfl:   •  calcium acetate (PHOSLO) 667 MG tablet, Take 1 tablet by mouth 3 (Three) Times a Day., Disp: , Rfl:   •  carvedilol (COREG) 3.125 MG tablet, Take 1 tablet by mouth 2 (Two) Times a Day With Meals. Take dos, Disp: , Rfl:   •  clopidogrel (PLAVIX) 75 MG tablet, Take 1 tablet by mouth Daily., Disp: , Rfl:   •  Docusate Sodium 100 MG capsule, Take 1 tablet by mouth 2 (Two) Times a Day., Disp: , Rfl:   •  ferrous sulfate 325 (65 FE) MG tablet, Take 1 tablet by mouth Daily With Breakfast., Disp: , Rfl:   •  Heparin Sodium, Porcine, (heparin, porcine,) 1000 units/mL injection, Heparin Sodium (Porcine) 1,000 Units/mL Systemic, Disp: , Rfl:   •  hydrALAZINE (APRESOLINE) 25 MG tablet, Take 1 tablet by mouth 3 (Three) Times a Day. (Patient taking differently: Take 1 tablet by mouth 3 (Three) Times a Day. Take dos), Disp: 90 tablet, Rfl: 11  •  lanthanum (FOSRENOL) 500 MG  chewable tablet, Chew 1 tablet Daily., Disp: , Rfl:   •  linaclotide (LINZESS) 145 MCG capsule capsule, Take 2 capsules by mouth Daily., Disp: , Rfl:   •  midodrine (PROAMATINE) 5 MG tablet, Take 1 tablet by mouth Daily. Take dos, Disp: , Rfl:   •  nitroglycerin (NITROSTAT) 0.4 MG SL tablet, Place 1 tablet under the tongue Every 5 (Five) Minutes As Needed for Chest Pain (Do not take more than 3 at one time. Go to ER or call 911 if chest pain persists). Take no more than 3 doses in 15 minutes., Disp: 60 tablet, Rfl: 3  •  ranolazine (RANEXA) 500 MG 12 hr tablet, Take 1 tablet by mouth 2 (Two) Times a Day. Take dos, Disp: , Rfl:   •  rosuvastatin (CRESTOR) 20 MG tablet, Take 1 tablet by mouth Daily., Disp: , Rfl:   •  tamsulosin (FLOMAX) 0.4 MG capsule 24 hr capsule, Take 1 capsule by mouth Daily., Disp: , Rfl:    Past Medical History:   Diagnosis Date   • A-V fistula     right arm   • Anemia of chronic renal failure 04/12/2021   • Antiplatelet or antithrombotic long-term use     plavix   • Arthritis    • Bilateral leg pain 11/05/2021   • CAD (coronary artery disease)     h/o CABG 2003 and stents 11/2022, Dr Shirley follows   • Chest pain     saw cardiology 4/10/23, pt states better if he takes his medications   • Dialysis patient     Tues, Thursday, Saturday, has chest wall catheter currently   • Gout    • Heart attack    • Hyperlipidemia    • Hypertension    • Kidney disease     stage 4, Dr Ornelas follows   • Neck pain 08/30/2022   • Neuritis of lower extremity, right 12/19/2019   • Proteinuria 05/23/2022   • Rheumatoid factor positive 07/06/2021   • Seasonal allergies 03/13/2014   • Vitamin D deficiency 05/23/2022         Result Review :     CMP        10/7/2022    11:51 4/26/2023    13:35 5/23/2023    08:49   CMP   Glucose 104   101   91     BUN 59   47   22     Creatinine 5.34   5.47   3.40     EGFR 10.8   10.5   18.5     Sodium 142   138   138     Potassium 4.2   5.5   3.8     Chloride 107   101   95     Calcium  9.0   9.4   9.7     Total Protein 6.8       Albumin 4.10       Globulin 2.7       Total Bilirubin <0.2       Alkaline Phosphatase 83       AST (SGOT) 20       ALT (SGPT) 12       Albumin/Globulin Ratio 1.5       BUN/Creatinine Ratio 11.0   8.6   6.5     Anion Gap 11.9   11.1   13.6       CBC        10/7/2022    11:51   CBC   WBC 6.18     RBC 3.61     Hemoglobin 11.3     Hematocrit 33.6     MCV 93.1     MCH 31.3     MCHC 33.6     RDW 14.8     Platelets 253       CBC w/diff        10/7/2022    11:51   CBC w/Diff   WBC 6.18     RBC 3.61     Hemoglobin 11.3     Hematocrit 33.6     MCV 93.1     MCH 31.3     MCHC 33.6     RDW 14.8     Platelets 253     Neutrophil Rel % 43.1     Immature Granulocyte Rel % 0.2     Lymphocyte Rel % 35.1     Monocyte Rel % 13.3     Eosinophil Rel % 6.5     Basophil Rel % 1.8       Lipid Panel        8/30/2022    10:22   Lipid Panel   Total Cholesterol 144     Triglycerides 130     HDL Cholesterol 47     VLDL Cholesterol 23     LDL Cholesterol  74     LDL/HDL Ratio 1.51                        Assessment and Plan    Diagnoses and all orders for this visit:    1. Myalgia (Primary)  Assessment & Plan:  Patient is not to take atorvastatin per cardiology.  His phone numbers in the chart are updated.  He is to let cardiology know if this change is not beneficial.  He is not to be taking any statin medication and see if it improves his myalgia.  Advised him I have no control over length of dialysis treatments.  Continue to talk to cardiology and nephrology about his concerns.      2. Need for COVID-19 vaccine  -     COVID-19 (Pfizer) Bivalent 12+yrs      Follow Up    No follow-ups on file.  Patient was given instructions and counseling regarding his condition or for health maintenance advice. Please see specific information pulled into the AVS if appropriate.

## 2023-06-07 ENCOUNTER — TELEPHONE (OUTPATIENT)
Dept: FAMILY MEDICINE CLINIC | Age: 73
End: 2023-06-07
Payer: MEDICARE

## 2023-06-07 NOTE — TELEPHONE ENCOUNTER
Caller: Mohamud Jarrett    Relationship: Self    Best call back number: [phone]    What form or medical record are you requesting: MEDICAL RECORDS    Who is requesting this form or medical record from you: [facility] SHORT TERM DISABILITY    How would you like to receive the form or medical records (pick-up, mail, fax): FAX  If fax, what is the fax number: [phone]      Timeframe paperwork needed: ASAP    Additional notes: THE PATIENT STATED HE HAS A SHORT-TERM DISABILITY CLAIM AND HIS INSURANCE COMPANY IS NEEDING UPDATED MEDICAL RECORDS. THE INSURANCE COMPANY HAS BEEN TRYING TO GET RECORDS SINCE 05/11/23  FAX: [phone]    HE WOULD LIKE A CALL BACK TO CONFIRM

## 2023-06-08 NOTE — TELEPHONE ENCOUNTER
Pt informed via voicemail that we have not received records request, gave him the fax number for company to send release of info and we will submit to [employer].

## 2023-06-14 PROBLEM — R53.1 WEAKNESS GENERALIZED: Status: ACTIVE | Noted: 2023-06-14

## 2023-06-14 PROBLEM — Z23 NEED FOR COVID-19 VACCINE: Status: RESOLVED | Noted: 2023-06-01 | Resolved: 2023-06-14

## 2023-06-14 PROBLEM — Z91.010 H/O PEANUT ALLERGY: Status: ACTIVE | Noted: 2023-06-14

## 2023-07-26 ENCOUNTER — OFFICE VISIT (OUTPATIENT)
Dept: FAMILY MEDICINE CLINIC | Age: 73
End: 2023-07-26
Payer: MEDICARE

## 2023-07-26 VITALS
HEIGHT: 70 IN | OXYGEN SATURATION: 99 % | SYSTOLIC BLOOD PRESSURE: 156 MMHG | BODY MASS INDEX: 28.35 KG/M2 | WEIGHT: 198 LBS | HEART RATE: 81 BPM | DIASTOLIC BLOOD PRESSURE: 75 MMHG

## 2023-07-26 DIAGNOSIS — S40.021D CONTUSION OF RIGHT UPPER EXTREMITY, SUBSEQUENT ENCOUNTER: ICD-10-CM

## 2023-07-26 DIAGNOSIS — Z99.2 DEPENDENCE ON RENAL DIALYSIS: Primary | ICD-10-CM

## 2023-07-26 PROBLEM — S40.021A CONTUSION OF RIGHT ARM: Status: ACTIVE | Noted: 2023-07-26

## 2023-07-26 PROBLEM — I50.9 CHF (CONGESTIVE HEART FAILURE): Status: ACTIVE | Noted: 2023-07-26

## 2023-07-26 RX ORDER — LIDOCAINE 50 MG/G
PATCH TOPICAL
COMMUNITY
Start: 2023-07-15

## 2023-07-26 NOTE — ASSESSMENT & PLAN NOTE
Advise patient that short-term disability paperwork needs to be completed by nephrology.  I called and talked to the office of both nephrology and dialysis and Jose advised that a nurse practitioner with nephrology would complete his paperwork.  Return to work date depends on when he is released from surgical procedure inserting his dialysis shunt on the right arm.  I have no way of knowing that information.  I encourage patient to call nephrology and talk to Jose.

## 2023-07-26 NOTE — PROGRESS NOTES
"Chief Complaint  Arm Swelling (Follow up - right arm)    Subjective          Mohamud Jarrett presents to [facility]     Patient is a 73-year-old male who is here today to follow-up regarding bruising and swelling of the right arm where access for renal dialysis was initiated 3 weeks ago.  He is currently getting dialysis from report or shunt on his chest.  Has to have this moved to the right arm in order for him to return to work at [employer].  The area started swelling and bruising shortly after he went home and he went to the emergency room for evaluation on July 15.  Was instructed to allow this shunt to rest x2 weeks and he saw Dr. Byrd, nephrologist, 2 days ago.  This shunt will need to be redone.  Patient still needs to have short-term disability paperwork completed.  Patient states his arm is looking much better.  Photo was taken of his right arm.  See media.     Objective   Vital Signs:   Vitals:    07/26/23 1058   BP: 156/75   BP Location: Left arm   Patient Position: Sitting   Cuff Size: Adult   Pulse: 81   SpO2: 99%   Weight: 89.8 kg (198 lb)   Height: 177.8 cm (70\")      Body mass index is 28.41 kg/m².           Physical Exam  Vitals reviewed.   Constitutional:       General: He is not in acute distress.     Appearance: Normal appearance. He is well-developed.   Cardiovascular:      Rate and Rhythm: Normal rate and regular rhythm.      Heart sounds: Normal heart sounds.   Pulmonary:      Effort: Pulmonary effort is normal.      Breath sounds: Normal breath sounds.   Musculoskeletal:      Right lower leg: No edema.      Left lower leg: No edema.   Skin:     General: Skin is warm and dry.   Neurological:      General: No focal deficit present.      Mental Status: He is alert.   Psychiatric:         Attention and Perception: Attention normal.         Mood and Affect: Mood and affect normal.         Behavior: Behavior normal.         Current Outpatient Medications:     " allopurinol (ZYLOPRIM) 100 MG tablet, Take 0.5 tablets by mouth Daily., Disp: , Rfl:     amLODIPine (NORVASC) 10 MG tablet, Take 1 tablet by mouth Daily., Disp: , Rfl:     aspirin 81 MG EC tablet, Take 1 tablet by mouth Daily., Disp: , Rfl:     bumetanide (BUMEX) 1 MG tablet, Take 1 tablet by mouth Daily., Disp: , Rfl:     carvedilol (COREG) 3.125 MG tablet, Take 1 tablet by mouth 2 (Two) Times a Day With Meals. Take dos, Disp: , Rfl:     clopidogrel (PLAVIX) 75 MG tablet, Take 1 tablet by mouth Daily., Disp: , Rfl:     Docusate Sodium 100 MG capsule, Take 1 tablet by mouth 2 (Two) Times a Day., Disp: , Rfl:     Heparin Sodium, Porcine, (heparin, porcine,) 1000 units/mL injection, Heparin Sodium (Porcine) 1,000 Units/mL Systemic, Disp: , Rfl:     hydrALAZINE (APRESOLINE) 25 MG tablet, Take 1 tablet by mouth 3 (Three) Times a Day. (Patient taking differently: Take 1 tablet by mouth 3 (Three) Times a Day. Take dos), Disp: 90 tablet, Rfl: 11    isosorbide mononitrate (IMDUR) 120 MG 24 hr tablet, , Disp: , Rfl:     lanthanum (FOSRENOL) 500 MG chewable tablet, Chew 1 tablet Daily., Disp: , Rfl:     lidocaine (LIDODERM) 5 %, 1 Patch, TransDermal, Daily, Placed over area of inflammation for discomfort as per your discussion with vascular specialist, # 25 Patch, 0 Refill(s), Pharmacy: [facility] [id number], cm, 07/15/23 7:38:00 EDT, CLINICALHEIGHT, 88.6, kg, 07/15/23 7:38:00 EDT, CLINICALWEIGHT, 177.8, Disp: , Rfl:     linaclotide (LINZESS) 145 MCG capsule capsule, Take 2 capsules by mouth Daily., Disp: , Rfl:     midodrine (PROAMATINE) 5 MG tablet, Take 1 tablet by mouth Daily. Take dos, Disp: , Rfl:     nitroglycerin (NITROSTAT) 0.4 MG SL tablet, Place 1 tablet under the tongue Every 5 (Five) Minutes As Needed for Chest Pain (Do not take more than 3 at one time. Go to ER or call 911 if chest pain persists). Take no more than 3 doses in 15 minutes., Disp: 60 tablet, Rfl: 3    ranolazine (RANEXA) 500 MG 12 hr tablet,  Take 1 tablet by mouth 2 (Two) Times a Day. Take dos, Disp: , Rfl:     rosuvastatin (CRESTOR) 20 MG tablet, Take 1 tablet by mouth Daily., Disp: , Rfl:     tamsulosin (FLOMAX) 0.4 MG capsule 24 hr capsule, Take 1 capsule by mouth Daily., Disp: , Rfl:     calcium acetate (PHOSLO) 667 MG tablet, Take 1 tablet by mouth 3 (Three) Times a Day. (Patient not taking: Reported on 7/26/2023), Disp: , Rfl:     ferrous sulfate 325 (65 FE) MG tablet, Take 1 tablet by mouth Daily With Breakfast. (Patient not taking: Reported on 7/26/2023), Disp: , Rfl:     Methoxy PEG-Epoetin Beta (MIRCERA IJ), 60 mcg Every 14 (Fourteen) Days., Disp: , Rfl:    Past Medical History:   Diagnosis Date    A-V fistula     right arm    Anemia of chronic renal failure 04/12/2021    Antiplatelet or antithrombotic long-term use     plavix    Arthritis     Bilateral leg pain 11/05/2021    CAD (coronary artery disease)     h/o CABG 2003 and stents 11/2022, Dr Shirley follows    Chest pain     saw cardiology 4/10/23, pt states better if he takes his medications    Dialysis patient     Tues, Thursday, Saturday, has chest wall catheter currently    Gout     Heart attack     Hyperlipidemia     Hypertension     Kidney disease     stage 4, Dr Ornelas follows    Neck pain 08/30/2022    Neuritis of lower extremity, right 12/19/2019    Proteinuria 05/23/2022    Rheumatoid factor positive 07/06/2021    Seasonal allergies 03/13/2014    Vitamin D deficiency 05/23/2022     Allergies   Allergen Reactions    Peanut (Diagnostic) Shortness Of Breath and Rash    Peanut Butter Flavor Shortness Of Breath and Rash    Simvastatin Myalgia     Other reaction(s): muscle cramps           Result Review :     CMP          4/26/2023    13:35 5/23/2023    08:49 6/16/2023    10:58   CMP   Glucose 101  91     BUN 47  22     Creatinine 5.47  3.40     EGFR 10.5  18.5     Sodium 138  138     Potassium 5.5  3.8  4.5       Chloride 101  95     Calcium 9.4  9.7     BUN/Creatinine Ratio 8.6  6.5      Anion Gap 11.1  13.6        Details          This result is from an external source.             CBC          10/7/2022    11:51   CBC   WBC 6.18    RBC 3.61    Hemoglobin 11.3    Hematocrit 33.6    MCV 93.1    MCH 31.3    MCHC 33.6    RDW 14.8    Platelets 253      CBC w/diff          10/7/2022    11:51   CBC w/Diff   WBC 6.18    RBC 3.61    Hemoglobin 11.3    Hematocrit 33.6    MCV 93.1    MCH 31.3    MCHC 33.6    RDW 14.8    Platelets 253    Neutrophil Rel % 43.1    Immature Granulocyte Rel % 0.2    Lymphocyte Rel % 35.1    Monocyte Rel % 13.3    Eosinophil Rel % 6.5    Basophil Rel % 1.8      Lipid Panel          8/30/2022    10:22   Lipid Panel   Total Cholesterol 144    Triglycerides 130    HDL Cholesterol 47    VLDL Cholesterol 23    LDL Cholesterol  74    LDL/HDL Ratio 1.51                   Assessment and Plan    Diagnoses and all orders for this visit:    1. Dependence on renal dialysis (Primary)  Assessment & Plan:  Advise patient that short-term disability paperwork needs to be completed by nephrology.  I called and talked to the office of both nephrology and dialysis and Jose advised that a nurse practitioner with nephrology would complete his paperwork.  Return to work date depends on when he is released from surgical procedure inserting his dialysis shunt on the right arm.  I have no way of knowing that information.  I encourage patient to call nephrology and talk to Jose.      2. Contusion of right upper extremity, subsequent encounter  Assessment & Plan:  Follow recommendation of surgeon and nephrology.  Patient reports area is improving.          Follow Up    No follow-ups on file.  Patient was given instructions and counseling regarding his condition or for health maintenance advice. Please see specific information pulled into the AVS if appropriate.

## 2023-09-01 ENCOUNTER — TELEPHONE (OUTPATIENT)
Dept: FAMILY MEDICINE CLINIC | Age: 73
End: 2023-09-01
Payer: MEDICARE

## 2023-09-01 NOTE — TELEPHONE ENCOUNTER
Called patient to inform on first no show 9/1/23 at 9:15. Patient did RS, and a letter has been sent.

## 2023-09-06 ENCOUNTER — OFFICE VISIT (OUTPATIENT)
Dept: FAMILY MEDICINE CLINIC | Age: 73
End: 2023-09-06
Payer: MEDICARE

## 2023-09-06 VITALS
SYSTOLIC BLOOD PRESSURE: 150 MMHG | DIASTOLIC BLOOD PRESSURE: 70 MMHG | HEIGHT: 70 IN | WEIGHT: 190 LBS | BODY MASS INDEX: 27.2 KG/M2 | OXYGEN SATURATION: 98 % | HEART RATE: 80 BPM

## 2023-09-06 DIAGNOSIS — T14.8XXA HEMATOMA: Primary | ICD-10-CM

## 2023-09-06 DIAGNOSIS — Z00.00 MEDICARE ANNUAL WELLNESS VISIT, SUBSEQUENT: ICD-10-CM

## 2023-09-06 DIAGNOSIS — E78.2 MIXED HYPERLIPIDEMIA: ICD-10-CM

## 2023-09-06 NOTE — PROGRESS NOTES
Subsequent Medicare Wellness Visit  The ABC's of Medicare Wellness Visit  Chief Complaint   Patient presents with    Medicare Wellness-subsequent    Fall     Patient fell going to car today knot on on left forearm near elbow        Subjective   History of Present Illness      Mohamud is a 73 y.o. male who presents   for a Subsequent Medicare Wellness Visit.    Patient states that he fell on the concrete outside his home earlier today and has a knot on his left elbow.  He denies any concerns or pain.  Patient defers x-ray imaging.      Does the patient have evidence of cognitive impairment?   No    Aspirin use counseling:Taking ASA appropriately as indicated    Recent Hospitalizations:  No hospitalization(s) within the last year.    Advanced Care Planning:  ACP discussion was held with the patient during this visit. Patient has an advance directive (not in EMR), copy requested.    The following portions of the patient's history were reviewed   and updated as appropriate: allergies, current medications, past family history, past medical history, past social history, past surgical history, and problem list.    Compared to one year ago, the patient feels his   physical health is better.  Compared to one year ago, the patient feels his   mental health is better.    Reviewed chart for potential of high risk medication in the elderly:  yes  Reviewed chart for potential of harmful drug interactions in the elderly:  yes    BMI is >= 25 and <30. (Overweight) The following options were offered after discussion;: nutrition counseling/recommendations       HEALTH RISK ASSESSMENT BEGIN  Fall Risk Screen:  CYNDIE Fall Risk Assessment was completed, and patient is at MODERATE risk for falls. Assessment completed on:9/6/2023    Depression Screen:       9/6/2023     2:22 PM   PHQ-2/PHQ-9 Depression Screening   Little Interest or Pleasure in Doing Things 0-->not at all   Feeling Down, Depressed or Hopeless 0-->not at all   PHQ-9:  Brief Depression Severity Measure Score 0       Current Medical Providers:  Patient Care Team:  Shara Talley APRN as PCP - General (Nurse Practitioner)  Moe Bower MD as Consulting Physician (Cardiology)  Daniel Lyons APRN (Family Medicine)  Silas Muniz MD as Consulting Physician (Nephrology)  Miriam Corbin APRN as Nurse Practitioner (Nurse Practitioner)  Rich Delgadillo MD as Consulting Physician (Cardiology)  Erasto Shirley MD as Consulting Physician (Cardiology)  Ivan Byrd MD (Transplant Surgery)    Smoking Status:  Social History     Tobacco Use   Smoking Status Never   Smokeless Tobacco Never       Alcohol Consumption:  Social History     Substance and Sexual Activity   Alcohol Use Yes    Comment: occassionally       Health Habits and Functional and Cognitive Screenin/6/2023     2:23 PM   Functional & Cognitive Status   Do you have difficulty preparing food and eating? No   Do you have difficulty bathing yourself, getting dressed or grooming yourself? No   Do you have difficulty using the toilet? No   Do you have difficulty moving around from place to place? No   Do you have trouble with steps or getting out of a bed or a chair? No   Current Diet Low Fat Diet   Dental Exam Not up to date   Eye Exam Not up to date   Exercise (times per week) 0 times per week   Current Exercises Include No Regular Exercise   Do you need help using the phone?  No   Are you deaf or do you have serious difficulty hearing?  No   Do you need help to go to places out of walking distance? No   Do you need help shopping? No   Do you need help preparing meals?  No   Do you need help with housework?  No   Do you need help with laundry? No   Do you need help taking your medications? No   Do you need help managing money? No   Do you ever drive or ride in a car without wearing a seat belt? No   Have you felt unusual stress, anger or loneliness in the last month? No   Who do you live  with? Child   If you need help, do you have trouble finding someone available to you? No   Have you been bothered in the last four weeks by sexual problems? No   Do you have difficulty concentrating, remembering or making decisions? No       Age-appropriate Screening Schedule:  Refer to the list below for future screening recommendations based on patient's age, sex and/or medical conditions. Orders for these recommended tests are listed in the plan section. The patient has been provided with a written plan.    Health Maintenance   Topic Date Due    TDAP/TD VACCINES (1 - Tdap) Never done    LIPID PANEL  08/30/2023    BMI FOLLOWUP  08/30/2023    ZOSTER VACCINE (1 of 2) 09/06/2024 (Originally 5/8/1969)    COVID-19 Vaccine (6 - Moderna series) 10/01/2023    INFLUENZA VACCINE  10/01/2023    COLORECTAL CANCER SCREENING  12/14/2023    ANNUAL WELLNESS VISIT  09/06/2024    HEPATITIS C SCREENING  Completed    Pneumococcal Vaccine 65+  Completed     HEALTH RISK ASSESSMENT END    Outpatient Medications Prior to Visit   Medication Sig Dispense Refill    allopurinol (ZYLOPRIM) 100 MG tablet Take 0.5 tablets by mouth Daily.      amLODIPine (NORVASC) 10 MG tablet Take 1 tablet by mouth Daily.      aspirin 81 MG EC tablet Take 1 tablet by mouth Daily.      bumetanide (BUMEX) 1 MG tablet Take 1 tablet by mouth Daily.      calcium acetate (PHOSLO) 667 MG tablet Take 1 tablet by mouth 3 (Three) Times a Day.      carvedilol (COREG) 3.125 MG tablet Take 1 tablet by mouth 2 (Two) Times a Day With Meals. Take dos      clopidogrel (PLAVIX) 75 MG tablet Take 1 tablet by mouth Daily.      Docusate Sodium 100 MG capsule Take 1 tablet by mouth 2 (Two) Times a Day.      ferrous sulfate 325 (65 FE) MG tablet Take 1 tablet by mouth Daily With Breakfast.      Heparin Sodium, Porcine, (heparin, porcine,) 1000 units/mL injection Heparin Sodium (Porcine) 1,000 Units/mL Systemic      hydrALAZINE (APRESOLINE) 25 MG tablet Take 1 tablet by mouth 3  (Three) Times a Day. (Patient taking differently: Take 1 tablet by mouth 3 (Three) Times a Day. Take dos) 90 tablet 11    isosorbide mononitrate (IMDUR) 120 MG 24 hr tablet       lanthanum (FOSRENOL) 500 MG chewable tablet Chew 1 tablet Daily.      lidocaine (LIDODERM) 5 % 1 Patch, TransDermal, Daily, Placed over area of inflammation for discomfort as per your discussion with vascular specialist, # 25 Patch, 0 Refill(s), Pharmacy: [facility] [id number], cm, 07/15/23 7:38:00 EDT, CLINICALHEIGHT, 88.6, kg, 07/15/23 7:38:00 EDT, CLINICALWEIGHT, 177.8      linaclotide (LINZESS) 145 MCG capsule capsule Take 2 capsules by mouth Daily.      Methoxy PEG-Epoetin Beta (MIRCERA IJ) 60 mcg Every 14 (Fourteen) Days.      midodrine (PROAMATINE) 5 MG tablet Take 1 tablet by mouth Daily. Take dos      nitroglycerin (NITROSTAT) 0.4 MG SL tablet Place 1 tablet under the tongue Every 5 (Five) Minutes As Needed for Chest Pain (Do not take more than 3 at one time. Go to ER or call 911 if chest pain persists). Take no more than 3 doses in 15 minutes. 60 tablet 3    ranolazine (RANEXA) 500 MG 12 hr tablet Take 1 tablet by mouth 2 (Two) Times a Day. Take dos      rosuvastatin (CRESTOR) 20 MG tablet Take 1 tablet by mouth Daily.      tamsulosin (FLOMAX) 0.4 MG capsule 24 hr capsule Take 1 capsule by mouth Daily.       No facility-administered medications prior to visit.       Patient Active Problem List   Diagnosis    Gout    Essential (primary) hypertension    Hyperlipidemia    Chronic kidney disease, stage 4 (severe)    Anemia in chronic kidney disease    Coronary artery disease    Chronic systolic (congestive) heart failure    Coagulation defect, unspecified    Dependence on renal dialysis    Gout due to renal impairment, unspecified site    Iron deficiency anemia    Mitral valve regurgitation    Stented coronary artery    Pure hypercholesterolemia    Left inguinal hernia    Myalgia    H/O peanut allergy    Weakness generalized    CHF  "(congestive heart failure)    Contusion of right arm    Hematoma    Medicare annual wellness visit, subsequent            Objective      Vitals:    09/06/23 1408 09/06/23 1449   BP: 153/73 150/70   BP Location: Left arm Left arm   Patient Position: Sitting Sitting   Cuff Size: Adult Adult   Pulse: 80    SpO2: 98%    Weight: 86.2 kg (190 lb)    Height: 177.8 cm (70\")    PainSc:   8    PainLoc: Arm        Physical Exam  Vitals reviewed.   Constitutional:       General: He is not in acute distress.     Appearance: Normal appearance. He is well-developed.   HENT:      Right Ear: Tympanic membrane normal.      Left Ear: Tympanic membrane normal.   Neck:      Thyroid: No thyromegaly.   Cardiovascular:      Rate and Rhythm: Normal rate and regular rhythm.      Pulses:           Posterior tibial pulses are 2+ on the right side and 2+ on the left side.      Heart sounds: Normal heart sounds.   Pulmonary:      Effort: Pulmonary effort is normal.      Breath sounds: Normal breath sounds.   Musculoskeletal:      Right lower leg: No edema.      Left lower leg: No edema.   Skin:     General: Skin is warm and dry.      Comments: Hematoma left arm below the elbow 2 cm   Neurological:      General: No focal deficit present.      Mental Status: He is alert.   Psychiatric:         Attention and Perception: Attention normal.         Mood and Affect: Mood and affect normal.         Behavior: Behavior normal.           Result Review :           Lab Results - Last 18 Months   Lab Units 07/15/23  0816 06/16/23  1058 05/23/23  0849 04/26/23  1335 02/08/23  1959 02/07/23  1522 11/07/22  0748 10/07/22  1151 08/30/22  1022 07/21/22  1346 07/11/22  1009 05/23/22  1123   BUN mg/dL  --   --  22 47*  --   --   --  59* 41*   < >  --  38*   CREATININE mg/dL  --   --  3.40* 5.47*  --   --   --  5.34* 3.96*   < >  --  3.83*   SODIUM mmol/L  --   --  138 138  --   --   --  142 141   < >  --  138   POTASSIUM mmol/L  --  4.5 3.8 5.5*  --   --   --  4.2 " 4.6   < >  --  4.6   CHLORIDE mmol/L  --   --  95* 101  --   --   --  107 106   < >  --  103   CALCIUM mg/dL  --   --  9.7 9.4  --   --   --  9.0 8.9   < >  --  10.0   ALBUMIN g/dL  --   --   --   --   --   --   --  4.10 3.90  --   --  3.90   BILIRUBIN mg/dL  --   --   --   --   --   --   --  <0.2 0.3  --   --   --    ALK PHOS U/L  --   --   --   --   --   --   --  83 80  --   --   --    AST (SGOT) U/L  --   --   --   --   --   --   --  20 16  --   --   --    ALT (SGPT) U/L  --   --   --   --   --   --   --  12 8  --   --   --    TRIGLYCERIDES mg/dL  --   --   --   --   --   --   --   --  130  --   --   --    HDL CHOL mg/dL  --   --   --   --   --   --   --   --  47  --   --   --    VLDL CHOL mg/dL  --   --   --   --   --   --   --   --  23  --   --   --    LDL CHOL mg/dL  --   --   --   --   --   --   --   --  74  --   --   --    LDL/HDL RATIO   --   --   --   --   --   --   --   --  1.51  --   --   --    CK TOTAL U/L  --   --  93  --   --   --   --   --   --   --   --   --    MICROALB UR mg/dL  --   --   --   --   --   --   --  136.6  --   --   --   --    WBC 10*3/mm3  --   --   --   --   --   --   --  6.18  --   --   --  7.16   RBC 10*6/mm3  --   --   --   --   --   --   --  3.61*  --   --   --  3.65*   HEMATOCRIT %  --   --   --   --   --   --   --  33.6*  --   --   --  36.1*   MCV fL  --   --   --   --   --   --   --  93.1  --   --   --  98.9*   MCH pg  --   --   --   --   --   --   --  31.3  --   --   --  31.8   INR  0.97  --   --   --  1.03 1.05   < >  --   --   --   --   --    VIT D 25 HYDROXY ng/ml  --   --  41.1  --   --   --   --   --   --   --  28.9*  --    URIC ACID mg/dL  --   --  1.9*  --   --   --   --   --   --   --  5.8  --     < > = values in this interval not displayed.       The following data was reviewed by: POLI Fajardo on 09/06/2023:    Assessment & Plan   Assessment and Plan      Diagnoses and all orders for this visit:    1. Hematoma (Primary)  Assessment & Plan:  Noted of left  arm below the elbow.  Patient defers x-ray.  Advise he can put a cold compress on the area for 10-minute intervals and let me know if he is having any persisting concerns      2. Medicare annual wellness visit, subsequent  Assessment & Plan:  Preventive care measures are discussed.  Patient's managed by cardiology and nephrology.      3. Mixed hyperlipidemia  -     Lipid panel; Future        Medicare Risks and Personalized Health Plan  CMS Preventative Services Quick Reference  Advance Directive Discussion  Immunizations Discussed/Encouraged (specific immunizations; Tdap and Shingrix )    The above risks/problems have been discussed with the patient.  Pertinent information has been shared with the patient in the   After Visit Summary. Follow up plans and orders are seen below   in the Assessment/Plan Section.    I spent 40 minutes caring for Mohamud on this date of service. This time includes time spent by me in the following activities:preparing for the visit, reviewing tests, obtaining and/or reviewing a separately obtained history, performing a medically appropriate examination and/or evaluation , counseling and educating the patient/family/caregiver, ordering medications, tests, or procedures, and documenting information in the medical record    Follow Up   No follow-ups on file.     An After Visit Summary and PPPS were given to the patient.

## 2023-09-07 NOTE — ASSESSMENT & PLAN NOTE
Noted of left arm below the elbow.  Patient defers x-ray.  Advise he can put a cold compress on the area for 10-minute intervals and let me know if he is having any persisting concerns

## 2023-09-08 RX ORDER — AMLODIPINE BESYLATE 10 MG/1
TABLET ORAL
Qty: 90 TABLET | Refills: 0 | Status: SHIPPED | OUTPATIENT
Start: 2023-09-08

## 2023-09-18 ENCOUNTER — TELEPHONE (OUTPATIENT)
Dept: FAMILY MEDICINE CLINIC | Age: 73
End: 2023-09-18
Payer: MEDICARE

## 2023-09-18 NOTE — LETTER
September 25, 2023    Mohamud ENGLE Kolby  [address] [facility] [id number]      Dear Mr. Jarrett    Just a friendly reminder you have active lab orders pending from your last office visit.  These orders are in your chart, simply come to the lab at your convenience between 7am-6pm Mon-Fri to have these completed. No appointment is needed. These are fasting labs meaning nothing to eat or drink after midnight the night before your test; however you may drink all the water or black coffee you would like the morning of your lab work.        Thank you,     [facility]     POLI Fajardo

## 2023-10-02 RX ORDER — ISOSORBIDE MONONITRATE 120 MG/1
TABLET, EXTENDED RELEASE ORAL
Qty: 90 TABLET | OUTPATIENT
Start: 2023-10-02

## 2023-12-04 ENCOUNTER — READMISSION MANAGEMENT (OUTPATIENT)
Dept: CALL CENTER | Facility: HOSPITAL | Age: 73
End: 2023-12-04
Payer: MEDICARE

## 2023-12-04 ENCOUNTER — TELEPHONE (OUTPATIENT)
Dept: FAMILY MEDICINE CLINIC | Age: 73
End: 2023-12-04

## 2023-12-04 ENCOUNTER — TRANSITIONAL CARE MANAGEMENT TELEPHONE ENCOUNTER (OUTPATIENT)
Dept: CALL CENTER | Facility: HOSPITAL | Age: 73
End: 2023-12-04
Payer: MEDICARE

## 2023-12-04 NOTE — OUTREACH NOTE
Call Center TCM Note      Flowsheet Row Responses   [facility] patient discharged from? Non-BH  [Spiritism]   Does the patient have one of the following disease processes/diagnoses(primary or secondary)? Other   TCM attempt successful? No   Unsuccessful attempts Attempt 1   Comments HOSP DC FU APPT 12/6/23 1045 am   Does the patient have an appointment with their PCP within 7-14 days of discharge? Yes            Ann Mckeon RN    12/4/2023, 13:00 EST

## 2023-12-04 NOTE — OUTREACH NOTE
Call Center TCM Note      Flowsheet Row Responses   [facility] patient discharged from? Non-   Does the patient have one of the following disease processes/diagnoses(primary or secondary)? Other   TCM attempt successful? Yes   Call start time 1354   Call end time 1357   Discharge diagnosis BLOCKED ARTERY   Does the patient have all medications ordered at discharge? N/A   Is the patient taking all medications as directed (includes completed medication regime)? Yes   Comments HOSP DC FU APPT 12/6/23 1045 am   Does the patient have an appointment with their PCP within 7-14 days of discharge? Yes   Has home health visited the patient within 72 hours of discharge? N/A   Psychosocial issues? No   Did the patient receive a copy of their discharge instructions? Yes   Nursing interventions Reviewed instructions with patient   What is the patient's perception of their health status since discharge? Improving   TCM call completed? Yes   Wrap up additional comments 2 stents placed . Cath site right groin without issues. reviewed s/s of infection to monitor for and restrictions.   Call end time 1357            Ann Mckeon RN    12/4/2023, 13:57 EST

## 2023-12-04 NOTE — TELEPHONE ENCOUNTER
Caller: Mohamud Jarrett    Relationship to patient: Self    Best call back number: [phone]    New or established patient?  [] New  [x] Established    Date of discharge: 11/30/23    Facility discharged from: [facility]     Diagnosis/Symptoms: BLOCKED ARTERY     Length of stay (If applicable):     Specialty Only: Did you see a [facility] health provider?    [] Yes  [x] No  If so, who?

## 2023-12-04 NOTE — OUTREACH NOTE
Prep Survey      Flowsheet Row Responses   [facility] facility patient discharged from? Non-BH   Is LACE score < 7 ? Non-BH Discharge   Eligibility [facility]   Date of Discharge 11/30/23   Discharge Disposition Home or Self Care   Discharge diagnosis BLOCKED ARTERY   Does the patient have one of the following disease processes/diagnoses(primary or secondary)? Other   Prep survey completed? Yes            Ana ENGLE - Registered Nurse

## 2023-12-06 ENCOUNTER — OFFICE VISIT (OUTPATIENT)
Dept: FAMILY MEDICINE CLINIC | Age: 73
End: 2023-12-06
Payer: MEDICARE

## 2023-12-06 VITALS
HEART RATE: 69 BPM | DIASTOLIC BLOOD PRESSURE: 62 MMHG | WEIGHT: 183 LBS | BODY MASS INDEX: 26.2 KG/M2 | SYSTOLIC BLOOD PRESSURE: 144 MMHG | OXYGEN SATURATION: 97 % | HEIGHT: 70 IN

## 2023-12-06 DIAGNOSIS — Z99.2 DEPENDENCE ON RENAL DIALYSIS: ICD-10-CM

## 2023-12-06 DIAGNOSIS — N18.4 CHRONIC KIDNEY DISEASE, STAGE 4 (SEVERE): ICD-10-CM

## 2023-12-06 DIAGNOSIS — D50.8 OTHER IRON DEFICIENCY ANEMIA: Primary | ICD-10-CM

## 2023-12-06 DIAGNOSIS — I50.22 CHRONIC SYSTOLIC (CONGESTIVE) HEART FAILURE: ICD-10-CM

## 2023-12-06 DIAGNOSIS — E78.2 MIXED HYPERLIPIDEMIA: ICD-10-CM

## 2023-12-06 RX ORDER — ALLOPURINOL 300 MG/1
300 TABLET ORAL DAILY
COMMUNITY
Start: 2023-11-10

## 2023-12-06 RX ORDER — SEVELAMER CARBONATE 800 MG/1
TABLET, FILM COATED ORAL
COMMUNITY
Start: 2023-10-02

## 2023-12-06 RX ORDER — LOSARTAN POTASSIUM 50 MG/1
50 TABLET ORAL DAILY
COMMUNITY

## 2023-12-06 RX ORDER — ATORVASTATIN CALCIUM 80 MG/1
TABLET, FILM COATED ORAL
COMMUNITY
Start: 2023-10-10

## 2023-12-06 RX ORDER — BUMETANIDE 1 MG/1
3 TABLET ORAL 2 TIMES DAILY
COMMUNITY

## 2023-12-06 RX ORDER — CARVEDILOL 12.5 MG/1
12.5 TABLET ORAL 2 TIMES DAILY WITH MEALS
COMMUNITY

## 2023-12-06 NOTE — PROGRESS NOTES
"Chief Complaint  Transitional Care Management ([facility] inpt follow up - from 11/25-11/30)    Subjective          Mohamud Jarrett presents to [facility]     Patient is a 73-year-old male who is here today in follow-up of a recent hospitalization.  Admitted to [facility] on November 25 and discharged on November 30.  Diagnosis acute hypoxic respiratory failure and volume overload.  Patient gets dialysis on Tuesday Wednesday and Saturday every week.  Patient states he has had decreased response to last couple of treatments with dialysis.  States he just is not ready do seem fluid as well as it once did.  Patient does state that he had 2 cardiac stents placed while at [facility] and he feels a whole lot better.  He states he went to [facility] last night and could walk on a treadmill and ride the bike for up to 2 hours.  Previous tolerance of exercise was 30 minutes at [facility].  He does still feel as if dialysis treatments are too long and he will continue to discuss with dialysis team at appointment tomorrow.  While at the hospital bumetanide was increased to 3 mg twice daily.  Carvedilol was increased to 12.5 mg twice daily and losartan was entered as a new medication 50 mg once daily.  Other medications remain unchanged.  BNP level was 16,000 initially and was decreased to 4300 on discharge.  Creatinine decreased from 9-7.  History of iron deficiency anemia with most recent hemoglobin level being 10.  He is on iron supplement.  Patient denies concerns today.     Objective   Vital Signs:   Vitals:    12/06/23 1007 12/06/23 1053   BP: 159/68 144/62   BP Location: Left arm Left arm   Patient Position: Sitting Sitting   Cuff Size: Adult Adult   Pulse: 69    SpO2: 97%    Weight: 83 kg (183 lb)    Height: 177.8 cm (70\")        Wt Readings from Last 3 Encounters:   12/06/23 83 kg (183 lb)   09/06/23 86.2 kg (190 lb)   07/26/23 89.8 kg (198 lb)      BP Readings from Last 3 Encounters: "   12/06/23 144/62   09/06/23 150/70   07/26/23 156/75       Body mass index is 26.26 kg/m².             Physical Exam  Vitals reviewed.   Constitutional:       General: He is not in acute distress.     Appearance: Normal appearance. He is well-developed.   Cardiovascular:      Rate and Rhythm: Normal rate and regular rhythm.      Pulses:           Posterior tibial pulses are 2+ on the right side and 2+ on the left side.      Heart sounds: Normal heart sounds.   Pulmonary:      Effort: Pulmonary effort is normal.      Breath sounds: Normal breath sounds.   Musculoskeletal:      Right lower leg: No edema.      Left lower leg: No edema.   Skin:     General: Skin is warm and dry.   Neurological:      General: No focal deficit present.      Mental Status: He is alert.   Psychiatric:         Attention and Perception: Attention normal.         Mood and Affect: Mood and affect normal.         Behavior: Behavior normal.           Current Outpatient Medications:     allopurinol (ZYLOPRIM) 300 MG tablet, Take 1 tablet by mouth Daily., Disp: , Rfl:     amLODIPine (NORVASC) 10 MG tablet, TAKE ONE TABLET BY MOUTH DAILY, Disp: 90 tablet, Rfl: 0    aspirin 81 MG EC tablet, Take 1 tablet by mouth Daily., Disp: , Rfl:     atorvastatin (LIPITOR) 80 MG tablet, , Disp: , Rfl:     calcium acetate (PHOSLO) 667 MG tablet, Take 1 tablet by mouth 3 (Three) Times a Day., Disp: , Rfl:     clopidogrel (PLAVIX) 75 MG tablet, Take 1 tablet by mouth Daily., Disp: , Rfl:     Docusate Sodium 100 MG capsule, Take 1 tablet by mouth 2 (Two) Times a Day., Disp: , Rfl:     ferrous sulfate 325 (65 FE) MG tablet, Take 1 tablet by mouth Daily With Breakfast., Disp: , Rfl:     Heparin Sodium, Porcine, (heparin, porcine,) 1000 units/mL injection, Heparin Sodium (Porcine) 1,000 Units/mL Systemic, Disp: , Rfl:     hydrALAZINE (APRESOLINE) 25 MG tablet, Take 1 tablet by mouth 3 (Three) Times a Day. (Patient taking differently: Take 1 tablet by mouth 3 (Three)  Times a Day. Take dos), Disp: 90 tablet, Rfl: 11    isosorbide mononitrate (IMDUR) 120 MG 24 hr tablet, , Disp: , Rfl:     linaclotide (LINZESS) 145 MCG capsule capsule, Take 2 capsules by mouth Daily., Disp: , Rfl:     nitroglycerin (NITROSTAT) 0.4 MG SL tablet, Place 1 tablet under the tongue Every 5 (Five) Minutes As Needed for Chest Pain (Do not take more than 3 at one time. Go to ER or call 911 if chest pain persists). Take no more than 3 doses in 15 minutes., Disp: 60 tablet, Rfl: 3    ranolazine (RANEXA) 500 MG 12 hr tablet, Take 1 tablet by mouth 2 (Two) Times a Day. Take dos, Disp: , Rfl:     sevelamer (RENVELA) 800 MG tablet, , Disp: , Rfl:     bumetanide (BUMEX) 1 MG tablet, Take 3 tablets by mouth 2 (Two) Times a Day., Disp: , Rfl:     carvedilol (COREG) 12.5 MG tablet, Take 1 tablet by mouth 2 (Two) Times a Day With Meals., Disp: , Rfl:     losartan (COZAAR) 50 MG tablet, Take 1 tablet by mouth Daily., Disp: , Rfl:     tamsulosin (FLOMAX) 0.4 MG capsule 24 hr capsule, Take 1 capsule by mouth Daily., Disp: , Rfl:    Past Medical History:   Diagnosis Date    A-V fistula     right arm    Anemia of chronic renal failure 04/12/2021    Antiplatelet or antithrombotic long-term use     plavix    Arthritis     Bilateral leg pain 11/05/2021    CAD (coronary artery disease)     h/o CABG 2003 and stents 11/2022, Dr Shirley follows    Chest pain     saw cardiology 4/10/23, pt states better if he takes his medications    Dialysis patient     Tues, Thursday, Saturday, has chest wall catheter currently    Gout     Heart attack     Hyperlipidemia     Hypertension     Kidney disease     stage 4, Dr Ornelas follows    Neck pain 08/30/2022    Neuritis of lower extremity, right 12/19/2019    Proteinuria 05/23/2022    Rheumatoid factor positive 07/06/2021    Seasonal allergies 03/13/2014    Vitamin D deficiency 05/23/2022     Allergies   Allergen Reactions    Peanut (Diagnostic) Shortness Of Breath and Rash    Peanut Butter  Flavor Shortness Of Breath and Rash    Simvastatin Myalgia     Other reaction(s): muscle cramps               Result Review :     Common labs          4/26/2023    13:35 5/23/2023    08:49 6/16/2023    10:58   Common Labs   Glucose 101  91     BUN 47  22     Creatinine 5.47  3.40     Sodium 138  138     Potassium 5.5  3.8  4.5       Chloride 101  95     Calcium 9.4  9.7     Uric Acid  1.9        Details          This result is from an external source.                XR chest AP portable    Result Date: 11/6/2023  1. Dense bibasilar airspace infiltrates, consistent with acute pneumonia. Follow-up films recommended. Images reviewed, interpreted, and dictated by Deep Bowles MD             Social History     Tobacco Use   Smoking Status Never   Smokeless Tobacco Never           Assessment and Plan    Diagnoses and all orders for this visit:    1. Other iron deficiency anemia (Primary)  Assessment & Plan:  Continue iron supplement and recheck labs in about 6 weeks time.  Written orders are provided to patient to have these collected alongside labs that are ordered for dialysis management.  Further treatment advice pending lab results.    Orders:  -     CBC w AUTO Differential; Future  -     Iron and TIBC; Future    2. Mixed hyperlipidemia  -     Lipid panel; Future    3. Dependence on renal dialysis    4. Chronic kidney disease, stage 4 (severe)  Assessment & Plan:  Continue per nephrology      5. Chronic systolic (congestive) heart failure  Assessment & Plan:  Patient states Dr. Dexter's office has been in contact with him to schedule follow-up appointment          Follow Up    No follow-ups on file.  Patient was given instructions and counseling regarding his condition or for health maintenance advice. Please see specific information pulled into the AVS if appropriate.

## 2023-12-06 NOTE — ASSESSMENT & PLAN NOTE
Continue iron supplement and recheck labs in about 6 weeks time.  Written orders are provided to patient to have these collected alongside labs that are ordered for dialysis management.  Further treatment advice pending lab results.

## 2024-01-01 ENCOUNTER — HOSPITAL ENCOUNTER (INPATIENT)
Facility: HOSPITAL | Age: 74
LOS: 1 days | End: 2024-11-08
Attending: INTERNAL MEDICINE | Admitting: INTERNAL MEDICINE
Payer: COMMERCIAL

## 2024-01-01 ENCOUNTER — APPOINTMENT (OUTPATIENT)
Dept: CT IMAGING | Facility: HOSPITAL | Age: 74
DRG: 291 | End: 2024-01-01
Payer: MEDICARE

## 2024-01-01 ENCOUNTER — HOSPITAL ENCOUNTER (INPATIENT)
Facility: HOSPITAL | Age: 74
LOS: 9 days | Discharge: STILL ADMITTED OR ADMINISTRATIVE | DRG: 291 | End: 2024-11-07
Attending: EMERGENCY MEDICINE | Admitting: INTERNAL MEDICINE
Payer: MEDICARE

## 2024-01-01 ENCOUNTER — APPOINTMENT (OUTPATIENT)
Dept: NEUROLOGY | Facility: HOSPITAL | Age: 74
DRG: 291 | End: 2024-01-01
Payer: MEDICARE

## 2024-01-01 ENCOUNTER — APPOINTMENT (OUTPATIENT)
Dept: GENERAL RADIOLOGY | Facility: HOSPITAL | Age: 74
DRG: 291 | End: 2024-01-01
Payer: MEDICARE

## 2024-01-01 VITALS
HEART RATE: 84 BPM | DIASTOLIC BLOOD PRESSURE: 72 MMHG | SYSTOLIC BLOOD PRESSURE: 142 MMHG | OXYGEN SATURATION: 96 % | TEMPERATURE: 97 F | RESPIRATION RATE: 18 BRPM | WEIGHT: 164.02 LBS | BODY MASS INDEX: 23.53 KG/M2

## 2024-01-01 VITALS — TEMPERATURE: 99.1 F | SYSTOLIC BLOOD PRESSURE: 127 MMHG | OXYGEN SATURATION: 91 % | DIASTOLIC BLOOD PRESSURE: 65 MMHG

## 2024-01-01 DIAGNOSIS — R18.8 OTHER ASCITES: Primary | ICD-10-CM

## 2024-01-01 DIAGNOSIS — N18.6 END STAGE RENAL DISEASE ON DIALYSIS: ICD-10-CM

## 2024-01-01 DIAGNOSIS — E87.0 HYPERNATREMIA: ICD-10-CM

## 2024-01-01 DIAGNOSIS — J90 PLEURAL EFFUSION: ICD-10-CM

## 2024-01-01 DIAGNOSIS — Z99.2 END STAGE RENAL DISEASE ON DIALYSIS: ICD-10-CM

## 2024-01-01 LAB
ALBUMIN SERPL-MCNC: 2.9 G/DL (ref 3.5–5.2)
ALBUMIN SERPL-MCNC: 3 G/DL (ref 3.5–5.2)
ALBUMIN SERPL-MCNC: 3.4 G/DL (ref 3.5–5.2)
ALBUMIN SERPL-MCNC: 3.5 G/DL (ref 3.5–5.2)
ALBUMIN/GLOB SERPL: 0.7 G/DL
ALBUMIN/GLOB SERPL: 0.8 G/DL
ALBUMIN/GLOB SERPL: 0.9 G/DL
ALP SERPL-CCNC: 231 U/L (ref 39–117)
ALP SERPL-CCNC: 242 U/L (ref 39–117)
ALP SERPL-CCNC: 271 U/L (ref 39–117)
ALP SERPL-CCNC: 279 U/L (ref 39–117)
ALP SERPL-CCNC: 281 U/L (ref 39–117)
ALT SERPL W P-5'-P-CCNC: 5 U/L (ref 1–41)
ALT SERPL W P-5'-P-CCNC: 6 U/L (ref 1–41)
ALT SERPL W P-5'-P-CCNC: 7 U/L (ref 1–41)
ALT SERPL W P-5'-P-CCNC: 7 U/L (ref 1–41)
ALT SERPL W P-5'-P-CCNC: <5 U/L (ref 1–41)
AMMONIA BLD-SCNC: 155 UMOL/L (ref 16–60)
ANION GAP SERPL CALCULATED.3IONS-SCNC: 14.3 MMOL/L (ref 5–15)
ANION GAP SERPL CALCULATED.3IONS-SCNC: 18.9 MMOL/L (ref 5–15)
ANION GAP SERPL CALCULATED.3IONS-SCNC: 19 MMOL/L (ref 5–15)
ANION GAP SERPL CALCULATED.3IONS-SCNC: 19.1 MMOL/L (ref 5–15)
ANION GAP SERPL CALCULATED.3IONS-SCNC: 19.4 MMOL/L (ref 5–15)
ANION GAP SERPL CALCULATED.3IONS-SCNC: 21.3 MMOL/L (ref 5–15)
AST SERPL-CCNC: 15 U/L (ref 1–40)
AST SERPL-CCNC: 15 U/L (ref 1–40)
AST SERPL-CCNC: 16 U/L (ref 1–40)
AST SERPL-CCNC: 17 U/L (ref 1–40)
AST SERPL-CCNC: 18 U/L (ref 1–40)
BACTERIA UR QL AUTO: NORMAL /HPF
BASOPHILS # BLD AUTO: 0.09 10*3/MM3 (ref 0–0.2)
BASOPHILS # BLD AUTO: 0.1 10*3/MM3 (ref 0–0.2)
BASOPHILS # BLD AUTO: 0.11 10*3/MM3 (ref 0–0.2)
BASOPHILS # BLD MANUAL: 0.09 10*3/MM3 (ref 0–0.2)
BASOPHILS NFR BLD AUTO: 2.5 % (ref 0–1.5)
BASOPHILS NFR BLD AUTO: 2.7 % (ref 0–1.5)
BASOPHILS NFR BLD AUTO: 2.9 % (ref 0–1.5)
BASOPHILS NFR BLD MANUAL: 2 % (ref 0–1.5)
BILIRUB SERPL-MCNC: 1 MG/DL (ref 0–1.2)
BILIRUB SERPL-MCNC: 1.1 MG/DL (ref 0–1.2)
BILIRUB SERPL-MCNC: 1.2 MG/DL (ref 0–1.2)
BILIRUB SERPL-MCNC: 1.3 MG/DL (ref 0–1.2)
BILIRUB SERPL-MCNC: 1.6 MG/DL (ref 0–1.2)
BILIRUB UR QL STRIP: NEGATIVE
BUN SERPL-MCNC: 40 MG/DL (ref 8–23)
BUN SERPL-MCNC: 63 MG/DL (ref 8–23)
BUN SERPL-MCNC: 73 MG/DL (ref 8–23)
BUN SERPL-MCNC: 77 MG/DL (ref 8–23)
BUN SERPL-MCNC: 81 MG/DL (ref 8–23)
BUN SERPL-MCNC: 95 MG/DL (ref 8–23)
BUN/CREAT SERPL: 10.3 (ref 7–25)
BUN/CREAT SERPL: 10.6 (ref 7–25)
BUN/CREAT SERPL: 10.7 (ref 7–25)
BUN/CREAT SERPL: 11.4 (ref 7–25)
BUN/CREAT SERPL: 8.2 (ref 7–25)
BUN/CREAT SERPL: 8.7 (ref 7–25)
CALCIUM SPEC-SCNC: 8.5 MG/DL (ref 8.6–10.5)
CALCIUM SPEC-SCNC: 8.5 MG/DL (ref 8.6–10.5)
CALCIUM SPEC-SCNC: 8.6 MG/DL (ref 8.6–10.5)
CALCIUM SPEC-SCNC: 8.8 MG/DL (ref 8.6–10.5)
CALCIUM SPEC-SCNC: 8.9 MG/DL (ref 8.6–10.5)
CALCIUM SPEC-SCNC: 9 MG/DL (ref 8.6–10.5)
CHLORIDE SERPL-SCNC: 100 MMOL/L (ref 98–107)
CHLORIDE SERPL-SCNC: 101 MMOL/L (ref 98–107)
CHLORIDE SERPL-SCNC: 101 MMOL/L (ref 98–107)
CHLORIDE SERPL-SCNC: 98 MMOL/L (ref 98–107)
CHLORIDE SERPL-SCNC: 99 MMOL/L (ref 98–107)
CHLORIDE SERPL-SCNC: 99 MMOL/L (ref 98–107)
CLARITY UR: CLEAR
CO2 SERPL-SCNC: 18.7 MMOL/L (ref 22–29)
CO2 SERPL-SCNC: 20.6 MMOL/L (ref 22–29)
CO2 SERPL-SCNC: 21.9 MMOL/L (ref 22–29)
CO2 SERPL-SCNC: 22 MMOL/L (ref 22–29)
CO2 SERPL-SCNC: 25.7 MMOL/L (ref 22–29)
CO2 SERPL-SCNC: 28.1 MMOL/L (ref 22–29)
COLOR UR: YELLOW
CREAT SERPL-MCNC: 4.9 MG/DL (ref 0.76–1.27)
CREAT SERPL-MCNC: 5.91 MG/DL (ref 0.76–1.27)
CREAT SERPL-MCNC: 7.07 MG/DL (ref 0.76–1.27)
CREAT SERPL-MCNC: 7.26 MG/DL (ref 0.76–1.27)
CREAT SERPL-MCNC: 8.33 MG/DL (ref 0.76–1.27)
CREAT SERPL-MCNC: 9.31 MG/DL (ref 0.76–1.27)
DEPRECATED RDW RBC AUTO: 54.6 FL (ref 37–54)
DEPRECATED RDW RBC AUTO: 54.9 FL (ref 37–54)
DEPRECATED RDW RBC AUTO: 56.4 FL (ref 37–54)
DEPRECATED RDW RBC AUTO: 57.7 FL (ref 37–54)
DEPRECATED RDW RBC AUTO: 58.3 FL (ref 37–54)
DEPRECATED RDW RBC AUTO: 58.8 FL (ref 37–54)
EGFRCR SERPLBLD CKD-EPI 2021: 11.9 ML/MIN/1.73
EGFRCR SERPLBLD CKD-EPI 2021: 5.5 ML/MIN/1.73
EGFRCR SERPLBLD CKD-EPI 2021: 6.3 ML/MIN/1.73
EGFRCR SERPLBLD CKD-EPI 2021: 7.4 ML/MIN/1.73
EGFRCR SERPLBLD CKD-EPI 2021: 7.6 ML/MIN/1.73
EGFRCR SERPLBLD CKD-EPI 2021: 9.5 ML/MIN/1.73
EOSINOPHIL # BLD AUTO: 0.11 10*3/MM3 (ref 0–0.4)
EOSINOPHIL # BLD AUTO: 0.14 10*3/MM3 (ref 0–0.4)
EOSINOPHIL # BLD AUTO: 0.2 10*3/MM3 (ref 0–0.4)
EOSINOPHIL # BLD AUTO: 0.21 10*3/MM3 (ref 0–0.4)
EOSINOPHIL # BLD AUTO: 0.21 10*3/MM3 (ref 0–0.4)
EOSINOPHIL # BLD MANUAL: 0.09 10*3/MM3 (ref 0–0.4)
EOSINOPHIL NFR BLD AUTO: 2.8 % (ref 0.3–6.2)
EOSINOPHIL NFR BLD AUTO: 3.9 % (ref 0.3–6.2)
EOSINOPHIL NFR BLD AUTO: 4.5 % (ref 0.3–6.2)
EOSINOPHIL NFR BLD AUTO: 5.7 % (ref 0.3–6.2)
EOSINOPHIL NFR BLD AUTO: 6.1 % (ref 0.3–6.2)
EOSINOPHIL NFR BLD MANUAL: 2 % (ref 0.3–6.2)
ERYTHROCYTE [DISTWIDTH] IN BLOOD BY AUTOMATED COUNT: 15.5 % (ref 12.3–15.4)
ERYTHROCYTE [DISTWIDTH] IN BLOOD BY AUTOMATED COUNT: 15.8 % (ref 12.3–15.4)
ERYTHROCYTE [DISTWIDTH] IN BLOOD BY AUTOMATED COUNT: 15.9 % (ref 12.3–15.4)
ERYTHROCYTE [DISTWIDTH] IN BLOOD BY AUTOMATED COUNT: 16.3 % (ref 12.3–15.4)
GEN 5 2HR TROPONIN T REFLEX: 242 NG/L
GLOBULIN UR ELPH-MCNC: 3.4 GM/DL
GLOBULIN UR ELPH-MCNC: 3.7 GM/DL
GLOBULIN UR ELPH-MCNC: 3.7 GM/DL
GLOBULIN UR ELPH-MCNC: 3.8 GM/DL
GLOBULIN UR ELPH-MCNC: 4 GM/DL
GLUCOSE SERPL-MCNC: 134 MG/DL (ref 65–99)
GLUCOSE SERPL-MCNC: 65 MG/DL (ref 65–99)
GLUCOSE SERPL-MCNC: 86 MG/DL (ref 65–99)
GLUCOSE SERPL-MCNC: 86 MG/DL (ref 65–99)
GLUCOSE SERPL-MCNC: 88 MG/DL (ref 65–99)
GLUCOSE SERPL-MCNC: 90 MG/DL (ref 65–99)
GLUCOSE UR STRIP-MCNC: NEGATIVE MG/DL
HBV SURFACE AG SERPL QL IA: NORMAL
HCT VFR BLD AUTO: 30 % (ref 37.5–51)
HCT VFR BLD AUTO: 30.2 % (ref 37.5–51)
HCT VFR BLD AUTO: 31.3 % (ref 37.5–51)
HCT VFR BLD AUTO: 31.9 % (ref 37.5–51)
HCT VFR BLD AUTO: 33.9 % (ref 37.5–51)
HCT VFR BLD AUTO: 34.1 % (ref 37.5–51)
HGB BLD-MCNC: 10.1 G/DL (ref 13–17.7)
HGB BLD-MCNC: 10.2 G/DL (ref 13–17.7)
HGB BLD-MCNC: 10.6 G/DL (ref 13–17.7)
HGB BLD-MCNC: 10.7 G/DL (ref 13–17.7)
HGB BLD-MCNC: 9.4 G/DL (ref 13–17.7)
HGB BLD-MCNC: 9.7 G/DL (ref 13–17.7)
HGB UR QL STRIP.AUTO: NEGATIVE
HYALINE CASTS UR QL AUTO: NORMAL /LPF
IMM GRANULOCYTES # BLD AUTO: 0.01 10*3/MM3 (ref 0–0.05)
IMM GRANULOCYTES # BLD AUTO: 0.02 10*3/MM3 (ref 0–0.05)
IMM GRANULOCYTES # BLD AUTO: 0.03 10*3/MM3 (ref 0–0.05)
IMM GRANULOCYTES NFR BLD AUTO: 0.2 % (ref 0–0.5)
IMM GRANULOCYTES NFR BLD AUTO: 0.3 % (ref 0–0.5)
IMM GRANULOCYTES NFR BLD AUTO: 0.3 % (ref 0–0.5)
IMM GRANULOCYTES NFR BLD AUTO: 0.6 % (ref 0–0.5)
IMM GRANULOCYTES NFR BLD AUTO: 0.8 % (ref 0–0.5)
KETONES UR QL STRIP: NEGATIVE
LEUKOCYTE ESTERASE UR QL STRIP.AUTO: NEGATIVE
LYMPHOCYTES # BLD AUTO: 0.59 10*3/MM3 (ref 0.7–3.1)
LYMPHOCYTES # BLD AUTO: 0.69 10*3/MM3 (ref 0.7–3.1)
LYMPHOCYTES # BLD AUTO: 0.74 10*3/MM3 (ref 0.7–3.1)
LYMPHOCYTES # BLD AUTO: 0.88 10*3/MM3 (ref 0.7–3.1)
LYMPHOCYTES # BLD AUTO: 0.98 10*3/MM3 (ref 0.7–3.1)
LYMPHOCYTES # BLD MANUAL: 0.93 10*3/MM3 (ref 0.7–3.1)
LYMPHOCYTES NFR BLD AUTO: 13.2 % (ref 19.6–45.3)
LYMPHOCYTES NFR BLD AUTO: 19.9 % (ref 19.6–45.3)
LYMPHOCYTES NFR BLD AUTO: 20.1 % (ref 19.6–45.3)
LYMPHOCYTES NFR BLD AUTO: 24.8 % (ref 19.6–45.3)
LYMPHOCYTES NFR BLD AUTO: 24.9 % (ref 19.6–45.3)
LYMPHOCYTES NFR BLD MANUAL: 3.1 % (ref 5–12)
MCH RBC QN AUTO: 30.5 PG (ref 26.6–33)
MCH RBC QN AUTO: 30.9 PG (ref 26.6–33)
MCH RBC QN AUTO: 31.2 PG (ref 26.6–33)
MCH RBC QN AUTO: 31.2 PG (ref 26.6–33)
MCH RBC QN AUTO: 31.4 PG (ref 26.6–33)
MCH RBC QN AUTO: 32 PG (ref 26.6–33)
MCHC RBC AUTO-ENTMCNC: 31.3 G/DL (ref 31.5–35.7)
MCHC RBC AUTO-ENTMCNC: 31.3 G/DL (ref 31.5–35.7)
MCHC RBC AUTO-ENTMCNC: 31.4 G/DL (ref 31.5–35.7)
MCHC RBC AUTO-ENTMCNC: 31.7 G/DL (ref 31.5–35.7)
MCHC RBC AUTO-ENTMCNC: 32.1 G/DL (ref 31.5–35.7)
MCHC RBC AUTO-ENTMCNC: 32.6 G/DL (ref 31.5–35.7)
MCV RBC AUTO: 100.3 FL (ref 79–97)
MCV RBC AUTO: 102.1 FL (ref 79–97)
MCV RBC AUTO: 95.7 FL (ref 79–97)
MCV RBC AUTO: 96.2 FL (ref 79–97)
MCV RBC AUTO: 97.7 FL (ref 79–97)
MCV RBC AUTO: 98.5 FL (ref 79–97)
MONOCYTES # BLD AUTO: 0.42 10*3/MM3 (ref 0.1–0.9)
MONOCYTES # BLD AUTO: 0.43 10*3/MM3 (ref 0.1–0.9)
MONOCYTES # BLD AUTO: 0.45 10*3/MM3 (ref 0.1–0.9)
MONOCYTES # BLD AUTO: 0.47 10*3/MM3 (ref 0.1–0.9)
MONOCYTES # BLD AUTO: 0.55 10*3/MM3 (ref 0.1–0.9)
MONOCYTES # BLD: 0.14 10*3/MM3 (ref 0.1–0.9)
MONOCYTES NFR BLD AUTO: 11.4 % (ref 5–12)
MONOCYTES NFR BLD AUTO: 11.7 % (ref 5–12)
MONOCYTES NFR BLD AUTO: 12.1 % (ref 5–12)
MONOCYTES NFR BLD AUTO: 12.3 % (ref 5–12)
MONOCYTES NFR BLD AUTO: 13.2 % (ref 5–12)
NEUTROPHILS # BLD AUTO: 3.29 10*3/MM3 (ref 1.7–7)
NEUTROPHILS NFR BLD AUTO: 1.95 10*3/MM3 (ref 1.7–7)
NEUTROPHILS NFR BLD AUTO: 2.03 10*3/MM3 (ref 1.7–7)
NEUTROPHILS NFR BLD AUTO: 2.2 10*3/MM3 (ref 1.7–7)
NEUTROPHILS NFR BLD AUTO: 2.27 10*3/MM3 (ref 1.7–7)
NEUTROPHILS NFR BLD AUTO: 3.02 10*3/MM3 (ref 1.7–7)
NEUTROPHILS NFR BLD AUTO: 55 % (ref 42.7–76)
NEUTROPHILS NFR BLD AUTO: 57.6 % (ref 42.7–76)
NEUTROPHILS NFR BLD AUTO: 58.7 % (ref 42.7–76)
NEUTROPHILS NFR BLD AUTO: 59.5 % (ref 42.7–76)
NEUTROPHILS NFR BLD AUTO: 67.3 % (ref 42.7–76)
NEUTROPHILS NFR BLD MANUAL: 72.4 % (ref 42.7–76)
NITRITE UR QL STRIP: NEGATIVE
NRBC BLD AUTO-RTO: 0 /100 WBC (ref 0–0.2)
PH UR STRIP.AUTO: 8.5 [PH] (ref 5–8)
PHOSPHATE SERPL-MCNC: 4.5 MG/DL (ref 2.5–4.5)
PHOSPHATE SERPL-MCNC: 5.2 MG/DL (ref 2.5–4.5)
PLAT MORPH BLD: NORMAL
PLATELET # BLD AUTO: 123 10*3/MM3 (ref 140–450)
PLATELET # BLD AUTO: 128 10*3/MM3 (ref 140–450)
PLATELET # BLD AUTO: 133 10*3/MM3 (ref 140–450)
PLATELET # BLD AUTO: 141 10*3/MM3 (ref 140–450)
PLATELET # BLD AUTO: 147 10*3/MM3 (ref 140–450)
PLATELET # BLD AUTO: 200 10*3/MM3 (ref 140–450)
PMV BLD AUTO: 10.5 FL (ref 6–12)
PMV BLD AUTO: 10.9 FL (ref 6–12)
PMV BLD AUTO: 11.2 FL (ref 6–12)
PMV BLD AUTO: 11.3 FL (ref 6–12)
PMV BLD AUTO: 11.5 FL (ref 6–12)
PMV BLD AUTO: 12 FL (ref 6–12)
POTASSIUM SERPL-SCNC: 3.9 MMOL/L (ref 3.5–5.2)
POTASSIUM SERPL-SCNC: 4.1 MMOL/L (ref 3.5–5.2)
POTASSIUM SERPL-SCNC: 4.3 MMOL/L (ref 3.5–5.2)
POTASSIUM SERPL-SCNC: 4.7 MMOL/L (ref 3.5–5.2)
POTASSIUM SERPL-SCNC: 4.7 MMOL/L (ref 3.5–5.2)
POTASSIUM SERPL-SCNC: 5.3 MMOL/L (ref 3.5–5.2)
PROT SERPL-MCNC: 6.3 G/DL (ref 6–8.5)
PROT SERPL-MCNC: 6.6 G/DL (ref 6–8.5)
PROT SERPL-MCNC: 6.9 G/DL (ref 6–8.5)
PROT SERPL-MCNC: 7.1 G/DL (ref 6–8.5)
PROT SERPL-MCNC: 7.3 G/DL (ref 6–8.5)
PROT UR QL STRIP: ABNORMAL
QT INTERVAL: 466 MS
QT INTERVAL: 514 MS
QTC INTERVAL: 489 MS
QTC INTERVAL: 527 MS
RBC # BLD AUTO: 2.99 10*6/MM3 (ref 4.14–5.8)
RBC # BLD AUTO: 3.14 10*6/MM3 (ref 4.14–5.8)
RBC # BLD AUTO: 3.24 10*6/MM3 (ref 4.14–5.8)
RBC # BLD AUTO: 3.27 10*6/MM3 (ref 4.14–5.8)
RBC # BLD AUTO: 3.34 10*6/MM3 (ref 4.14–5.8)
RBC # BLD AUTO: 3.47 10*6/MM3 (ref 4.14–5.8)
RBC # UR STRIP: NORMAL /HPF
REF LAB TEST METHOD: NORMAL
SODIUM SERPL-SCNC: 139 MMOL/L (ref 136–145)
SODIUM SERPL-SCNC: 141 MMOL/L (ref 136–145)
SODIUM SERPL-SCNC: 141 MMOL/L (ref 136–145)
SODIUM SERPL-SCNC: 148 MMOL/L (ref 136–145)
SP GR UR STRIP: 1.01 (ref 1–1.03)
SQUAMOUS #/AREA URNS HPF: NORMAL /HPF
TARGETS BLD QL SMEAR: ABNORMAL
TROPONIN T DELTA: 21 NG/L
TROPONIN T SERPL HS-MCNC: 221 NG/L
UROBILINOGEN UR QL STRIP: ABNORMAL
VARIANT LYMPHS NFR BLD MANUAL: 20.4 % (ref 19.6–45.3)
WBC # UR STRIP: NORMAL /HPF
WBC MORPH BLD: NORMAL
WBC NRBC COR # BLD AUTO: 3.46 10*3/MM3 (ref 3.4–10.8)
WBC NRBC COR # BLD AUTO: 3.55 10*3/MM3 (ref 3.4–10.8)
WBC NRBC COR # BLD AUTO: 3.69 10*3/MM3 (ref 3.4–10.8)
WBC NRBC COR # BLD AUTO: 3.94 10*3/MM3 (ref 3.4–10.8)
WBC NRBC COR # BLD AUTO: 4.48 10*3/MM3 (ref 3.4–10.8)
WBC NRBC COR # BLD AUTO: 4.55 10*3/MM3 (ref 3.4–10.8)

## 2024-01-01 PROCEDURE — G0378 HOSPITAL OBSERVATION PER HR: HCPCS

## 2024-01-01 PROCEDURE — 25010000002 MORPHINE PER 10 MG: Performed by: HOSPITALIST

## 2024-01-01 PROCEDURE — 80053 COMPREHEN METABOLIC PANEL: CPT | Performed by: INTERNAL MEDICINE

## 2024-01-01 PROCEDURE — 99231 SBSQ HOSP IP/OBS SF/LOW 25: CPT | Performed by: INTERNAL MEDICINE

## 2024-01-01 PROCEDURE — A9270 NON-COVERED ITEM OR SERVICE: HCPCS | Performed by: INTERNAL MEDICINE

## 2024-01-01 PROCEDURE — 85025 COMPLETE CBC W/AUTO DIFF WBC: CPT | Performed by: INTERNAL MEDICINE

## 2024-01-01 PROCEDURE — 25010000002 LEVETRIRACETAM PER 10 MG: Performed by: STUDENT IN AN ORGANIZED HEALTH CARE EDUCATION/TRAINING PROGRAM

## 2024-01-01 PROCEDURE — 80069 RENAL FUNCTION PANEL: CPT | Performed by: INTERNAL MEDICINE

## 2024-01-01 PROCEDURE — 97164 PT RE-EVAL EST PLAN CARE: CPT

## 2024-01-01 PROCEDURE — 85025 COMPLETE CBC W/AUTO DIFF WBC: CPT | Performed by: EMERGENCY MEDICINE

## 2024-01-01 PROCEDURE — 25010000002 GLYCOPYRROLATE 0.2 MG/ML SOLUTION: Performed by: HOSPITALIST

## 2024-01-01 PROCEDURE — 93005 ELECTROCARDIOGRAM TRACING: CPT | Performed by: INTERNAL MEDICINE

## 2024-01-01 PROCEDURE — 25010000002 GLYCOPYRROLATE 0.2 MG/ML SOLUTION: Performed by: INTERNAL MEDICINE

## 2024-01-01 PROCEDURE — 81001 URINALYSIS AUTO W/SCOPE: CPT | Performed by: EMERGENCY MEDICINE

## 2024-01-01 PROCEDURE — 36415 COLL VENOUS BLD VENIPUNCTURE: CPT | Performed by: EMERGENCY MEDICINE

## 2024-01-01 PROCEDURE — 25010000002 MORPHINE PER 10 MG: Performed by: INTERNAL MEDICINE

## 2024-01-01 PROCEDURE — 25510000001 IOPAMIDOL PER 1 ML: Performed by: INTERNAL MEDICINE

## 2024-01-01 PROCEDURE — 70496 CT ANGIOGRAPHY HEAD: CPT

## 2024-01-01 PROCEDURE — 93005 ELECTROCARDIOGRAM TRACING: CPT | Performed by: EMERGENCY MEDICINE

## 2024-01-01 PROCEDURE — 93010 ELECTROCARDIOGRAM REPORT: CPT | Performed by: INTERNAL MEDICINE

## 2024-01-01 PROCEDURE — 0042T HC CT CEREBRAL PERFUSION W/WO CONTRAST: CPT

## 2024-01-01 PROCEDURE — 97162 PT EVAL MOD COMPLEX 30 MIN: CPT

## 2024-01-01 PROCEDURE — 25010000002 LORAZEPAM PER 2 MG: Performed by: INTERNAL MEDICINE

## 2024-01-01 PROCEDURE — 99222 1ST HOSP IP/OBS MODERATE 55: CPT | Performed by: INTERNAL MEDICINE

## 2024-01-01 PROCEDURE — 95819 EEG AWAKE AND ASLEEP: CPT | Performed by: PSYCHIATRY & NEUROLOGY

## 2024-01-01 PROCEDURE — 80053 COMPREHEN METABOLIC PANEL: CPT | Performed by: EMERGENCY MEDICINE

## 2024-01-01 PROCEDURE — 25010000002 LEVETRIRACETAM PER 10 MG: Performed by: INTERNAL MEDICINE

## 2024-01-01 PROCEDURE — 92610 EVALUATE SWALLOWING FUNCTION: CPT

## 2024-01-01 PROCEDURE — 71045 X-RAY EXAM CHEST 1 VIEW: CPT

## 2024-01-01 PROCEDURE — 95819 EEG AWAKE AND ASLEEP: CPT

## 2024-01-01 PROCEDURE — 95715 VEEG EA 12-26HR INTMT MNTR: CPT

## 2024-01-01 PROCEDURE — 82140 ASSAY OF AMMONIA: CPT | Performed by: INTERNAL MEDICINE

## 2024-01-01 PROCEDURE — 99214 OFFICE O/P EST MOD 30 MIN: CPT | Performed by: INTERNAL MEDICINE

## 2024-01-01 PROCEDURE — 71250 CT THORAX DX C-: CPT

## 2024-01-01 PROCEDURE — 97530 THERAPEUTIC ACTIVITIES: CPT

## 2024-01-01 PROCEDURE — 85007 BL SMEAR W/DIFF WBC COUNT: CPT | Performed by: EMERGENCY MEDICINE

## 2024-01-01 PROCEDURE — 97110 THERAPEUTIC EXERCISES: CPT

## 2024-01-01 PROCEDURE — 95720 EEG PHY/QHP EA INCR W/VEEG: CPT | Performed by: STUDENT IN AN ORGANIZED HEALTH CARE EDUCATION/TRAINING PROGRAM

## 2024-01-01 PROCEDURE — 25010000002 HYDROMORPHONE PER 4 MG: Performed by: HOSPITALIST

## 2024-01-01 PROCEDURE — 25010000002 LORAZEPAM PER 2 MG: Performed by: STUDENT IN AN ORGANIZED HEALTH CARE EDUCATION/TRAINING PROGRAM

## 2024-01-01 PROCEDURE — 87340 HEPATITIS B SURFACE AG IA: CPT

## 2024-01-01 PROCEDURE — 70498 CT ANGIOGRAPHY NECK: CPT

## 2024-01-01 PROCEDURE — 84484 ASSAY OF TROPONIN QUANT: CPT

## 2024-01-01 PROCEDURE — 74176 CT ABD & PELVIS W/O CONTRAST: CPT

## 2024-01-01 PROCEDURE — 25010000002 DIPHENHYDRAMINE PER 50 MG: Performed by: INTERNAL MEDICINE

## 2024-01-01 PROCEDURE — 63710000001 PANTOPRAZOLE 40 MG TABLET DELAYED-RELEASE: Performed by: INTERNAL MEDICINE

## 2024-01-01 PROCEDURE — 51702 INSERT TEMP BLADDER CATH: CPT

## 2024-01-01 PROCEDURE — 99239 HOSP IP/OBS DSCHRG MGMT >30: CPT | Performed by: INTERNAL MEDICINE

## 2024-01-01 PROCEDURE — 99285 EMERGENCY DEPT VISIT HI MDM: CPT

## 2024-01-01 PROCEDURE — 51798 US URINE CAPACITY MEASURE: CPT

## 2024-01-01 PROCEDURE — 63710000001 ACETAMINOPHEN 325 MG TABLET: Performed by: INTERNAL MEDICINE

## 2024-01-01 PROCEDURE — 84100 ASSAY OF PHOSPHORUS: CPT | Performed by: INTERNAL MEDICINE

## 2024-01-01 PROCEDURE — 5A1D70Z PERFORMANCE OF URINARY FILTRATION, INTERMITTENT, LESS THAN 6 HOURS PER DAY: ICD-10-PCS | Performed by: INTERNAL MEDICINE

## 2024-01-01 PROCEDURE — 63710000001 TAMSULOSIN 0.4 MG CAPSULE: Performed by: INTERNAL MEDICINE

## 2024-01-01 PROCEDURE — 99222 1ST HOSP IP/OBS MODERATE 55: CPT | Performed by: STUDENT IN AN ORGANIZED HEALTH CARE EDUCATION/TRAINING PROGRAM

## 2024-01-01 PROCEDURE — 63710000001 ASPIRIN 81 MG CHEWABLE TABLET: Performed by: INTERNAL MEDICINE

## 2024-01-01 PROCEDURE — 99232 SBSQ HOSP IP/OBS MODERATE 35: CPT | Performed by: NURSE PRACTITIONER

## 2024-01-01 RX ORDER — ACETAMINOPHEN 325 MG/1
650 TABLET ORAL EVERY 4 HOURS PRN
Status: CANCELLED | OUTPATIENT
Start: 2024-01-01

## 2024-01-01 RX ORDER — LORAZEPAM 2 MG/ML
2 CONCENTRATE ORAL
Status: DISCONTINUED | OUTPATIENT
Start: 2024-01-01 | End: 2024-01-01 | Stop reason: HOSPADM

## 2024-01-01 RX ORDER — HYDROMORPHONE HYDROCHLORIDE 1 MG/ML
0.5 INJECTION, SOLUTION INTRAMUSCULAR; INTRAVENOUS; SUBCUTANEOUS
Status: DISPENSED | OUTPATIENT
Start: 2024-01-01 | End: 2024-01-01

## 2024-01-01 RX ORDER — LORAZEPAM 2 MG/ML
2 INJECTION INTRAMUSCULAR
Status: DISCONTINUED | OUTPATIENT
Start: 2024-01-01 | End: 2024-01-01 | Stop reason: HOSPADM

## 2024-01-01 RX ORDER — SODIUM CHLORIDE 0.9 % (FLUSH) 0.9 %
10 SYRINGE (ML) INJECTION AS NEEDED
Status: DISCONTINUED | OUTPATIENT
Start: 2024-01-01 | End: 2024-01-01

## 2024-01-01 RX ORDER — LEVETIRACETAM 500 MG/5ML
500 INJECTION, SOLUTION, CONCENTRATE INTRAVENOUS EVERY 12 HOURS SCHEDULED
Status: DISCONTINUED | OUTPATIENT
Start: 2024-01-01 | End: 2024-01-01

## 2024-01-01 RX ORDER — MORPHINE SULFATE 20 MG/ML
5 SOLUTION ORAL
Status: DISCONTINUED | OUTPATIENT
Start: 2024-01-01 | End: 2024-01-01 | Stop reason: HOSPADM

## 2024-01-01 RX ORDER — ONDANSETRON 4 MG/1
4 TABLET, ORALLY DISINTEGRATING ORAL EVERY 6 HOURS PRN
Status: DISCONTINUED | OUTPATIENT
Start: 2024-01-01 | End: 2024-01-01 | Stop reason: HOSPADM

## 2024-01-01 RX ORDER — MORPHINE SULFATE 20 MG/ML
20 SOLUTION ORAL
Status: CANCELLED | OUTPATIENT
Start: 2024-01-01 | End: 2024-11-13

## 2024-01-01 RX ORDER — DIPHENOXYLATE HYDROCHLORIDE AND ATROPINE SULFATE 2.5; .025 MG/1; MG/1
1 TABLET ORAL
Status: DISCONTINUED | OUTPATIENT
Start: 2024-01-01 | End: 2024-01-01 | Stop reason: HOSPADM

## 2024-01-01 RX ORDER — GLYCOPYRROLATE 0.2 MG/ML
0.4 INJECTION INTRAMUSCULAR; INTRAVENOUS
Status: CANCELLED | OUTPATIENT
Start: 2024-01-01

## 2024-01-01 RX ORDER — MORPHINE SULFATE 4 MG/ML
4 INJECTION, SOLUTION INTRAMUSCULAR; INTRAVENOUS
Status: DISCONTINUED | OUTPATIENT
Start: 2024-01-01 | End: 2024-01-01 | Stop reason: HOSPADM

## 2024-01-01 RX ORDER — ACETAMINOPHEN 325 MG/1
650 TABLET ORAL EVERY 4 HOURS PRN
Status: DISCONTINUED | OUTPATIENT
Start: 2024-01-01 | End: 2024-01-01 | Stop reason: HOSPADM

## 2024-01-01 RX ORDER — LORAZEPAM 2 MG/ML
0.5 CONCENTRATE ORAL
Status: DISCONTINUED | OUTPATIENT
Start: 2024-01-01 | End: 2024-01-01

## 2024-01-01 RX ORDER — ACETAMINOPHEN 160 MG/5ML
650 SOLUTION ORAL EVERY 4 HOURS PRN
Status: CANCELLED | OUTPATIENT
Start: 2024-01-01

## 2024-01-01 RX ORDER — LORAZEPAM 2 MG/ML
1 CONCENTRATE ORAL
Status: DISCONTINUED | OUTPATIENT
Start: 2024-01-01 | End: 2024-01-01 | Stop reason: HOSPADM

## 2024-01-01 RX ORDER — LORAZEPAM 2 MG/ML
1 CONCENTRATE ORAL
Status: CANCELLED | OUTPATIENT
Start: 2024-01-01 | End: 2024-11-12

## 2024-01-01 RX ORDER — ONDANSETRON 4 MG/1
4 TABLET, ORALLY DISINTEGRATING ORAL EVERY 6 HOURS PRN
Status: CANCELLED | OUTPATIENT
Start: 2024-01-01

## 2024-01-01 RX ORDER — HYDROMORPHONE HYDROCHLORIDE 1 MG/ML
0.5 INJECTION, SOLUTION INTRAMUSCULAR; INTRAVENOUS; SUBCUTANEOUS
Status: CANCELLED | OUTPATIENT
Start: 2024-01-01 | End: 2024-11-13

## 2024-01-01 RX ORDER — LEVETIRACETAM 500 MG/5ML
500 INJECTION, SOLUTION, CONCENTRATE INTRAVENOUS EVERY 12 HOURS SCHEDULED
Status: DISCONTINUED | OUTPATIENT
Start: 2024-01-01 | End: 2024-01-01 | Stop reason: HOSPADM

## 2024-01-01 RX ORDER — ACETAMINOPHEN 325 MG/1
650 TABLET ORAL EVERY 4 HOURS PRN
Status: DISCONTINUED | OUTPATIENT
Start: 2024-01-01 | End: 2024-01-01

## 2024-01-01 RX ORDER — GLYCOPYRROLATE 0.2 MG/ML
0.4 INJECTION INTRAMUSCULAR; INTRAVENOUS
Status: DISCONTINUED | OUTPATIENT
Start: 2024-01-01 | End: 2024-01-01 | Stop reason: HOSPADM

## 2024-01-01 RX ORDER — ACETAMINOPHEN 650 MG/1
650 SUPPOSITORY RECTAL EVERY 4 HOURS PRN
Status: DISCONTINUED | OUTPATIENT
Start: 2024-01-01 | End: 2024-01-01 | Stop reason: HOSPADM

## 2024-01-01 RX ORDER — LORAZEPAM 2 MG/ML
0.5 INJECTION INTRAMUSCULAR
Status: DISCONTINUED | OUTPATIENT
Start: 2024-01-01 | End: 2024-01-01

## 2024-01-01 RX ORDER — MORPHINE SULFATE 2 MG/ML
2 INJECTION, SOLUTION INTRAMUSCULAR; INTRAVENOUS
Status: CANCELLED | OUTPATIENT
Start: 2024-01-01 | End: 2024-11-13

## 2024-01-01 RX ORDER — MORPHINE SULFATE 20 MG/ML
10 SOLUTION ORAL
Status: DISCONTINUED | OUTPATIENT
Start: 2024-01-01 | End: 2024-01-01 | Stop reason: HOSPADM

## 2024-01-01 RX ORDER — TAMSULOSIN HYDROCHLORIDE 0.4 MG/1
0.4 CAPSULE ORAL DAILY
Status: DISCONTINUED | OUTPATIENT
Start: 2024-01-01 | End: 2024-01-01 | Stop reason: HOSPADM

## 2024-01-01 RX ORDER — ONDANSETRON 2 MG/ML
4 INJECTION INTRAMUSCULAR; INTRAVENOUS EVERY 6 HOURS PRN
Status: DISCONTINUED | OUTPATIENT
Start: 2024-01-01 | End: 2024-01-01 | Stop reason: HOSPADM

## 2024-01-01 RX ORDER — LORAZEPAM 2 MG/ML
0.5 INJECTION INTRAMUSCULAR
Status: DISCONTINUED | OUTPATIENT
Start: 2024-01-01 | End: 2024-01-01 | Stop reason: HOSPADM

## 2024-01-01 RX ORDER — LORAZEPAM 2 MG/ML
1 INJECTION INTRAMUSCULAR
Status: DISCONTINUED | OUTPATIENT
Start: 2024-01-01 | End: 2024-01-01 | Stop reason: HOSPADM

## 2024-01-01 RX ORDER — GLYCOPYRROLATE 0.2 MG/ML
0.4 INJECTION INTRAMUSCULAR; INTRAVENOUS
Status: DISCONTINUED | OUTPATIENT
Start: 2024-01-01 | End: 2024-01-01

## 2024-01-01 RX ORDER — LORAZEPAM 2 MG/ML
2 INJECTION INTRAMUSCULAR
Status: CANCELLED | OUTPATIENT
Start: 2024-01-01 | End: 2024-11-12

## 2024-01-01 RX ORDER — ONDANSETRON 2 MG/ML
4 INJECTION INTRAMUSCULAR; INTRAVENOUS EVERY 6 HOURS PRN
Status: CANCELLED | OUTPATIENT
Start: 2024-01-01

## 2024-01-01 RX ORDER — LORAZEPAM 2 MG/ML
0.5 CONCENTRATE ORAL
Status: CANCELLED | OUTPATIENT
Start: 2024-01-01 | End: 2024-11-12

## 2024-01-01 RX ORDER — LORAZEPAM 2 MG/ML
0.5 CONCENTRATE ORAL
Status: DISCONTINUED | OUTPATIENT
Start: 2024-01-01 | End: 2024-01-01 | Stop reason: HOSPADM

## 2024-01-01 RX ORDER — DIPHENHYDRAMINE HYDROCHLORIDE 50 MG/ML
25 INJECTION INTRAMUSCULAR; INTRAVENOUS EVERY 6 HOURS PRN
Status: DISCONTINUED | OUTPATIENT
Start: 2024-01-01 | End: 2024-01-01 | Stop reason: HOSPADM

## 2024-01-01 RX ORDER — LORAZEPAM 2 MG/ML
1 INJECTION INTRAMUSCULAR
Status: DISCONTINUED | OUTPATIENT
Start: 2024-01-01 | End: 2024-01-01

## 2024-01-01 RX ORDER — ONDANSETRON 2 MG/ML
4 INJECTION INTRAMUSCULAR; INTRAVENOUS EVERY 6 HOURS PRN
Status: DISCONTINUED | OUTPATIENT
Start: 2024-01-01 | End: 2024-01-01

## 2024-01-01 RX ORDER — LEVETIRACETAM 500 MG/1
500 TABLET ORAL 3 TIMES WEEKLY
Status: DISCONTINUED | OUTPATIENT
Start: 2024-01-01 | End: 2024-01-01

## 2024-01-01 RX ORDER — MORPHINE SULFATE 2 MG/ML
6 INJECTION, SOLUTION INTRAMUSCULAR; INTRAVENOUS
Status: ACTIVE | OUTPATIENT
Start: 2024-01-01 | End: 2024-01-01

## 2024-01-01 RX ORDER — LORAZEPAM 2 MG/ML
1 CONCENTRATE ORAL
Status: DISCONTINUED | OUTPATIENT
Start: 2024-01-01 | End: 2024-01-01

## 2024-01-01 RX ORDER — HYDROMORPHONE HYDROCHLORIDE 2 MG/ML
1.5 INJECTION, SOLUTION INTRAMUSCULAR; INTRAVENOUS; SUBCUTANEOUS
Status: DISCONTINUED | OUTPATIENT
Start: 2024-01-01 | End: 2024-01-01 | Stop reason: HOSPADM

## 2024-01-01 RX ORDER — MORPHINE SULFATE 20 MG/ML
5 SOLUTION ORAL
Status: CANCELLED | OUTPATIENT
Start: 2024-01-01 | End: 2024-11-13

## 2024-01-01 RX ORDER — MORPHINE SULFATE 4 MG/ML
4 INJECTION, SOLUTION INTRAMUSCULAR; INTRAVENOUS
Status: CANCELLED | OUTPATIENT
Start: 2024-01-01 | End: 2024-11-13

## 2024-01-01 RX ORDER — PANTOPRAZOLE SODIUM 40 MG/1
40 TABLET, DELAYED RELEASE ORAL
Status: DISCONTINUED | OUTPATIENT
Start: 2024-01-01 | End: 2024-01-01 | Stop reason: HOSPADM

## 2024-01-01 RX ORDER — LEVETIRACETAM 500 MG/1
500 TABLET ORAL 2 TIMES DAILY
Status: DISCONTINUED | OUTPATIENT
Start: 2024-01-01 | End: 2024-01-01

## 2024-01-01 RX ORDER — ACETAMINOPHEN 650 MG/1
650 SUPPOSITORY RECTAL EVERY 4 HOURS PRN
Status: CANCELLED | OUTPATIENT
Start: 2024-01-01

## 2024-01-01 RX ORDER — ONDANSETRON 4 MG/1
4 TABLET, ORALLY DISINTEGRATING ORAL EVERY 6 HOURS PRN
Status: DISCONTINUED | OUTPATIENT
Start: 2024-01-01 | End: 2024-01-01

## 2024-01-01 RX ORDER — GLYCOPYRROLATE 0.2 MG/ML
0.2 INJECTION INTRAMUSCULAR; INTRAVENOUS
Status: DISCONTINUED | OUTPATIENT
Start: 2024-01-01 | End: 2024-01-01 | Stop reason: HOSPADM

## 2024-01-01 RX ORDER — TAMSULOSIN HYDROCHLORIDE 0.4 MG/1
0.4 CAPSULE ORAL DAILY
Status: CANCELLED | OUTPATIENT
Start: 2024-01-01

## 2024-01-01 RX ORDER — MORPHINE SULFATE 10 MG/ML
6 INJECTION INTRAMUSCULAR; INTRAVENOUS; SUBCUTANEOUS
Status: DISCONTINUED | OUTPATIENT
Start: 2024-01-01 | End: 2024-01-01 | Stop reason: HOSPADM

## 2024-01-01 RX ORDER — DIPHENOXYLATE HYDROCHLORIDE AND ATROPINE SULFATE 2.5; .025 MG/1; MG/1
1 TABLET ORAL
Status: CANCELLED | OUTPATIENT
Start: 2024-01-01

## 2024-01-01 RX ORDER — MORPHINE SULFATE 20 MG/ML
20 SOLUTION ORAL
Status: DISCONTINUED | OUTPATIENT
Start: 2024-01-01 | End: 2024-01-01 | Stop reason: HOSPADM

## 2024-01-01 RX ORDER — MORPHINE SULFATE 20 MG/ML
10 SOLUTION ORAL
Status: ACTIVE | OUTPATIENT
Start: 2024-01-01 | End: 2024-01-01

## 2024-01-01 RX ORDER — HYDROMORPHONE HYDROCHLORIDE 1 MG/ML
0.5 INJECTION, SOLUTION INTRAMUSCULAR; INTRAVENOUS; SUBCUTANEOUS
Status: DISCONTINUED | OUTPATIENT
Start: 2024-01-01 | End: 2024-01-01 | Stop reason: HOSPADM

## 2024-01-01 RX ORDER — METOPROLOL TARTRATE 25 MG/1
12.5 TABLET, FILM COATED ORAL EVERY 12 HOURS SCHEDULED
Status: DISCONTINUED | OUTPATIENT
Start: 2024-01-01 | End: 2024-01-01

## 2024-01-01 RX ORDER — MORPHINE SULFATE 20 MG/ML
5 SOLUTION ORAL
Status: ACTIVE | OUTPATIENT
Start: 2024-01-01 | End: 2024-01-01

## 2024-01-01 RX ORDER — LORAZEPAM 2 MG/ML
1 INJECTION INTRAMUSCULAR
Status: CANCELLED | OUTPATIENT
Start: 2024-01-01 | End: 2024-11-12

## 2024-01-01 RX ORDER — GLYCOPYRROLATE 0.2 MG/ML
0.2 INJECTION INTRAMUSCULAR; INTRAVENOUS
Status: DISCONTINUED | OUTPATIENT
Start: 2024-01-01 | End: 2024-01-01

## 2024-01-01 RX ORDER — LORAZEPAM 2 MG/ML
2 INJECTION INTRAMUSCULAR
Status: DISCONTINUED | OUTPATIENT
Start: 2024-01-01 | End: 2024-01-01

## 2024-01-01 RX ORDER — MORPHINE SULFATE 10 MG/ML
6 INJECTION INTRAMUSCULAR; INTRAVENOUS; SUBCUTANEOUS
Status: CANCELLED | OUTPATIENT
Start: 2024-01-01 | End: 2024-11-13

## 2024-01-01 RX ORDER — MORPHINE SULFATE 20 MG/ML
10 SOLUTION ORAL
Status: CANCELLED | OUTPATIENT
Start: 2024-01-01 | End: 2024-11-13

## 2024-01-01 RX ORDER — HYDROXYZINE HYDROCHLORIDE 25 MG/1
25 TABLET, FILM COATED ORAL 3 TIMES DAILY PRN
Status: DISCONTINUED | OUTPATIENT
Start: 2024-01-01 | End: 2024-01-01

## 2024-01-01 RX ORDER — MORPHINE SULFATE 2 MG/ML
4 INJECTION, SOLUTION INTRAMUSCULAR; INTRAVENOUS
Status: ACTIVE | OUTPATIENT
Start: 2024-01-01 | End: 2024-01-01

## 2024-01-01 RX ORDER — DIPHENHYDRAMINE HYDROCHLORIDE 50 MG/ML
25 INJECTION INTRAMUSCULAR; INTRAVENOUS EVERY 6 HOURS PRN
Status: CANCELLED | OUTPATIENT
Start: 2024-01-01

## 2024-01-01 RX ORDER — NITROGLYCERIN 0.4 MG/1
0.4 TABLET SUBLINGUAL
Status: DISCONTINUED | OUTPATIENT
Start: 2024-01-01 | End: 2024-01-01

## 2024-01-01 RX ORDER — ASPIRIN 81 MG/1
81 TABLET, CHEWABLE ORAL DAILY
Status: DISCONTINUED | OUTPATIENT
Start: 2024-01-01 | End: 2024-01-01

## 2024-01-01 RX ORDER — IOPAMIDOL 755 MG/ML
150 INJECTION, SOLUTION INTRAVASCULAR
Status: COMPLETED | OUTPATIENT
Start: 2024-01-01 | End: 2024-01-01

## 2024-01-01 RX ORDER — ACETAMINOPHEN 160 MG/5ML
650 SOLUTION ORAL EVERY 4 HOURS PRN
Status: DISCONTINUED | OUTPATIENT
Start: 2024-01-01 | End: 2024-01-01 | Stop reason: HOSPADM

## 2024-01-01 RX ORDER — LEVETIRACETAM 500 MG/5ML
500 INJECTION, SOLUTION, CONCENTRATE INTRAVENOUS EVERY 12 HOURS SCHEDULED
Status: CANCELLED | OUTPATIENT
Start: 2024-01-01

## 2024-01-01 RX ORDER — HYDROMORPHONE HYDROCHLORIDE 2 MG/ML
1.5 INJECTION, SOLUTION INTRAMUSCULAR; INTRAVENOUS; SUBCUTANEOUS
Status: CANCELLED | OUTPATIENT
Start: 2024-01-01 | End: 2024-11-13

## 2024-01-01 RX ORDER — ACETAMINOPHEN 160 MG/5ML
650 SOLUTION ORAL EVERY 4 HOURS PRN
Status: DISCONTINUED | OUTPATIENT
Start: 2024-01-01 | End: 2024-01-01

## 2024-01-01 RX ORDER — DIPHENHYDRAMINE HCL 25 MG
25 CAPSULE ORAL EVERY 6 HOURS PRN
Status: DISCONTINUED | OUTPATIENT
Start: 2024-01-01 | End: 2024-01-01

## 2024-01-01 RX ORDER — LORAZEPAM 2 MG/ML
2 CONCENTRATE ORAL
Status: DISCONTINUED | OUTPATIENT
Start: 2024-01-01 | End: 2024-01-01

## 2024-01-01 RX ORDER — PANTOPRAZOLE SODIUM 40 MG/1
40 TABLET, DELAYED RELEASE ORAL
Status: DISCONTINUED | OUTPATIENT
Start: 2024-01-01 | End: 2024-01-01

## 2024-01-01 RX ORDER — TAMSULOSIN HYDROCHLORIDE 0.4 MG/1
0.4 CAPSULE ORAL DAILY
Status: DISCONTINUED | OUTPATIENT
Start: 2024-01-01 | End: 2024-01-01

## 2024-01-01 RX ORDER — LORAZEPAM 2 MG/ML
1 INJECTION INTRAMUSCULAR ONCE
Status: COMPLETED | OUTPATIENT
Start: 2024-01-01 | End: 2024-01-01

## 2024-01-01 RX ORDER — DIPHENHYDRAMINE HCL 25 MG
25 CAPSULE ORAL EVERY 6 HOURS PRN
Status: DISCONTINUED | OUTPATIENT
Start: 2024-01-01 | End: 2024-01-01 | Stop reason: HOSPADM

## 2024-01-01 RX ORDER — GLYCOPYRROLATE 0.2 MG/ML
0.2 INJECTION INTRAMUSCULAR; INTRAVENOUS
Status: CANCELLED | OUTPATIENT
Start: 2024-01-01

## 2024-01-01 RX ORDER — LORAZEPAM 2 MG/ML
0.5 INJECTION INTRAMUSCULAR
Status: CANCELLED | OUTPATIENT
Start: 2024-01-01 | End: 2024-11-12

## 2024-01-01 RX ORDER — ALBUMIN (HUMAN) 12.5 G/50ML
12.5 SOLUTION INTRAVENOUS AS NEEDED
Status: ACTIVE | OUTPATIENT
Start: 2024-01-01 | End: 2024-01-01

## 2024-01-01 RX ORDER — MORPHINE SULFATE 2 MG/ML
2 INJECTION, SOLUTION INTRAMUSCULAR; INTRAVENOUS
Status: DISCONTINUED | OUTPATIENT
Start: 2024-01-01 | End: 2024-01-01 | Stop reason: HOSPADM

## 2024-01-01 RX ORDER — DIPHENOXYLATE HYDROCHLORIDE AND ATROPINE SULFATE 2.5; .025 MG/1; MG/1
1 TABLET ORAL
Status: DISCONTINUED | OUTPATIENT
Start: 2024-01-01 | End: 2024-01-01

## 2024-01-01 RX ORDER — SODIUM CHLORIDE 0.9 % (FLUSH) 0.9 %
10 SYRINGE (ML) INJECTION EVERY 12 HOURS SCHEDULED
Status: DISCONTINUED | OUTPATIENT
Start: 2024-01-01 | End: 2024-01-01

## 2024-01-01 RX ORDER — DIPHENHYDRAMINE HCL 25 MG
25 CAPSULE ORAL EVERY 6 HOURS PRN
Status: CANCELLED | OUTPATIENT
Start: 2024-01-01

## 2024-01-01 RX ORDER — LORAZEPAM 2 MG/ML
2 CONCENTRATE ORAL
Status: CANCELLED | OUTPATIENT
Start: 2024-01-01 | End: 2024-11-12

## 2024-01-01 RX ORDER — ACETAMINOPHEN 325 MG/1
650 TABLET ORAL EVERY 6 HOURS PRN
Status: DISCONTINUED | OUTPATIENT
Start: 2024-01-01 | End: 2024-01-01

## 2024-01-01 RX ORDER — MORPHINE SULFATE 2 MG/ML
2 INJECTION, SOLUTION INTRAMUSCULAR; INTRAVENOUS
Status: DISPENSED | OUTPATIENT
Start: 2024-01-01 | End: 2024-01-01

## 2024-01-01 RX ORDER — PANTOPRAZOLE SODIUM 40 MG/1
40 TABLET, DELAYED RELEASE ORAL
Status: CANCELLED | OUTPATIENT
Start: 2024-01-01

## 2024-01-01 RX ORDER — MORPHINE SULFATE 20 MG/ML
20 SOLUTION ORAL
Status: ACTIVE | OUTPATIENT
Start: 2024-01-01 | End: 2024-01-01

## 2024-01-01 RX ORDER — METOPROLOL TARTRATE 25 MG/1
12.5 TABLET, FILM COATED ORAL EVERY 12 HOURS SCHEDULED
Qty: 30 TABLET | Refills: 0 | Status: SHIPPED | OUTPATIENT
Start: 2024-01-01 | End: 2024-11-27

## 2024-01-01 RX ADMIN — MORPHINE SULFATE 2 MG: 2 INJECTION, SOLUTION INTRAMUSCULAR; INTRAVENOUS at 18:15

## 2024-01-01 RX ADMIN — Medication 10 ML: at 23:11

## 2024-01-01 RX ADMIN — PANTOPRAZOLE SODIUM 40 MG: 40 TABLET, DELAYED RELEASE ORAL at 05:28

## 2024-01-01 RX ADMIN — PANTOPRAZOLE SODIUM 40 MG: 40 TABLET, DELAYED RELEASE ORAL at 17:24

## 2024-01-01 RX ADMIN — MORPHINE SULFATE 2 MG: 2 INJECTION, SOLUTION INTRAMUSCULAR; INTRAVENOUS at 12:32

## 2024-01-01 RX ADMIN — HYDROXYZINE HYDROCHLORIDE 25 MG: 25 TABLET ORAL at 09:35

## 2024-01-01 RX ADMIN — TAMSULOSIN HYDROCHLORIDE 0.4 MG: 0.4 CAPSULE ORAL at 10:35

## 2024-01-01 RX ADMIN — LORAZEPAM 1 MG: 2 INJECTION INTRAMUSCULAR; INTRAVENOUS at 20:27

## 2024-01-01 RX ADMIN — MORPHINE SULFATE 2 MG: 2 INJECTION, SOLUTION INTRAMUSCULAR; INTRAVENOUS at 08:17

## 2024-01-01 RX ADMIN — HYDROXYZINE HYDROCHLORIDE 25 MG: 25 TABLET ORAL at 18:13

## 2024-01-01 RX ADMIN — LEVETIRACETAM 500 MG: 500 TABLET, FILM COATED ORAL at 20:21

## 2024-01-01 RX ADMIN — LORAZEPAM 1 MG: 2 INJECTION INTRAMUSCULAR; INTRAVENOUS at 11:37

## 2024-01-01 RX ADMIN — LORAZEPAM 2 MG: 2 INJECTION INTRAMUSCULAR; INTRAVENOUS at 12:32

## 2024-01-01 RX ADMIN — LEVETIRACETAM 500 MG: 500 TABLET, FILM COATED ORAL at 10:35

## 2024-01-01 RX ADMIN — LEVETIRACETAM 500 MG: 500 TABLET, FILM COATED ORAL at 09:00

## 2024-01-01 RX ADMIN — Medication 10 ML: at 08:14

## 2024-01-01 RX ADMIN — Medication 10 ML: at 22:16

## 2024-01-01 RX ADMIN — LORAZEPAM 1 MG: 2 INJECTION INTRAMUSCULAR; INTRAVENOUS at 18:02

## 2024-01-01 RX ADMIN — MORPHINE SULFATE 2 MG: 2 INJECTION, SOLUTION INTRAMUSCULAR; INTRAVENOUS at 13:39

## 2024-01-01 RX ADMIN — PANTOPRAZOLE SODIUM 40 MG: 40 TABLET, DELAYED RELEASE ORAL at 06:55

## 2024-01-01 RX ADMIN — IOPAMIDOL 150 ML: 755 INJECTION, SOLUTION INTRAVENOUS at 11:33

## 2024-01-01 RX ADMIN — LEVETIRACETAM 500 MG: 100 INJECTION INTRAVENOUS at 14:30

## 2024-01-01 RX ADMIN — LEVETIRACETAM 500 MG: 100 INJECTION INTRAVENOUS at 08:48

## 2024-01-01 RX ADMIN — PANTOPRAZOLE SODIUM 40 MG: 40 TABLET, DELAYED RELEASE ORAL at 10:35

## 2024-01-01 RX ADMIN — LORAZEPAM 2 MG: 2 INJECTION INTRAMUSCULAR; INTRAVENOUS at 18:15

## 2024-01-01 RX ADMIN — Medication 10 ML: at 09:45

## 2024-01-01 RX ADMIN — GLYCOPYRROLATE 0.2 MG: 0.2 INJECTION INTRAMUSCULAR; INTRAVENOUS at 02:52

## 2024-01-01 RX ADMIN — PANTOPRAZOLE SODIUM 40 MG: 40 TABLET, DELAYED RELEASE ORAL at 16:55

## 2024-01-01 RX ADMIN — MORPHINE SULFATE 2 MG: 2 INJECTION, SOLUTION INTRAMUSCULAR; INTRAVENOUS at 09:50

## 2024-01-01 RX ADMIN — LEVETIRACETAM 500 MG: 100 INJECTION INTRAVENOUS at 20:34

## 2024-01-01 RX ADMIN — LEVETIRACETAM 500 MG: 500 TABLET, FILM COATED ORAL at 21:02

## 2024-01-01 RX ADMIN — PANTOPRAZOLE SODIUM 40 MG: 40 TABLET, DELAYED RELEASE ORAL at 08:49

## 2024-01-01 RX ADMIN — LEVETIRACETAM 500 MG: 100 INJECTION INTRAVENOUS at 23:28

## 2024-01-01 RX ADMIN — TAMSULOSIN HYDROCHLORIDE 0.4 MG: 0.4 CAPSULE ORAL at 23:11

## 2024-01-01 RX ADMIN — HYDROXYZINE HYDROCHLORIDE 25 MG: 25 TABLET ORAL at 08:23

## 2024-01-01 RX ADMIN — LORAZEPAM 2 MG: 2 INJECTION INTRAMUSCULAR; INTRAVENOUS at 13:39

## 2024-01-01 RX ADMIN — LEVETIRACETAM 500 MG: 500 TABLET, FILM COATED ORAL at 10:00

## 2024-01-01 RX ADMIN — Medication 10 ML: at 20:22

## 2024-01-01 RX ADMIN — Medication 10 ML: at 20:48

## 2024-01-01 RX ADMIN — GLYCOPYRROLATE 0.2 MG: 0.2 INJECTION INTRAMUSCULAR; INTRAVENOUS at 12:32

## 2024-01-01 RX ADMIN — GLYCOPYRROLATE 0.2 MG: 0.2 INJECTION INTRAMUSCULAR; INTRAVENOUS at 12:04

## 2024-01-01 RX ADMIN — LEVETIRACETAM 500 MG: 100 INJECTION INTRAVENOUS at 10:31

## 2024-01-01 RX ADMIN — MORPHINE SULFATE 2 MG: 2 INJECTION, SOLUTION INTRAMUSCULAR; INTRAVENOUS at 12:04

## 2024-01-01 RX ADMIN — TAMSULOSIN HYDROCHLORIDE 0.4 MG: 0.4 CAPSULE ORAL at 08:23

## 2024-01-01 RX ADMIN — HYDROMORPHONE HYDROCHLORIDE 0.5 MG: 1 INJECTION, SOLUTION INTRAMUSCULAR; INTRAVENOUS; SUBCUTANEOUS at 17:34

## 2024-01-01 RX ADMIN — LEVETIRACETAM 500 MG: 500 TABLET, FILM COATED ORAL at 08:49

## 2024-01-01 RX ADMIN — LEVETIRACETAM 500 MG: 500 TABLET, FILM COATED ORAL at 10:40

## 2024-01-01 RX ADMIN — LORAZEPAM 2 MG: 2 INJECTION INTRAMUSCULAR; INTRAVENOUS at 08:17

## 2024-01-01 RX ADMIN — GLYCOPYRROLATE 0.2 MG: 0.2 INJECTION INTRAMUSCULAR; INTRAVENOUS at 18:15

## 2024-01-01 RX ADMIN — PANTOPRAZOLE SODIUM 40 MG: 40 TABLET, DELAYED RELEASE ORAL at 18:41

## 2024-01-01 RX ADMIN — Medication 10 ML: at 09:35

## 2024-01-01 RX ADMIN — HYDROXYZINE HYDROCHLORIDE 25 MG: 25 TABLET ORAL at 03:00

## 2024-01-01 RX ADMIN — ACETAMINOPHEN 325MG 650 MG: 325 TABLET ORAL at 21:33

## 2024-01-01 RX ADMIN — TAMSULOSIN HYDROCHLORIDE 0.4 MG: 0.4 CAPSULE ORAL at 08:49

## 2024-01-01 RX ADMIN — LEVETIRACETAM 500 MG: 500 TABLET, FILM COATED ORAL at 20:06

## 2024-01-01 RX ADMIN — PANTOPRAZOLE SODIUM 40 MG: 40 TABLET, DELAYED RELEASE ORAL at 09:00

## 2024-01-01 RX ADMIN — PANTOPRAZOLE SODIUM 40 MG: 40 TABLET, DELAYED RELEASE ORAL at 05:10

## 2024-01-01 RX ADMIN — LEVETIRACETAM 500 MG: 500 TABLET, FILM COATED ORAL at 21:53

## 2024-01-01 RX ADMIN — PANTOPRAZOLE SODIUM 40 MG: 40 TABLET, DELAYED RELEASE ORAL at 16:57

## 2024-01-01 RX ADMIN — PANTOPRAZOLE SODIUM 40 MG: 40 TABLET, DELAYED RELEASE ORAL at 16:43

## 2024-01-01 RX ADMIN — ASPIRIN 81 MG: 81 TABLET, CHEWABLE ORAL at 23:11

## 2024-01-01 RX ADMIN — PANTOPRAZOLE SODIUM 40 MG: 40 TABLET, DELAYED RELEASE ORAL at 05:38

## 2024-01-01 RX ADMIN — LEVETIRACETAM 500 MG: 500 TABLET, FILM COATED ORAL at 15:56

## 2024-01-01 RX ADMIN — LEVETIRACETAM 500 MG: 100 INJECTION INTRAVENOUS at 20:31

## 2024-01-01 RX ADMIN — LEVETIRACETAM 500 MG: 500 TABLET, FILM COATED ORAL at 21:00

## 2024-01-01 RX ADMIN — GLYCOPYRROLATE 0.2 MG: 0.2 INJECTION INTRAMUSCULAR; INTRAVENOUS at 08:16

## 2024-01-01 RX ADMIN — Medication 10 ML: at 09:11

## 2024-01-01 RX ADMIN — METOPROLOL TARTRATE 12.5 MG: 25 TABLET, FILM COATED ORAL at 09:31

## 2024-01-01 RX ADMIN — GLYCOPYRROLATE 0.2 MG: 0.2 INJECTION INTRAMUSCULAR; INTRAVENOUS at 23:29

## 2024-01-01 RX ADMIN — TAMSULOSIN HYDROCHLORIDE 0.4 MG: 0.4 CAPSULE ORAL at 10:40

## 2024-01-01 RX ADMIN — PANTOPRAZOLE SODIUM 40 MG: 40 TABLET, DELAYED RELEASE ORAL at 23:11

## 2024-01-01 RX ADMIN — TAMSULOSIN HYDROCHLORIDE 0.4 MG: 0.4 CAPSULE ORAL at 09:31

## 2024-01-01 RX ADMIN — LORAZEPAM 2 MG: 2 INJECTION INTRAMUSCULAR; INTRAVENOUS at 17:43

## 2024-01-01 RX ADMIN — Medication 10 ML: at 20:05

## 2024-01-01 RX ADMIN — METOPROLOL TARTRATE 12.5 MG: 25 TABLET, FILM COATED ORAL at 20:04

## 2024-01-01 RX ADMIN — PANTOPRAZOLE SODIUM 40 MG: 40 TABLET, DELAYED RELEASE ORAL at 17:35

## 2024-01-01 RX ADMIN — TAMSULOSIN HYDROCHLORIDE 0.4 MG: 0.4 CAPSULE ORAL at 10:00

## 2024-01-01 RX ADMIN — MORPHINE SULFATE 2 MG: 2 INJECTION, SOLUTION INTRAMUSCULAR; INTRAVENOUS at 05:12

## 2024-01-01 RX ADMIN — LEVETIRACETAM 500 MG: 100 INJECTION INTRAVENOUS at 09:31

## 2024-01-01 RX ADMIN — PANTOPRAZOLE SODIUM 40 MG: 40 TABLET, DELAYED RELEASE ORAL at 18:02

## 2024-01-01 RX ADMIN — ASPIRIN 81 MG: 81 TABLET, CHEWABLE ORAL at 09:31

## 2024-01-01 RX ADMIN — METOPROLOL TARTRATE 12.5 MG: 25 TABLET, FILM COATED ORAL at 20:59

## 2024-01-01 RX ADMIN — GLYCOPYRROLATE 0.2 MG: 0.2 INJECTION INTRAMUSCULAR; INTRAVENOUS at 17:42

## 2024-01-01 RX ADMIN — LORAZEPAM 1 MG: 2 INJECTION INTRAMUSCULAR; INTRAVENOUS at 09:50

## 2024-01-01 RX ADMIN — ASPIRIN 81 MG: 81 TABLET, CHEWABLE ORAL at 08:23

## 2024-01-01 RX ADMIN — LORAZEPAM 2 MG: 2 INJECTION INTRAMUSCULAR; INTRAVENOUS at 12:04

## 2024-01-01 RX ADMIN — PANTOPRAZOLE SODIUM 40 MG: 40 TABLET, DELAYED RELEASE ORAL at 07:59

## 2024-01-01 RX ADMIN — MORPHINE SULFATE 2 MG: 2 INJECTION, SOLUTION INTRAMUSCULAR; INTRAVENOUS at 20:28

## 2024-01-01 RX ADMIN — PANTOPRAZOLE SODIUM 40 MG: 40 TABLET, DELAYED RELEASE ORAL at 16:52

## 2024-01-01 RX ADMIN — METOPROLOL TARTRATE 12.5 MG: 25 TABLET, FILM COATED ORAL at 22:16

## 2024-01-01 RX ADMIN — LEVETIRACETAM 500 MG: 500 TABLET, FILM COATED ORAL at 13:34

## 2024-01-01 RX ADMIN — DIPHENHYDRAMINE HYDROCHLORIDE 25 MG: 50 INJECTION, SOLUTION INTRAMUSCULAR; INTRAVENOUS at 13:14

## 2024-01-01 RX ADMIN — GLYCOPYRROLATE 0.2 MG: 0.2 INJECTION INTRAMUSCULAR; INTRAVENOUS at 13:39

## 2024-01-01 RX ADMIN — TAMSULOSIN HYDROCHLORIDE 0.4 MG: 0.4 CAPSULE ORAL at 09:00

## 2024-01-01 RX ADMIN — PANTOPRAZOLE SODIUM 40 MG: 40 TABLET, DELAYED RELEASE ORAL at 06:57

## 2024-01-01 RX ADMIN — MORPHINE SULFATE 2 MG: 2 INJECTION, SOLUTION INTRAMUSCULAR; INTRAVENOUS at 17:43

## 2024-01-05 ENCOUNTER — TELEPHONE (OUTPATIENT)
Dept: FAMILY MEDICINE CLINIC | Age: 74
End: 2024-01-05
Payer: MEDICARE

## 2024-01-05 NOTE — TELEPHONE ENCOUNTER
Caller: Mohamud Jarrett    Relationship to patient: Self    Best call back number: [phone]    Patient is needing: PATIENT CALLED IN AND WANTED TO LET PCP KNOW THAT HE MISSED APPOINTMENT 1/3/2024 BECAUSE HE IS CURRENTLY IN [facility] WITH PHENOMENA AND IS HAVING TESTS PERFORMED.PATIENT SAID HE WAS HAVING TROUBLE BREATHING AND PASSED OUT AND IS CURRENTLY IN [facility]

## 2024-02-05 ENCOUNTER — TELEPHONE (OUTPATIENT)
Dept: FAMILY MEDICINE CLINIC | Age: 74
End: 2024-02-05
Payer: MEDICARE

## 2024-02-05 NOTE — TELEPHONE ENCOUNTER
Pt only wants to see Shara and is having fatigue and dizziness and fell down in [place] [place] last night. Shara's next appt isn't till 2/14.  Shara can you work him in somewhere?

## 2024-02-08 RX ORDER — ALLOPURINOL 300 MG/1
300 TABLET ORAL DAILY
Qty: 30 TABLET | Refills: 0 | Status: SHIPPED | OUTPATIENT
Start: 2024-02-08

## 2024-02-08 NOTE — TELEPHONE ENCOUNTER
Patient called back and verified the 300 mg dosage of allopurinol and was not happy about having to schedule a follow up in March. After speaking to nurse was able to get him scheduled for 3/27 at 8:00 am for follow up.

## 2024-02-08 NOTE — TELEPHONE ENCOUNTER
Lmtrc, need to verify dose, per 3/16/23 not dose was changed, but no new rx was sent, pt also needs to schedule f/u with Shara

## 2024-02-16 ENCOUNTER — TELEPHONE (OUTPATIENT)
Dept: FAMILY MEDICINE CLINIC | Age: 74
End: 2024-02-16
Payer: MEDICARE

## 2024-02-23 ENCOUNTER — OFFICE VISIT (OUTPATIENT)
Dept: FAMILY MEDICINE CLINIC | Age: 74
End: 2024-02-23
Payer: MEDICARE

## 2024-02-23 VITALS
DIASTOLIC BLOOD PRESSURE: 75 MMHG | HEIGHT: 70 IN | HEART RATE: 75 BPM | WEIGHT: 178 LBS | OXYGEN SATURATION: 97 % | BODY MASS INDEX: 25.48 KG/M2 | SYSTOLIC BLOOD PRESSURE: 156 MMHG

## 2024-02-23 DIAGNOSIS — Z91.81 PERSONAL HISTORY OF FALL: ICD-10-CM

## 2024-02-23 DIAGNOSIS — D63.1 ANEMIA IN STAGE 4 CHRONIC KIDNEY DISEASE: ICD-10-CM

## 2024-02-23 DIAGNOSIS — N18.4 CHRONIC KIDNEY DISEASE, STAGE 4 (SEVERE): ICD-10-CM

## 2024-02-23 DIAGNOSIS — R42 DIZZINESS: Primary | ICD-10-CM

## 2024-02-23 DIAGNOSIS — N18.4 ANEMIA IN STAGE 4 CHRONIC KIDNEY DISEASE: ICD-10-CM

## 2024-02-23 DIAGNOSIS — I50.22 CHRONIC SYSTOLIC (CONGESTIVE) HEART FAILURE: ICD-10-CM

## 2024-02-23 DIAGNOSIS — Z99.2 DEPENDENCE ON RENAL DIALYSIS: ICD-10-CM

## 2024-02-23 PROBLEM — N25.81 SECONDARY HYPERPARATHYROIDISM OF RENAL ORIGIN: Status: ACTIVE | Noted: 2024-02-02

## 2024-02-23 PROCEDURE — 3078F DIAST BP <80 MM HG: CPT | Performed by: NURSE PRACTITIONER

## 2024-02-23 PROCEDURE — 99214 OFFICE O/P EST MOD 30 MIN: CPT | Performed by: NURSE PRACTITIONER

## 2024-02-23 PROCEDURE — 93000 ELECTROCARDIOGRAM COMPLETE: CPT | Performed by: NURSE PRACTITIONER

## 2024-02-23 PROCEDURE — 1160F RVW MEDS BY RX/DR IN RCRD: CPT | Performed by: NURSE PRACTITIONER

## 2024-02-23 PROCEDURE — 3077F SYST BP >= 140 MM HG: CPT | Performed by: NURSE PRACTITIONER

## 2024-02-23 PROCEDURE — 1159F MED LIST DOCD IN RCRD: CPT | Performed by: NURSE PRACTITIONER

## 2024-02-23 RX ORDER — IPRATROPIUM BROMIDE AND ALBUTEROL SULFATE 2.5; .5 MG/3ML; MG/3ML
SOLUTION RESPIRATORY (INHALATION)
COMMUNITY
Start: 2023-12-28

## 2024-02-23 NOTE — ASSESSMENT & PLAN NOTE
Patient is not showing any signs of orthostatic hypotension.  Does not currently monitor blood pressure at home.  Encourage him to monitor blood pressure at home.  He states he has not discussed dizziness with cardiologist.  He did see cardiology in December.  Patient states he is going to call cardiology.  EKG appears consistent to previous EKG.  He is to go to the emergency room for further evaluation if any symptoms persist or worsen.  He had labs as recently as yesterday per nephrology and is scheduled for dialysis again tomorrow.  Clearly has advanced kidney disease which could lead to some weakness.  For today I am going to check a CT of the head without contrast as a precaution.  Encourage patient to consider physical therapy.  Patient defers at this time.  Agreeable to CT of the head without contrast.

## 2024-02-23 NOTE — PROGRESS NOTES
Chief Complaint  Fall (Patient c/o dizziness and falling, patient states he has fallen 7-8 times in the last month states he is walking and his feet give out on him. States he is falling at home and when he is out and about ) and Dizziness    Subjective          Mohamud Jarrett presents to [facility]     Patient is a 73-year-old male who is here today to discuss dizziness.  States that last week he was in [employer] [place] and became dizzy with walking to the table and fell on the floor.  Did not lose consciousness and did not hit his head.  He states EMS was called and evaluated him but patient declined to go to the emergency room for further evaluation.  He states that EMS staff person told him that is shunt for dialysis in the right arm was likely causing his symptoms.  Patient is concerned about persisting weakness and dizziness.  Patient gets dialysis Tuesday Thursday and Saturday and had labs drawn as recently as yesterday per nephrology.  He states he is due to follow-up with cardiology and needs to call and schedule an appointment.  He does acknowledge missing 2 recent dialysis appointments.  He had COVID and flu on January 3.  Patient also has congestive heart failure and end-stage renal disease on dialysis.  Secondary hyperparathyroidism due to kidney disease.  Yesterday's labs showed a parathyroid hormone level of 363, phosphorus 9, calcium 8.1, potassium 5.5, he also has anemia of chronic kidney disease and his hemoglobin was stable at 10.4 on February 18.  He takes iron.  Patient states he has discussed his concerns with nephrology but not cardiology.  Patient denies having low or high blood pressure and has not stopped taking or run out of any medications that are prescribed by his specialists.     Objective   Vital Signs:   Vitals:    02/23/24 1138 02/23/24 1237 02/23/24 1238 02/23/24 1239   BP: 156/75      BP Location: Left arm      Patient Position: Sitting      Cuff  "Size: Adult      Pulse: 75      SpO2: 98% 95% 95% 97%   Weight: 80.7 kg (178 lb)      Height: 177.8 cm (70\")          Wt Readings from Last 3 Encounters:   02/23/24 80.7 kg (178 lb)   12/06/23 83 kg (183 lb)   09/06/23 86.2 kg (190 lb)      BP Readings from Last 3 Encounters:   02/23/24 156/75   12/06/23 144/62   09/06/23 150/70       Body mass index is 25.54 kg/m².             Physical Exam  Vitals reviewed.   Constitutional:       General: He is not in acute distress.     Appearance: Normal appearance. He is well-developed.   HENT:      Right Ear: Tympanic membrane normal.      Left Ear: Tympanic membrane normal.   Neck:      Vascular: No carotid bruit.   Cardiovascular:      Rate and Rhythm: Normal rate and regular rhythm.      Heart sounds: Normal heart sounds.   Pulmonary:      Effort: Pulmonary effort is normal.      Breath sounds: Normal breath sounds.   Musculoskeletal:         General: Normal range of motion.      Right lower leg: No edema.      Left lower leg: No edema.   Skin:     General: Skin is warm and dry.   Neurological:      General: No focal deficit present.      Mental Status: He is alert.   Psychiatric:         Attention and Perception: Attention normal.         Mood and Affect: Mood and affect normal.         Behavior: Behavior normal.           Current Outpatient Medications:     allopurinol (ZYLOPRIM) 300 MG tablet, Take 1 tablet by mouth Daily., Disp: 30 tablet, Rfl: 0    amLODIPine (NORVASC) 10 MG tablet, TAKE ONE TABLET BY MOUTH DAILY, Disp: 90 tablet, Rfl: 0    aspirin 81 MG EC tablet, Take 1 tablet by mouth Daily., Disp: , Rfl:     atorvastatin (LIPITOR) 80 MG tablet, , Disp: , Rfl:     bumetanide (BUMEX) 1 MG tablet, Take 3 tablets by mouth 2 (Two) Times a Day., Disp: , Rfl:     calcium acetate (PHOSLO) 667 MG tablet, Take 1 tablet by mouth 3 (Three) Times a Day., Disp: , Rfl:     carvedilol (COREG) 12.5 MG tablet, Take 1 tablet by mouth 2 (Two) Times a Day With Meals., Disp: , Rfl:     " clopidogrel (PLAVIX) 75 MG tablet, Take 1 tablet by mouth Daily., Disp: , Rfl:     Docusate Sodium 100 MG capsule, Take 1 tablet by mouth 2 (Two) Times a Day., Disp: , Rfl:     ferrous sulfate 325 (65 FE) MG tablet, Take 1 tablet by mouth Daily With Breakfast., Disp: , Rfl:     Heparin Sodium, Porcine, (heparin, porcine,) 1000 units/mL injection, Heparin Sodium (Porcine) 1,000 Units/mL Systemic, Disp: , Rfl:     hydrALAZINE (APRESOLINE) 25 MG tablet, Take 1 tablet by mouth 3 (Three) Times a Day. (Patient taking differently: Take 1 tablet by mouth 3 (Three) Times a Day. Take dos), Disp: 90 tablet, Rfl: 11    ipratropium-albuterol (DUO-NEB) 0.5-2.5 mg/3 ml nebulizer, , Disp: , Rfl:     isosorbide mononitrate (IMDUR) 120 MG 24 hr tablet, , Disp: , Rfl:     linaclotide (LINZESS) 145 MCG capsule capsule, Take 2 capsules by mouth Daily., Disp: , Rfl:     losartan (COZAAR) 50 MG tablet, Take 1 tablet by mouth Daily., Disp: , Rfl:     nitroglycerin (NITROSTAT) 0.4 MG SL tablet, Place 1 tablet under the tongue Every 5 (Five) Minutes As Needed for Chest Pain (Do not take more than 3 at one time. Go to ER or call 911 if chest pain persists). Take no more than 3 doses in 15 minutes., Disp: 60 tablet, Rfl: 3    ranolazine (RANEXA) 500 MG 12 hr tablet, Take 1 tablet by mouth 2 (Two) Times a Day. Take dos, Disp: , Rfl:     tamsulosin (FLOMAX) 0.4 MG capsule 24 hr capsule, Take 1 capsule by mouth Daily., Disp: , Rfl:    Past Medical History:   Diagnosis Date    A-V fistula     right arm    Anemia of chronic renal failure 04/12/2021    Antiplatelet or antithrombotic long-term use     plavix    Arthritis     Bilateral leg pain 11/05/2021    CAD (coronary artery disease)     h/o CABG 2003 and stents 11/2022, Dr Shirley follows    Chest pain     saw cardiology 4/10/23, pt states better if he takes his medications    Dialysis patient     Tues, Thursday, Saturday, has chest wall catheter currently    Gout     Heart attack      Hyperlipidemia     Hypertension     Kidney disease     stage 4, Dr Ornelas follows    Neck pain 08/30/2022    Neuritis of lower extremity, right 12/19/2019    Proteinuria 05/23/2022    Rheumatoid factor positive 07/06/2021    Seasonal allergies 03/13/2014    Vitamin D deficiency 05/23/2022     Allergies   Allergen Reactions    Peanut (Diagnostic) Shortness Of Breath and Rash    Peanut Butter Flavor Shortness Of Breath and Rash    Simvastatin Myalgia     Other reaction(s): muscle cramps               Result Review :     Common labs          4/26/2023    13:35 5/23/2023    08:49 6/16/2023    10:58   Common Labs   Glucose 101  91     BUN 47  22     Creatinine 5.47  3.40     Sodium 138  138     Potassium 5.5  3.8  4.5       Chloride 101  95     Calcium 9.4  9.7     Uric Acid  1.9        Details          This result is from an external source.                XR chest AP portable    Result Date: 11/6/2023  1. Dense bibasilar airspace infiltrates, consistent with acute pneumonia. Follow-up films recommended. Images reviewed, interpreted, and dictated by Deep Bowles MD           ECG 12 Lead    Date/Time: 2/23/2024 1:26 PM  Performed by: Shara Talley APRN    Authorized by: Shara Talley APRN  Comparison: compared with previous ECG from 10/4/2022  Similar to previous ECG  Rhythm: sinus rhythm  Rate: normal  BPM: 69  ST Segments: ST segments normal  T Waves: T waves normal  Comments: Probable LVH with secondary repol abnormality         Social History     Tobacco Use   Smoking Status Never   Smokeless Tobacco Never           Assessment and Plan    Diagnoses and all orders for this visit:    1. Dizziness (Primary)  Assessment & Plan:  Patient is not showing any signs of orthostatic hypotension.  Does not currently monitor blood pressure at home.  Encourage him to monitor blood pressure at home.  He states he has not discussed dizziness with cardiologist.  He did see cardiology in December.  Patient states he is going  to call cardiology.  EKG appears consistent to previous EKG.  He is to go to the emergency room for further evaluation if any symptoms persist or worsen.  He had labs as recently as yesterday per nephrology and is scheduled for dialysis again tomorrow.  Clearly has advanced kidney disease which could lead to some weakness.  For today I am going to check a CT of the head without contrast as a precaution.  Encourage patient to consider physical therapy.  Patient defers at this time.  Agreeable to CT of the head without contrast.    Orders:  -     ECG 12 Lead  -     CT Head Without Contrast; Future    2. Personal history of fall    3. Chronic systolic (congestive) heart failure    4. Chronic kidney disease, stage 4 (severe)    5. Dependence on renal dialysis    6. Anemia in stage 4 chronic kidney disease        Follow Up    No follow-ups on file.  Patient was given instructions and counseling regarding his condition or for health maintenance advice. Please see specific information pulled into the AVS if appropriate.

## 2024-03-27 ENCOUNTER — TELEPHONE (OUTPATIENT)
Dept: FAMILY MEDICINE CLINIC | Age: 74
End: 2024-03-27

## 2024-03-27 ENCOUNTER — OFFICE VISIT (OUTPATIENT)
Dept: FAMILY MEDICINE CLINIC | Age: 74
End: 2024-03-27
Payer: MEDICARE

## 2024-03-27 VITALS
OXYGEN SATURATION: 100 % | TEMPERATURE: 98.3 F | WEIGHT: 178.6 LBS | DIASTOLIC BLOOD PRESSURE: 80 MMHG | SYSTOLIC BLOOD PRESSURE: 136 MMHG | BODY MASS INDEX: 25.57 KG/M2 | HEIGHT: 70 IN | HEART RATE: 60 BPM

## 2024-03-27 DIAGNOSIS — I50.22 CHRONIC SYSTOLIC (CONGESTIVE) HEART FAILURE: ICD-10-CM

## 2024-03-27 DIAGNOSIS — Z99.2 DEPENDENCE ON RENAL DIALYSIS: ICD-10-CM

## 2024-03-27 DIAGNOSIS — H61.21 IMPACTED CERUMEN OF RIGHT EAR: ICD-10-CM

## 2024-03-27 DIAGNOSIS — N18.4 CHRONIC KIDNEY DISEASE, STAGE 4 (SEVERE): ICD-10-CM

## 2024-03-27 DIAGNOSIS — Z87.39 HISTORY OF GOUT: Primary | ICD-10-CM

## 2024-03-27 PROBLEM — S40.021A CONTUSION OF RIGHT ARM: Status: RESOLVED | Noted: 2023-07-26 | Resolved: 2024-03-27

## 2024-03-27 PROBLEM — T14.8XXA HEMATOMA: Status: RESOLVED | Noted: 2023-09-06 | Resolved: 2024-03-27

## 2024-03-27 RX ORDER — HYDROXYZINE HYDROCHLORIDE 25 MG/1
25 TABLET, FILM COATED ORAL EVERY 8 HOURS PRN
COMMUNITY
Start: 2024-03-26

## 2024-03-27 NOTE — PROGRESS NOTES
"Chief Complaint  Hypertension, Hyperlipidemia, and Chronic Kidney Disease    Subjective          Mohamud Jarrett presents to [facility]     Patient is a 73-year-old male who is here today regarding refill of allopurinol 300 mg once daily.  It had originally been prescribed by another provider outside of this office but this office was notified of refill request on February 8.  A 30-day supply was provided at that time.  Patient receives dialysis 3 days/week on Tuesday Thursday and Saturday.  His most recent uric acid level in the chart is 1.9 on May 23, 2023.  Patient denies any episodes of gout.    Patient states he was recently hospitalized at [facility] for 1 week, dates unspecified.  Having staff call to request discharge summary.  I am able to view CT imaging of the head and abdomen and chest x-ray on March 7.  CT of the chest showed pulmonary congestion.  Ct  abdomen noted ascites     Objective   Vital Signs:   Vitals:    03/27/24 0824 03/27/24 0936   BP: 152/71 136/80   BP Location: Right arm Left arm   Patient Position: Sitting Lying   Cuff Size: Adult Adult   Pulse: 60    Temp: 98.3 °F (36.8 °C)    TempSrc: Oral    SpO2: 100%    Weight: 81 kg (178 lb 9.6 oz)    Height: 177.8 cm (70\")        Wt Readings from Last 3 Encounters:   03/27/24 81 kg (178 lb 9.6 oz)   02/23/24 80.7 kg (178 lb)   12/06/23 83 kg (183 lb)      BP Readings from Last 3 Encounters:   03/27/24 136/80   02/23/24 156/75   12/06/23 144/62       Body mass index is 25.63 kg/m².             Physical Exam  Vitals reviewed.   Constitutional:       General: He is not in acute distress.     Appearance: Normal appearance. He is well-developed.   HENT:      Right Ear: There is impacted cerumen.      Left Ear: Tympanic membrane normal.   Neck:      Thyroid: No thyromegaly.      Vascular: No carotid bruit.   Cardiovascular:      Rate and Rhythm: Normal rate and regular rhythm.      Heart sounds: Normal heart sounds. "   Pulmonary:      Effort: Pulmonary effort is normal.      Breath sounds: Normal breath sounds.   Musculoskeletal:      Right lower leg: No edema.      Left lower leg: No edema.   Skin:     General: Skin is warm and dry.   Neurological:      General: No focal deficit present.      Mental Status: He is alert.   Psychiatric:         Attention and Perception: Attention normal.         Mood and Affect: Mood and affect normal.         Behavior: Behavior normal.           Current Outpatient Medications:     allopurinol (ZYLOPRIM) 300 MG tablet, Take 1 tablet by mouth Daily., Disp: 30 tablet, Rfl: 0    amLODIPine (NORVASC) 10 MG tablet, TAKE ONE TABLET BY MOUTH DAILY, Disp: 90 tablet, Rfl: 0    aspirin 81 MG EC tablet, Take 1 tablet by mouth Daily., Disp: , Rfl:     atorvastatin (LIPITOR) 80 MG tablet, Take 1 tablet by mouth Daily., Disp: , Rfl:     bumetanide (BUMEX) 1 MG tablet, Take 3 tablets by mouth 2 (Two) Times a Day., Disp: , Rfl:     calcium acetate (PHOSLO) 667 MG tablet, Take 1 tablet by mouth 3 (Three) Times a Day., Disp: , Rfl:     carvedilol (COREG) 12.5 MG tablet, Take 1 tablet by mouth 2 (Two) Times a Day With Meals., Disp: , Rfl:     clopidogrel (PLAVIX) 75 MG tablet, Take 1 tablet by mouth Daily., Disp: , Rfl:     Docusate Sodium 100 MG capsule, Take 1 tablet by mouth 2 (Two) Times a Day., Disp: , Rfl:     ferrous sulfate 325 (65 FE) MG tablet, Take 1 tablet by mouth Daily With Breakfast., Disp: , Rfl:     hydrALAZINE (APRESOLINE) 25 MG tablet, Take 1 tablet by mouth 3 (Three) Times a Day. (Patient taking differently: Take 1 tablet by mouth 3 (Three) Times a Day. Take dos), Disp: 90 tablet, Rfl: 11    hydrOXYzine (ATARAX) 25 MG tablet, Take 1 tablet by mouth Every 8 (Eight) Hours As Needed., Disp: , Rfl:     losartan (COZAAR) 50 MG tablet, Take 1 tablet by mouth Daily., Disp: , Rfl:     nitroglycerin (NITROSTAT) 0.4 MG SL tablet, Place 1 tablet under the tongue Every 5 (Five) Minutes As Needed for Chest  Pain (Do not take more than 3 at one time. Go to ER or call 911 if chest pain persists). Take no more than 3 doses in 15 minutes., Disp: 60 tablet, Rfl: 3    ranolazine (RANEXA) 500 MG 12 hr tablet, Take 1 tablet by mouth 2 (Two) Times a Day. Take dos, Disp: , Rfl:     isosorbide mononitrate (IMDUR) 120 MG 24 hr tablet, Take 1 tablet by mouth Daily., Disp: , Rfl:     tamsulosin (FLOMAX) 0.4 MG capsule 24 hr capsule, Take 1 capsule by mouth Daily., Disp: , Rfl:    Past Medical History:   Diagnosis Date    A-V fistula     right arm    Anemia of chronic renal failure 04/12/2021    Antiplatelet or antithrombotic long-term use     plavix    Arthritis     Bilateral leg pain 11/05/2021    CAD (coronary artery disease)     h/o CABG 2003 and stents 11/2022, Dr Shirley follows    Chest pain     saw cardiology 4/10/23, pt states better if he takes his medications    Dialysis patient     Tues, Thursday, Saturday, has chest wall catheter currently    Gout     Heart attack     Hyperlipidemia     Hypertension     Kidney disease     stage 4, Dr Ornelas follows    Neck pain 08/30/2022    Neuritis of lower extremity, right 12/19/2019    Proteinuria 05/23/2022    Rheumatoid factor positive 07/06/2021    Seasonal allergies 03/13/2014    Vitamin D deficiency 05/23/2022     Allergies   Allergen Reactions    Peanut (Diagnostic) Shortness Of Breath and Rash    Peanut Butter Flavor Shortness Of Breath and Rash    Simvastatin Myalgia     Other reaction(s): muscle cramps               Result Review :     Common labs          4/26/2023    13:35 5/23/2023    08:49 6/16/2023    10:58   Common Labs   Glucose 101  91     BUN 47  22     Creatinine 5.47  3.40     Sodium 138  138     Potassium 5.5  3.8  4.5       Chloride 101  95     Calcium 9.4  9.7     Uric Acid  1.9        Details          This result is from an external source.                No Images in the past 120 days found..           Social History     Tobacco Use   Smoking Status Never    Smokeless Tobacco Never           Assessment and Plan    Diagnoses and all orders for this visit:    1. History of gout (Primary)  Assessment & Plan:  Last uric acid level was low.  Discussed with patient we may be able to decrease dose of allopurinol and recheck uric acid level in a couple of months.  Patient states he would like to stop the medication if at all possible.  I have printed a uric acid level order for patient to take with him to dialysis tomorrow.  If level is still low then allopurinol will be discontinued and 2 months later we will recheck a uric acid level again.  Patient is agreeable.    Orders:  -     Uric acid; Future    2. Dependence on renal dialysis  Assessment & Plan:  [facility] discharge summary was obtained.  He was inpatient from March 11 through the 23rd.  Primary diagnosis bleeding AV fistula which was repaired on the inpatient by vascular services.  He had missed his dialysis appointment prior to that on March 7.  He continued on dialysis while in the hospital and he was evaluated for physical therapy and Occupational Therapy case management arranged in-home physical therapy and Occupational Therapy to continue after discharge.      3. Chronic kidney disease, stage 4 (severe)    4. Chronic systolic (congestive) heart failure  Assessment & Plan:  Very important to continue with nephrology and cardiology for his chronic conditions.      5. Impacted cerumen of right ear  Assessment & Plan:  Tympanic membrane normal after cerumen removal.  95% of the cerumen was removed from the ear canal today with both instrumentation and flushing with warm water.  Patient tolerated procedure well.    Orders:  -     Ear Cerumen Removal        Follow Up    No follow-ups on file.  Patient was given instructions and counseling regarding his condition or for health maintenance advice. Please see specific information pulled into the AVS if appropriate.

## 2024-03-27 NOTE — ASSESSMENT & PLAN NOTE
Last uric acid level was low.  Discussed with patient we may be able to decrease dose of allopurinol and recheck uric acid level in a couple of months.  Patient states he would like to stop the medication if at all possible.  I have printed a uric acid level order for patient to take with him to dialysis tomorrow.  If level is still low then allopurinol will be discontinued and 2 months later we will recheck a uric acid level again.  Patient is agreeable.

## 2024-03-27 NOTE — ASSESSMENT & PLAN NOTE
Tympanic membrane normal after cerumen removal.  95% of the cerumen was removed from the ear canal today with both instrumentation and flushing with warm water.  Patient tolerated procedure well.

## 2024-04-02 ENCOUNTER — TELEPHONE (OUTPATIENT)
Dept: FAMILY MEDICINE CLINIC | Age: 74
End: 2024-04-02
Payer: MEDICARE

## 2024-04-02 NOTE — TELEPHONE ENCOUNTER
You ordered a CT of head without contast for pt on 2/23, patient had this completed by another physican in care everywhere ( in chart) on 3/7 at [facility]. Okay to cancel your order ? Please advise. kp

## 2024-04-02 NOTE — ASSESSMENT & PLAN NOTE
[facility] discharge summary was obtained.  He was inpatient from March 11 through the 23rd.  Primary diagnosis bleeding AV fistula which was repaired on the inpatient by vascular services.  He had missed his dialysis appointment prior to that on March 7.  He continued on dialysis while in the hospital and he was evaluated for physical therapy and Occupational Therapy case management arranged in-home physical therapy and Occupational Therapy to continue after discharge.

## 2024-04-02 NOTE — TELEPHONE ENCOUNTER
----- Message from Laura Talley sent at 3/29/2024  8:38 AM EDT -----    ----- Message -----  From: SYSTEM  Sent: 3/29/2024   1:58 AM EDT  To: [facility] Hermelindo Hussein [facility]

## 2024-05-01 ENCOUNTER — TELEPHONE (OUTPATIENT)
Dept: FAMILY MEDICINE CLINIC | Age: 74
End: 2024-05-01
Payer: MEDICARE

## 2024-06-05 ENCOUNTER — OFFICE VISIT (OUTPATIENT)
Dept: FAMILY MEDICINE CLINIC | Age: 74
End: 2024-06-05
Payer: MEDICARE

## 2024-06-05 VITALS
DIASTOLIC BLOOD PRESSURE: 63 MMHG | SYSTOLIC BLOOD PRESSURE: 146 MMHG | HEART RATE: 66 BPM | HEIGHT: 70 IN | OXYGEN SATURATION: 100 % | BODY MASS INDEX: 25.63 KG/M2

## 2024-06-05 DIAGNOSIS — D63.1 ANEMIA IN STAGE 4 CHRONIC KIDNEY DISEASE: ICD-10-CM

## 2024-06-05 DIAGNOSIS — N18.4 ANEMIA IN STAGE 4 CHRONIC KIDNEY DISEASE: ICD-10-CM

## 2024-06-05 DIAGNOSIS — R04.0 EPISTAXIS: Primary | ICD-10-CM

## 2024-06-05 DIAGNOSIS — N18.4 CHRONIC KIDNEY DISEASE, STAGE 4 (SEVERE): ICD-10-CM

## 2024-06-05 DIAGNOSIS — I50.22 CHRONIC SYSTOLIC (CONGESTIVE) HEART FAILURE: ICD-10-CM

## 2024-06-05 DIAGNOSIS — Z99.2 DEPENDENCE ON RENAL DIALYSIS: ICD-10-CM

## 2024-06-05 PROCEDURE — 1160F RVW MEDS BY RX/DR IN RCRD: CPT | Performed by: NURSE PRACTITIONER

## 2024-06-05 PROCEDURE — 30901 CONTROL OF NOSEBLEED: CPT | Performed by: NURSE PRACTITIONER

## 2024-06-05 PROCEDURE — 99214 OFFICE O/P EST MOD 30 MIN: CPT | Performed by: NURSE PRACTITIONER

## 2024-06-05 PROCEDURE — 1159F MED LIST DOCD IN RCRD: CPT | Performed by: NURSE PRACTITIONER

## 2024-06-05 PROCEDURE — 3078F DIAST BP <80 MM HG: CPT | Performed by: NURSE PRACTITIONER

## 2024-06-05 PROCEDURE — 3077F SYST BP >= 140 MM HG: CPT | Performed by: NURSE PRACTITIONER

## 2024-06-05 PROCEDURE — 1125F AMNT PAIN NOTED PAIN PRSNT: CPT | Performed by: NURSE PRACTITIONER

## 2024-06-05 RX ORDER — BUDESONIDE AND FORMOTEROL FUMARATE DIHYDRATE 80; 4.5 UG/1; UG/1
2 AEROSOL RESPIRATORY (INHALATION)
COMMUNITY
Start: 2024-03-30

## 2024-06-05 RX ORDER — UREA 10 %
1 LOTION (ML) TOPICAL
COMMUNITY
Start: 2024-03-30

## 2024-06-05 RX ORDER — SEVELAMER CARBONATE 800 MG/1
3 TABLET, FILM COATED ORAL 3 TIMES DAILY
COMMUNITY
Start: 2024-05-17

## 2024-06-05 NOTE — PROGRESS NOTES
"Chief Complaint  Nose Bleed (Sx started a couple of hours ago )    Subjective        Mohamud Jarrett presents to [facility]  Nose Bleed   The bleeding has been from the right nare. This is a new problem. The current episode started today. The problem occurs constantly. The problem has been unchanged. The bleeding is associated with anticoagulants, aspirin and recent URI. He has tried pressure for the symptoms. The treatment provided no relief.   Was admitted to [facility] for pneumonia, reports has had pneumonia since pneumonia. On dialysis.  Nose bleed began this morning, cannot get it to stop. Reports having bad taste in his mouth.    Objective   Vital Signs:  /63 (BP Location: Left arm, Patient Position: Sitting, Cuff Size: Adult)   Pulse 66   Ht 177.8 cm (70\")   SpO2 100%   BMI 25.63 kg/m²   Estimated body mass index is 25.63 kg/m² as calculated from the following:    Height as of this encounter: 177.8 cm (70\").    Weight as of 3/27/24: 81 kg (178 lb 9.6 oz).               Physical Exam  HENT:      Nose:      Right Nostril: Epistaxis present.   Cardiovascular:      Rate and Rhythm: Normal rate and regular rhythm.   Pulmonary:      Effort: Pulmonary effort is normal. No respiratory distress.      Breath sounds: Normal breath sounds. No stridor. No wheezing, rhonchi or rales.   Abdominal:      General: There is distension.   Skin:     Coloration: Skin is jaundiced.   Neurological:      Mental Status: He is alert.        Result Review :      CMP          3/30/2024    00:00 4/22/2024    00:00 4/29/2024    00:00   CMP   BUN 95     72        Calcium 8.8      9.0          Details          This result is from an external source.             CBC          5/8/2024    00:00 5/22/2024    00:00 5/29/2024    00:00   CBC   Hemoglobin 11.5        34.5     10.7        32.1     10.4        31.2          Details          This result is from an external source.             Renal Profile  "         3/30/2024    00:00 4/22/2024    00:00   Renal Profile   BUN 95     72          Details          This result is from an external source.             Data reviewed : Recent hospitalization notes reviewed.             Assessment and Plan     Diagnoses and all orders for this visit:    1. Epistaxis (Primary)    2. Dependence on renal dialysis    3. Chronic kidney disease, stage 4 (severe)    4. Chronic systolic (congestive) heart failure    5. Anemia in stage 4 chronic kidney disease    Rhino rocket placed, bleeding continued. Labs from recent hospitalization reviewed. Patient's color bad. Sent to the hospital for further evaluation and treatment.       I spent 33 minutes caring for Mohamud on this date of service. This time includes time spent by me in the following activities:preparing for the visit, reviewing tests, obtaining and/or reviewing a separately obtained history, performing a medically appropriate examination and/or evaluation , counseling and educating the patient/family/caregiver, and documenting information in the medical record  Follow Up     Return in 1 week (on 6/12/2024), or if symptoms worsen or fail to improve.  Patient was given instructions and counseling regarding his condition or for health maintenance advice. Please see specific information pulled into the AVS if appropriate.

## 2024-07-01 ENCOUNTER — HOSPITAL ENCOUNTER (INPATIENT)
Facility: HOSPITAL | Age: 74
LOS: 2 days | Discharge: HOME HEALTH | End: 2024-07-06
Attending: EMERGENCY MEDICINE | Admitting: FAMILY MEDICINE
Payer: MEDICARE

## 2024-07-01 ENCOUNTER — APPOINTMENT (OUTPATIENT)
Dept: GENERAL RADIOLOGY | Facility: HOSPITAL | Age: 74
End: 2024-07-01
Payer: MEDICARE

## 2024-07-01 DIAGNOSIS — N18.6 END STAGE RENAL DISEASE: Primary | ICD-10-CM

## 2024-07-01 DIAGNOSIS — E87.5 HYPERKALEMIA: ICD-10-CM

## 2024-07-01 DIAGNOSIS — R26.2 DIFFICULTY IN WALKING: ICD-10-CM

## 2024-07-01 DIAGNOSIS — Z78.9 DECREASED ACTIVITIES OF DAILY LIVING (ADL): ICD-10-CM

## 2024-07-01 LAB
ALBUMIN SERPL-MCNC: 3.7 G/DL (ref 3.5–5.2)
ALBUMIN/GLOB SERPL: 1.2 G/DL
ALP SERPL-CCNC: 239 U/L (ref 39–117)
ALT SERPL W P-5'-P-CCNC: 17 U/L (ref 1–41)
ANION GAP SERPL CALCULATED.3IONS-SCNC: 27.8 MMOL/L (ref 5–15)
AST SERPL-CCNC: 22 U/L (ref 1–40)
BASOPHILS # BLD AUTO: 0.06 10*3/MM3 (ref 0–0.2)
BASOPHILS NFR BLD AUTO: 1.5 % (ref 0–1.5)
BILIRUB SERPL-MCNC: 1.5 MG/DL (ref 0–1.2)
BUN SERPL-MCNC: >112 MG/DL (ref 8–23)
BUN/CREAT SERPL: ABNORMAL
CALCIUM SPEC-SCNC: 9 MG/DL (ref 8.6–10.5)
CHLORIDE SERPL-SCNC: 92 MMOL/L (ref 98–107)
CO2 SERPL-SCNC: 15.2 MMOL/L (ref 22–29)
CREAT SERPL-MCNC: 12.9 MG/DL (ref 0.76–1.27)
D-LACTATE SERPL-SCNC: 1.7 MMOL/L (ref 0.5–2)
D-LACTATE SERPL-SCNC: 2.1 MMOL/L (ref 0.5–2)
DEPRECATED RDW RBC AUTO: 57.9 FL (ref 37–54)
EGFRCR SERPLBLD CKD-EPI 2021: 3.7 ML/MIN/1.73
EOSINOPHIL # BLD AUTO: 0.06 10*3/MM3 (ref 0–0.4)
EOSINOPHIL NFR BLD AUTO: 1.5 % (ref 0.3–6.2)
ERYTHROCYTE [DISTWIDTH] IN BLOOD BY AUTOMATED COUNT: 17.1 % (ref 12.3–15.4)
GLOBULIN UR ELPH-MCNC: 3.2 GM/DL
GLUCOSE SERPL-MCNC: 113 MG/DL (ref 65–99)
HCT VFR BLD AUTO: 29.6 % (ref 37.5–51)
HGB BLD-MCNC: 10.2 G/DL (ref 13–17.7)
HOLD SPECIMEN: NORMAL
HOLD SPECIMEN: NORMAL
IMM GRANULOCYTES # BLD AUTO: 0.01 10*3/MM3 (ref 0–0.05)
IMM GRANULOCYTES NFR BLD AUTO: 0.3 % (ref 0–0.5)
LIPASE SERPL-CCNC: 113 U/L (ref 13–60)
LYMPHOCYTES # BLD AUTO: 0.83 10*3/MM3 (ref 0.7–3.1)
LYMPHOCYTES NFR BLD AUTO: 20.9 % (ref 19.6–45.3)
MCH RBC QN AUTO: 33.1 PG (ref 26.6–33)
MCHC RBC AUTO-ENTMCNC: 34.5 G/DL (ref 31.5–35.7)
MCV RBC AUTO: 96.1 FL (ref 79–97)
MONOCYTES # BLD AUTO: 0.6 10*3/MM3 (ref 0.1–0.9)
MONOCYTES NFR BLD AUTO: 15.1 % (ref 5–12)
NEUTROPHILS NFR BLD AUTO: 2.41 10*3/MM3 (ref 1.7–7)
NEUTROPHILS NFR BLD AUTO: 60.7 % (ref 42.7–76)
NRBC BLD AUTO-RTO: 0.5 /100 WBC (ref 0–0.2)
PLATELET # BLD AUTO: 155 10*3/MM3 (ref 140–450)
PMV BLD AUTO: 11.7 FL (ref 6–12)
POTASSIUM SERPL-SCNC: 6.6 MMOL/L (ref 3.5–5.2)
PROT SERPL-MCNC: 6.9 G/DL (ref 6–8.5)
QT INTERVAL: 550 MS
QTC INTERVAL: 560 MS
RBC # BLD AUTO: 3.08 10*6/MM3 (ref 4.14–5.8)
SODIUM SERPL-SCNC: 135 MMOL/L (ref 136–145)
WBC NRBC COR # BLD AUTO: 3.97 10*3/MM3 (ref 3.4–10.8)
WHOLE BLOOD HOLD COAG: NORMAL
WHOLE BLOOD HOLD SPECIMEN: NORMAL

## 2024-07-01 PROCEDURE — G0257 UNSCHED DIALYSIS ESRD PT HOS: HCPCS

## 2024-07-01 PROCEDURE — 25010000002 CALCIUM GLUCONATE-NACL 1-0.675 GM/50ML-% SOLUTION: Performed by: EMERGENCY MEDICINE

## 2024-07-01 PROCEDURE — 85025 COMPLETE CBC W/AUTO DIFF WBC: CPT

## 2024-07-01 PROCEDURE — G0378 HOSPITAL OBSERVATION PER HR: HCPCS

## 2024-07-01 PROCEDURE — A9270 NON-COVERED ITEM OR SERVICE: HCPCS | Performed by: EMERGENCY MEDICINE

## 2024-07-01 PROCEDURE — 63710000001 GUAIFENESIN-DEXTROMETHORPHAN 100-10 MG/5ML SYRUP: Performed by: FAMILY MEDICINE

## 2024-07-01 PROCEDURE — 99291 CRITICAL CARE FIRST HOUR: CPT

## 2024-07-01 PROCEDURE — 36415 COLL VENOUS BLD VENIPUNCTURE: CPT

## 2024-07-01 PROCEDURE — 80053 COMPREHEN METABOLIC PANEL: CPT | Performed by: EMERGENCY MEDICINE

## 2024-07-01 PROCEDURE — 93010 ELECTROCARDIOGRAM REPORT: CPT | Performed by: INTERNAL MEDICINE

## 2024-07-01 PROCEDURE — A9270 NON-COVERED ITEM OR SERVICE: HCPCS | Performed by: FAMILY MEDICINE

## 2024-07-01 PROCEDURE — 71045 X-RAY EXAM CHEST 1 VIEW: CPT

## 2024-07-01 PROCEDURE — 93005 ELECTROCARDIOGRAM TRACING: CPT | Performed by: EMERGENCY MEDICINE

## 2024-07-01 PROCEDURE — 83690 ASSAY OF LIPASE: CPT | Performed by: EMERGENCY MEDICINE

## 2024-07-01 PROCEDURE — 99223 1ST HOSP IP/OBS HIGH 75: CPT | Performed by: FAMILY MEDICINE

## 2024-07-01 PROCEDURE — 83605 ASSAY OF LACTIC ACID: CPT | Performed by: EMERGENCY MEDICINE

## 2024-07-01 PROCEDURE — 63710000001 SODIUM ZIRCONIUM CYCLOSILICATE 10 G PACK: Performed by: EMERGENCY MEDICINE

## 2024-07-01 RX ORDER — AMOXICILLIN 250 MG
2 CAPSULE ORAL 2 TIMES DAILY PRN
Status: DISCONTINUED | OUTPATIENT
Start: 2024-07-01 | End: 2024-07-07 | Stop reason: HOSPADM

## 2024-07-01 RX ORDER — HEPARIN SODIUM 5000 [USP'U]/ML
5000 INJECTION, SOLUTION INTRAVENOUS; SUBCUTANEOUS EVERY 12 HOURS SCHEDULED
Status: DISCONTINUED | OUTPATIENT
Start: 2024-07-01 | End: 2024-07-07 | Stop reason: HOSPADM

## 2024-07-01 RX ORDER — ONDANSETRON 2 MG/ML
4 INJECTION INTRAMUSCULAR; INTRAVENOUS EVERY 6 HOURS PRN
Status: DISCONTINUED | OUTPATIENT
Start: 2024-07-01 | End: 2024-07-07 | Stop reason: HOSPADM

## 2024-07-01 RX ORDER — SODIUM CHLORIDE 9 MG/ML
40 INJECTION, SOLUTION INTRAVENOUS AS NEEDED
Status: DISCONTINUED | OUTPATIENT
Start: 2024-07-01 | End: 2024-07-07 | Stop reason: HOSPADM

## 2024-07-01 RX ORDER — BISACODYL 5 MG/1
5 TABLET, DELAYED RELEASE ORAL DAILY PRN
Status: DISCONTINUED | OUTPATIENT
Start: 2024-07-01 | End: 2024-07-07 | Stop reason: HOSPADM

## 2024-07-01 RX ORDER — SODIUM CHLORIDE 0.9 % (FLUSH) 0.9 %
10 SYRINGE (ML) INJECTION AS NEEDED
Status: DISCONTINUED | OUTPATIENT
Start: 2024-07-01 | End: 2024-07-07 | Stop reason: HOSPADM

## 2024-07-01 RX ORDER — GUAIFENESIN/DEXTROMETHORPHAN 100-10MG/5
5 SYRUP ORAL EVERY 4 HOURS PRN
Status: DISCONTINUED | OUTPATIENT
Start: 2024-07-01 | End: 2024-07-07 | Stop reason: HOSPADM

## 2024-07-01 RX ORDER — SODIUM CHLORIDE 0.9 % (FLUSH) 0.9 %
10 SYRINGE (ML) INJECTION EVERY 12 HOURS SCHEDULED
Status: DISCONTINUED | OUTPATIENT
Start: 2024-07-01 | End: 2024-07-07 | Stop reason: HOSPADM

## 2024-07-01 RX ORDER — POLYETHYLENE GLYCOL 3350 17 G/17G
17 POWDER, FOR SOLUTION ORAL DAILY PRN
Status: DISCONTINUED | OUTPATIENT
Start: 2024-07-01 | End: 2024-07-07 | Stop reason: HOSPADM

## 2024-07-01 RX ORDER — CALCIUM GLUCONATE 20 MG/ML
1000 INJECTION, SOLUTION INTRAVENOUS ONCE
Status: COMPLETED | OUTPATIENT
Start: 2024-07-01 | End: 2024-07-01

## 2024-07-01 RX ORDER — BISACODYL 10 MG
10 SUPPOSITORY, RECTAL RECTAL DAILY PRN
Status: DISCONTINUED | OUTPATIENT
Start: 2024-07-01 | End: 2024-07-07 | Stop reason: HOSPADM

## 2024-07-01 RX ADMIN — SODIUM BICARBONATE 50 MEQ: 84 INJECTION INTRAVENOUS at 16:31

## 2024-07-01 RX ADMIN — GUAIFENESIN AND DEXTROMETHORPHAN 5 ML: 100; 10 SYRUP ORAL at 21:38

## 2024-07-01 RX ADMIN — SODIUM ZIRCONIUM CYCLOSILICATE 10 G: 10 POWDER, FOR SUSPENSION ORAL at 16:29

## 2024-07-01 RX ADMIN — CALCIUM GLUCONATE 1000 MG: 20 INJECTION, SOLUTION INTRAVENOUS at 16:16

## 2024-07-01 NOTE — ED PROVIDER NOTES
Time: 3:30 PM EDT  Date of encounter:  7/1/2024  Independent Historian/Clinical History and Information was obtained by:   Patient and Family    History is limited by: N/A    Chief Complaint: Shortness of breath, vomiting, weakness      History of Present Illness:  Patient is a 74 y.o. year old male who presents to the emergency department for evaluation of shortness of breath, vomiting and weakness.  This patient presents to the emergency room complaining of shortness of breath vomiting weakness for the last several days.  The patient lives at home and he has missed dialysis for at least a week.  He has been on dialysis for at least the last 6 months and he also has a history of known coronary disease.    HPI    Patient Care Team  Primary Care Provider: Shara Talley APRN    Past Medical History:     Allergies   Allergen Reactions    Peanut (Diagnostic) Shortness Of Breath and Rash    Peanut Butter Flavor Shortness Of Breath and Rash    Simvastatin Myalgia     Other reaction(s): muscle cramps     Past Medical History:   Diagnosis Date    A-V fistula     right arm    Anemia of chronic renal failure 04/12/2021    Antiplatelet or antithrombotic long-term use     plavix    Arthritis     Bilateral leg pain 11/05/2021    CAD (coronary artery disease)     h/o CABG 2003 and stents 11/2022, Dr Shirley follows    Chest pain     saw cardiology 4/10/23, pt states better if he takes his medications    Dialysis patient     Tues, Thursday, Saturday, has chest wall catheter currently    Gout     Heart attack     Hyperlipidemia     Hypertension     Kidney disease     stage 4, Dr Ornelas follows    Neck pain 08/30/2022    Neuritis of lower extremity, right 12/19/2019    Proteinuria 05/23/2022    Rheumatoid factor positive 07/06/2021    Seasonal allergies 03/13/2014    Vitamin D deficiency 05/23/2022     Past Surgical History:   Procedure Laterality Date    ARTERIOVENOUS FISTULA Right     CARDIAC CATHETERIZATION      CARPAL TUNNEL  RELEASE      CATARACT EXTRACTION W/ INTRAOCULAR LENS IMPLANT      COLONOSCOPY N/A 2006    COLONOSCOPY N/A 12/14/2018    Procedure: COLONOSCOPY TO CECUM WITH COLD BIOPSY POLYPECTOMY;  Surgeon: Elliott Harding MD;  Location: [facility] ENDOSCOPY;  Service: General    CORONARY ANGIOPLASTY WITH STENT PLACEMENT  11/09/2022    CORONARY ARTERY BYPASS GRAFT N/A 06/20/2003    INGUINAL HERNIA REPAIR Right 06/04/2014    Open incarcerated inguinal hernia repair-Dr. Elliott Harding    INGUINAL HERNIA REPAIR Left 4/26/2023    Procedure: OPEN LEFT INGUINAL HERNIA REPAIR;  Surgeon: Elliott Harding MD;  Location:  [facility] OR;  Service: General;  Laterality: Left;    LUMBAR DISC SURGERY N/A 1990, 1992    L4-L5, X2     Family History   Problem Relation Age of Onset    Heart disease Mother     Heart disease Father     Malig Hyperthermia Neg Hx        Home Medications:  Prior to Admission medications    Medication Sig Start Date End Date Taking? Authorizing Provider   allopurinol (ZYLOPRIM) 300 MG tablet Take 1 tablet by mouth Daily. 2/8/24   Osmar Landers MD   amLODIPine (NORVASC) 10 MG tablet TAKE ONE TABLET BY MOUTH DAILY 9/8/23   Shara Talley APRN   aspirin 81 MG EC tablet Take 1 tablet by mouth Daily. 4/27/22   Arash Elkins MD   atorvastatin (LIPITOR) 80 MG tablet Take 1 tablet by mouth Daily. 10/10/23   Arash Elkins MD   budesonide-formoterol (SYMBICORT) 80-4.5 MCG/ACT inhaler Inhale 2 puffs. 3/30/24   Arash Elkins MD   bumetanide (BUMEX) 1 MG tablet Take 3 tablets by mouth 2 (Two) Times a Day.    Arash Elkins MD   calcium acetate (PHOSLO) 667 MG tablet Take 1 tablet by mouth 3 (Three) Times a Day. 5/18/23   Arash Elkins MD   carvedilol (COREG) 12.5 MG tablet Take 1 tablet by mouth 2 (Two) Times a Day With Meals.    Arash Elkins MD   clopidogrel (PLAVIX) 75 MG tablet Take 1 tablet by mouth Daily. 5/13/22   Arash Elkins MD   Docusate Sodium 100 MG capsule  Take 1 tablet by mouth 2 (Two) Times a Day. 3/2/23   Arash Elkins MD   ferrous sulfate 325 (65 FE) MG tablet Take 1 tablet by mouth Daily With Breakfast.    Arash Elkins MD   hydrALAZINE (APRESOLINE) 25 MG tablet Take 1 tablet by mouth 3 (Three) Times a Day.  Patient taking differently: Take 1 tablet by mouth 3 (Three) Times a Day. Take dos 10/20/22   Rich Delgadillo MD   hydrOXYzine (ATARAX) 25 MG tablet Take 1 tablet by mouth Every 8 (Eight) Hours As Needed. 3/26/24   Arash Elkins MD   isosorbide mononitrate (IMDUR) 120 MG 24 hr tablet Take 1 tablet by mouth Daily. 6/9/23   Arash Elkins MD   losartan (COZAAR) 50 MG tablet Take 1 tablet by mouth Daily.    Arash Elkins MD   Methoxy PEG-Epoetin Beta (MIRCERA IJ) 75 mcg Every 28 (Twenty-Eight) Days. 6/3/24 6/2/25  Arash Elkins MD   nitroglycerin (NITROSTAT) 0.4 MG SL tablet Place 1 tablet under the tongue Every 5 (Five) Minutes As Needed for Chest Pain (Do not take more than 3 at one time. Go to ER or call 911 if chest pain persists). Take no more than 3 doses in 15 minutes. 9/22/22   Rich Delgadillo MD   ranolazine (RANEXA) 500 MG 12 hr tablet Take 1 tablet by mouth 2 (Two) Times a Day. Take dos 4/10/23   Arash Elkins MD   Renvela 800 MG tablet Take 3 tablets by mouth 3 (Three) Times a Day. 5/17/24   Arash Elkins MD   tamsulosin (FLOMAX) 0.4 MG capsule 24 hr capsule Take 1 capsule by mouth Daily. 3/1/23   Arash Elkins MD   Zinc Sulfate 220 (50 Zn) MG tablet Take 1 tablet by mouth. 3/30/24   Arash Elkins MD        Social History:   Social History     Tobacco Use    Smoking status: Never    Smokeless tobacco: Never   Vaping Use    Vaping status: Never Used   Substance Use Topics    Alcohol use: Yes     Comment: occassionally    Drug use: No         Review of Systems:  Review of Systems   Constitutional:  Negative for chills and fever.   HENT:  Negative for congestion, ear  "pain and sore throat.    Eyes:  Negative for pain.   Respiratory:  Positive for shortness of breath. Negative for cough and chest tightness.    Cardiovascular:  Negative for chest pain.   Gastrointestinal:  Positive for nausea and vomiting. Negative for abdominal pain and diarrhea.   Genitourinary:  Negative for flank pain and hematuria.   Musculoskeletal:  Negative for joint swelling.   Skin:  Negative for pallor.   Neurological:  Negative for seizures and headaches.   All other systems reviewed and are negative.       Physical Exam:  /72 (BP Location: Left arm, Patient Position: Sitting)   Pulse 65   Temp 97.6 °F (36.4 °C) (Oral)   Resp 20   Ht 177.8 cm (70\")   Wt 78.1 kg (172 lb 2.9 oz)   SpO2 96%   BMI 24.71 kg/m²     Physical Exam  Vitals and nursing note reviewed.   Constitutional:       General: He is not in acute distress.     Appearance: Normal appearance. He is ill-appearing. He is not toxic-appearing.   HENT:      Head: Normocephalic and atraumatic.      Right Ear: External ear normal.      Left Ear: External ear normal.      Nose: Nose normal.      Mouth/Throat:      Mouth: Mucous membranes are moist.   Eyes:      General: No scleral icterus.     Pupils: Pupils are equal, round, and reactive to light.   Cardiovascular:      Rate and Rhythm: Normal rate and regular rhythm.      Pulses: Normal pulses.      Heart sounds: Normal heart sounds.   Pulmonary:      Effort: Pulmonary effort is normal. No respiratory distress.      Breath sounds: Normal breath sounds.   Abdominal:      General: Abdomen is flat.      Palpations: Abdomen is soft.      Tenderness: There is no abdominal tenderness.   Musculoskeletal:         General: Normal range of motion.      Cervical back: Normal range of motion and neck supple.   Skin:     General: Skin is warm and dry.      Capillary Refill: Capillary refill takes less than 2 seconds.   Neurological:      General: No focal deficit present.      Mental Status: He is " oriented to person, place, and time.      Motor: Weakness present.      Comments: Generalized weakness.                  Procedures:  Procedures      Medical Decision Making:      Comorbidities that affect care:    Chronic Kidney Disease, Coronary Artery Disease    External Notes reviewed:    Previous Clinic Note: Nephrology clinic note.      The following orders were placed and all results were independently analyzed by me:  Orders Placed This Encounter   Procedures    XR Chest 1 View    [place] Draw    Comprehensive Metabolic Panel    Lipase    Urinalysis With Microscopic If Indicated (No Culture) - Urine, Clean Catch    Lactic Acid, Plasma    CBC Auto Differential    STAT Lactic Acid, Reflex    Renal Function Panel    CBC (No Diff)    NPO Diet NPO Type: Strict NPO    Undress & Gown    Strict Intake & Output    Nephrology  (on-call MD unless specified)    Hospitalist (on-call MD unless specified)    ECG 12 Lead Rhythm Change    Insert Peripheral IV    Hemodialysis Inpatient    CBC & Differential    Green Top (Gel)    Lavender Top    Gold Top - SST    Light Blue Top       Medications Given in the Emergency Department:  Medications   sodium chloride 0.9 % flush 10 mL (has no administration in time range)   sodium zirconium cyclosilicate (LOKELMA) packet 10 g (10 g Oral Given 7/1/24 1629)   calcium gluconate 1000 Mg/50ml 0.675% NaCl IV SOLN (0 mg Intravenous Stopped 7/1/24 1629)   sodium bicarbonate injection 8.4% 50 mEq (50 mEq Intravenous Given 7/1/24 1631)        ED Course:       EKG: Sinus rhythm rate of 62 bpm  First-degree AV block  IVCD  Nonspecific ST change  Prolonged QT interval.    Labs:    Lab Results (last 24 hours)       Procedure Component Value Units Date/Time    CBC & Differential [[id number]]  (Abnormal) Collected: 07/01/24 1425    Specimen: Blood Updated: 07/01/24 1435    Narrative:      The following orders were created for panel order CBC & Differential.  Procedure                                Abnormality         Status                     ---------                               -----------         ------                     CBC Auto Differential[[id number]]        Abnormal            Final result                 Please view results for these tests on the individual orders.    Comprehensive Metabolic Panel [[id number]]  (Abnormal) Collected: 07/01/24 1425    Specimen: Blood Updated: 07/01/24 1510     Glucose 113 mg/dL      BUN >112 mg/dL      Creatinine 12.90 mg/dL      Sodium 135 mmol/L      Potassium 6.6 mmol/L      Comment: Slight hemolysis detected by analyzer. Result may be falsely elevated.        Chloride 92 mmol/L      CO2 15.2 mmol/L      Calcium 9.0 mg/dL      Total Protein 6.9 g/dL      Albumin 3.7 g/dL      ALT (SGPT) 17 U/L      AST (SGOT) 22 U/L      Alkaline Phosphatase 239 U/L      Total Bilirubin 1.5 mg/dL      Globulin 3.2 gm/dL      A/G Ratio 1.2 g/dL      BUN/Creatinine Ratio --     Comment: Unable to calculate.        Anion Gap 27.8 mmol/L      eGFR 3.7 mL/min/1.73      Comment: <15 Indicative of kidney failure       Narrative:      GFR Normal >60  Chronic Kidney Disease <60  Kidney Failure <15    The GFR formula is only valid for adults with stable renal function between ages 18 and 70.    Lipase [[id number]]  (Abnormal) Collected: 07/01/24 1425    Specimen: Blood Updated: 07/01/24 1451     Lipase 113 U/L     Lactic Acid, Plasma [[id number]]  (Abnormal) Collected: 07/01/24 1425    Specimen: Blood Updated: 07/01/24 1510     Lactate 2.1 mmol/L     CBC Auto Differential [[id number]]  (Abnormal) Collected: 07/01/24 1425    Specimen: Blood Updated: 07/01/24 1435     WBC 3.97 10*3/mm3      RBC 3.08 10*6/mm3      Hemoglobin 10.2 g/dL      Hematocrit 29.6 %      MCV 96.1 fL      MCH 33.1 pg      MCHC 34.5 g/dL      RDW 17.1 %      RDW-SD 57.9 fl      MPV 11.7 fL      Platelets 155 10*3/mm3      Neutrophil % 60.7 %      Lymphocyte % 20.9 %      Monocyte % 15.1 %      Eosinophil % 1.5 %       Basophil % 1.5 %      Immature Grans % 0.3 %      Neutrophils, Absolute 2.41 10*3/mm3      Lymphocytes, Absolute 0.83 10*3/mm3      Monocytes, Absolute 0.60 10*3/mm3      Eosinophils, Absolute 0.06 10*3/mm3      Basophils, Absolute 0.06 10*3/mm3      Immature Grans, Absolute 0.01 10*3/mm3      nRBC 0.5 /100 WBC              Imaging:    XR Chest 1 View    Result Date: 7/1/2024  XR CHEST 1 VW Date of Exam: 7/1/2024 3:40 PM EDT Indication: Weakness Comparison: None available. Findings: Sternotomy wires overlie the midline. No focal pulmonary consolidations are evident. Pulmonary vascularity is within normal limits. There is cardiomegaly. Aortic vascular calcification is noted. No pneumothorax or large effusion identified.     Impression: Cardiomegaly. Electronically Signed: Mitchell Ellis MD  7/1/2024 3:59 PM EDT  Workstation ID: [id number]       Differential Diagnosis and Discussion:    Dyspnea: Differential diagnosis includes but is not limited to metabolic acidosis, neurological disorders, psychogenic, asthma, pneumothorax, upper airway obstruction, COPD, pneumonia, noncardiogenic pulmonary edema, interstitial lung disease, anemia, congestive heart failure, and pulmonary embolism  Vomiting: Differential diagnosis includes but is not limited to migraine, labyrinthine disorders, psychogenic, metabolic and endocrine causes, peptic ulcer, gastric outlet obstruction, gastritis, gastroenteritis, appendicitis, intestinal obstruction, paralytic ileus, food poisoning, cholecystitis, acute hepatitis, acute pancreatitis, acute febrile illness, and myocardial infarction.  Weakness: Based on the patient's history, signs, and symptoms, the diffential diagnosis includes but is not limited to meningitis, stroke, sepsis, subarachnoid hemorrhage, intracranial bleeding, encephalitis, acute uti, dehydration, MS, myasthenia gravis, Guillan [place], migraine variant, neuromuscular disorders vertigo, electrolyte imbalance, and metabolic  disorders.    All labs were reviewed and interpreted by me.  All X-rays impressions were independently interpreted by me.  EKG was interpreted by me.    MDM     Amount and/or Complexity of Data Reviewed  Clinical lab tests: reviewed  Tests in the medicine section of CPT®: reviewed  Decide to obtain previous medical records or to obtain history from someone other than the patient: yes         Critical Care Note: Total Critical Care time of 50 minutes. Total critical care time documented does not include time spent on separately billed procedures for services of nurses or physician assistants. I personally saw and examined the patient. I have reviewed all diagnostic interpretations and treatment plans as written. I was present for the key portions of any procedures performed and the inclusive time noted in any critical care statement. Critical care time includes patient management by me, time spent at the patients bedside,  time to review lab and imaging results, discussing patient care, documentation in the medical record, and time spent with family or caregiver.        Patient Care Considerations:    CT ABDOMEN AND PELVIS: I considered ordering a CT scan of the abdomen and pelvis however the patient currently has no abdominal pain or tenderness.      Consultants/Shared Management Plan:    Hospitalist: I have discussed the case with hospitalist who agrees to accept the patient for admission.  Consultant: I have discussed the case with Dr Muniz who states he will put dialysis orders in for the patient.    Social Determinants of Health:    Patient is unable to carry out activities of daily life. Escalation of care is necessary.       Disposition and Care Coordination:    Admit:   Through independent evaluation of the patient's history, physical, and imperical data, the patient meets criteria for inpatient admission to the hospital.        Final diagnoses:   End stage renal disease   Hyperkalemia        ED Disposition        ED Disposition   Decision to Admit    Condition   --    Comment   --               This medical record created using voice recognition software.             Kimani Clay,   07/01/24 1646       Kimani Clay,   07/01/24 1705

## 2024-07-01 NOTE — H&P
[facility]   HISTORY AND PHYSICAL    Patient Name: Mohamud Jarrett  : 1950  MRN: [id number]  Primary Care Physician:  Shara Talley APRN  Date of admission: 2024    Subjective   Subjective     Chief Complaint: Generalized weakness, missed dialysis    HPI:    Mohamud Jarrett is a 74 y.o. male with past medical history of end-stage renal disease on hemodialysis, hypertension, CAD status post CABG, and GERD presented to the ED for evaluation of generalized weakness.  Patient states that he missed over a week of dialysis because he had not been feeling well with generalized weakness, creased appetite, lightheadedness, nausea, and vomiting.  Family was concerned so they brought him to the ED for further evaluation.  In the ED patient's vitals were within normal limits on arrival.  Labs showed that he had severe uremia with BUN greater than 112, hyperkalemia, and elevated lactic acid level to go along with his known renal failure.  Chest x-ray showed cardiomegaly with no acute findings.  When asked she denied any recent fevers, chills, headaches, focal weakness, chest pain, palpitation, shortness of breath, cough, abdominal pain, nausea, vomiting, diarrhea, constipation, dysuria, hematuria, hematochezia, melena, or anxiety.  She is admitted for further evaluation and treatment.    Review of Systems   All systems were reviewed and negative except for: As per HPI    Personal History     Past Medical History:   Diagnosis Date    A-V fistula     right arm    Anemia of chronic renal failure 2021    Antiplatelet or antithrombotic long-term use     plavix    Arthritis     Bilateral leg pain 2021    CAD (coronary artery disease)     h/o CABG  and stents 2022, Dr Shirley follows    Chest pain     saw cardiology 4/10/23, pt states better if he takes his medications    Dialysis patient     Tues, Thursday, Saturday, has chest wall catheter currently    Gout     Heart attack     Hyperlipidemia      Hypertension     Kidney disease     stage 4, Dr Ornelas follows    Neck pain 08/30/2022    Neuritis of lower extremity, right 12/19/2019    Proteinuria 05/23/2022    Rheumatoid factor positive 07/06/2021    Seasonal allergies 03/13/2014    Vitamin D deficiency 05/23/2022       Past Surgical History:   Procedure Laterality Date    ARTERIOVENOUS FISTULA Right     CARDIAC CATHETERIZATION      CARPAL TUNNEL RELEASE      CATARACT EXTRACTION W/ INTRAOCULAR LENS IMPLANT      COLONOSCOPY N/A 2006    COLONOSCOPY N/A 12/14/2018    Procedure: COLONOSCOPY TO CECUM WITH COLD BIOPSY POLYPECTOMY;  Surgeon: Elliott Harding MD;  Location: [facility] ENDOSCOPY;  Service: General    CORONARY ANGIOPLASTY WITH STENT PLACEMENT  11/09/2022    CORONARY ARTERY BYPASS GRAFT N/A 06/20/2003    INGUINAL HERNIA REPAIR Right 06/04/2014    Open incarcerated inguinal hernia repair-Dr. Elliott Harding    INGUINAL HERNIA REPAIR Left 4/26/2023    Procedure: OPEN LEFT INGUINAL HERNIA REPAIR;  Surgeon: Elliott Harding MD;  Location: [facility] OR;  Service: General;  Laterality: Left;    LUMBAR DISC SURGERY N/A 1990, 1992    L4-L5, X2       Family History: family history includes Heart disease in his father and mother. Otherwise pertinent FHx was reviewed and not pertinent to current issue.    Social History:  reports that he has never smoked. He has never used smokeless tobacco. He reports current alcohol use. He reports that he does not use drugs.    Home Medications:  Docusate Sodium, Methoxy PEG-Epoetin Beta, Zinc Sulfate, allopurinol, amLODIPine, aspirin, atorvastatin, budesonide-formoterol, bumetanide, calcium acetate, carvedilol, clopidogrel, ferrous sulfate, hydrALAZINE, hydrOXYzine, isosorbide mononitrate, losartan, nitroglycerin, ranolazine, sevelamer, and tamsulosin      Allergies:  Allergies   Allergen Reactions    Peanut (Diagnostic) Shortness Of Breath and Rash    Peanut Butter Flavor Shortness Of Breath and Rash    Simvastatin Myalgia      Other reaction(s): muscle cramps       Objective   Objective     Vitals:   Temp:  [97.6 °F (36.4 °C)] 97.6 °F (36.4 °C)  Heart Rate:  [65-66] 65  Resp:  [18-20] 20  BP: (135-139)/(64-72) 135/72  Physical Exam    Constitutional: Awake, alert   Eyes: PERRLA, sclerae anicteric, no conjunctival injection   HENT: NCAT, mucous membranes moist   Neck: Supple, no thyromegaly, no lymphadenopathy, trachea midline   Respiratory: Clear to auscultation bilaterally, nonlabored respirations    Cardiovascular: RRR, no murmurs, rubs, or gallops, palpable pedal pulses bilaterally   Gastrointestinal: Positive bowel sounds, soft, nontender, nondistended   Musculoskeletal: No bilateral ankle edema, no clubbing or cyanosis to extremities   Psychiatric: Appropriate affect, cooperative   Neurologic: Oriented x 3, strength symmetric in all extremities, Cranial Nerves grossly intact to confrontation, speech clear   Skin: No rashes     Result Review    Result Review:  I have personally reviewed the results from the time of this admission to 7/1/2024 18:40 EDT and agree with these findings:  [x]  Laboratory list / accordion  []  Microbiology  [x]  Radiology  [x]  EKG/Telemetry   []  Cardiology/Vascular   []  Pathology  []  Old records  []  Other:  Most notable findings include: Hyperkalemia, uremia, electrolyte imbalances, renal failure, anemia.  Chest x-ray negative      Assessment & Plan   Assessment / Plan     Brief Patient Summary:  Mohamud Jarrett is a 74 y.o. male with past medical history of end-stage renal disease on hemodialysis, hypertension, CAD status post CABG, and GERD presented to the ED for evaluation of generalized weakness    Active Hospital Problems:  Active Hospital Problems    Diagnosis     **Hyperkalemia     End stage renal disease     CHF (congestive heart failure)     Weakness generalized     Iron deficiency anemia     Dependence on renal dialysis     Stented coronary artery     Coronary artery disease     Essential  (primary) hypertension      Plan:     Hyperkalemia  -Admit to telemetry  -EKG reviewed  -Patient with multiple missed dialysis sessions  -Patient symptomatic with nausea and vomiting  -Lokelma given in ED  -Patient to have emergent hemodialysis  -Nephrology consulted and following  -Will follow along    End-stage renal disease  -Hemodialysis per nephrology    HTN  -Currently well controlled  -PRN BP meds  -Resume home meds when available  -Titrate if needed    Generalized weakness  -Secondary to above  -Fall precautions    CAD status post CABG  CHF    GI ppx  DVT ppx    CODE STATUS:       Admission Status:  I believe this patient meets observation status.      Electronically signed by Lior Aviles MD, 07/01/24, 6:40 PM EDT.

## 2024-07-01 NOTE — CONSULTS
Consult received.   Spoke to Dr. Child in ER. Missed over a wk of dialysis. Recent PTCA to coronary.  D/w dialysis nurse, proceed with dialysis ASAP.   Labs reviewed. Acidosis, severe hyperkalemia and elevated bun.   Full note will follow in AM.  Please call with any questions.     Electronically signed by Silas Muniz MD, 07/01/24, 4:59 PM EDT.

## 2024-07-02 LAB
ALBUMIN SERPL-MCNC: 3.1 G/DL (ref 3.5–5.2)
ANION GAP SERPL CALCULATED.3IONS-SCNC: 16.8 MMOL/L (ref 5–15)
BUN SERPL-MCNC: 55 MG/DL (ref 8–23)
BUN/CREAT SERPL: 7.1 (ref 7–25)
CALCIUM SPEC-SCNC: 8.6 MG/DL (ref 8.6–10.5)
CHLORIDE SERPL-SCNC: 95 MMOL/L (ref 98–107)
CO2 SERPL-SCNC: 27.2 MMOL/L (ref 22–29)
CREAT SERPL-MCNC: 7.75 MG/DL (ref 0.76–1.27)
DEPRECATED RDW RBC AUTO: 55.9 FL (ref 37–54)
EGFRCR SERPLBLD CKD-EPI 2021: 6.9 ML/MIN/1.73
ERYTHROCYTE [DISTWIDTH] IN BLOOD BY AUTOMATED COUNT: 17 % (ref 12.3–15.4)
GLUCOSE SERPL-MCNC: 88 MG/DL (ref 65–99)
HCT VFR BLD AUTO: 25.7 % (ref 37.5–51)
HGB BLD-MCNC: 8.9 G/DL (ref 13–17.7)
MCH RBC QN AUTO: 32.6 PG (ref 26.6–33)
MCHC RBC AUTO-ENTMCNC: 34.6 G/DL (ref 31.5–35.7)
MCV RBC AUTO: 94.1 FL (ref 79–97)
PHOSPHATE SERPL-MCNC: 6.5 MG/DL (ref 2.5–4.5)
PLATELET # BLD AUTO: 138 10*3/MM3 (ref 140–450)
PMV BLD AUTO: 11 FL (ref 6–12)
POTASSIUM SERPL-SCNC: 4.1 MMOL/L (ref 3.5–5.2)
RBC # BLD AUTO: 2.73 10*6/MM3 (ref 4.14–5.8)
SODIUM SERPL-SCNC: 139 MMOL/L (ref 136–145)
WBC NRBC COR # BLD AUTO: 4.26 10*3/MM3 (ref 3.4–10.8)

## 2024-07-02 PROCEDURE — G0378 HOSPITAL OBSERVATION PER HR: HCPCS

## 2024-07-02 PROCEDURE — 94640 AIRWAY INHALATION TREATMENT: CPT

## 2024-07-02 PROCEDURE — 94799 UNLISTED PULMONARY SVC/PX: CPT

## 2024-07-02 PROCEDURE — 80069 RENAL FUNCTION PANEL: CPT | Performed by: INTERNAL MEDICINE

## 2024-07-02 PROCEDURE — 94760 N-INVAS EAR/PLS OXIMETRY 1: CPT

## 2024-07-02 PROCEDURE — 99232 SBSQ HOSP IP/OBS MODERATE 35: CPT | Performed by: FAMILY MEDICINE

## 2024-07-02 PROCEDURE — 85027 COMPLETE CBC AUTOMATED: CPT | Performed by: INTERNAL MEDICINE

## 2024-07-02 PROCEDURE — 36415 COLL VENOUS BLD VENIPUNCTURE: CPT | Performed by: INTERNAL MEDICINE

## 2024-07-02 PROCEDURE — 25010000002 HEPARIN (PORCINE) PER 1000 UNITS: Performed by: FAMILY MEDICINE

## 2024-07-02 RX ORDER — FLUTICASONE PROPIONATE 50 MCG
2 SPRAY, SUSPENSION (ML) NASAL DAILY
Status: DISCONTINUED | OUTPATIENT
Start: 2024-07-02 | End: 2024-07-07 | Stop reason: HOSPADM

## 2024-07-02 RX ORDER — IPRATROPIUM BROMIDE AND ALBUTEROL SULFATE 2.5; .5 MG/3ML; MG/3ML
3 SOLUTION RESPIRATORY (INHALATION) ONCE
Status: COMPLETED | OUTPATIENT
Start: 2024-07-02 | End: 2024-07-02

## 2024-07-02 RX ORDER — IPRATROPIUM BROMIDE AND ALBUTEROL SULFATE 2.5; .5 MG/3ML; MG/3ML
3 SOLUTION RESPIRATORY (INHALATION) EVERY 4 HOURS PRN
Status: DISCONTINUED | OUTPATIENT
Start: 2024-07-02 | End: 2024-07-07 | Stop reason: HOSPADM

## 2024-07-02 RX ADMIN — FLUTICASONE PROPIONATE 2 SPRAY: 50 SPRAY, METERED NASAL at 14:54

## 2024-07-02 RX ADMIN — GUAIFENESIN AND DEXTROMETHORPHAN 5 ML: 100; 10 SYRUP ORAL at 12:44

## 2024-07-02 RX ADMIN — IPRATROPIUM BROMIDE AND ALBUTEROL SULFATE 3 ML: .5; 3 SOLUTION RESPIRATORY (INHALATION) at 16:17

## 2024-07-02 RX ADMIN — HEPARIN SODIUM 5000 UNITS: 5000 INJECTION INTRAVENOUS; SUBCUTANEOUS at 09:26

## 2024-07-02 RX ADMIN — Medication 10 ML: at 20:36

## 2024-07-02 RX ADMIN — IPRATROPIUM BROMIDE AND ALBUTEROL SULFATE 3 ML: .5; 3 SOLUTION RESPIRATORY (INHALATION) at 23:35

## 2024-07-02 RX ADMIN — GUAIFENESIN AND DEXTROMETHORPHAN 5 ML: 100; 10 SYRUP ORAL at 20:35

## 2024-07-02 RX ADMIN — Medication 10 ML: at 09:00

## 2024-07-02 RX ADMIN — HEPARIN SODIUM 5000 UNITS: 5000 INJECTION INTRAVENOUS; SUBCUTANEOUS at 20:35

## 2024-07-02 NOTE — CONSULTS
Patient Care Team:  Shara Talley APRN as PCP - General (Nurse Practitioner)  Moe Bower MD as Consulting Physician (Cardiology)  Daniel Lyons APRN (Family Medicine)  Silas Muniz MD as Consulting Physician (Nephrology)  Miriam Corbin APRN as Nurse Practitioner (Nurse Practitioner)  Rich Delgadillo MD as Consulting Physician (Cardiology)  Erasto Shirley MD as Consulting Physician (Cardiology)  Ivan Byrd MD (Transplant Surgery)    Chief complaint: ESRD, Hyperkalemia    Subjective     History of Present Illness  73yo with h/o ESRD on dialysis m/w/f at [facility] in [place].  Non-compliant with dialysis treatment.  Had more recently missed 1 week of dialysis.  Had stat dialysis overnight.  He tells me he didn't go to dialysis because he had cough and felt sick.  No complaints today.    Review of Systems   Constitutional:  Negative for chills and diaphoresis.   Respiratory:  Positive for cough. Negative for shortness of breath.    Cardiovascular:  Negative for chest pain and leg swelling.   Gastrointestinal:  Negative for diarrhea, nausea and vomiting.   Genitourinary:  Negative for dysuria.   Neurological:  Negative for headaches.        Past Medical History:   Diagnosis Date    A-V fistula     right arm    Anemia of chronic renal failure 04/12/2021    Antiplatelet or antithrombotic long-term use     plavix    Arthritis     Bilateral leg pain 11/05/2021    CAD (coronary artery disease)     h/o CABG 2003 and stents 11/2022, Dr Shirley follows    Chest pain     saw cardiology 4/10/23, pt states better if he takes his medications    Dialysis patient     Tues, Thursday, Saturday, has chest wall catheter currently    Gout     Heart attack     Hyperlipidemia     Hypertension     Kidney disease     stage 4, Dr Ornelas follows    Neck pain 08/30/2022    Neuritis of lower extremity, right 12/19/2019    Proteinuria 05/23/2022    Rheumatoid factor positive 07/06/2021    Seasonal  allergies 03/13/2014    Vitamin D deficiency 05/23/2022   ,   Past Surgical History:   Procedure Laterality Date    ARTERIOVENOUS FISTULA Right     CARDIAC CATHETERIZATION      CARPAL TUNNEL RELEASE      CATARACT EXTRACTION W/ INTRAOCULAR LENS IMPLANT      COLONOSCOPY N/A 2006    COLONOSCOPY N/A 12/14/2018    Procedure: COLONOSCOPY TO CECUM WITH COLD BIOPSY POLYPECTOMY;  Surgeon: Elliott Harding MD;  Location: [facility] ENDOSCOPY;  Service: General    CORONARY ANGIOPLASTY WITH STENT PLACEMENT  11/09/2022    CORONARY ARTERY BYPASS GRAFT N/A 06/20/2003    INGUINAL HERNIA REPAIR Right 06/04/2014    Open incarcerated inguinal hernia repair-Dr. Elliott Harding    INGUINAL HERNIA REPAIR Left 4/26/2023    Procedure: OPEN LEFT INGUINAL HERNIA REPAIR;  Surgeon: Elliott Harding MD;  Location: [facility] OR;  Service: General;  Laterality: Left;    LUMBAR DISC SURGERY N/A 1990, 1992    L4-L5, X2   ,   Family History   Problem Relation Age of Onset    Heart disease Mother     Heart disease Father     Malig Hyperthermia Neg Hx    ,   Social History     Socioeconomic History    Marital status:    Tobacco Use    Smoking status: Never    Smokeless tobacco: Never   Vaping Use    Vaping status: Never Used   Substance and Sexual Activity    Alcohol use: Yes     Comment: occassionally    Drug use: No    Sexual activity: Defer     E-cigarette/Vaping    E-cigarette/Vaping Use Never User      E-cigarette/Vaping Substances     E-cigarette/Vaping Devices         ,   Medications Prior to Admission   Medication Sig Dispense Refill Last Dose    allopurinol (ZYLOPRIM) 300 MG tablet Take 1 tablet by mouth Daily. 30 tablet 0     amLODIPine (NORVASC) 10 MG tablet TAKE ONE TABLET BY MOUTH DAILY (Patient taking differently: Take 1 tablet by mouth Daily.) 90 tablet 0     aspirin 81 MG EC tablet Take 1 tablet by mouth Daily.       atorvastatin (LIPITOR) 80 MG tablet Take 1 tablet by mouth Daily.       budesonide-formoterol (SYMBICORT) 80-4.5  MCG/ACT inhaler Inhale 2 puffs.       bumetanide (BUMEX) 1 MG tablet Take 3 tablets by mouth 2 (Two) Times a Day.       calcium acetate (PHOSLO) 667 MG tablet Take 1 tablet by mouth 3 (Three) Times a Day.       carvedilol (COREG) 12.5 MG tablet Take 1 tablet by mouth 2 (Two) Times a Day With Meals.       clopidogrel (PLAVIX) 75 MG tablet Take 1 tablet by mouth Daily.       Docusate Sodium 100 MG capsule Take 1 tablet by mouth 2 (Two) Times a Day.       ferrous sulfate 325 (65 FE) MG tablet Take 1 tablet by mouth Daily With Breakfast.       hydrALAZINE (APRESOLINE) 25 MG tablet Take 1 tablet by mouth 3 (Three) Times a Day. 90 tablet 11     hydrOXYzine (ATARAX) 25 MG tablet Take 1 tablet by mouth Every 8 (Eight) Hours As Needed.       isosorbide mononitrate (IMDUR) 120 MG 24 hr tablet Take 1 tablet by mouth Daily.       losartan (COZAAR) 50 MG tablet Take 1 tablet by mouth Daily.       Methoxy PEG-Epoetin Beta (MIRCERA IJ) 75 mcg Every 28 (Twenty-Eight) Days.       nitroglycerin (NITROSTAT) 0.4 MG SL tablet Place 1 tablet under the tongue Every 5 (Five) Minutes As Needed for Chest Pain (Do not take more than 3 at one time. Go to ER or call 911 if chest pain persists). Take no more than 3 doses in 15 minutes. 60 tablet 3     ranolazine (RANEXA) 500 MG 12 hr tablet Take 1 tablet by mouth 2 (Two) Times a Day. Take dos       Renvela 800 MG tablet Take 3 tablets by mouth 3 (Three) Times a Day.       tamsulosin (FLOMAX) 0.4 MG capsule 24 hr capsule Take 1 capsule by mouth Daily.       Zinc Sulfate 220 (50 Zn) MG tablet Take 1 tablet by mouth.      , Scheduled Meds:  heparin (porcine), 5,000 Units, Subcutaneous, Q12H  sodium chloride, 10 mL, Intravenous, Q12H   , Continuous Infusions:   , PRN Meds:    senna-docusate sodium **AND** polyethylene glycol **AND** bisacodyl **AND** bisacodyl    guaiFENesin-dextromethorphan    ondansetron    sodium chloride    sodium chloride    sodium chloride, and Allergies:  Peanut (diagnostic),  "Peanut butter flavor, and Simvastatin    Objective     Vital Signs  Temp:  [97.2 °F (36.2 °C)-97.9 °F (36.6 °C)] 97.9 °F (36.6 °C)  Heart Rate:  [56-85] 60  Resp:  [16-20] 16  BP: (126-179)/(54-83) 144/60    No intake/output data recorded.  I/O last 3 completed shifts:  In: -   Out: 1500     Physical Exam  Constitutional:       Appearance: Normal appearance.   HENT:      Mouth/Throat:      Mouth: Mucous membranes are moist.   Eyes:      General: No scleral icterus.     Pupils: Pupils are equal, round, and reactive to light.   Cardiovascular:      Rate and Rhythm: Normal rate and regular rhythm.      Pulses: Normal pulses.      Heart sounds: Normal heart sounds.   Pulmonary:      Effort: Pulmonary effort is normal.      Breath sounds: Normal breath sounds.   Abdominal:      General: Abdomen is flat.      Palpations: Abdomen is soft.   Musculoskeletal:      Cervical back: Normal range of motion.      Right lower leg: No edema.      Left lower leg: No edema.   Skin:     General: Skin is warm and dry.   Neurological:      General: No focal deficit present.      Mental Status: He is alert.         Results Review:    I reviewed the patient's new clinical results.  I reviewed the patient's new imaging results and agree with the interpretation.  I reviewed the patient's other test results and agree with the interpretation    WBC WBC   Date Value Ref Range Status   07/02/2024 4.26 3.40 - 10.80 10*3/mm3 Final   07/01/2024 3.97 3.40 - 10.80 10*3/mm3 Final      HGB Hemoglobin   Date Value Ref Range Status   07/02/2024 8.9 (L) 13.0 - 17.7 g/dL Final   07/01/2024 10.2 (L) 13.0 - 17.7 g/dL Final      HCT Hematocrit   Date Value Ref Range Status   07/02/2024 25.7 (L) 37.5 - 51.0 % Final   07/01/2024 29.6 (L) 37.5 - 51.0 % Final      Platlets No results found for: \"LABPLAT\"   MCV MCV   Date Value Ref Range Status   07/02/2024 94.1 79.0 - 97.0 fL Final   07/01/2024 96.1 79.0 - 97.0 fL Final          Sodium Sodium   Date Value Ref " "Range Status   07/02/2024 139 136 - 145 mmol/L Final   07/01/2024 135 (L) 136 - 145 mmol/L Final      Potassium Potassium   Date Value Ref Range Status   07/02/2024 4.1 3.5 - 5.2 mmol/L Final   07/01/2024 6.6 (C) 3.5 - 5.2 mmol/L Final     Comment:     Slight hemolysis detected by analyzer. Result may be falsely elevated.      Chloride Chloride   Date Value Ref Range Status   07/02/2024 95 (L) 98 - 107 mmol/L Final   07/01/2024 92 (L) 98 - 107 mmol/L Final      CO2 CO2   Date Value Ref Range Status   07/02/2024 27.2 22.0 - 29.0 mmol/L Final   07/01/2024 15.2 (L) 22.0 - 29.0 mmol/L Final      BUN BUN   Date Value Ref Range Status   07/02/2024 55 (H) 8 - 23 mg/dL Final   07/01/2024 >112 (H) 8 - 23 mg/dL Final      Creatinine Creatinine   Date Value Ref Range Status   07/02/2024 7.75 (H) 0.76 - 1.27 mg/dL Final   07/01/2024 12.90 (H) 0.76 - 1.27 mg/dL Final      Calcium Calcium   Date Value Ref Range Status   07/02/2024 8.6 8.6 - 10.5 mg/dL Final   07/01/2024 9.0 8.6 - 10.5 mg/dL Final      PO4 No results found for: \"CAPO4\"   Albumin Albumin   Date Value Ref Range Status   07/02/2024 3.1 (L) 3.5 - 5.2 g/dL Final   07/01/2024 3.7 3.5 - 5.2 g/dL Final      Magnesium No results found for: \"MG\"   Uric Acid No results found for: \"URICACID\"         Assessment & Plan       Hyperkalemia    Essential (primary) hypertension    Coronary artery disease    Dependence on renal dialysis    Iron deficiency anemia    Stented coronary artery    Weakness generalized    CHF (congestive heart failure)    End stage renal disease      Assessment & Plan  ESRD-  Normal dialysis m/w/f at [facility].  Had dialysis last pm.  Will plan dialysis again in am and monitor.  Ok for d/c from renal standpoint.  D/w him importance of going to regular dialysis and staying full treatment.    Hyperkalemia-  poor dietary, dialysis complaince.  Better after dialysis.  Low potassium diet which I d/w him today.    Anemia of CKD-  below goal today.  " Will start epogen here.    HTN-  continue home meds.    CAD- s/p CABG, recent stent    I discussed the patients findings and my recommendations with patient, nursing staff, and primary care team    John Sinclair MD  07/02/24  08:20 EDT

## 2024-07-02 NOTE — NURSING NOTE
Duration of Treatment 4.0 Hours                       Completed   Access Site AVF                                15 minute manual pressure   Dialyzer Revaclear    mL/min   Dialysate Temperature (C) 36   BFR-As tolerated to a maximum of: 400 mL/min   Dialysate Solution Bath: K+ 2 mEq, Ca 2.5mEq   Bicarb 40 mEq   Na+ 139 mEq   Fluid Removal: 2-3 as tolerated             1.5 L removed     4 hours dialysis completed. Patient educated on dialysis treatment time. About 2 hours into treatment UF paused due to patient having cramps in both legs. 100 ml normal saline bolus given. UFG decreased. 2 hrs 30 minute into treatment patient having lower bilateral legs cramps. UF paused, for remainder of treatment due continuous leg cramps. 1.5 L net UF removed. Post /69 HR 71. Post report given to primary nurse: AYAN Sosa.

## 2024-07-02 NOTE — PLAN OF CARE
Goal Outcome Evaluation:  Plan of Care Reviewed With: patient        Progress: no change  Outcome Evaluation: Patient alert & oriented x4, VSS, no significant changes in status during shift.

## 2024-07-02 NOTE — PLAN OF CARE
Goal Outcome Evaluation:      Patient was an admit from ED on shift. A&O. VSS. No complaints from patient. All needs met at this time. Plan of care continue.

## 2024-07-03 LAB
ANION GAP SERPL CALCULATED.3IONS-SCNC: 18.3 MMOL/L (ref 5–15)
BUN SERPL-MCNC: 58 MG/DL (ref 8–23)
BUN/CREAT SERPL: 6.7 (ref 7–25)
CALCIUM SPEC-SCNC: 8.9 MG/DL (ref 8.6–10.5)
CHLORIDE SERPL-SCNC: 96 MMOL/L (ref 98–107)
CO2 SERPL-SCNC: 24.7 MMOL/L (ref 22–29)
CREAT SERPL-MCNC: 8.72 MG/DL (ref 0.76–1.27)
DEPRECATED RDW RBC AUTO: 58.4 FL (ref 37–54)
EGFRCR SERPLBLD CKD-EPI 2021: 5.9 ML/MIN/1.73
ERYTHROCYTE [DISTWIDTH] IN BLOOD BY AUTOMATED COUNT: 17.2 % (ref 12.3–15.4)
GLUCOSE SERPL-MCNC: 96 MG/DL (ref 65–99)
HCT VFR BLD AUTO: 29.6 % (ref 37.5–51)
HGB BLD-MCNC: 10 G/DL (ref 13–17.7)
MCH RBC QN AUTO: 32.7 PG (ref 26.6–33)
MCHC RBC AUTO-ENTMCNC: 33.8 G/DL (ref 31.5–35.7)
MCV RBC AUTO: 96.7 FL (ref 79–97)
PLATELET # BLD AUTO: 159 10*3/MM3 (ref 140–450)
PMV BLD AUTO: 10.8 FL (ref 6–12)
POTASSIUM SERPL-SCNC: 4.3 MMOL/L (ref 3.5–5.2)
RBC # BLD AUTO: 3.06 10*6/MM3 (ref 4.14–5.8)
SODIUM SERPL-SCNC: 139 MMOL/L (ref 136–145)
WBC NRBC COR # BLD AUTO: 4.02 10*3/MM3 (ref 3.4–10.8)

## 2024-07-03 PROCEDURE — 25010000002 EPOETIN ALFA PER 1000 UNITS: Performed by: INTERNAL MEDICINE

## 2024-07-03 PROCEDURE — G0378 HOSPITAL OBSERVATION PER HR: HCPCS

## 2024-07-03 PROCEDURE — 25010000002 HEPARIN (PORCINE) PER 1000 UNITS: Performed by: FAMILY MEDICINE

## 2024-07-03 PROCEDURE — 97166 OT EVAL MOD COMPLEX 45 MIN: CPT

## 2024-07-03 PROCEDURE — 99232 SBSQ HOSP IP/OBS MODERATE 35: CPT | Performed by: FAMILY MEDICINE

## 2024-07-03 PROCEDURE — 85027 COMPLETE CBC AUTOMATED: CPT | Performed by: FAMILY MEDICINE

## 2024-07-03 PROCEDURE — 80048 BASIC METABOLIC PNL TOTAL CA: CPT | Performed by: INTERNAL MEDICINE

## 2024-07-03 RX ORDER — BUMETANIDE 1 MG/1
4 TABLET ORAL DAILY
Status: DISCONTINUED | OUTPATIENT
Start: 2024-07-03 | End: 2024-07-03

## 2024-07-03 RX ORDER — BUMETANIDE 1 MG/1
3 TABLET ORAL
Status: DISCONTINUED | OUTPATIENT
Start: 2024-07-03 | End: 2024-07-07 | Stop reason: HOSPADM

## 2024-07-03 RX ORDER — IPRATROPIUM BROMIDE AND ALBUTEROL SULFATE 2.5; .5 MG/3ML; MG/3ML
3 SOLUTION RESPIRATORY (INHALATION)
Status: DISCONTINUED | OUTPATIENT
Start: 2024-07-03 | End: 2024-07-05

## 2024-07-03 RX ORDER — SEVELAMER CARBONATE 800 MG/1
1600 TABLET, FILM COATED ORAL
Status: DISCONTINUED | OUTPATIENT
Start: 2024-07-03 | End: 2024-07-07 | Stop reason: HOSPADM

## 2024-07-03 RX ADMIN — SEVELAMER CARBONATE 1600 MG: 800 TABLET, FILM COATED ORAL at 13:31

## 2024-07-03 RX ADMIN — FLUTICASONE PROPIONATE 2 SPRAY: 50 SPRAY, METERED NASAL at 09:06

## 2024-07-03 RX ADMIN — HEPARIN SODIUM 5000 UNITS: 5000 INJECTION INTRAVENOUS; SUBCUTANEOUS at 20:08

## 2024-07-03 RX ADMIN — SEVELAMER CARBONATE 1600 MG: 800 TABLET, FILM COATED ORAL at 09:06

## 2024-07-03 RX ADMIN — ERYTHROPOIETIN 10000 UNITS: 10000 INJECTION, SOLUTION INTRAVENOUS; SUBCUTANEOUS at 09:06

## 2024-07-03 RX ADMIN — HEPARIN SODIUM 5000 UNITS: 5000 INJECTION INTRAVENOUS; SUBCUTANEOUS at 09:06

## 2024-07-03 RX ADMIN — Medication 10 ML: at 09:06

## 2024-07-03 RX ADMIN — Medication 10 ML: at 20:09

## 2024-07-03 NOTE — NURSING NOTE
Duration of Treatment 3.5 Hours                                Ran entire tx   Access Site Right AVF                                Functioned well   Dialyzer Revaclear    mL/min   Dialysate Temperature (C) 36   BFR-As tolerated to a maximum of: 400 mL/min   Dialysate Solution Bath: K+ 2 mEq, Ca 2.5mEq   Bicarb Other   Bicarb Comments 37   Na+ 137 meq   Fluid Removal: 1L                                          Removed 1L       Pt completed 3.5 hour dialysis tx, removed 1L; pt tolerated well; no complaints from pt; post /71 HR 64.    Report given to med Sosa RN.

## 2024-07-03 NOTE — PROGRESS NOTES
[facility]   Hospitalist Progress Note  Date: 7/3/2024  Patient Name: Mohamud Jarrett  : 1950  MRN: [id number]  Date of admission: 2024      Subjective   Subjective     Chief Complaint: Follow-up weakness cough    Summary:Mohamud Jarrett is a 74 y.o. male  with past medical history of end-stage renal disease on hemodialysis, hypertension, CAD status post CABG, and GERD presented to the ED for evaluation of generalized weakness.  Patient states that he missed over a week of dialysis because he had not been feeling well with generalized weakness, creased appetite, lightheadedness, nausea, and vomiting.  Family was concerned so they brought him to the ED for further evaluation.  In the ED patient's vitals were within normal limits on arrival.  Labs showed that he had severe uremia with BUN greater than 112, hyperkalemia, and elevated lactic acid level to go along with his known renal failure.  Chest x-ray showed cardiomegaly with no acute findings.  Patient given Lokelma admitted for emergent dialysis.  Nephrology consulted.  Underwent dialysis with improvement in hyperkalemia and uremia.  Continues to be significantly weak AMPA 12 per nursing.  PT OT consulted.    Interval Followup: Patient sitting up in bed side chair appears to be resting comfortably.  Patient continues to endorse fatigue and generalized weakness.  Patient indicates breathing treatments helped with cough and congestion overnight.  Patient denies any new complaints.  Afebrile overnight.  Sinus rhythm 60s to 70s on telemetry reviewed.  Blood pressure within normal limits.  Satting well on room air.  Potassium within normal limits.   Bicarb within normal limits.  Hemoglobin improved.  No other issues per nursing.    Review of Systems  Constitutional: Positive for fatigue and negative fever.   HENT: Negative for sore throat and trouble swallowing.    Eyes: Negative for pain and discharge.   Respiratory: Positive for cough and negative  shortness of breath.    Cardiovascular: Negative for chest pain and palpitations.   Gastrointestinal: Negative for abdominal pain, nausea and vomiting.   Endocrine: Negative for cold intolerance and heat intolerance.   Genitourinary: Negative for difficulty urinating and dysuria.   Musculoskeletal: Negative for back pain and neck stiffness.   Skin: Negative for color change and rash.   Neurological: Negative for syncope and headaches.   Hematological: Negative for adenopathy.   Psychiatric/Behavioral: Negative for confusion and hallucinations.    Objective   Objective     Vitals:   Temp:  [97.3 °F (36.3 °C)-98.6 °F (37 °C)] 97.7 °F (36.5 °C)  Heart Rate:  [60-71] 71  Resp:  [16-18] 16  BP: (136-165)/(59-80) 157/80  Physical Exam   General: well-developed appearing stated age in no acute distress  HEENT: Normocephalic atraumatic moist membranes pupils equal round reactive light, no scleral icterus no conjunctival injection  Cardiovascular: regular rate and rhythm no murmurs rubs or gallops S1-S2, no lower extremity edema appreciated  Pulmonary: Scant rhonchi bilaterally no wheezes rales symmetric chest expansion, unlabored, no conversational dyspnea appreciated  Gastrointestinal: Soft nontender nondistended positive bowel sounds all 4 quadrants no rebound or guarding  Musculoskeletal: No clubbing cyanosis, warm and well-perfused, calves soft symmetric nontender bilaterally  Skin: Clean dry without rashes  Neuro: Cranial nerves II through XII intact grossly no sensorimotor deficits appreciated bilateral upper and lower extremities  Psych: Patient is calm cooperative and appropriate with exam not responding to internal stimuli  : No Joya catheter no bladder distention no suprapubic tenderness    Result Review    Result Review:  I have personally reviewed these results and agree with these findings:  [x]  Laboratory  LAB RESULTS:      Lab 07/03/24  0455 07/02/24  0516 07/01/24  1744 07/01/24  1425   WBC 4.02 4.26  --   3.97   HEMOGLOBIN 10.0* 8.9*  --  10.2*   HEMATOCRIT 29.6* 25.7*  --  29.6*   PLATELETS 159 138*  --  155   NEUTROS ABS  --   --   --  2.41   IMMATURE GRANS (ABS)  --   --   --  0.01   LYMPHS ABS  --   --   --  0.83   MONOS ABS  --   --   --  0.60   EOS ABS  --   --   --  0.06   MCV 96.7 94.1  --  96.1   LACTATE  --   --  1.7 2.1*         Lab 07/03/24  0455 07/02/24  0516 07/01/24  1425   SODIUM 139 139 135*   POTASSIUM 4.3 4.1 6.6*   CHLORIDE 96* 95* 92*   CO2 24.7 27.2 15.2*   ANION GAP 18.3* 16.8* 27.8*   BUN 58* 55* >112*   CREATININE 8.72* 7.75* 12.90*   EGFR 5.9* 6.9* 3.7*   GLUCOSE 96 88 113*   CALCIUM 8.9 8.6 9.0   PHOSPHORUS  --  6.5*  --          Lab 07/02/24  0516 07/01/24  1425   TOTAL PROTEIN  --  6.9   ALBUMIN 3.1* 3.7   GLOBULIN  --  3.2   ALT (SGPT)  --  17   AST (SGOT)  --  22   BILIRUBIN  --  1.5*   ALK PHOS  --  239*   LIPASE  --  113*                     Brief Urine Lab Results       None          Microbiology Results (last 10 days)       ** No results found for the last 240 hours. **            []  Microbiology  [x]  Radiology  XR Chest 1 View    Result Date: 7/1/2024  Impression: Cardiomegaly. Electronically Signed: Mitchell Ellis MD  7/1/2024 3:59 PM EDT  Workstation ID: [id number]     [x]  EKG/Telemetry   []  Cardiology/Vascular   []  Pathology  []  Old records  [x]  Other:  Scheduled Meds:bumetanide, 3 mg, Oral, BID  epoetin ben/ben-epbx, 10,000 Units, Subcutaneous, Once per day on Monday Wednesday Friday  fluticasone, 2 spray, Each Nare, Daily  heparin (porcine), 5,000 Units, Subcutaneous, Q12H  ipratropium-albuterol, 3 mL, Nebulization, 4x Daily - RT  sevelamer, 1,600 mg, Oral, TID With Meals  sodium chloride, 10 mL, Intravenous, Q12H      Continuous Infusions:   PRN Meds:.  senna-docusate sodium **AND** polyethylene glycol **AND** bisacodyl **AND** bisacodyl    guaiFENesin-dextromethorphan    ipratropium-albuterol    ondansetron    sodium chloride    sodium chloride    sodium  chloride      Assessment & Plan   Assessment / Plan     Assessment/Plan:  Normocytic anemia  Thrombocytopenia  Hyperkalemia present on admission resolved  Metabolic acidosis secondary end-stage renal disease present on admission resolved  Uremia improved  Fatigue  Cough  Decreased mobility and ADLs        Patient admitted for further evaluation and treatment  Nephrology consulted thank you for assistance  Continue Epogen per nephrology  Continue hemodialysis with ultrafiltration per nephrology  Physical therapy Occupational Therapy consulted thank you for your assistance  Continue bronchopulmonary hygiene protocol  Continue DuoNebs scheduled and as needed  Continue Robitussin  Continue Flonase scheduled  Further inpatient orders recommendations pending clinical course       Discussed plan with bedside RN as well as Dr. Muniz nephrology attending.    Disposition: Pending PT eval.  OT recommending inpatient rehab placement.    VTE Prophylaxis:  Pharmacologic VTE prophylaxis orders are present.        CODE STATUS:   Level Of Support Discussed With: Patient  Code Status (Patient has no pulse and is not breathing): CPR (Attempt to Resuscitate)  Medical Interventions (Patient has pulse or is breathing): Full Support

## 2024-07-03 NOTE — THERAPY EVALUATION
Patient Name: Mohamud Jarrett  : 1950    MRN: [id number]                              Today's Date: 7/3/2024       Admit Date: 2024    Visit Dx:     ICD-10-CM ICD-9-CM   1. End stage renal disease  N18.6 585.6   2. Hyperkalemia  E87.5 276.7   3. Decreased activities of daily living (ADL)  Z78.9 V49.89     Patient Active Problem List   Diagnosis    Gout    Essential (primary) hypertension    Hyperlipidemia    Chronic kidney disease, stage 4 (severe)    Anemia in chronic kidney disease    Coronary artery disease    Chronic systolic (congestive) heart failure    Coagulation defect, unspecified    Dependence on renal dialysis    Gout due to renal impairment, unspecified site    Iron deficiency anemia    Mitral valve regurgitation    Stented coronary artery    Pure hypercholesterolemia    Left inguinal hernia    Myalgia    H/O peanut allergy    Weakness generalized    CHF (congestive heart failure)    Medicare annual wellness visit, subsequent    Secondary hyperparathyroidism of renal origin    Dizziness    Personal history of fall    History of gout    Impacted cerumen of right ear    Hyperkalemia    End stage renal disease     Past Medical History:   Diagnosis Date    A-V fistula     right arm    Anemia of chronic renal failure 2021    Antiplatelet or antithrombotic long-term use     plavix    Arthritis     Bilateral leg pain 2021    CAD (coronary artery disease)     h/o CABG  and stents 2022, Dr Shirley follows    Chest pain     saw cardiology 4/10/23, pt states better if he takes his medications    Dialysis patient     Tues, Thursday, Saturday, has chest wall catheter currently    Gout     Heart attack     Hyperlipidemia     Hypertension     Kidney disease     stage 4, Dr Ornelas follows    Neck pain 2022    Neuritis of lower extremity, right 2019    Proteinuria 2022    Rheumatoid factor positive 2021    Seasonal allergies 2014    Vitamin D deficiency  05/23/2022     Past Surgical History:   Procedure Laterality Date    ARTERIOVENOUS FISTULA Right     CARDIAC CATHETERIZATION      CARPAL TUNNEL RELEASE      CATARACT EXTRACTION W/ INTRAOCULAR LENS IMPLANT      COLONOSCOPY N/A 2006    COLONOSCOPY N/A 12/14/2018    Procedure: COLONOSCOPY TO CECUM WITH COLD BIOPSY POLYPECTOMY;  Surgeon: Elliott Harding MD;  Location:  [facility];  Service: General    CORONARY ANGIOPLASTY WITH STENT PLACEMENT  11/09/2022    CORONARY ARTERY BYPASS GRAFT N/A 06/20/2003    INGUINAL HERNIA REPAIR Right 06/04/2014    Open incarcerated inguinal hernia repair-Dr. Elliott Harding    INGUINAL HERNIA REPAIR Left 4/26/2023    Procedure: OPEN LEFT INGUINAL HERNIA REPAIR;  Surgeon: Elliott Harding MD;  Location:  [facility];  Service: General;  Laterality: Left;    LUMBAR DISC SURGERY N/A 1990, 1992    L4-L5, X2      General Information       Row Name 07/03/24 1034          OT Time and Intention    Document Type evaluation  -AV     Mode of Treatment individual therapy;occupational therapy  -AV       Row Name 07/03/24 1034          General Information    Patient Profile Reviewed yes  -AV     Prior Level of Function independent:;ADL's;transfer  stands to shower (tub). stands at sink to groom. difficulty transferring on/off his standard commode. ambulates with RW: reports frequent falls. no home O2.  -AV     Existing Precautions/Restrictions fall  -AV     Barriers to Rehab --  history of medical non-compliance  -AV       Row Name 07/03/24 1034          Occupational Profile    Reason for Services/Referral (Occupational Profile) Patient is a 74 year old male admitted to [facility] on 7/1/24 with weakness, missing dialysis. He is currently on 3W/ room air. OT consulted due to recent decline in ADL/ transfer independence. No previous OT services for current condition.  -AV       Row Name 07/03/24 1034          Living Environment    People in Home sibling(s)  reports living w/ his sister   -AV       [facility] Name 07/03/24 1034          Home Main Entrance    Number of Stairs, Main Entrance none  -AV       [facility] Name 07/03/24 1034          Stairs Within Home, Primary    Number of Stairs, Within Home, Primary none  -AV       [facility] Name 07/03/24 1034          Cognition    Orientation Status (Cognition) --  alert, pleasant and cooperative. able to follow commands. minimal cues for redirection.  -AV       [facility] Name 07/03/24 1034          Safety Issues, Functional Mobility    Impairments Affecting Function (Mobility) balance;endurance/activity tolerance;strength  -AV               User Key  (r) = Recorded By, (t) = Taken By, (c) = Cosigned By      Initials Name Provider Type    Alek Restrepo OT Occupational Therapist                     Mobility/ADL's       [facility] 07/03/24 1038          Transfers    Transfers sit-stand transfer  -AV     Comment, (Transfers) min assist- unsteady  -AV       Row Name 07/03/24 1038          Sit-Stand Transfer    Sit-Stand [place] (Transfers) contact guard;verbal cues  -AV       Row Name 07/03/24 1038          Activities of Daily Living    BADL Assessment/Intervention --  Independent feeding and grooming with setup while seated. Mod assist bathing/ dressing. Independent use of urinal in seated position/ no BM during hospitalization.  -AV               User Key  (r) = Recorded By, (t) = Taken By, (c) = Cosigned By      Initials Name Provider Type    Alek Restrepo OT Occupational Therapist                   Obj/Interventions       [facility] Name 07/03/24 1039          Sensory Assessment (Somatosensory)    Sensory Assessment (Somatosensory) UE sensation intact  -AV       Row Name 07/03/24 1039          Vision Assessment/Intervention    Visual Impairment/Limitations WF  -AV     Vision Assessment Comment glasses  -AV       Row Name 07/03/24 1039          Range of Motion Comprehensive    General Range of Motion bilateral upper extremity ROM WFL  -AV     Comment, General Range of  "Motion AROM  -AV       Row Name 07/03/24 1039          Strength Comprehensive (MMT)    Comment, General Manual Muscle Testing (MMT) Assessment 4(-)/5 bilateral biceps, triceps and   -AV       Row Name 07/03/24 1039          Motor Skills    Motor Skills coordination;functional endurance  -AV     Coordination WFL  left dominant. functional w/ some difficulty: \"shaky hands\"  -AV     Functional Endurance fair minus  -AV       Row Name 07/03/24 1039          Balance    Comment, Balance min assist  -AV               User Key  (r) = Recorded By, (t) = Taken By, (c) = Cosigned By      Initials Name Provider Type    AV Alek Begum OT Occupational Therapist                   Goals/Plan       Row Name 07/03/24 1042          Transfer Goal 1 (OT)    Activity/Assistive Device (Transfer Goal 1, OT) transfers, all;walker, rolling  -AV     [place] Level/Cues Needed (Transfer Goal 1, OT) modified independence  -AV     Time Frame (Transfer Goal 1, OT) long term goal (LTG);10 days  -AV       Row Name 07/03/24 1042          Bathing Goal 1 (OT)    Activity/Device (Bathing Goal 1, OT) bathing skills, all;tub bench  -AV     [place] Level/Cues Needed (Bathing Goal 1, OT) modified independence  -AV     Time Frame (Bathing Goal 1, OT) long term goal (LTG);10 days  -AV       Row Name 07/03/24 1042          Dressing Goal 1 (OT)    Activity/Device (Dressing Goal 1, OT) dressing skills, all  -AV     [place]/Cues Needed (Dressing Goal 1, OT) modified independence  -AV     Time Frame (Dressing Goal 1, OT) long term goal (LTG);10 days  -AV       Row Name 07/03/24 1042          Toileting Goal 1 (OT)    Activity/Device (Toileting Goal 1, OT) toileting skills, all;raised toilet seat  -AV     [place] Level/Cues Needed (Toileting Goal 1, OT) modified independence  -AV     Time Frame (Toileting Goal 1, OT) long term goal (LTG);10 days  -AV       Row Name 07/03/24 1042          Grooming Goal 1 (OT)    Activity/Device (Grooming " Goal 1, OT) grooming skills, all  -AV     [place] (Grooming Goal 1, OT) modified independence  standing at sink  -AV     Time Frame (Grooming Goal 1, OT) long term goal (LTG);10 days  -AV       Row Name 07/03/24 1042          Strength Goal 1 (OT)    Strength Goal 1 (OT) Patient will demonstrate 4/5 bilateral biceps, triceps and  to increase ADL/ transfer independence.  -AV     Time Frame (Strength Goal 1, OT) long term goal (LTG);10 days  -AV       Row Name 07/03/24 1042          Problem Specific Goal 1 (OT)    Problem Specific Goal 1 (OT) Patient will demonstrate fair endurance/ activity tolerance needed to support ADLs.  -AV     Time Frame (Problem Specific Goal 1, OT) long term goal (LTG);10 days  -AV       Row Name 07/03/24 1042          Therapy Assessment/Plan (OT)    Planned Therapy Interventions (OT) activity tolerance training;BADL retraining;functional balance retraining;occupation/activity based interventions;patient/caregiver education/training;strengthening exercise;transfer/mobility retraining  -AV               User Key  (r) = Recorded By, (t) = Taken By, (c) = Cosigned By      Initials Name Provider Type    AV Alek Begum OT Occupational Therapist                   Clinical Impression       Row Name 07/03/24 1041          Pain Assessment    Additional Documentation Pain Scale: FACES Pre/Post-Treatment (Group)  -AV       Row Name 07/03/24 1041          Pain Scale: FACES Pre/Post-Treatment    Pain: FACES Scale, Pretreatment 2-->hurts little bit  -AV     Posttreatment Pain Rating 2-->hurts little bit  -AV       Row Name 07/03/24 1041          Plan of Care Review    Plan of Care Reviewed With patient  -AV     Progress no change  first session: evaluation  -AV     Outcome Evaluation Patient presents with limitations of balance, strength and endurance/ activity tolerance which are impacting ADL/ transfer independence. Skilled OT services are indicated to remediate/ compensate for deficits to  maximize independence and safety with functional tasks.  -AV       Row Name 07/03/24 1041          Therapy Assessment/Plan (OT)    Patient/Family Therapy Goal Statement (OT) regain independence  -AV     Rehab Potential (OT) good, to achieve stated therapy goals  -AV     Criteria for Skilled Therapeutic Interventions Met (OT) yes;meets criteria;skilled treatment is necessary  -AV     Therapy Frequency (OT) 5 times/wk  -AV       Row Name 07/03/24 1041          Therapy Plan Review/Discharge Plan (OT)    Equipment Needs Upon Discharge (OT) commode chair;tub bench  -AV     Anticipated Discharge Disposition (OT) inpatient rehabilitation facility  -AV       Row Name 07/03/24 1041          Vital Signs    O2 Delivery Pre Treatment room air  -AV     O2 Delivery Intra Treatment room air  -AV     O2 Delivery Post Treatment room air  -AV       Row Name 07/03/24 1041          Positioning and Restraints    Pre-Treatment Position in bed  -AV     Post Treatment Position bed  -AV     In Bed sitting EOB;call light within reach;encouraged to call for assist;exit alarm on  -AV               User Key  (r) = Recorded By, (t) = Taken By, (c) = Cosigned By      Initials Name Provider Type    AV Alek Begum, OT Occupational Therapist                   Outcome Measures       Row Name 07/03/24 1044          How much help from another is currently needed...    Putting on and taking off regular lower body clothing? 2  -AV     Bathing (including washing, rinsing, and drying) 2  -AV     Toileting (which includes using toilet bed pan or urinal) 3  -AV     Putting on and taking off regular upper body clothing 4  -AV     Taking care of personal grooming (such as brushing teeth) 4  -AV     Eating meals 4  -AV     AM-PAC 6 Clicks Score (OT) 19  -AV       Row Name 07/03/24 1044          Functional Assessment    Outcome Measure Options AM-PAC 6 Clicks Daily Activity (OT);Optimal Instrument  -AV       Row Name 07/03/24 1044          Optimal Instrument     Optimal Instrument Optimal - 3  -AV     Bending/Stooping 2  -AV     Standing 2  -AV     Reaching 1  -AV     From the list, choose the 3 activities you would most like to be able to do without any difficulty Bending/stooping;Standing;Reaching  -AV     Total Score Optimal - 3 5  -AV               User Key  (r) = Recorded By, (t) = Taken By, (c) = Cosigned By      Initials Name Provider Type    AV Alek Begum OT Occupational Therapist                    Occupational Therapy Education       Title: PT OT SLP Therapies (Done)       Topic: Occupational Therapy (Done)       Point: ADL training (Done)       Description:   Instruct learner(s) on proper safety adaptation and remediation techniques during self care or transfers.   Instruct in proper use of assistive devices.                  Learning Progress Summary             Patient Acceptance, E, VU by AV at 7/3/2024 1045                         Point: Home exercise program (Done)       Description:   Instruct learner(s) on appropriate technique for monitoring, assisting and/or progressing therapeutic exercises/activities.                  Learning Progress Summary             Patient Acceptance, E, VU by AV at 7/3/2024 1045                         Point: Precautions (Done)       Description:   Instruct learner(s) on prescribed precautions during self-care and functional transfers.                  Learning Progress Summary             Patient Acceptance, E, VU by AV at 7/3/2024 1045                         Point: Body mechanics (Done)       Description:   Instruct learner(s) on proper positioning and spine alignment during self-care, functional mobility activities and/or exercises.                  Learning Progress Summary             Patient Acceptance, E, VU by AV at 7/3/2024 1045                                         User Key       Initials Effective Dates Name Provider Type Discipline    AV 06/16/21 -  Alek Begum OT Occupational Therapist OT                   OT Recommendation and Plan  Planned Therapy Interventions (OT): activity tolerance training, BADL retraining, functional balance retraining, occupation/activity based interventions, patient/caregiver education/training, strengthening exercise, transfer/mobility retraining  Therapy Frequency (OT): 5 times/wk  Plan of Care Review  Plan of Care Reviewed With: patient  Progress: no change (first session: evaluation)  Outcome Evaluation: Patient presents with limitations of balance, strength and endurance/ activity tolerance which are impacting ADL/ transfer independence. Skilled OT services are indicated to remediate/ compensate for deficits to maximize independence and safety with functional tasks.     Time Calculation:   Evaluation Complexity (OT)  Review Occupational Profile/Medical/Therapy History Complexity: expanded/moderate complexity  Assessment, Occupational Performance/Identification of Deficit Complexity: 3-5 performance deficits  Clinical Decision Making Complexity (OT): detailed assessment/moderate complexity  Overall Complexity of Evaluation (OT): moderate complexity     Time Calculation- OT       Row Name 07/03/24 1046             Time Calculation- OT    OT Received On 07/03/24  -AV      OT Goal Re-Cert Due Date 07/12/24  -AV         Untimed Charges    OT Eval/Re-eval Minutes 35  -AV         Total Minutes    Untimed Charges Total Minutes 35  -AV       Total Minutes 35  -AV                User Key  (r) = Recorded By, (t) = Taken By, (c) = Cosigned By      Initials Name Provider Type    AV Alek Begum OT Occupational Therapist                  Therapy Charges for Today       Code Description Service Date Service Provider Modifiers Qty    [id number] HC OT EVAL MOD COMPLEXITY 3 7/3/2024 Alek Begum OT GO 1                 Alek Begum OT  7/3/2024

## 2024-07-03 NOTE — PROGRESS NOTES
[facility]   Hospitalist Progress Note  Date: 2024  Patient Name: Mohamud Jarrett  : 1950  MRN: [id number]  Date of admission: 2024      Subjective   Subjective     Chief Complaint: Follow-up weakness cough    Summary:Mohamud Jarrett is a 74 y.o. male  with past medical history of end-stage renal disease on hemodialysis, hypertension, CAD status post CABG, and GERD presented to the ED for evaluation of generalized weakness.  Patient states that he missed over a week of dialysis because he had not been feeling well with generalized weakness, creased appetite, lightheadedness, nausea, and vomiting.  Family was concerned so they brought him to the ED for further evaluation.  In the ED patient's vitals were within normal limits on arrival.  Labs showed that he had severe uremia with BUN greater than 112, hyperkalemia, and elevated lactic acid level to go along with his known renal failure.  Chest x-ray showed cardiomegaly with no acute findings.  Patient given [place] admitted for emergent dialysis.  Nephrology consulted.  Underwent dialysis with improvement in hyperkalemia and uremia.  Continues to be significantly weak AMPAC 12 per nursing.  PT OT consulted.    Interval Followup: Patient lying in bed appears to be resting comfortably.  Patient wakes easily, still complaining of fatigue.  Patient was sleeping most the morning per nursing.  Patient denies any new complaints though still worsening weakness and nagging cough.  Afebrile overnight.  Sinus rhythm 60s to 70s on telemetry reviewed.  Blood pressure within normal limits.  Satting well on room air.  Potassium within normal limits.  BUN improved.  Bicarb within normal limits.  Hemoglobin trending down.  No other issues per nursing.    Review of Systems  Constitutional: Negative for fatigue and fever.   HENT: Negative for sore throat and trouble swallowing.    Eyes: Negative for pain and discharge.   Respiratory: Positive for cough and negative  shortness of breath.    Cardiovascular: Negative for chest pain and palpitations.   Gastrointestinal: Negative for abdominal pain, nausea and vomiting.   Endocrine: Negative for cold intolerance and heat intolerance.   Genitourinary: Negative for difficulty urinating and dysuria.   Musculoskeletal: Negative for back pain and neck stiffness.   Skin: Negative for color change and rash.   Neurological: Negative for syncope and headaches.   Hematological: Negative for adenopathy.   Psychiatric/Behavioral: Negative for confusion and hallucinations.    Objective   Objective     Vitals:   Temp:  [97.2 °F (36.2 °C)-98.4 °F (36.9 °C)] 98.4 °F (36.9 °C)  Heart Rate:  [60-85] 66  Resp:  [15-20] 18  BP: (126-179)/(54-83) 151/65  Physical Exam   General: well-developed appearing stated age in no acute distress  HEENT: Normocephalic atraumatic moist membranes pupils equal round reactive light, no scleral icterus no conjunctival injection  Cardiovascular: regular rate and rhythm no murmurs rubs or gallops S1-S2, no lower extremity edema appreciated  Pulmonary: Scant rhonchi bilaterally no wheezes rales symmetric chest expansion, unlabored, no conversational dyspnea appreciated  Gastrointestinal: Soft nontender nondistended positive bowel sounds all 4 quadrants no rebound or guarding  Musculoskeletal: No clubbing cyanosis, warm and well-perfused, calves soft symmetric nontender bilaterally  Skin: Clean dry without rashes  Neuro: Cranial nerves II through XII intact grossly no sensorimotor deficits appreciated bilateral upper and lower extremities  Psych: Patient is calm cooperative and appropriate with exam not responding to internal stimuli  : No Joya catheter no bladder distention no suprapubic tenderness    Result Review    Result Review:  I have personally reviewed these results and agree with these findings:  [x]  Laboratory  LAB RESULTS:      Lab 07/02/24  0516 07/01/24  1744 07/01/24  1425   WBC 4.26  --  3.97    HEMOGLOBIN 8.9*  --  10.2*   HEMATOCRIT 25.7*  --  29.6*   PLATELETS 138*  --  155   NEUTROS ABS  --   --  2.41   IMMATURE GRANS (ABS)  --   --  0.01   LYMPHS ABS  --   --  0.83   MONOS ABS  --   --  0.60   EOS ABS  --   --  0.06   MCV 94.1  --  96.1   LACTATE  --  1.7 2.1*         Lab 07/02/24  0516 07/01/24  1425   SODIUM 139 135*   POTASSIUM 4.1 6.6*   CHLORIDE 95* 92*   CO2 27.2 15.2*   ANION GAP 16.8* 27.8*   BUN 55* >112*   CREATININE 7.75* 12.90*   EGFR 6.9* 3.7*   GLUCOSE 88 113*   CALCIUM 8.6 9.0   PHOSPHORUS 6.5*  --          Lab 07/02/24  0516 07/01/24  1425   TOTAL PROTEIN  --  6.9   ALBUMIN 3.1* 3.7   GLOBULIN  --  3.2   ALT (SGPT)  --  17   AST (SGOT)  --  22   BILIRUBIN  --  1.5*   ALK PHOS  --  239*   LIPASE  --  113*                     Brief Urine Lab Results       None          Microbiology Results (last 10 days)       ** No results found for the last 240 hours. **            []  Microbiology  [x]  Radiology  XR Chest 1 View    Result Date: 7/1/2024  Impression: Cardiomegaly. Electronically Signed: Mitchell Ellis MD  7/1/2024 3:59 PM EDT  Workstation ID: [id number]     [x]  EKG/Telemetry   []  Cardiology/Vascular   []  Pathology  []  Old records  [x]  Other:  Scheduled Meds:[START ON 7/3/2024] epoetin ben/ben-epbx, 10,000 Units, Subcutaneous, Once per day on Monday Wednesday Friday  fluticasone, 2 spray, Each Nare, Daily  heparin (porcine), 5,000 Units, Subcutaneous, Q12H  sodium chloride, 10 mL, Intravenous, Q12H      Continuous Infusions:   PRN Meds:.  senna-docusate sodium **AND** polyethylene glycol **AND** bisacodyl **AND** bisacodyl    guaiFENesin-dextromethorphan    ondansetron    sodium chloride    sodium chloride    sodium chloride      Assessment & Plan   Assessment / Plan     Assessment/Plan:  Normocytic anemia  Thrombocytopenia  Hyperkalemia present on admission resolved  Metabolic acidosis secondary end-stage renal disease present on admission resolved  Uremia  improved  Fatigue  Cough  Decreased mobility and ADLs        Patient admitted for further evaluation and treatment  Nephrology consulted thank you for assistance  Start Epogen per nephrology  Repeat CBC in a.m.  Repeat hemodialysis with ultrafiltration in a.m.  Physical therapy Occupational Therapy consulted thank you for your assistance  Start bronchopulmonary hygiene protocol  Start Robitussin  Start Flonase scheduled  Further inpatient orders recommendations pending clinical course       Discussed plan with bedside RN.    Disposition: Pending PT OT eval's    VTE Prophylaxis:  Pharmacologic VTE prophylaxis orders are present.        CODE STATUS:   Level Of Support Discussed With: Patient  Code Status (Patient has no pulse and is not breathing): CPR (Attempt to Resuscitate)  Medical Interventions (Patient has pulse or is breathing): Full Support

## 2024-07-03 NOTE — PLAN OF CARE
Goal Outcome Evaluation:  Plan of Care Reviewed With: patient        Progress: no change (first session: evaluation)  Outcome Evaluation: Patient presents with limitations of balance, strength and endurance/ activity tolerance which are impacting ADL/ transfer independence. Skilled OT services are indicated to remediate/ compensate for deficits to maximize independence and safety with functional tasks.      Anticipated Discharge Disposition (OT): inpatient rehabilitation facility

## 2024-07-03 NOTE — PROGRESS NOTES
[facility]     Nephrology Progress Note      Patient Name: Mohamud Jarrett  : 1950  MRN: [id number]  Primary Care Physician:  Shara Talley APRN  Date of admission: 2024    Subjective   Subjective     Interval History:  Patient Reports feeling okay.  Having intermittent cough, some nausea but able to tolerate p.o.  Seen on dialysis.  Blood pressure on high side.    Review of Systems   All systems were reviewed and negative except for: Was mentioned above    Objective   Objective     Vitals:   Temp:  [97.3 °F (36.3 °C)-98.6 °F (37 °C)] 97.7 °F (36.5 °C)  Heart Rate:  [60-69] 62  Resp:  [16-18] 16  BP: (136-165)/(59-76) 156/71  Physical Exam:   Constitutional: Awake, alert, no distress   Eyes: sclerae anicteric, no conjunctival injection   HENT: mucous membranes moist   Neck: Supple, no thyromegaly, no lymphadenopathy, trachea midline, mild  JVD   Respiratory: Bibasilar Rales, no wheezing, nonlabored respirations    Cardiovascular: RRR, no murmurs, rubs, or gallops.   Gastrointestinal: Positive bowel sounds, soft, nontender, nondistended   Musculoskeletal: 1+ edema, no clubbing or cyanosis, right upper extremity AV fistula is patent.   Psychiatric: Flat affect, little eye contact, cooperative   Neurologic: Oriented x 3, moving all extremities, Cranial Nerves grossly intact, speech clear   Skin: warm and dry, no rashes     Result Review    Result Reviewed:  I have personally reviewed the results from the time of this admission to 7/3/2024 16:23 EDT and agree with these findings:  [x]  Laboratory  []  Microbiology  [x]  Radiology  []  EKG/Telemetry   []  Cardiology/Vascular   []  Pathology  []  Old records  []  Other:        Lab 24  0455 24  0516 24  1425   SODIUM 139 139 135*   POTASSIUM 4.3 4.1 6.6*   CHLORIDE 96* 95* 92*   CO2 24.7 27.2 15.2*   BUN 58* 55* >112*   CREATININE 8.72* 7.75* 12.90*   GLUCOSE 96 88 113*   EGFR 5.9* 6.9* 3.7*   ANION GAP 18.3* 16.8* 27.8*   PHOSPHORUS   --  6.5*  --            Most notable findings include: Potassium 4.3, hemoglobin 10.    Assessment & Plan   Assessment / Plan       Active Hospital Problems:  Active Hospital Problems    Diagnosis     **Hyperkalemia     End stage renal disease     CHF (congestive heart failure)     Weakness generalized     Iron deficiency anemia     Dependence on renal dialysis     Stented coronary artery     Coronary artery disease     Essential (primary) hypertension        Assessment and Plan:    ESRD-  Normal dialysis m/w/f at [facility] in [place].  Presented after missing a whole week of dialysis.  Seen on dialysis today, clinically improved. Ok for d/c from renal standpoint.  D/w him importance of adherence.       Hyperkalemia-  poor dietary anddialysis complaince.  Improved.  Continue low potassium diet.     Anemia of CKD-getting Epogen with dialysis.  Hemoglobin at goal.       HTN-suboptimally controlled, continue home meds.  Resume oral Bumex per home dosing.     CAD- s/p CABG, recent stent    Discussed with dialysis staff and primary.  Will follow,    Electronically signed by Silas Muniz MD, 7/3/2024, 16:23 EDT.

## 2024-07-03 NOTE — PLAN OF CARE
Goal Outcome Evaluation:  Plan of Care Reviewed With: patient        Progress: no change  Outcome Evaluation: Patient alert & oriented x 4, VSS, no significant changes in status during shift.

## 2024-07-03 NOTE — PLAN OF CARE
Goal Outcome Evaluation:      Patient is alert and oriented times 4 for this shift. VSS. Patient plan of care progressing, will continue to monitor. No significant changes noted this shift.

## 2024-07-04 LAB
BACTERIA UR QL AUTO: ABNORMAL /HPF
BILIRUB UR QL STRIP: NEGATIVE
CLARITY UR: CLEAR
COLOR UR: ABNORMAL
GLUCOSE UR STRIP-MCNC: NEGATIVE MG/DL
HGB UR QL STRIP.AUTO: NEGATIVE
HYALINE CASTS UR QL AUTO: ABNORMAL /LPF
KETONES UR QL STRIP: NEGATIVE
LEUKOCYTE ESTERASE UR QL STRIP.AUTO: ABNORMAL
NITRITE UR QL STRIP: NEGATIVE
PH UR STRIP.AUTO: 8.5 [PH] (ref 5–8)
PROT UR QL STRIP: ABNORMAL
RBC # UR STRIP: ABNORMAL /HPF
REF LAB TEST METHOD: ABNORMAL
SP GR UR STRIP: 1.01 (ref 1–1.03)
SQUAMOUS #/AREA URNS HPF: ABNORMAL /HPF
UROBILINOGEN UR QL STRIP: ABNORMAL
WBC # UR STRIP: ABNORMAL /HPF

## 2024-07-04 PROCEDURE — 97161 PT EVAL LOW COMPLEX 20 MIN: CPT

## 2024-07-04 PROCEDURE — 94799 UNLISTED PULMONARY SVC/PX: CPT

## 2024-07-04 PROCEDURE — 5A1D70Z PERFORMANCE OF URINARY FILTRATION, INTERMITTENT, LESS THAN 6 HOURS PER DAY: ICD-10-PCS | Performed by: FAMILY MEDICINE

## 2024-07-04 PROCEDURE — G0378 HOSPITAL OBSERVATION PER HR: HCPCS

## 2024-07-04 PROCEDURE — 25010000002 HEPARIN (PORCINE) PER 1000 UNITS: Performed by: FAMILY MEDICINE

## 2024-07-04 PROCEDURE — 94664 DEMO&/EVAL PT USE INHALER: CPT

## 2024-07-04 PROCEDURE — 99232 SBSQ HOSP IP/OBS MODERATE 35: CPT | Performed by: FAMILY MEDICINE

## 2024-07-04 PROCEDURE — 81001 URINALYSIS AUTO W/SCOPE: CPT | Performed by: EMERGENCY MEDICINE

## 2024-07-04 RX ADMIN — SEVELAMER CARBONATE 1600 MG: 800 TABLET, FILM COATED ORAL at 09:25

## 2024-07-04 RX ADMIN — HEPARIN SODIUM 5000 UNITS: 5000 INJECTION INTRAVENOUS; SUBCUTANEOUS at 09:25

## 2024-07-04 RX ADMIN — IPRATROPIUM BROMIDE AND ALBUTEROL SULFATE 3 ML: .5; 3 SOLUTION RESPIRATORY (INHALATION) at 06:34

## 2024-07-04 RX ADMIN — IPRATROPIUM BROMIDE AND ALBUTEROL SULFATE 3 ML: .5; 3 SOLUTION RESPIRATORY (INHALATION) at 00:41

## 2024-07-04 RX ADMIN — BUMETANIDE 3 MG: 1 TABLET ORAL at 18:27

## 2024-07-04 RX ADMIN — IPRATROPIUM BROMIDE AND ALBUTEROL SULFATE 3 ML: .5; 3 SOLUTION RESPIRATORY (INHALATION) at 18:34

## 2024-07-04 RX ADMIN — HEPARIN SODIUM 5000 UNITS: 5000 INJECTION INTRAVENOUS; SUBCUTANEOUS at 21:11

## 2024-07-04 RX ADMIN — Medication 10 ML: at 09:25

## 2024-07-04 RX ADMIN — SEVELAMER CARBONATE 1600 MG: 800 TABLET, FILM COATED ORAL at 13:35

## 2024-07-04 RX ADMIN — SEVELAMER CARBONATE 1600 MG: 800 TABLET, FILM COATED ORAL at 18:27

## 2024-07-04 RX ADMIN — IPRATROPIUM BROMIDE AND ALBUTEROL SULFATE 3 ML: .5; 3 SOLUTION RESPIRATORY (INHALATION) at 12:12

## 2024-07-04 RX ADMIN — Medication 10 ML: at 21:11

## 2024-07-04 RX ADMIN — BUMETANIDE 3 MG: 1 TABLET ORAL at 09:25

## 2024-07-04 RX ADMIN — IPRATROPIUM BROMIDE AND ALBUTEROL SULFATE 3 ML: .5; 3 SOLUTION RESPIRATORY (INHALATION) at 23:46

## 2024-07-04 NOTE — PROGRESS NOTES
[facility]   Hospitalist Progress Note  Date: 2024  Patient Name: Mohamud Jarrett  : 1950  MRN: [id number]  Date of admission: 2024      Subjective   Subjective     Chief Complaint: Follow-up weakness cough    Summary:Mohamud Jarrett is a 74 y.o. male  with past medical history of end-stage renal disease on hemodialysis, hypertension, CAD status post CABG, and GERD presented to the ED for evaluation of generalized weakness.  Patient states that he missed over a week of dialysis because he had not been feeling well with generalized weakness, creased appetite, lightheadedness, nausea, and vomiting.  Family was concerned so they brought him to the ED for further evaluation.  In the ED patient's vitals were within normal limits on arrival.  Labs showed that he had severe uremia with BUN greater than 112, hyperkalemia, and elevated lactic acid level to go along with his known renal failure.  Chest x-ray showed cardiomegaly with no acute findings.  Patient given Lokelma admitted for emergent dialysis.  Nephrology consulted.  Underwent dialysis with improvement in hyperkalemia and uremia.  Continues to be significantly weak AMPA 12 per nursing.  PT OT consulted.  Patient wishing to pursue inpatient rehab.  Discharge planning coordinating.    Interval Followup: Patient lying in bed appears to be resting comfortably.  Patient continues to endorse fatigue, generalized weakness and unsteadiness concerned he is going to fall.  Patient worked well with physical therapy.  Patient denies any new complaints.  Afebrile overnight.  Sinus rhythm PVCs 60s to 90s on telemetry reviewed.  Blood pressure within normal limits.  Satting well on room air.  No other issues per nursing.    Review of Systems  Constitutional: Positive for fatigue and negative fever.   HENT: Negative for sore throat and trouble swallowing.    Eyes: Negative for pain and discharge.   Respiratory: Negative for cough and negative shortness of  breath.    Cardiovascular: Negative for chest pain and palpitations.   Gastrointestinal: Negative for abdominal pain, nausea and vomiting.   Endocrine: Negative for cold intolerance and heat intolerance.   Genitourinary: Negative for difficulty urinating and dysuria.   Musculoskeletal: Negative for back pain and neck stiffness.   Skin: Negative for color change and rash.   Neurological: Negative for syncope and headaches.   Hematological: Negative for adenopathy.   Psychiatric/Behavioral: Negative for confusion and hallucinations.    Objective   Objective     Vitals:   Temp:  [97.7 °F (36.5 °C)-99.1 °F (37.3 °C)] 98.1 °F (36.7 °C)  Heart Rate:  [59-84] 70  Resp:  [16-20] 18  BP: (140-162)/(60-84) 159/84  Physical Exam   General: well-developed appearing stated age in no acute distress  HEENT: Normocephalic atraumatic moist membranes pupils equal round reactive light, no scleral icterus no conjunctival injection  Cardiovascular: regular rate and rhythm no murmurs rubs or gallops S1-S2, no lower extremity edema appreciated  Pulmonary: Clear to auscultation bilaterally no wheezes rales symmetric chest expansion, unlabored, no conversational dyspnea appreciated  Gastrointestinal: Soft nontender nondistended positive bowel sounds all 4 quadrants no rebound or guarding  Musculoskeletal: No clubbing cyanosis, warm and well-perfused, calves soft symmetric nontender bilaterally  Skin: Clean dry without rashes  Neuro: Cranial nerves II through XII intact grossly no sensorimotor deficits appreciated bilateral upper and lower extremities  Psych: Patient is calm cooperative and appropriate with exam not responding to internal stimuli  : No Joya catheter no bladder distention no suprapubic tenderness    Result Review    Result Review:  I have personally reviewed these results and agree with these findings:  [x]  Laboratory  LAB RESULTS:      Lab 07/03/24  0455 07/02/24  0516 07/01/24  1744 07/01/24  1425   WBC 4.02 4.26  --   3.97   HEMOGLOBIN 10.0* 8.9*  --  10.2*   HEMATOCRIT 29.6* 25.7*  --  29.6*   PLATELETS 159 138*  --  155   NEUTROS ABS  --   --   --  2.41   IMMATURE GRANS (ABS)  --   --   --  0.01   LYMPHS ABS  --   --   --  0.83   MONOS ABS  --   --   --  0.60   EOS ABS  --   --   --  0.06   MCV 96.7 94.1  --  96.1   LACTATE  --   --  1.7 2.1*         Lab 07/03/24  0455 07/02/24  0516 07/01/24  1425   SODIUM 139 139 135*   POTASSIUM 4.3 4.1 6.6*   CHLORIDE 96* 95* 92*   CO2 24.7 27.2 15.2*   ANION GAP 18.3* 16.8* 27.8*   BUN 58* 55* >112*   CREATININE 8.72* 7.75* 12.90*   EGFR 5.9* 6.9* 3.7*   GLUCOSE 96 88 113*   CALCIUM 8.9 8.6 9.0   PHOSPHORUS  --  6.5*  --          Lab 07/02/24  0516 07/01/24  1425   TOTAL PROTEIN  --  6.9   ALBUMIN 3.1* 3.7   GLOBULIN  --  3.2   ALT (SGPT)  --  17   AST (SGOT)  --  22   BILIRUBIN  --  1.5*   ALK PHOS  --  239*   LIPASE  --  113*                     Brief Urine Lab Results  (Last result in the past 365 days)        Color   Clarity   Blood   Leuk Est   Nitrite   Protein   CREAT   Urine HCG        07/04/24 1042 Dark Yellow   Clear   Negative   Trace   Negative   >=300 mg/dL (3+)                 Microbiology Results (last 10 days)       ** No results found for the last 240 hours. **            []  Microbiology  [x]  Radiology  XR Chest 1 View    Result Date: 7/1/2024  Impression: Cardiomegaly. Electronically Signed: Mitchell Ellis MD  7/1/2024 3:59 PM EDT  Workstation ID: [id number]     [x]  EKG/Telemetry   []  Cardiology/Vascular   []  Pathology  []  Old records  [x]  Other:  Scheduled Meds:bumetanide, 3 mg, Oral, BID  epoetin ben/ben-epbx, 10,000 Units, Subcutaneous, Once per day on Monday Wednesday Friday  fluticasone, 2 spray, Each Nare, Daily  heparin (porcine), 5,000 Units, Subcutaneous, Q12H  ipratropium-albuterol, 3 mL, Nebulization, 4x Daily - RT  sevelamer, 1,600 mg, Oral, TID With Meals  sodium chloride, 10 mL, Intravenous, Q12H      Continuous Infusions:   PRN Meds:.  senna-docusate  sodium **AND** polyethylene glycol **AND** bisacodyl **AND** bisacodyl    guaiFENesin-dextromethorphan    ipratropium-albuterol    ondansetron    sodium chloride    sodium chloride    sodium chloride      Assessment & Plan   Assessment / Plan     Assessment/Plan:  Normocytic anemia  Thrombocytopenia  Hyperkalemia present on admission resolved  Metabolic acidosis secondary end-stage renal disease present on admission resolved  Uremia improved  Fatigue  Cough  Decreased mobility and ADLs        Patient admitted for further evaluation and treatment  Nephrology consulted thank you for assistance  Continue Epogen per nephrology  Continue hemodialysis with ultrafiltration per nephrology  Continue physical therapy Occupational Therapy thank you for your assistance  Continue bronchopulmonary hygiene protocol  Continue DuoNebs scheduled and as needed  Continue Robitussin  Continue Flonase scheduled  Further inpatient orders recommendations pending clinical course       Discussed plan with bedside RN     Disposition: Patient wishing to pursue inpatient rehab.  Discharge planning coordinating.    VTE Prophylaxis:  Pharmacologic VTE prophylaxis orders are present.        CODE STATUS:   Level Of Support Discussed With: Patient  Code Status (Patient has no pulse and is not breathing): CPR (Attempt to Resuscitate)  Medical Interventions (Patient has pulse or is breathing): Full Support

## 2024-07-04 NOTE — PROGRESS NOTES
Respiratory Therapist Broncho-Pulmonary Hygiene Progress Note      Patient Name:  Mohamud Jarrett  YOB: 1950    Mohamud Jarrett meets the qualification for Level 1 of the Bronco-Pulmonary Hygiene Protocol. This was based on my daily patient assessment and includes review of chest x-ray results, cough ability and quality, oxygenation, secretions or risk for secretion development and patient mobility.     Broncho-Pulmonary Hygiene Assessment:    Level of Movement: Actively changing positions-requires assistance  Disoriented/Follows Commands    Breath Sounds: Diminished and/or coarse rhonchi    Cough: Strong, effective and/or frequent    Chest X-Ray: No CXR available    Sputum Productions: None or small amount of thin or watery secretions with effective cough    History and Physical: None    SpO2 to Oxygen Need: greater than 92% on room air or  less than 3L nasal canula    Current SpO2 is: 95 on room air    Based on this information, I have completed the following interventions: Teach/Instruct patient on cough and deep breathe      Electronically signed by Swetha Kirkland RRT, 07/04/24, 10:03 AM EDT.

## 2024-07-04 NOTE — PLAN OF CARE
Goal Outcome Evaluation:         Patient A&O on shift. VSS. All needs met at this time. Plan of care continue.

## 2024-07-04 NOTE — THERAPY EVALUATION
Acute Care - Physical Therapy Initial Evaluation  MINDI Irvin     Patient Name: Mohamud Jarrett  : 1950  MRN: [id number]  Today's Date: 2024    Admit date: 2024     Referring Physician: Martin Thomas MD     Surgery Date:* No surgery found *           Visit Dx:     ICD-10-CM ICD-9-CM   1. End stage renal disease  N18.6 585.6   2. Hyperkalemia  E87.5 276.7   3. Decreased activities of daily living (ADL)  Z78.9 V49.89   4. Difficulty in walking  R26.2 719.7     Patient Active Problem List   Diagnosis    Gout    Essential (primary) hypertension    Hyperlipidemia    Chronic kidney disease, stage 4 (severe)    Anemia in chronic kidney disease    Coronary artery disease    Chronic systolic (congestive) heart failure    Coagulation defect, unspecified    Dependence on renal dialysis    Gout due to renal impairment, unspecified site    Iron deficiency anemia    Mitral valve regurgitation    Stented coronary artery    Pure hypercholesterolemia    Left inguinal hernia    Myalgia    H/O peanut allergy    Weakness generalized    CHF (congestive heart failure)    Medicare annual wellness visit, subsequent    Secondary hyperparathyroidism of renal origin    Dizziness    Personal history of fall    History of gout    Impacted cerumen of right ear    Hyperkalemia    End stage renal disease     Past Medical History:   Diagnosis Date    A-V fistula     right arm    Anemia of chronic renal failure 2021    Antiplatelet or antithrombotic long-term use     plavix    Arthritis     Bilateral leg pain 2021    CAD (coronary artery disease)     h/o CABG  and stents 2022, Dr Shirley follows    Chest pain     saw cardiology 4/10/23, pt states better if he takes his medications    Dialysis patient     Tues, Thursday, Saturday, has chest wall catheter currently    Gout     Heart attack     Hyperlipidemia     Hypertension     Kidney disease     stage 4, Dr Ornelas follows    Neck pain 2022    Neuritis of lower  extremity, right 12/19/2019    Proteinuria 05/23/2022    Rheumatoid factor positive 07/06/2021    Seasonal allergies 03/13/2014    Vitamin D deficiency 05/23/2022     Past Surgical History:   Procedure Laterality Date    ARTERIOVENOUS FISTULA Right     CARDIAC CATHETERIZATION      CARPAL TUNNEL RELEASE      CATARACT EXTRACTION W/ INTRAOCULAR LENS IMPLANT      COLONOSCOPY N/A 2006    COLONOSCOPY N/A 12/14/2018    Procedure: COLONOSCOPY TO CECUM WITH COLD BIOPSY POLYPECTOMY;  Surgeon: Elliott Harding MD;  Location: [facility] ENDOSCOPY;  Service: General    CORONARY ANGIOPLASTY WITH STENT PLACEMENT  11/09/2022    CORONARY ARTERY BYPASS GRAFT N/A 06/20/2003    INGUINAL HERNIA REPAIR Right 06/04/2014    Open incarcerated inguinal hernia repair-Dr. Elliott Harding    INGUINAL HERNIA REPAIR Left 4/26/2023    Procedure: OPEN LEFT INGUINAL HERNIA REPAIR;  Surgeon: Elliott Harding MD;  Location: [facility] OR;  Service: General;  Laterality: Left;    LUMBAR DISC SURGERY N/A 1990, 1992    L4-L5, X2     PT Assessment (Last 12 Hours)       PT Evaluation and Treatment       Row Name 07/04/24 1149          Physical Therapy Time and Intention    Subjective Information complains of;weakness;fatigue;swelling (P)   -SJ     Document Type evaluation (P)   -SJ     Mode of Treatment individual therapy;physical therapy (P)   -SJ     Patient Effort good (P)   -SJ     Symptoms Noted During/After Treatment none (P)   -SJ       Row Name 07/04/24 1140          General Information    Patient Profile Reviewed yes (P)   -SJ     Patient Observations alert;cooperative;agree to therapy (P)   -SJ     Prior Level of Function independent:;all household mobility;community mobility (P)   -SJ     Equipment Currently Used at Home rollator;bath bench (P)   -SJ     Existing Precautions/Restrictions fall (P)   -SJ     Risks Reviewed patient: (P)   -SJ       Row Name 07/04/24 1148          Living Environment    Current Living Arrangements apartment (P)   -SJ      People in Home sibling(s) (P)   -     Primary Care Provided by self (P)   -[facility] 07/04/24 1149          Home Main Entrance    Number of Stairs, Main Entrance none (P)   -SJ       Row Name 07/04/24 1149          Home Use of Assistive/Adaptive Equipment    Equipment Currently Used at Home rollator;bath bench (P)   -[facility] 07/04/24 1149          Pain    Pretreatment Pain Rating 0/10 - no pain (P)   -SJ     Posttreatment Pain Rating 0/10 - no pain (P)   -[facility] 07/04/24 1149          Cognition    Orientation Status (Cognition) oriented x 3 (P)   -SJ       Row Name 07/04/24 1149          Range of Motion (ROM)    Range of Motion bilateral lower extremities (P)   Bilat LE Hip Flexion limited by pain, Knee Extension limited by tightness, Ankle Plantarflexion and Dorsiflexion limited by swelling.  -[facility] 07/04/24 1149          Strength (Manual Muscle Testing)    Strength (Manual Muscle Testing) bilateral lower extremities (P)   R LE Hip 4-/5, Knee 4-/5, Ankle 4-/5; L LE Hip 4-/5, Knee 4-/5, Ankle 4-/5  -SJ       Row Name 07/04/24 1149          Bed Mobility    Bed Mobility supine-sit (P)   -     Supine-Sit Slope (Bed Mobility) modified independence (P)   -     Assistive Device (Bed Mobility) bed rails;head of bed elevated (P)   -SJ       Row Name 07/04/24 1149          Transfers    Transfers sit-stand transfer;stand-sit transfer (P)   -[facility] 07/04/24 1149          Sit-Stand Transfer    Sit-Stand Slope (Transfers) contact guard;1 person assist (P)   -     Assistive Device (Sit-Stand Transfers) walker, front-wheeled (P)   -SJ       Row Name 07/04/24 1149          Stand-Sit Transfer    Stand-Sit Slope (Transfers) contact guard;1 person assist (P)   -     Assistive Device (Stand-Sit Transfers) walker, front-wheeled (P)   -SJ       Row Name 07/04/24 1149          Gait/Stairs (Locomotion)    Gait/Stairs Locomotion gait/ambulation assistive  device (P)   -SJ     [place] Level (Gait) contact guard;1 person assist (P)   -SJ     Assistive Device (Gait) walker, front-wheeled (P)   -SJ     Patient was able to Ambulate yes (P)   -SJ     Distance in Feet (Gait) 100 (P)   -SJ     Pattern (Gait) step-to (P)   -SJ     Deviations/Abnormal Patterns (Gait) base of support, narrow;brittany decreased;festinating/shuffling;gait speed decreased;stride length decreased (P)   -SJ     Bilateral Gait Deviations forward flexed posture (P)   -SJ     Gait Assessment/Intervention Pt able to walk 100ft but needed extra time to complete task due to gait speed. (P)   -SJ       Row Name 07/04/24 1147          Safety Issues, Functional Mobility    Impairments Affecting Function (Mobility) balance;coordination;endurance/activity tolerance;postural/trunk control;range of motion (ROM);strength (P)   -SJ       Row Name 07/04/24 1148          Balance    Balance Assessment standing dynamic balance (P)   -SJ     Dynamic Standing Balance contact guard;1-person assist (P)   -SJ     Position/Device Used, Standing Balance walker, front-wheeled;supported (P)   -SJ       Row Name 07/04/24 1146          Plan of Care Review    Plan of Care Reviewed With patient (P)   -SJ     Progress no change (P)   -SJ     Outcome Evaluation Pt presents with complaints of weakness. Pt exhibits deficits in balance, strength, and ROM effecting functional mobility and safety with daily activities. Skilled therapy required to address functional deficits and decrease falls risk with transfers and ambulation. (P)   -SJ       Row Name 07/04/24 1144          Positioning and Restraints    Pre-Treatment Position in bed (P)   -SJ     Post Treatment Position chair (P)   -SJ     In Chair reclined;call light within reach;encouraged to call for assist;exit alarm on (P)   -SJ       Row Name 07/04/24 1140          Therapy Assessment/Plan (PT)    Rehab Potential (PT) good, to achieve stated therapy goals (P)   -SJ     Criteria  for Skilled Interventions Met (PT) yes;skilled treatment is necessary (P)   -SJ     Therapy Frequency (PT) daily (P)   -SJ     Predicted Duration of Therapy Intervention (PT) 10 days (P)   -SJ     Problem List (PT) problems related to;balance;coordination;mobility;motor control;range of motion (ROM);strength;postural control (P)   -SJ     Activity Limitations Related to Problem List (PT) unable to ambulate safely;unable to transfer safely (P)   -[facility] Name 07/04/24 1149          PT Evaluation Complexity    History, PT Evaluation Complexity 1-2 personal factors and/or comorbidities (P)   -SJ     Examination of Body Systems (PT Eval Complexity) total of 4 or more elements (P)   -SJ     Clinical Presentation (PT Evaluation Complexity) stable (P)   -SJ     Clinical Decision Making (PT Evaluation Complexity) low complexity (P)   -SJ     Overall Complexity (PT Evaluation Complexity) low complexity (P)   -SJ       Row Name 07/04/24 1149          Therapy Plan Review/Discharge Plan (PT)    Therapy Plan Review (PT) evaluation/treatment results reviewed;patient (P)   -[facility] 07/04/24 1149          Physical Therapy Goals    Transfer Goal Selection (PT) transfer, PT goal 1 (P)   -SJ     Gait Training Goal Selection (PT) gait training, PT goal 1 (P)   -SJ     Balance Goal Selection (PT) balance, PT goal 1 (P)   -SJ       Row Name 07/04/24 1148          Transfer Goal 1 (PT)    Activity/Assistive Device (Transfer Goal 1, PT) transfers, all;walker, rolling (P)   -SJ     [place] Level/Cues Needed (Transfer Goal 1, PT) modified independence (P)   -SJ     Time Frame (Transfer Goal 1, PT) long term goal (LTG);10 days (P)   -[facility] 07/04/24 1149          Gait Training Goal 1 (PT)    Activity/Assistive Device (Gait Training Goal 1, PT) gait (walking locomotion);assistive device use;walker, rolling (P)   -SJ     [place] Level (Gait Training Goal 1, PT) modified independence (P)   -SJ     Distance  (Gait Training Goal 1, PT) 300 (P)   -SJ     Time Frame (Gait Training Goal 1, PT) long term goal (LTG);10 days (P)   -SJ       Row Name 07/04/24 1149          Balance Goal 1 (PT)    Activity/Assistive Device (Balance Goal 1, PT) standing, dynamic;walker, rolling (P)   -SJ     [place] Level/Cues Needed (Balance Goal 1, PT) conditional independence (P)   -SJ     Time Frame (Balance Goal 1, PT) long term goal (LTG);10 days (P)   -SJ               User Key  (r) = Recorded By, (t) = Taken By, (c) = Cosigned By      Initials Name Provider Type     Camilo Craig, PT Student PT Student                    Physical Therapy Education       Title: PT OT SLP Therapies (Done)       Topic: Physical Therapy (Done)       Point: Mobility training (Done)       Learning Progress Summary             Patient Acceptance, E,TB, VU by  at 7/4/2024 1204                         Point: Precautions (Done)       Learning Progress Summary             Patient Acceptance, E,TB, VU by  at 7/4/2024 1204                                         User Key       Initials Effective Dates Name Provider Type Discipline     05/21/24 -  Camilo Craig PT Student PT Student PT                  PT Recommendation and Plan  Anticipated Discharge Disposition (PT): (P) inpatient rehabilitation facility  Planned Therapy Interventions (PT): (P) balance training, bed mobility training, gait training, motor coordination training, neuromuscular re-education, patient/family education, postural re-education, ROM (range of motion), strengthening, stretching, transfer training  Therapy Frequency (PT): (P) daily  Plan of Care Reviewed With: (P) patient  Progress: (P) no change  Outcome Evaluation: (P) Pt presents with complaints of weakness. Pt exhibits deficits in balance, strength, and ROM effecting functional mobility and safety with daily activities. Skilled therapy required to address functional deficits and decrease falls risk with transfers and  ambulation.   Outcome Measures       Row Name 07/04/24 1203             How much help from another person do you currently need...    Turning from your back to your side while in flat bed without using bedrails? 4 (P)   -SJ      Moving from lying on back to sitting on the side of a flat bed without bedrails? 4 (P)   -SJ      Moving to and from a bed to a chair (including a wheelchair)? 3 (P)   -SJ      Standing up from a chair using your arms (e.g., wheelchair, bedside chair)? 3 (P)   -SJ      Climbing 3-5 steps with a railing? 2 (P)   -SJ      To walk in hospital room? 3 (P)   -SJ      AM-PAC 6 Clicks Score (PT) 19 (P)   -      Highest Level of Mobility Goal 6 --> Walk 10 steps or more (P)   -         Functional Assessment    Outcome Measure Options AM-PAC 6 Clicks Basic Mobility (PT) (P)   -                User Key  (r) = Recorded By, (t) = Taken By, (c) = Cosigned By      Initials Name Provider Type    Camilo King, PT Student PT Student                     Time Calculation:    PT Charges       Row Name 07/04/24 1149             Time Calculation    PT Received On 07/04/24 (P)   -      PT Goal Re-Cert Due Date 07/13/24 (P)   -         Untimed Charges    PT Eval/Re-eval Minutes 45 (P)   -SJ         Total Minutes    Untimed Charges Total Minutes 45 (P)   -SJ       Total Minutes 45 (P)   -                User Key  (r) = Recorded By, (t) = Taken By, (c) = Cosigned By      Initials Name Provider Type     Camilo Craig, PT Student PT Student                      PT G-Codes  Outcome Measure Options: (P) AM-PAC 6 Clicks Basic Mobility (PT)  AM-PAC 6 Clicks Score (PT): (P) 19  AM-PAC 6 Clicks Score (OT): 19    Camilo Craig PT Student  7/4/2024

## 2024-07-04 NOTE — PROGRESS NOTES
[facility]     Nephrology Progress Note      Patient Name: Mohamud Jarrett  : 1950  MRN: [id number]  Primary Care Physician:  Shara Talley APRN  Date of admission: 2024    Subjective   Subjective     Interval History:  Patient Reports feeling okay.    No new complaints.    Review of Systems   All systems were reviewed and negative except for: Was mentioned above    Objective   Objective     Vitals:   Temp:  [97.7 °F (36.5 °C)-99.1 °F (37.3 °C)] 97.7 °F (36.5 °C)  Heart Rate:  [59-77] 72  Resp:  [16-18] 18  BP: (140-162)/(60-80) 153/72  Physical Exam:   Constitutional: Awake, alert, no distress   Eyes: sclerae anicteric, no conjunctival injection   HENT: mucous membranes moist   Neck: Supple, no thyromegaly, no lymphadenopathy, trachea midline, mild  JVD   Respiratory: Bibasilar Rales, no wheezing, nonlabored respirations    Cardiovascular: RRR, no murmurs, rubs, or gallops.   Gastrointestinal: Positive bowel sounds, soft, nontender, nondistended   Musculoskeletal: 1+ edema, no clubbing or cyanosis, right upper extremity AV fistula is patent.   Psychiatric: Flat affect, little eye contact, cooperative   Neurologic: Oriented x 3, moving all extremities, Cranial Nerves grossly intact, speech clear   Skin: warm and dry, no rashes     Result Review    Result Reviewed:  I have personally reviewed the results from the time of this admission to 2024 09:29 EDT and agree with these findings:  [x]  Laboratory  []  Microbiology  [x]  Radiology  []  EKG/Telemetry   []  Cardiology/Vascular   []  Pathology  []  Old records  []  Other:        Lab 24  0455 24  0516 24  1425   SODIUM 139 139 135*   POTASSIUM 4.3 4.1 6.6*   CHLORIDE 96* 95* 92*   CO2 24.7 27.2 15.2*   BUN 58* 55* >112*   CREATININE 8.72* 7.75* 12.90*   GLUCOSE 96 88 113*   EGFR 5.9* 6.9* 3.7*   ANION GAP 18.3* 16.8* 27.8*   PHOSPHORUS  --  6.5*  --              Assessment & Plan   Assessment / Plan       Active Hospital  Problems:  Active Hospital Problems    Diagnosis     **Hyperkalemia     End stage renal disease     CHF (congestive heart failure)     Weakness generalized     Iron deficiency anemia     Dependence on renal dialysis     Stented coronary artery     Coronary artery disease     Essential (primary) hypertension        Assessment and Plan:    ESRD-  Normal dialysis m/w/f at [facility] in [place].  Presented after missing a whole week of dialysis.  Ok for d/c from renal standpoint.  D/w him importance of adherence.       Hyperkalemia-  poor dietary and dialysis complaince.  Improved.  Continue low potassium diet.     Anemia of CKD-getting Epogen with dialysis.  Hemoglobin at goal.       HTN-suboptimally controlled, continue home meds.  Resume oral Bumex per home dosing.     CAD- s/p CABG, recent stent

## 2024-07-04 NOTE — PLAN OF CARE
Goal Outcome Evaluation:  Plan of Care Reviewed With: (P) patient        Progress: (P) no change  Outcome Evaluation: (P) Pt presents with complaints of weakness. Pt exhibits deficits in balance, strength, and ROM effecting functional mobility and safety with daily activities. Skilled therapy required to address functional deficits and decrease falls risk with transfers and ambulation.      Anticipated Discharge Disposition (PT): (P) inpatient rehabilitation facility

## 2024-07-05 LAB
ANION GAP SERPL CALCULATED.3IONS-SCNC: 16.8 MMOL/L (ref 5–15)
BUN SERPL-MCNC: 38 MG/DL (ref 8–23)
BUN/CREAT SERPL: 5.7 (ref 7–25)
CALCIUM SPEC-SCNC: 8.7 MG/DL (ref 8.6–10.5)
CHLORIDE SERPL-SCNC: 98 MMOL/L (ref 98–107)
CO2 SERPL-SCNC: 22.2 MMOL/L (ref 22–29)
CREAT SERPL-MCNC: 6.62 MG/DL (ref 0.76–1.27)
EGFRCR SERPLBLD CKD-EPI 2021: 8.3 ML/MIN/1.73
GLUCOSE SERPL-MCNC: 86 MG/DL (ref 65–99)
POTASSIUM SERPL-SCNC: 4.5 MMOL/L (ref 3.5–5.2)
SODIUM SERPL-SCNC: 137 MMOL/L (ref 136–145)

## 2024-07-05 PROCEDURE — 94799 UNLISTED PULMONARY SVC/PX: CPT

## 2024-07-05 PROCEDURE — 99232 SBSQ HOSP IP/OBS MODERATE 35: CPT | Performed by: FAMILY MEDICINE

## 2024-07-05 PROCEDURE — 80048 BASIC METABOLIC PNL TOTAL CA: CPT | Performed by: INTERNAL MEDICINE

## 2024-07-05 PROCEDURE — 25010000002 EPOETIN ALFA PER 1000 UNITS: Performed by: INTERNAL MEDICINE

## 2024-07-05 PROCEDURE — 25010000002 HEPARIN (PORCINE) PER 1000 UNITS: Performed by: FAMILY MEDICINE

## 2024-07-05 RX ORDER — HYDROCODONE BITARTRATE AND ACETAMINOPHEN 5; 325 MG/1; MG/1
1 TABLET ORAL EVERY 6 HOURS PRN
Status: DISCONTINUED | OUTPATIENT
Start: 2024-07-05 | End: 2024-07-07 | Stop reason: HOSPADM

## 2024-07-05 RX ORDER — CYCLOBENZAPRINE HCL 5 MG
5 TABLET ORAL 3 TIMES DAILY PRN
Status: DISCONTINUED | OUTPATIENT
Start: 2024-07-05 | End: 2024-07-07 | Stop reason: HOSPADM

## 2024-07-05 RX ORDER — ACETAMINOPHEN 325 MG/1
650 TABLET ORAL EVERY 6 HOURS PRN
Status: DISCONTINUED | OUTPATIENT
Start: 2024-07-05 | End: 2024-07-07 | Stop reason: HOSPADM

## 2024-07-05 RX ORDER — ALPRAZOLAM 0.25 MG/1
0.25 TABLET ORAL ONCE
Status: COMPLETED | OUTPATIENT
Start: 2024-07-05 | End: 2024-07-05

## 2024-07-05 RX ADMIN — SEVELAMER CARBONATE 1600 MG: 800 TABLET, FILM COATED ORAL at 09:41

## 2024-07-05 RX ADMIN — IPRATROPIUM BROMIDE AND ALBUTEROL SULFATE 3 ML: .5; 3 SOLUTION RESPIRATORY (INHALATION) at 06:37

## 2024-07-05 RX ADMIN — HEPARIN SODIUM 5000 UNITS: 5000 INJECTION INTRAVENOUS; SUBCUTANEOUS at 14:53

## 2024-07-05 RX ADMIN — HYDROCODONE BITARTRATE AND ACETAMINOPHEN 1 TABLET: 5; 325 TABLET ORAL at 09:41

## 2024-07-05 RX ADMIN — ALPRAZOLAM 0.25 MG: 0.25 TABLET ORAL at 19:19

## 2024-07-05 RX ADMIN — HEPARIN SODIUM 5000 UNITS: 5000 INJECTION INTRAVENOUS; SUBCUTANEOUS at 19:19

## 2024-07-05 RX ADMIN — SEVELAMER CARBONATE 1600 MG: 800 TABLET, FILM COATED ORAL at 17:58

## 2024-07-05 RX ADMIN — Medication 10 ML: at 09:42

## 2024-07-05 RX ADMIN — ERYTHROPOIETIN 10000 UNITS: 10000 INJECTION, SOLUTION INTRAVENOUS; SUBCUTANEOUS at 07:58

## 2024-07-05 RX ADMIN — BUMETANIDE 3 MG: 1 TABLET ORAL at 09:41

## 2024-07-05 RX ADMIN — SEVELAMER CARBONATE 1600 MG: 800 TABLET, FILM COATED ORAL at 14:52

## 2024-07-05 RX ADMIN — BUMETANIDE 3 MG: 1 TABLET ORAL at 17:58

## 2024-07-05 NOTE — NURSING NOTE
3hr 27 min of 4 hr tx completed. 1.7 L removed and 77.6 BLP.    Pt complaining of cramping and tx terminated early. Pt slept most of tx without complaints. Pt is alert and oriented x3 in no acute distress.    Report given to Lea BOTELLO. All questions addressed at this time.

## 2024-07-05 NOTE — PROGRESS NOTES
[facility]   Hospitalist Progress Note  Date: 2024  Patient Name: Mohamud Jarrett  : 1950  MRN: [id number]  Date of admission: 2024      Subjective   Subjective     Chief Complaint: Follow-up weakness cough    Summary:Mohamud Jarrett is a 74 y.o. male  with past medical history of end-stage renal disease on hemodialysis, hypertension, CAD status post CABG, and GERD presented to the ED for evaluation of generalized weakness.  Patient states that he missed over a week of dialysis because he had not been feeling well with generalized weakness, creased appetite, lightheadedness, nausea, and vomiting.  Family was concerned so they brought him to the ED for further evaluation.  In the ED patient's vitals were within normal limits on arrival.  Labs showed that he had severe uremia with BUN greater than 112, hyperkalemia, and elevated lactic acid level to go along with his known renal failure.  Chest x-ray showed cardiomegaly with no acute findings.  Patient given Lokelma admitted for emergent dialysis.  Nephrology consulted.  Underwent dialysis with improvement in hyperkalemia and uremia.  PT OT consulted.  Patient planning to return home with home health and family.  Discharge planning coordinating.    Interval Followup: Patient lying in bed appears to be resting comfortably.  Patient states that he is feeling much better today.  Family making arrangements for patient to move in tomorrow.  Patient denies any new complaints.  Afebrile overnight.  Sinus rhythm PVCs 60s to 80s on telemetry reviewed.  Blood pressure within normal limits.  Satting well on room air.  No other issues per nursing.    Review of Systems  Constitutional: Positive for fatigue and negative fever.   HENT: Negative for sore throat and trouble swallowing.    Eyes: Negative for pain and discharge.   Respiratory: Negative for cough and negative shortness of breath.    Cardiovascular: Negative for chest pain and palpitations.    Gastrointestinal: Negative for abdominal pain, nausea and vomiting.   Endocrine: Negative for cold intolerance and heat intolerance.   Genitourinary: Negative for difficulty urinating and dysuria.   Musculoskeletal: Negative for back pain and neck stiffness.   Skin: Negative for color change and rash.   Neurological: Negative for syncope and headaches.   Hematological: Negative for adenopathy.   Psychiatric/Behavioral: Negative for confusion and hallucinations.    Objective   Objective     Vitals:   Temp:  [97.2 °F (36.2 °C)-99.1 °F (37.3 °C)] 97.3 °F (36.3 °C)  Heart Rate:  [68-81] 71  Resp:  [14-20] 16  BP: (129-160)/(63-86) 151/70  Physical Exam   General: well-developed appearing stated age in no acute distress  HEENT: Normocephalic atraumatic moist membranes pupils equal round reactive light, no scleral icterus no conjunctival injection  Cardiovascular: regular rate and rhythm no murmurs rubs or gallops S1-S2, no lower extremity edema appreciated  Pulmonary: Clear to auscultation bilaterally no wheezes rales symmetric chest expansion, unlabored, no conversational dyspnea appreciated  Gastrointestinal: Soft nontender nondistended positive bowel sounds all 4 quadrants no rebound or guarding  Musculoskeletal: No clubbing cyanosis, warm and well-perfused, calves soft symmetric nontender bilaterally  Skin: Clean dry without rashes  Neuro: Cranial nerves II through XII intact grossly no sensorimotor deficits appreciated bilateral upper and lower extremities  Psych: Patient is calm cooperative and appropriate with exam not responding to internal stimuli  : No Joya catheter no bladder distention no suprapubic tenderness    Result Review    Result Review:  I have personally reviewed these results and agree with these findings:  [x]  Laboratory  LAB RESULTS:      Lab 07/03/24  0455 07/02/24  0516 07/01/24  1744 07/01/24  1425   WBC 4.02 4.26  --  3.97   HEMOGLOBIN 10.0* 8.9*  --  10.2*   HEMATOCRIT 29.6* 25.7*  --   29.6*   PLATELETS 159 138*  --  155   NEUTROS ABS  --   --   --  2.41   IMMATURE GRANS (ABS)  --   --   --  0.01   LYMPHS ABS  --   --   --  0.83   MONOS ABS  --   --   --  0.60   EOS ABS  --   --   --  0.06   MCV 96.7 94.1  --  96.1   LACTATE  --   --  1.7 2.1*         Lab 07/05/24  0512 07/03/24  0455 07/02/24  0516 07/01/24  1425   SODIUM 137 139 139 135*   POTASSIUM 4.5 4.3 4.1 6.6*   CHLORIDE 98 96* 95* 92*   CO2 22.2 24.7 27.2 15.2*   ANION GAP 16.8* 18.3* 16.8* 27.8*   BUN 38* 58* 55* >112*   CREATININE 6.62* 8.72* 7.75* 12.90*   EGFR 8.3* 5.9* 6.9* 3.7*   GLUCOSE 86 96 88 113*   CALCIUM 8.7 8.9 8.6 9.0   PHOSPHORUS  --   --  6.5*  --          Lab 07/02/24  0516 07/01/24  1425   TOTAL PROTEIN  --  6.9   ALBUMIN 3.1* 3.7   GLOBULIN  --  3.2   ALT (SGPT)  --  17   AST (SGOT)  --  22   BILIRUBIN  --  1.5*   ALK PHOS  --  239*   LIPASE  --  113*                     Brief Urine Lab Results  (Last result in the past 365 days)        Color   Clarity   Blood   Leuk Est   Nitrite   Protein   CREAT   Urine HCG        07/04/24 1042 Dark Yellow   Clear   Negative   Trace   Negative   >=300 mg/dL (3+)                 Microbiology Results (last 10 days)       ** No results found for the last 240 hours. **            []  Microbiology  [x]  Radiology  XR Chest 1 View    Result Date: 7/1/2024  Impression: Cardiomegaly. Electronically Signed: Mitchell Ellis MD  7/1/2024 3:59 PM EDT  Workstation ID: [id number]     [x]  EKG/Telemetry   []  Cardiology/Vascular   []  Pathology  []  Old records  [x]  Other:  Scheduled Meds:ALPRAZolam, 0.25 mg, Oral, Once  bumetanide, 3 mg, Oral, BID  epoetin ben/ben-epbx, 10,000 Units, Subcutaneous, Once per day on Monday Wednesday Friday  fluticasone, 2 spray, Each Nare, Daily  heparin (porcine), 5,000 Units, Subcutaneous, Q12H  sevelamer, 1,600 mg, Oral, TID With Meals  sodium chloride, 10 mL, Intravenous, Q12H      Continuous Infusions:   PRN Meds:.  acetaminophen    senna-docusate sodium **AND**  polyethylene glycol **AND** bisacodyl **AND** bisacodyl    cyclobenzaprine    guaiFENesin-dextromethorphan    HYDROcodone-acetaminophen    ipratropium-albuterol    ondansetron    sodium chloride    sodium chloride    sodium chloride      Assessment & Plan   Assessment / Plan     Assessment/Plan:  Normocytic anemia  Thrombocytopenia  Hyperkalemia present on admission resolved  Metabolic acidosis secondary end-stage renal disease present on admission resolved  Uremia improved  Fatigue  Cough  Decreased mobility and ADLs        Patient admitted for further evaluation and treatment  Nephrology consulted thank you for assistance  Continue Epogen per nephrology  Continue hemodialysis with ultrafiltration per nephrology  Continue physical therapy Occupational Therapy thank you for your assistance  Continue bronchopulmonary hygiene protocol  Continue DuoNebs as needed  Continue Robitussin  Continue Flonase scheduled  Further inpatient orders recommendations pending clinical course       Discussed plan with bedside RN pulse Dr. Muniz nephrology    Disposition: Home with home health tomorrow once family has made arrangements for patient to prevent.  Discharge planning coordinating.    VTE Prophylaxis:  Pharmacologic VTE prophylaxis orders are present.        CODE STATUS:   Level Of Support Discussed With: Patient  Code Status (Patient has no pulse and is not breathing): CPR (Attempt to Resuscitate)  Medical Interventions (Patient has pulse or is breathing): Full Support

## 2024-07-05 NOTE — PLAN OF CARE
Goal Outcome Evaluation:  Plan of Care Reviewed With: patient           Outcome Evaluation: patient alert oriented vitals stable progressing toward goals

## 2024-07-05 NOTE — PROGRESS NOTES
[facility]     Nephrology Progress Note      Patient Name: Mohamud Jarrett  : 1950  MRN: [id number]  Primary Care Physician:  Shara Talley, POLI  Date of admission: 2024    Subjective   Subjective     Interval History:  Patient Reports feeling okay.  Was having severe cramping during the end of dialysis and shortened his treatment by 30-minute.  Blood pressure was okay.  Ate good breakfast    Review of Systems   All systems were reviewed and negative except for: Was mentioned above    Objective   Objective     Vitals:   Temp:  [97.2 °F (36.2 °C)-99.1 °F (37.3 °C)] 99.1 °F (37.3 °C)  Heart Rate:  [68-81] 74  Resp:  [14-20] 16  BP: (129-160)/(63-86) 155/82  Physical Exam:   Constitutional: Awake, alert, no distress   Eyes: sclerae anicteric, no conjunctival injection   HENT: mucous membranes moist   Neck: Supple, no thyromegaly, no lymphadenopathy, trachea midline, mild  JVD   Respiratory: Bibasilar Rales, no wheezing, nonlabored respirations    Cardiovascular: RRR, no murmurs, rubs, or gallops.   Gastrointestinal: Positive bowel sounds, soft, nontender, nondistended   Musculoskeletal: 1+ edema, R>L, no clubbing or cyanosis, right upper extremity AV fistula is patent.   Psychiatric: Flat affect, little eye contact, cooperative   Neurologic: Oriented x 3, moving all extremities, Cranial Nerves grossly intact, speech clear   Skin: warm and dry, no rashes     Result Review    Result Reviewed:  I have personally reviewed the results from the time of this admission to 2024 15:54 EDT and agree with these findings:  [x]  Laboratory  []  Microbiology  [x]  Radiology  []  EKG/Telemetry   []  Cardiology/Vascular   []  Pathology  []  Old records  []  Other:        Lab 24  0512 24  0455 24  0516 24  1425   SODIUM 137 139 139 135*   POTASSIUM 4.5 4.3 4.1 6.6*   CHLORIDE 98 96* 95* 92*   CO2 22.2 24.7 27.2 15.2*   BUN 38* 58* 55* >112*   CREATININE 6.62* 8.72* 7.75* 12.90*   GLUCOSE  86 96 88 113*   EGFR 8.3* 5.9* 6.9* 3.7*   ANION GAP 16.8* 18.3* 16.8* 27.8*   PHOSPHORUS  --   --  6.5*  --              Assessment & Plan   Assessment / Plan       Active Hospital Problems:  Active Hospital Problems    Diagnosis     **Hyperkalemia     End stage renal disease     CHF (congestive heart failure)     Weakness generalized     Iron deficiency anemia     Dependence on renal dialysis     Stented coronary artery     Coronary artery disease     Essential (primary) hypertension        Assessment and Plan:    ESRD-  Normal dialysis m/w/f at [facility] in [place].  Presented after missing a whole week of dialysis.  Status post dialysis today.  Had severe cramping.  Dialysis terminated 30 minutes short.  Discussed with patient importance of adherence.  Okay to DC from renal standpoint.    Hyperkalemia-  poor dietary and dialysis complaince.  Improved.  Continue low potassium diet.     Anemia of CKD-getting Epogen with dialysis.  Hemoglobin at goal.       HTN-suboptimally controlled, continue home meds.  Continue Bumex.     CAD- s/p CABG, recent stent    Discussed with primary and with dialysis staff.  Please call with any questions    Electronically signed by Silas Muniz MD, 07/05/24, 3:56 PM EDT.

## 2024-07-05 NOTE — SIGNIFICANT NOTE
07/05/24 0942   OTHER   Discipline occupational therapist   Rehab Time/Intention   Session Not Performed   (off unit: dialysis)

## 2024-07-05 NOTE — PLAN OF CARE
Goal Outcome Evaluation:  Plan of Care Reviewed With: patient        Progress: no change  Outcome Evaluation: A&Ox4; VSS. Recieved hemodialysis this shift; 2.5L removed. Medicated x1 for pain this shift. Denies any additional needs at this time. Plan of care ongoing.

## 2024-07-06 ENCOUNTER — READMISSION MANAGEMENT (OUTPATIENT)
Dept: CALL CENTER | Facility: HOSPITAL | Age: 74
End: 2024-07-06
Payer: MEDICARE

## 2024-07-06 VITALS
TEMPERATURE: 97.3 F | HEART RATE: 78 BPM | WEIGHT: 163.14 LBS | DIASTOLIC BLOOD PRESSURE: 75 MMHG | HEIGHT: 70 IN | RESPIRATION RATE: 18 BRPM | OXYGEN SATURATION: 98 % | SYSTOLIC BLOOD PRESSURE: 156 MMHG | BODY MASS INDEX: 23.36 KG/M2

## 2024-07-06 PROCEDURE — 99238 HOSP IP/OBS DSCHRG MGMT 30/<: CPT | Performed by: FAMILY MEDICINE

## 2024-07-06 PROCEDURE — 25010000002 HEPARIN (PORCINE) PER 1000 UNITS: Performed by: FAMILY MEDICINE

## 2024-07-06 RX ADMIN — BUMETANIDE 3 MG: 1 TABLET ORAL at 08:16

## 2024-07-06 RX ADMIN — BUMETANIDE 3 MG: 1 TABLET ORAL at 17:50

## 2024-07-06 RX ADMIN — SEVELAMER CARBONATE 1600 MG: 800 TABLET, FILM COATED ORAL at 11:56

## 2024-07-06 RX ADMIN — Medication 10 ML: at 08:17

## 2024-07-06 RX ADMIN — HEPARIN SODIUM 5000 UNITS: 5000 INJECTION INTRAVENOUS; SUBCUTANEOUS at 08:17

## 2024-07-06 RX ADMIN — SEVELAMER CARBONATE 1600 MG: 800 TABLET, FILM COATED ORAL at 08:17

## 2024-07-06 RX ADMIN — HYDROCODONE BITARTRATE AND ACETAMINOPHEN 1 TABLET: 5; 325 TABLET ORAL at 10:37

## 2024-07-06 NOTE — PLAN OF CARE
Goal Outcome Evaluation:           Progress: (P) improving  Outcome Evaluation: (P) VSS. To discharge home this shift with home health. Ambulated to bathroom with x 1 and walker. Sat up in chair for meals. Required PRN dose of Norco once this shift for c/o generalized pain.

## 2024-07-06 NOTE — DISCHARGE SUMMARY
[facility]         HOSPITALIST  DISCHARGE SUMMARY    Patient Name: Mohamud Jarrett  : 1950  MRN: [id number]    Date of Admission: 2024  Date of Discharge: 2024  Primary Care Physician: Shara Talley APRN    Consults       Date and Time Order Name Status Description    2024  4:45 PM Hospitalist (on-call MD unless specified)      2024  3:38 PM Nephrology  (on-call MD unless specified) Completed             Active and Resolved Hospital Problems:  Normocytic anemia  Thrombocytopenia  Hyperkalemia present on admission resolved  Metabolic acidosis secondary end-stage renal disease present on admission resolved  Uremia improved  Fatigue  Cough  Decreased mobility and ADLs  ESRD on HD  HTN  Active Hospital Problems    Diagnosis POA   • **Hyperkalemia [E87.5] Yes   • End stage renal disease [N18.6] Unknown   • CHF (congestive heart failure) [I50.9] Yes   • Weakness generalized [R53.1] Yes   • Iron deficiency anemia [D50.9] Yes   • Dependence on renal dialysis [Z99.2] Not Applicable   • Stented coronary artery [Z95.5] Not Applicable   • Coronary artery disease [I25.10] Yes   • Essential (primary) hypertension [I10] Yes      Resolved Hospital Problems   No resolved problems to display.       Hospital Course     Hospital Course:  Mohamud Jarrett is a 74 y.o. male with past medical history of end-stage renal disease on hemodialysis, hypertension, CAD status post CABG, and GERD presented to the ED for evaluation of generalized weakness.  Patient states that he missed over a week of dialysis because he had not been feeling well with generalized weakness, creased appetite, lightheadedness, nausea, and vomiting.  Family was concerned so they brought him to the ED for further evaluation.  In the ED patient's vitals were within normal limits on arrival.  Labs showed that he had severe uremia with BUN greater than 112, hyperkalemia, and elevated lactic acid level to go along with his known  renal failure.  Chest x-ray showed cardiomegaly with no acute findings.  Patient given Lokelma admitted for emergent dialysis.  Nephrology consulted.  Underwent dialysis with improvement in hyperkalemia and uremia.  PT OT consulted.  Patient planning to return home with home health and family.  Patient seen and evaluated on day of discharge and thought stable for discharge home with home health follow-up with primary care provider and nephrology as an outpatient.        DISCHARGE Follow Up Recommendations for labs and diagnostics: As above      Day of Discharge     Vital Signs:  Temp:  [97.3 °F (36.3 °C)-98.6 °F (37 °C)] 98.2 °F (36.8 °C)  Heart Rate:  [67-78] 74  Resp:  [14-18] 18  BP: (129-158)/(65-86) 146/79  Physical Exam:   Gen. well-developed appearing stated age in no acute distress  HEENT: Normocephalic atraumatic moist membranes pupils equal round reactive light, no scleral icterus no conjunctival injection  Cardiovascular: regular rate and rhythm no murmurs rubs or gallops S1-S2, no lower extremity edema appreciated  Pulmonary: Clear to auscultation bilaterally no wheezes rales or rhonchi symmetric chest expansion, unlabored, no conversational dyspnea appreciated  Gastrointestinal: Soft nontender nondistended positive bowel sounds all 4 quadrants no rebound or guarding  Musculoskeletal: No clubbing cyanosis, warm and well-perfused, calves soft symmetric nontender bilaterally  Skin: Clean dry without rashs  Neuro: Cranial nerves II through XII intact grossly no sensorimotor deficits appreciated bilateral upper and lower extremities  Psych: Patient is calm cooperative and appropriate with exam not responding to internal stimuli  : No Joya catheter no bladder distention no suprapubic tenderness      Discharge Details        Discharge Medications        Continue These Medications        Instructions Start Date   allopurinol 300 MG tablet  Commonly known as: ZYLOPRIM   300 mg, Oral, Daily      amLODIPine 10  MG tablet  Commonly known as: NORVASC   TAKE ONE TABLET BY MOUTH DAILY      aspirin 81 MG EC tablet   81 mg, Oral, Daily      atorvastatin 80 MG tablet  Commonly known as: LIPITOR   Take 1 tablet by mouth Daily.      budesonide-formoterol 80-4.5 MCG/ACT inhaler  Commonly known as: SYMBICORT   2 puffs, Inhalation      bumetanide 1 MG tablet  Commonly known as: BUMEX   3 mg, Oral, 2 Times Daily      calcium acetate 667 MG tablet  Commonly known as: PHOSLO   1 tablet, Oral, 3 Times Daily      clopidogrel 75 MG tablet  Commonly known as: PLAVIX   1 tablet, Oral, Daily      Docusate Sodium 100 MG capsule   1 tablet, Oral, 2 Times Daily      ferrous sulfate 325 (65 FE) MG tablet   325 mg, Oral, Daily With Breakfast      hydrALAZINE 25 MG tablet  Commonly known as: APRESOLINE   25 mg, Oral, 3 Times Daily      hydrOXYzine 25 MG tablet  Commonly known as: ATARAX   25 mg, Oral, Every 8 Hours PRN      isosorbide mononitrate 120 MG 24 hr tablet  Commonly known as: IMDUR   Take 1 tablet by mouth Daily.      MIRCERA IJ   75 mcg, Every 28 Days      nitroglycerin 0.4 MG SL tablet  Commonly known as: NITROSTAT   0.4 mg, Sublingual, Every 5 Minutes PRN, Take no more than 3 doses in 15 minutes.      ranolazine 500 MG 12 hr tablet  Commonly known as: RANEXA   500 mg, Oral, 2 Times Daily, Take dos      Renvela 800 MG tablet  Generic drug: sevelamer   3 tablets, Oral, 3 Times Daily      tamsulosin 0.4 MG capsule 24 hr capsule  Commonly known as: FLOMAX   1 capsule, Oral, Daily      Zinc Sulfate 220 (50 Zn) MG tablet   1 tablet, Oral             Stop These Medications      carvedilol 12.5 MG tablet  Commonly known as: COREG     losartan 50 MG tablet  Commonly known as: COZAAR              Allergies   Allergen Reactions   • Peanut (Diagnostic) Shortness Of Breath and Rash   • Peanut Butter Flavor Shortness Of Breath and Rash   • Simvastatin Myalgia     Other reaction(s): muscle cramps       Discharge Disposition:  Home-Health Care  c    Diet:  Hospital:  Diet Order   Procedures   • Diet: Renal; Low Potassium, Low Sodium (2-3g), Low Phosphorus; Fluid Consistency: Thin (IDDSI 0)       Discharge Activity:   Activity Instructions       Gradually Increase Activity Until at Pre-Hospitalization Level              CODE STATUS:  Code Status and Medical Interventions:   Ordered at: 07/01/24 2050     Level Of Support Discussed With:    Patient     Code Status (Patient has no pulse and is not breathing):    CPR (Attempt to Resuscitate)     Medical Interventions (Patient has pulse or is breathing):    Full Support         Future Appointments   Date Time Provider Department Center   7/9/2024 11:30 AM Shara Talley APRN [facility]   9/27/2024  9:15 AM Shara Talley APRN [facility]       Additional Instructions for the Follow-ups that You Need to Schedule       Discharge Follow-up with PCP   As directed       Currently Documented PCP:    Shara Talley APRN    PCP Phone Number:    [phone]     Follow Up Details: Hospital discharge follow up HTN                Pertinent  and/or Most Recent Results     PROCEDURES:   None    LAB RESULTS:      Lab 07/03/24  0455 07/02/24  0516 07/01/24  1744 07/01/24  1425   WBC 4.02 4.26  --  3.97   HEMOGLOBIN 10.0* 8.9*  --  10.2*   HEMATOCRIT 29.6* 25.7*  --  29.6*   PLATELETS 159 138*  --  155   NEUTROS ABS  --   --   --  2.41   IMMATURE GRANS (ABS)  --   --   --  0.01   LYMPHS ABS  --   --   --  0.83   MONOS ABS  --   --   --  0.60   EOS ABS  --   --   --  0.06   MCV 96.7 94.1  --  96.1   LACTATE  --   --  1.7 2.1*         Lab 07/05/24  0512 07/03/24  0455 07/02/24  0516 07/01/24  1425   SODIUM 137 139 139 135*   POTASSIUM 4.5 4.3 4.1 6.6*   CHLORIDE 98 96* 95* 92*   CO2 22.2 24.7 27.2 15.2*   ANION GAP 16.8* 18.3* 16.8* 27.8*   BUN 38* 58* 55* >112*   CREATININE 6.62* 8.72* 7.75* 12.90*   EGFR 8.3* 5.9* 6.9* 3.7*   GLUCOSE 86 96 88 113*   CALCIUM 8.7 8.9 8.6 9.0   PHOSPHORUS  --   --  6.5*  --           Lab 07/02/24  0516 07/01/24  1425   TOTAL PROTEIN  --  6.9   ALBUMIN 3.1* 3.7   GLOBULIN  --  3.2   ALT (SGPT)  --  17   AST (SGOT)  --  22   BILIRUBIN  --  1.5*   ALK PHOS  --  239*   LIPASE  --  113*                     Brief Urine Lab Results  (Last result in the past 365 days)        Color   Clarity   Blood   Leuk Est   Nitrite   Protein   CREAT   Urine HCG        07/04/24 1042 Dark Yellow   Clear   Negative   Trace   Negative   >=300 mg/dL (3+)                 Microbiology Results (last 10 days)       ** No results found for the last 240 hours. **            XR Chest 1 View    Result Date: 7/1/2024  Impression: Impression: Cardiomegaly. Electronically Signed: Mitchell Ellis MD  7/1/2024 3:59 PM EDT  Workstation ID: [id number]                 Labs Pending at Discharge:        Time spent on Discharge including face to face service: 25 minutes    Electronically signed by Martin Thomas MD, 07/06/24, 9:42 AM EDT.

## 2024-07-06 NOTE — PROGRESS NOTES
[facility]     Nephrology Progress Note      Patient Name: Mohamud Jarrett  : 1950  MRN: [id number]  Primary Care Physician:  Shara Talley, POLI  Date of admission: 2024    Subjective   Subjective     Interval History:  Patient Reports feeling okay.    No new events.    Review of Systems   All systems were reviewed and negative except for: Was mentioned above    Objective   Objective     Vitals:   Temp:  [97.3 °F (36.3 °C)-98.6 °F (37 °C)] 98.2 °F (36.8 °C)  Heart Rate:  [67-78] 74  Resp:  [14-18] 18  BP: (129-158)/(65-86) 146/79  Physical Exam:   Constitutional: Awake, alert, no distress   Eyes: sclerae anicteric, no conjunctival injection   HENT: mucous membranes moist   Neck: Supple, no thyromegaly, no lymphadenopathy, trachea midline, mild  JVD   Respiratory: Bibasilar Rales, no wheezing, nonlabored respirations    Cardiovascular: RRR, no murmurs, rubs, or gallops.   Gastrointestinal: Positive bowel sounds, soft, nontender, nondistended   Musculoskeletal: 1+ edema, R>L, no clubbing or cyanosis, right upper extremity AV fistula is patent.   Psychiatric: Flat affect, little eye contact, cooperative   Neurologic: Oriented x 3, moving all extremities, Cranial Nerves grossly intact, speech clear   Skin: warm and dry, no rashes     Result Review    Result Reviewed:  I have personally reviewed the results from the time of this admission to 2024 09:06 EDT and agree with these findings:  [x]  Laboratory  []  Microbiology  [x]  Radiology  []  EKG/Telemetry   []  Cardiology/Vascular   []  Pathology  []  Old records  []  Other:        Lab 24  0512 24  0455 24  0516 24  1425   SODIUM 137 139 139 135*   POTASSIUM 4.5 4.3 4.1 6.6*   CHLORIDE 98 96* 95* 92*   CO2 22.2 24.7 27.2 15.2*   BUN 38* 58* 55* >112*   CREATININE 6.62* 8.72* 7.75* 12.90*   GLUCOSE 86 96 88 113*   EGFR 8.3* 5.9* 6.9* 3.7*   ANION GAP 16.8* 18.3* 16.8* 27.8*   PHOSPHORUS  --   --  6.5*  --               Assessment & Plan   Assessment / Plan       Active Hospital Problems:  Active Hospital Problems    Diagnosis     **Hyperkalemia     End stage renal disease     CHF (congestive heart failure)     Weakness generalized     Iron deficiency anemia     Dependence on renal dialysis     Stented coronary artery     Coronary artery disease     Essential (primary) hypertension        Assessment and Plan:    ESRD-  Normal dialysis m/w/f at [facility].  Presented after missing a whole week of dialysis.    Discussed with patient importance of adherence.  Okay to DC from renal standpoint.    Hyperkalemia-  poor dietary and dialysis complaince.  Improved.  Continue low potassium diet.     Anemia of CKD-getting Epogen with dialysis.  Hemoglobin at goal.       HTN-suboptimally controlled, continue home meds.  Continue Bumex.     CAD- s/p CABG, recent stent

## 2024-07-06 NOTE — PLAN OF CARE
Goal Outcome Evaluation:  Plan of Care Reviewed With: patient      Pt arrived to room 3018 this shift from [facility]. Pt alert and oriented x4 and has had no complaints since arrival. Rested. Call light in reach and able to make needs known

## 2024-07-07 NOTE — OUTREACH NOTE
Prep Survey      Flowsheet Row Responses   [facility] patient discharged from? [facility]   Is LACE score < 7 ? No   Eligibility [facility]   Date of Admission 07/05/24   Date of Discharge 07/06/24   Discharge Disposition Home-Health Care Sv   Discharge diagnosis Hyperkalemia   Does the patient have one of the following disease processes/diagnoses(primary or secondary)? Other   Does the patient have Home health ordered? Yes   What is the Home health agency?  [facility]   Is there a DME ordered? No   Prep survey completed? Yes            Claire DAS - Registered Nurse

## 2024-07-08 ENCOUNTER — TRANSITIONAL CARE MANAGEMENT TELEPHONE ENCOUNTER (OUTPATIENT)
Dept: CALL CENTER | Facility: HOSPITAL | Age: 74
End: 2024-07-08
Payer: MEDICARE

## 2024-07-08 NOTE — OUTREACH NOTE
Call Center TCM Note      Flowsheet Row Responses   [facility] facility patient discharged from? [facility]   Does the patient have one of the following disease processes/diagnoses(primary or secondary)? Other   TCM attempt successful? No  [VR for Cristian Jarrett]   Unsuccessful attempts Attempt 1   Call Status Voice mail issues            Sherine Cobos RN    7/8/2024, 11:29 EDT

## 2024-07-08 NOTE — OUTREACH NOTE
Call Center TCM Note      Flowsheet Row Responses   [facility] patient discharged from? [facility]   Does the patient have one of the following disease processes/diagnoses(primary or secondary)? Other   TCM attempt successful? No   Unsuccessful attempts Attempt 2            Sherine Cobos RN    7/8/2024, 15:50 EDT

## 2024-07-09 ENCOUNTER — TRANSITIONAL CARE MANAGEMENT TELEPHONE ENCOUNTER (OUTPATIENT)
Dept: CALL CENTER | Facility: HOSPITAL | Age: 74
End: 2024-07-09
Payer: MEDICARE

## 2024-07-09 ENCOUNTER — REFERRAL TRIAGE (OUTPATIENT)
Dept: CASE MANAGEMENT | Facility: OTHER | Age: 74
End: 2024-07-09
Payer: MEDICARE

## 2024-07-09 ENCOUNTER — OFFICE VISIT (OUTPATIENT)
Dept: FAMILY MEDICINE CLINIC | Age: 74
End: 2024-07-09
Payer: MEDICARE

## 2024-07-09 ENCOUNTER — LAB (OUTPATIENT)
Dept: LAB | Facility: HOSPITAL | Age: 74
End: 2024-07-09
Payer: MEDICARE

## 2024-07-09 VITALS
HEART RATE: 75 BPM | HEIGHT: 70 IN | SYSTOLIC BLOOD PRESSURE: 146 MMHG | RESPIRATION RATE: 18 BRPM | DIASTOLIC BLOOD PRESSURE: 68 MMHG | OXYGEN SATURATION: 97 % | TEMPERATURE: 97.8 F | BODY MASS INDEX: 24.05 KG/M2 | WEIGHT: 168 LBS

## 2024-07-09 DIAGNOSIS — Z87.39 HISTORY OF GOUT: ICD-10-CM

## 2024-07-09 DIAGNOSIS — R60.9 EDEMA, UNSPECIFIED TYPE: Primary | ICD-10-CM

## 2024-07-09 DIAGNOSIS — R79.89 ELEVATED BRAIN NATRIURETIC PEPTIDE (BNP) LEVEL: ICD-10-CM

## 2024-07-09 DIAGNOSIS — R60.9 EDEMA, UNSPECIFIED TYPE: ICD-10-CM

## 2024-07-09 DIAGNOSIS — N18.4 CHRONIC KIDNEY DISEASE, STAGE 4 (SEVERE): ICD-10-CM

## 2024-07-09 DIAGNOSIS — Z99.2 DEPENDENCE ON RENAL DIALYSIS: ICD-10-CM

## 2024-07-09 DIAGNOSIS — F32.A DEPRESSION, UNSPECIFIED DEPRESSION TYPE: ICD-10-CM

## 2024-07-09 DIAGNOSIS — I50.9 CHRONIC CONGESTIVE HEART FAILURE, UNSPECIFIED HEART FAILURE TYPE: ICD-10-CM

## 2024-07-09 DIAGNOSIS — E78.2 MIXED HYPERLIPIDEMIA: ICD-10-CM

## 2024-07-09 DIAGNOSIS — D50.8 OTHER IRON DEFICIENCY ANEMIA: ICD-10-CM

## 2024-07-09 PROBLEM — Z95.5 PRESENCE OF CORONARY ANGIOPLASTY IMPLANT AND GRAFT: Status: ACTIVE | Noted: 2024-06-28

## 2024-07-09 PROBLEM — Z91.81 PERSONAL HISTORY OF FALL: Status: RESOLVED | Noted: 2024-02-23 | Resolved: 2024-07-09

## 2024-07-09 PROBLEM — M10.9 GOUT: Status: RESOLVED | Noted: 2021-07-06 | Resolved: 2024-07-09

## 2024-07-09 PROBLEM — H61.21 IMPACTED CERUMEN OF RIGHT EAR: Status: RESOLVED | Noted: 2024-03-27 | Resolved: 2024-07-09

## 2024-07-09 PROBLEM — I50.22 CHRONIC SYSTOLIC (CONGESTIVE) HEART FAILURE: Status: RESOLVED | Noted: 2023-02-25 | Resolved: 2024-07-09

## 2024-07-09 PROBLEM — R42 DIZZINESS: Status: RESOLVED | Noted: 2024-02-23 | Resolved: 2024-07-09

## 2024-07-09 PROBLEM — Z00.00 MEDICARE ANNUAL WELLNESS VISIT, SUBSEQUENT: Status: RESOLVED | Noted: 2023-09-06 | Resolved: 2024-07-09

## 2024-07-09 PROBLEM — E87.5 HYPERKALEMIA: Status: RESOLVED | Noted: 2024-07-01 | Resolved: 2024-07-09

## 2024-07-09 PROBLEM — M10.30 GOUT DUE TO RENAL IMPAIRMENT, UNSPECIFIED SITE: Status: RESOLVED | Noted: 2023-02-25 | Resolved: 2024-07-09

## 2024-07-09 PROBLEM — M79.10 MYALGIA: Status: RESOLVED | Noted: 2023-06-01 | Resolved: 2024-07-09

## 2024-07-09 LAB
BASOPHILS # BLD AUTO: 0.1 10*3/MM3 (ref 0–0.2)
BASOPHILS NFR BLD AUTO: 2.2 % (ref 0–1.5)
CHOLEST SERPL-MCNC: 125 MG/DL (ref 0–200)
DEPRECATED RDW RBC AUTO: 60.2 FL (ref 37–54)
EOSINOPHIL # BLD AUTO: 0.06 10*3/MM3 (ref 0–0.4)
EOSINOPHIL NFR BLD AUTO: 1.3 % (ref 0.3–6.2)
ERYTHROCYTE [DISTWIDTH] IN BLOOD BY AUTOMATED COUNT: 17.2 % (ref 12.3–15.4)
HCT VFR BLD AUTO: 28.9 % (ref 37.5–51)
HDLC SERPL-MCNC: 43 MG/DL (ref 40–60)
HGB BLD-MCNC: 9.8 G/DL (ref 13–17.7)
IMM GRANULOCYTES # BLD AUTO: 0.02 10*3/MM3 (ref 0–0.05)
IMM GRANULOCYTES NFR BLD AUTO: 0.4 % (ref 0–0.5)
IRON 24H UR-MRATE: 77 MCG/DL (ref 59–158)
IRON SATN MFR SERPL: 35 % (ref 20–50)
LDLC SERPL CALC-MCNC: 64 MG/DL (ref 0–100)
LDLC/HDLC SERPL: 1.46 {RATIO}
LYMPHOCYTES # BLD AUTO: 0.96 10*3/MM3 (ref 0.7–3.1)
LYMPHOCYTES NFR BLD AUTO: 21.4 % (ref 19.6–45.3)
MCH RBC QN AUTO: 32.9 PG (ref 26.6–33)
MCHC RBC AUTO-ENTMCNC: 33.9 G/DL (ref 31.5–35.7)
MCV RBC AUTO: 97 FL (ref 79–97)
MONOCYTES # BLD AUTO: 0.45 10*3/MM3 (ref 0.1–0.9)
MONOCYTES NFR BLD AUTO: 10 % (ref 5–12)
NEUTROPHILS NFR BLD AUTO: 2.9 10*3/MM3 (ref 1.7–7)
NEUTROPHILS NFR BLD AUTO: 64.7 % (ref 42.7–76)
NRBC BLD AUTO-RTO: 0 /100 WBC (ref 0–0.2)
NT-PROBNP SERPL-MCNC: ABNORMAL PG/ML (ref 0–900)
PLATELET # BLD AUTO: 119 10*3/MM3 (ref 140–450)
PMV BLD AUTO: 11.7 FL (ref 6–12)
RBC # BLD AUTO: 2.98 10*6/MM3 (ref 4.14–5.8)
TIBC SERPL-MCNC: 222 MCG/DL (ref 298–536)
TRANSFERRIN SERPL-MCNC: 149 MG/DL (ref 200–360)
TRIGL SERPL-MCNC: 97 MG/DL (ref 0–150)
URATE SERPL-MCNC: 6.6 MG/DL (ref 3.4–7)
VLDLC SERPL-MCNC: 18 MG/DL (ref 5–40)
WBC NRBC COR # BLD AUTO: 4.49 10*3/MM3 (ref 3.4–10.8)

## 2024-07-09 PROCEDURE — 3077F SYST BP >= 140 MM HG: CPT | Performed by: NURSE PRACTITIONER

## 2024-07-09 PROCEDURE — 1160F RVW MEDS BY RX/DR IN RCRD: CPT | Performed by: NURSE PRACTITIONER

## 2024-07-09 PROCEDURE — 80061 LIPID PANEL: CPT

## 2024-07-09 PROCEDURE — 85025 COMPLETE CBC W/AUTO DIFF WBC: CPT

## 2024-07-09 PROCEDURE — 1125F AMNT PAIN NOTED PAIN PRSNT: CPT | Performed by: NURSE PRACTITIONER

## 2024-07-09 PROCEDURE — 84466 ASSAY OF TRANSFERRIN: CPT

## 2024-07-09 PROCEDURE — 3078F DIAST BP <80 MM HG: CPT | Performed by: NURSE PRACTITIONER

## 2024-07-09 PROCEDURE — 84550 ASSAY OF BLOOD/URIC ACID: CPT

## 2024-07-09 PROCEDURE — 83880 ASSAY OF NATRIURETIC PEPTIDE: CPT

## 2024-07-09 PROCEDURE — 36415 COLL VENOUS BLD VENIPUNCTURE: CPT

## 2024-07-09 PROCEDURE — 1159F MED LIST DOCD IN RCRD: CPT | Performed by: NURSE PRACTITIONER

## 2024-07-09 PROCEDURE — 83540 ASSAY OF IRON: CPT

## 2024-07-09 NOTE — ASSESSMENT & PLAN NOTE
Not following recommendations for dialysis will only worsen his symptoms.  Patient does verbalize a desire to improve his health and mobility.  Recommend he continue with treatment plan from nephrology and cardiology.

## 2024-07-09 NOTE — ASSESSMENT & PLAN NOTE
BNP level is pending.  Continue follow-up with cardiology.  Further treatment recommendations will pend these lab results.

## 2024-07-09 NOTE — OUTREACH NOTE
Call Center TCM Note      Flowsheet Row Responses   [facility] patient discharged from? [facility]   Does the patient have one of the following disease processes/diagnoses(primary or secondary)? Other   TCM attempt successful? No   Unsuccessful attempts Attempt 3   Call Status Voice mail issues            Mikayla Pisano RN    7/9/2024, 11:33 EDT         Subjective   Patient ID: Jannie is a 77 year old female.    Chief Complaint   Patient presents with   • Hip Pain     right      HPI     Patient had right knee replacement by Dr. Sanchez at [facility] at the end of September 2019.   She has had swelling in right leg since that time. She noted today the swelling was worse.  She as pain below her knee--she seems to say the pain is the same or worse.   Dr. Julien gives her hydrocodone.   (120 tabs in the last month) .  No fevers, chills, chest pain, dyspnea. She saw Dr. Sanchez last week and he did an xray of her knee in his office.  She wants pain pills.  She has a history of loumbar spinal stenosis and pain down her right leg.        Past Medical History:   Diagnosis Date   • Cataract    • Essential (primary) hypertension      Past Surgical History:   Procedure Laterality Date   • Appendectomy     • Cholecystectomy     • Hysterectomy       History reviewed. No pertinent family history.  Social History     Socioeconomic History   • Marital status:      Spouse name: Not on file   • Number of children: Not on file   • Years of education: Not on file   • Highest education level: Not on file   Occupational History   • Not on file   Social Needs   • Financial resource strain: Not hard at all   • Food insecurity:     Worry: Never true     Inability: Never true   • Transportation needs:     Medical: No     Non-medical: No   Tobacco Use   • Smoking status: Never Smoker   • Smokeless tobacco: Never Used   Substance and Sexual Activity   • Alcohol use: Yes     Frequency: Monthly or less     Drinks per session: 3 or 4     Binge frequency: Never     Comment: Social Drinker   • Drug use: No   • Sexual activity: Never     Comment: hysterectomy    Lifestyle   • Physical activity:     Days per week: 0 days     Minutes per session: 0 min   • Stress: Not at all   Relationships   • Social connections:     Talks on phone: Patient refused     Gets together: Patient refused      Attends Worship service: Patient refused     Active member of club or organization: Patient refused     Attends meetings of clubs or organizations: Patient refused     Relationship status:    • Intimate partner violence:     Fear of current or ex partner: No     Emotionally abused: No     Physically abused: No     Forced sexual activity: No   Other Topics Concern   •  Service No   • Blood Transfusions No   • Caffeine Concern No   • Occupational Exposure No   • Hobby Hazards No   • Sleep Concern No   • Stress Concern No   • Weight Concern No   • Special Diet No   • Back Care No   • Exercise No   • Bike Helmet No   • Seat Belt Yes   • Self-Exams No   Social History Narrative   • Not on file     ALLERGIES:  No Known Allergies    Review of Systems   Constitutional: Positive for appetite change (poor appetite before the surgery--she will see Dr. Julien.  ). Negative for activity change and fatigue.   HENT: Negative for congestion.    Respiratory: Negative for chest tightness and shortness of breath.    Cardiovascular: Negative for chest pain.   Gastrointestinal: Negative for abdominal pain and constipation.   Genitourinary: Negative for dysuria and frequency.   Neurological: Negative for dizziness and syncope.   Psychiatric/Behavioral: Negative for dysphoric mood.       Objective   Visit Vitals  /62 (BP Location: LUE - Left upper extremity, Patient Position: Sitting, Cuff Size: Small Adult)   Pulse 104   Temp 99 °F (37.2 °C) (Oral)   Resp 20   Ht 5' 3\" (1.6 m)   Wt 72.1 kg (159 lb)   SpO2 100%   BMI 28.17 kg/m²     Physical Exam   Constitutional: She is oriented to person, place, and time and well-developed, well-nourished, and in no distress.   HENT:   Head: Normocephalic.   Eyes: Pupils are equal, round, and reactive to light.   Neck: No tracheal deviation present. No thyromegaly present.   Cardiovascular: Normal rate, regular rhythm, normal heart sounds and intact distal pulses.    Pulmonary/Chest: Breath sounds normal. No respiratory distress. She has no wheezes. She has no rales. She exhibits no tenderness.   Abdominal: Soft. Bowel sounds are normal. She exhibits no distension. There is no tenderness. Musculoskeletal: Normal range of motion.         General: Edema (knee feels warm and is mildly tender to the touch; right leg is swollen and tender.  ) present. No tenderness.     Lymphadenopathy:     She has no cervical adenopathy.   Neurological: She is alert and oriented to person, place, and time.   Skin: Skin is warm and dry.   Psychiatric: Mood, memory, affect and judgment normal.   Nursing note and vitals reviewed.      Assessment   Problem List Items Addressed This Visit        Nervous    Pain in both lower extremities    Relevant Orders    US VAS EXTREMITY LOWER VENOUS DUPLEX    CBC WITH DIFFERENTIAL    COMPREHENSIVE METABOLIC PANEL       Musculoskeletal    Spinal stenosis - Primary    Relevant Orders    US VAS EXTREMITY LOWER VENOUS DUPLEX    CBC WITH DIFFERENTIAL    COMPREHENSIVE METABOLIC PANEL        1. Leg pain and swelling.  They will go to [place] for venous duplex.  They will go to ER if knee and leg pain, swelling in crease or if fever chills.  Discussed the urgent need for evaluation for blood clot.   Patient asks about her hydrocodone and tramadol prescriptions from Dr. Julien.    2.  Spinal stenosis.   Patient 's history is a bit variable.  Some of pain may be from this.   RV Dr. Julien ASA suggested.              Patient's medications, allergies, past medical, surgical, social and family histories were reviewed and updated as appropriate.    Paulie Dejesus MD  11/21/2019

## 2024-07-09 NOTE — ASSESSMENT & PLAN NOTE
Remind patient that congestive heart failure often involves some form of edema.  He denies any worsening of symptoms.  I prepared a written prescription for bilateral below the knee compression stockings.  He is to take this to [facility] or [facility] to be fitted for compression stockings.  He does have transportation via his sister.  Elevate feet when sitting.  His specialist prescribe all of his chronic medications.  There are medical indications for his current medications.

## 2024-07-09 NOTE — ASSESSMENT & PLAN NOTE
Start sertraline and follow-up in 1 month or sooner if concerns.  He is not certain who originally prescribed the hydroxyzine.  Remind him this can cause daytime drowsiness and not to take it during the day.

## 2024-07-09 NOTE — PROGRESS NOTES
Chief Complaint  Hospital Follow Up Visit    Subjective          Mohamud Jarrett presents to [facility]     Patient is a 74-year-old male who is here today following up after recent hospitalization July 1 through July 6 at [facility].  Diagnosis was metabolic acidosis and hyperkalemia and anemia.  He has end-stage renal disease on dialysis.  Patient had not received dialysis 1 week prior to his admission due to generalized weakness.  He received dialysis while in the hospital.  He states he continues to feel tired and wants to sleep more often.  He does not sleep well at night and he sleeps more during the day.  He did not attend dialysis yesterday.  He is neck scheduled to attend dialysis tomorrow.  Patient states he takes 14 pills a day and that simply too many.  He also has congestive heart failure and heart disease.  He does acknowledge some mild depression but denies suicidal ideation.  He is not as independent as he once was.  His sister drove him to his appointment today.  He states either his sister or his daughter stay with him and he does not stay alone.  His daughter lives next door to him.  Reviewed patient labs and creatinine had improved from 8-6 on July 5.  Anemia was steady and stable with a hemoglobin of 10 on July 3.  Patient would also like to have further intervention for his edema.  He is not currently wearing compression stockings during the day.  He denies any increase in shortness of breath.  He denies chest pain.  He does state that home health will be coming to his home.  He is not aware of specifics.  He is agreeable to talking to a nurse navigator for chronic care management here in our office.     Objective   Vital Signs:   Vitals:    07/09/24 1157   BP: 146/68   BP Location: Left arm   Patient Position: Sitting   Cuff Size: Adult   Pulse: 75   Resp: 18   Temp: 97.8 °F (36.6 °C)   TempSrc: Oral   SpO2: 97%   Weight: 76.2 kg (168 lb)   Height:  "177.8 cm (70\")       Wt Readings from Last 3 Encounters:   07/09/24 76.2 kg (168 lb)   07/03/24 74 kg (163 lb 2.3 oz)   03/27/24 81 kg (178 lb 9.6 oz)      BP Readings from Last 3 Encounters:   07/09/24 146/68   07/06/24 156/75   06/05/24 146/63       Body mass index is 24.11 kg/m².    BMI is within normal parameters. No other follow-up for BMI required.       Physical Exam  Vitals reviewed.   Constitutional:       General: He is not in acute distress.     Appearance: Normal appearance. He is well-developed.   Cardiovascular:      Rate and Rhythm: Normal rate and regular rhythm.      Pulses:           Posterior tibial pulses are 2+ on the right side and 2+ on the left side.      Heart sounds: Normal heart sounds.      Comments: 1+ edema of ankles only bilateral  Pulmonary:      Effort: Pulmonary effort is normal.      Breath sounds: Normal breath sounds.   Musculoskeletal:      Right lower leg: No edema.      Left lower leg: No edema.      Comments: Sitting in wheelchair.   Skin:     General: Skin is warm and dry.   Neurological:      General: No focal deficit present.      Mental Status: He is alert.   Psychiatric:         Attention and Perception: Attention normal.         Mood and Affect: Mood and affect normal.         Behavior: Behavior normal.           Current Outpatient Medications:     amLODIPine (NORVASC) 10 MG tablet, TAKE ONE TABLET BY MOUTH DAILY (Patient taking differently: Take 1 tablet by mouth Daily.), Disp: 90 tablet, Rfl: 0    aspirin 81 MG EC tablet, Take 1 tablet by mouth Daily., Disp: , Rfl:     atorvastatin (LIPITOR) 80 MG tablet, Take 1 tablet by mouth Daily., Disp: , Rfl:     budesonide-formoterol (SYMBICORT) 80-4.5 MCG/ACT inhaler, Inhale 2 puffs., Disp: , Rfl:     bumetanide (BUMEX) 1 MG tablet, Take 3 tablets by mouth 2 (Two) Times a Day., Disp: , Rfl:     calcium acetate (PHOSLO) 667 MG tablet, Take 1 tablet by mouth 3 (Three) Times a Day., Disp: , Rfl:     clopidogrel (PLAVIX) 75 MG " tablet, Take 1 tablet by mouth Daily., Disp: , Rfl:     Docusate Sodium 100 MG capsule, Take 1 tablet by mouth 2 (Two) Times a Day., Disp: , Rfl:     ferrous sulfate 325 (65 FE) MG tablet, Take 1 tablet by mouth Daily With Breakfast., Disp: , Rfl:     hydrALAZINE (APRESOLINE) 25 MG tablet, Take 1 tablet by mouth 3 (Three) Times a Day., Disp: 90 tablet, Rfl: 11    hydrOXYzine (ATARAX) 25 MG tablet, Take 1 tablet by mouth At Night As Needed for Anxiety., Disp: , Rfl:     isosorbide mononitrate (IMDUR) 120 MG 24 hr tablet, Take 1 tablet by mouth Daily., Disp: , Rfl:     Methoxy PEG-Epoetin Beta (MIRCERA IJ), 75 mcg Every 28 (Twenty-Eight) Days., Disp: , Rfl:     nitroglycerin (NITROSTAT) 0.4 MG SL tablet, Place 1 tablet under the tongue Every 5 (Five) Minutes As Needed for Chest Pain (Do not take more than 3 at one time. Go to ER or call 911 if chest pain persists). Take no more than 3 doses in 15 minutes., Disp: 60 tablet, Rfl: 3    ranolazine (RANEXA) 500 MG 12 hr tablet, Take 1 tablet by mouth 2 (Two) Times a Day. Take dos, Disp: , Rfl:     Renvela 800 MG tablet, Take 3 tablets by mouth 3 (Three) Times a Day., Disp: , Rfl:     tamsulosin (FLOMAX) 0.4 MG capsule 24 hr capsule, Take 1 capsule by mouth Daily., Disp: , Rfl:     Zinc Sulfate 220 (50 Zn) MG tablet, Take 1 tablet by mouth., Disp: , Rfl:     sertraline (Zoloft) 50 MG tablet, Take one half tablet at bedtime x 1 week then increase to 1 tablet at bedtime, Disp: 30 tablet, Rfl: 2   Past Medical History:   Diagnosis Date    A-V fistula     right arm    Anemia of chronic renal failure 04/12/2021    Antiplatelet or antithrombotic long-term use     plavix    Arthritis     Bilateral leg pain 11/05/2021    CAD (coronary artery disease)     h/o CABG 2003 and stents 11/2022, Dr Shirley follows    Chest pain     saw cardiology 4/10/23, pt states better if he takes his medications    Dialysis patient     Tues, Thursday, Saturday, has chest wall catheter currently     Gout     Heart attack     Hyperlipidemia     Hypertension     Kidney disease     stage 4, Dr Ornelas follows    Neck pain 08/30/2022    Neuritis of lower extremity, right 12/19/2019    Proteinuria 05/23/2022    Rheumatoid factor positive 07/06/2021    Seasonal allergies 03/13/2014    Vitamin D deficiency 05/23/2022     Allergies   Allergen Reactions    Peanut (Diagnostic) Shortness Of Breath and Rash    Peanut Butter Flavor Shortness Of Breath and Rash    Simvastatin Myalgia     Other reaction(s): muscle cramps               Result Review :     Common labs          7/2/2024    05:16 7/3/2024    04:55 7/5/2024    05:12   Common Labs   Glucose 88  96  86    BUN 55  58  38    Creatinine 7.75  8.72  6.62    Sodium 139  139  137    Potassium 4.1  4.3  4.5    Chloride 95  96  98    Calcium 8.6  8.9  8.7    Albumin 3.1      WBC 4.26  4.02     Hemoglobin 8.9  10.0     Hematocrit 25.7  29.6     Platelets 138  159          XR Chest 1 View    Result Date: 7/1/2024  Impression: Cardiomegaly. Electronically Signed: Mitchell Ellis MD  7/1/2024 3:59 PM EDT  Workstation ID: [id number]    XR Chest 1 View    Result Date: 5/31/2024  Prominent  interstitial changes are  likely secondary to vascular congestion.  Continued follow-up is recommended. Images reviewed, interpreted, and dictated by Dr. ASHLEY Yoon. Transcribed by Michelle Heck PA-C.             Social History     Tobacco Use   Smoking Status Never   Smokeless Tobacco Never           Assessment and Plan    Diagnoses and all orders for this visit:    1. Edema, unspecified type (Primary)  Assessment & Plan:  Remind patient that congestive heart failure often involves some form of edema.  He denies any worsening of symptoms.  I prepared a written prescription for bilateral below the knee compression stockings.  He is to take this to [facility] or [facility] to be fitted for compression stockings.  He does have transportation via his sister.  Elevate feet when sitting.   His specialist prescribe all of his chronic medications.  There are medical indications for his current medications.     Orders:  -     proBNP; Future    2. Depression, unspecified depression type  Assessment & Plan:  Start sertraline and follow-up in 1 month or sooner if concerns.  He is not certain who originally prescribed the hydroxyzine.  Remind him this can cause daytime drowsiness and not to take it during the day.    Orders:  -     sertraline (Zoloft) 50 MG tablet; Take one half tablet at bedtime x 1 week then increase to 1 tablet at bedtime  Dispense: 30 tablet; Refill: 2  -     Ambulatory Referral to Chronic Care Management    3. Elevated brain natriuretic peptide (BNP) level  -     proBNP; Future    4. Dependence on renal dialysis  Assessment & Plan:  Not following recommendations for dialysis will only worsen his symptoms.  Patient does verbalize a desire to improve his health and mobility.  Recommend he continue with treatment plan from nephrology and cardiology.    Orders:  -     Ambulatory Referral to Chronic Care Management    5. Chronic kidney disease, stage 4 (severe)  -     Ambulatory Referral to Chronic Care Management    6. Chronic congestive heart failure, unspecified heart failure type  Assessment & Plan:  BNP level is pending.  Continue follow-up with cardiology.  Further treatment recommendations will pend these lab results.    Orders:  -     proBNP; Future  -     Ambulatory Referral to Chronic Care Management        Follow Up    Return in about 4 weeks (around 8/6/2024).  Patient was given instructions and counseling regarding his condition or for health maintenance advice. Please see specific information pulled into the AVS if appropriate.

## 2024-07-10 ENCOUNTER — HOSPITAL ENCOUNTER (OUTPATIENT)
Facility: HOSPITAL | Age: 74
Setting detail: OBSERVATION
LOS: 2 days | Discharge: HOME | End: 2024-07-12
Attending: EMERGENCY MEDICINE | Admitting: FAMILY MEDICINE
Payer: MEDICARE

## 2024-07-10 ENCOUNTER — APPOINTMENT (OUTPATIENT)
Dept: GENERAL RADIOLOGY | Facility: HOSPITAL | Age: 74
End: 2024-07-10
Payer: MEDICARE

## 2024-07-10 ENCOUNTER — PATIENT OUTREACH (OUTPATIENT)
Dept: CASE MANAGEMENT | Facility: OTHER | Age: 74
End: 2024-07-10
Payer: MEDICARE

## 2024-07-10 DIAGNOSIS — N18.9 CHRONIC RENAL FAILURE, UNSPECIFIED CKD STAGE: Primary | ICD-10-CM

## 2024-07-10 DIAGNOSIS — R07.89 CHEST PAIN, ATYPICAL: ICD-10-CM

## 2024-07-10 DIAGNOSIS — E87.5 HYPERKALEMIA: ICD-10-CM

## 2024-07-10 DIAGNOSIS — R94.31 ABNORMAL EKG: ICD-10-CM

## 2024-07-10 DIAGNOSIS — R79.89 ELEVATED TROPONIN: ICD-10-CM

## 2024-07-10 DIAGNOSIS — Z95.5 PRESENCE OF CORONARY ANGIOPLASTY IMPLANT AND GRAFT: ICD-10-CM

## 2024-07-10 PROBLEM — R53.1 GENERALIZED WEAKNESS: Status: ACTIVE | Noted: 2024-07-10

## 2024-07-10 LAB
ALBUMIN SERPL-MCNC: 3.7 G/DL (ref 3.5–5.2)
ALBUMIN/GLOB SERPL: 1.1 G/DL
ALP SERPL-CCNC: 250 U/L (ref 39–117)
ALT SERPL W P-5'-P-CCNC: 17 U/L (ref 1–41)
ANION GAP SERPL CALCULATED.3IONS-SCNC: 22.7 MMOL/L (ref 5–15)
AST SERPL-CCNC: 27 U/L (ref 1–40)
BACTERIA UR QL AUTO: ABNORMAL /HPF
BASOPHILS # BLD AUTO: 0.06 10*3/MM3 (ref 0–0.2)
BASOPHILS NFR BLD AUTO: 1.3 % (ref 0–1.5)
BILIRUB SERPL-MCNC: 1.5 MG/DL (ref 0–1.2)
BILIRUB UR QL STRIP: NEGATIVE
BUN SERPL-MCNC: 79 MG/DL (ref 8–23)
BUN/CREAT SERPL: 9.4 (ref 7–25)
CALCIUM SPEC-SCNC: 9.2 MG/DL (ref 8.6–10.5)
CHLORIDE SERPL-SCNC: 99 MMOL/L (ref 98–107)
CLARITY UR: CLEAR
CO2 SERPL-SCNC: 18.3 MMOL/L (ref 22–29)
COLOR UR: YELLOW
CREAT SERPL-MCNC: 8.44 MG/DL (ref 0.76–1.27)
DEPRECATED RDW RBC AUTO: 57 FL (ref 37–54)
EGFRCR SERPLBLD CKD-EPI 2021: 6.2 ML/MIN/1.73
EOSINOPHIL # BLD AUTO: 0.07 10*3/MM3 (ref 0–0.4)
EOSINOPHIL NFR BLD AUTO: 1.5 % (ref 0.3–6.2)
ERYTHROCYTE [DISTWIDTH] IN BLOOD BY AUTOMATED COUNT: 17.4 % (ref 12.3–15.4)
FLUAV SUBTYP SPEC NAA+PROBE: NOT DETECTED
FLUBV RNA ISLT QL NAA+PROBE: NOT DETECTED
GEN 5 2HR TROPONIN T REFLEX: 169 NG/L
GLOBULIN UR ELPH-MCNC: 3.3 GM/DL
GLUCOSE SERPL-MCNC: 84 MG/DL (ref 65–99)
GLUCOSE UR STRIP-MCNC: NEGATIVE MG/DL
HCT VFR BLD AUTO: 31.5 % (ref 37.5–51)
HGB BLD-MCNC: 10.7 G/DL (ref 13–17.7)
HGB UR QL STRIP.AUTO: ABNORMAL
HOLD SPECIMEN: NORMAL
HOLD SPECIMEN: NORMAL
HYALINE CASTS UR QL AUTO: ABNORMAL /LPF
IMM GRANULOCYTES # BLD AUTO: 0.02 10*3/MM3 (ref 0–0.05)
IMM GRANULOCYTES NFR BLD AUTO: 0.4 % (ref 0–0.5)
KETONES UR QL STRIP: NEGATIVE
LEUKOCYTE ESTERASE UR QL STRIP.AUTO: ABNORMAL
LYMPHOCYTES # BLD AUTO: 0.86 10*3/MM3 (ref 0.7–3.1)
LYMPHOCYTES NFR BLD AUTO: 18.7 % (ref 19.6–45.3)
MAGNESIUM SERPL-MCNC: 2.5 MG/DL (ref 1.6–2.4)
MCH RBC QN AUTO: 31.9 PG (ref 26.6–33)
MCHC RBC AUTO-ENTMCNC: 34 G/DL (ref 31.5–35.7)
MCV RBC AUTO: 94 FL (ref 79–97)
MONOCYTES # BLD AUTO: 0.5 10*3/MM3 (ref 0.1–0.9)
MONOCYTES NFR BLD AUTO: 10.9 % (ref 5–12)
NEUTROPHILS NFR BLD AUTO: 3.08 10*3/MM3 (ref 1.7–7)
NEUTROPHILS NFR BLD AUTO: 67.2 % (ref 42.7–76)
NITRITE UR QL STRIP: NEGATIVE
NRBC BLD AUTO-RTO: 0 /100 WBC (ref 0–0.2)
NT-PROBNP SERPL-MCNC: ABNORMAL PG/ML (ref 0–900)
PH UR STRIP.AUTO: 6 [PH] (ref 5–8)
PLATELET # BLD AUTO: 124 10*3/MM3 (ref 140–450)
PMV BLD AUTO: 11.2 FL (ref 6–12)
POTASSIUM SERPL-SCNC: 5.7 MMOL/L (ref 3.5–5.2)
PROT SERPL-MCNC: 7 G/DL (ref 6–8.5)
PROT UR QL STRIP: ABNORMAL
RBC # BLD AUTO: 3.35 10*6/MM3 (ref 4.14–5.8)
RBC # UR STRIP: ABNORMAL /HPF
REF LAB TEST METHOD: ABNORMAL
RSV RNA NPH QL NAA+NON-PROBE: NOT DETECTED
SARS-COV-2 RNA RESP QL NAA+PROBE: NOT DETECTED
SODIUM SERPL-SCNC: 140 MMOL/L (ref 136–145)
SP GR UR STRIP: 1.02 (ref 1–1.03)
SQUAMOUS #/AREA URNS HPF: ABNORMAL /HPF
TROPONIN T DELTA: -1 NG/L
TROPONIN T SERPL HS-MCNC: 170 NG/L
UROBILINOGEN UR QL STRIP: ABNORMAL
WBC # UR STRIP: ABNORMAL /HPF
WBC NRBC COR # BLD AUTO: 4.59 10*3/MM3 (ref 3.4–10.8)
WHOLE BLOOD HOLD COAG: NORMAL
WHOLE BLOOD HOLD SPECIMEN: NORMAL

## 2024-07-10 PROCEDURE — 99291 CRITICAL CARE FIRST HOUR: CPT

## 2024-07-10 PROCEDURE — 87637 SARSCOV2&INF A&B&RSV AMP PRB: CPT | Performed by: EMERGENCY MEDICINE

## 2024-07-10 PROCEDURE — 63710000001 INSULIN REGULAR HUMAN PER 5 UNITS: Performed by: EMERGENCY MEDICINE

## 2024-07-10 PROCEDURE — 93010 ELECTROCARDIOGRAM REPORT: CPT | Performed by: INTERNAL MEDICINE

## 2024-07-10 PROCEDURE — 84484 ASSAY OF TROPONIN QUANT: CPT | Performed by: EMERGENCY MEDICINE

## 2024-07-10 PROCEDURE — 83880 ASSAY OF NATRIURETIC PEPTIDE: CPT

## 2024-07-10 PROCEDURE — 83735 ASSAY OF MAGNESIUM: CPT

## 2024-07-10 PROCEDURE — 81001 URINALYSIS AUTO W/SCOPE: CPT

## 2024-07-10 PROCEDURE — 80053 COMPREHEN METABOLIC PANEL: CPT

## 2024-07-10 PROCEDURE — 71045 X-RAY EXAM CHEST 1 VIEW: CPT

## 2024-07-10 PROCEDURE — 85025 COMPLETE CBC W/AUTO DIFF WBC: CPT

## 2024-07-10 PROCEDURE — 36415 COLL VENOUS BLD VENIPUNCTURE: CPT

## 2024-07-10 PROCEDURE — 93005 ELECTROCARDIOGRAM TRACING: CPT | Performed by: EMERGENCY MEDICINE

## 2024-07-10 PROCEDURE — 96374 THER/PROPH/DIAG INJ IV PUSH: CPT

## 2024-07-10 PROCEDURE — 99285 EMERGENCY DEPT VISIT HI MDM: CPT

## 2024-07-10 PROCEDURE — 84484 ASSAY OF TROPONIN QUANT: CPT

## 2024-07-10 PROCEDURE — 93005 ELECTROCARDIOGRAM TRACING: CPT

## 2024-07-10 RX ORDER — SODIUM CHLORIDE 0.9 % (FLUSH) 0.9 %
10 SYRINGE (ML) INJECTION AS NEEDED
Status: DISCONTINUED | OUTPATIENT
Start: 2024-07-10 | End: 2024-07-12 | Stop reason: HOSPADM

## 2024-07-10 RX ORDER — DEXTROSE MONOHYDRATE 25 G/50ML
25 INJECTION, SOLUTION INTRAVENOUS ONCE
Status: COMPLETED | OUTPATIENT
Start: 2024-07-10 | End: 2024-07-10

## 2024-07-10 RX ADMIN — SODIUM ZIRCONIUM CYCLOSILICATE 10 G: 10 POWDER, FOR SUSPENSION ORAL at 21:47

## 2024-07-10 RX ADMIN — INSULIN HUMAN 5 UNITS: 100 INJECTION, SOLUTION PARENTERAL at 21:42

## 2024-07-10 RX ADMIN — DEXTROSE MONOHYDRATE 25 G: 25 INJECTION, SOLUTION INTRAVENOUS at 21:43

## 2024-07-10 NOTE — ED PROVIDER NOTES
Time: 5:37 PM EDT  Date of encounter:  7/10/2024  Independent Historian/Clinical History and Information was obtained by:   Patient and Family    History is limited by: N/A    Chief Complaint   Patient presents with    Abnormal Lab     we    Weakness - Generalized         History of Present Illness:  Patient is a 74 y.o. year old male who presents to the emergency department for evaluation of weakness.  Patient states that he has missed 2 of his dialysis appointments this week.  He is typically on a Monday, Wednesday, Friday appointment.  States that has been too weak to go to his appointments.  According to the patient's family he was recently admitted to the hospital for confusion dehydration.  He was admitted for 5 days and discharged home on Saturday.  Patient states that he has continued to feel very fatigued and weak.  Patient states he felt too fatigued and weak to go to dialysis on Monday.  He states he did see his primary care physician yesterday as a follow-up for the hospitalization who ordered outpatient labs.  He called him today because his BNP was elevated.  He missed dialysis again today because of the continued fatigue and weakness.  He denies any chest pain or chest pressure.  He denies any shortness of breath he does have a slight cough.  The patient denies any abdominal pain.  The patient does have chronic swelling in his legs.  The patient denies any fever, rigors, myalgias.  The patient's had no vomiting.  Patient's had no diarrhea.      Patient Care Team  Primary Care Provider: Shara Talley APRN    Past Medical History:     Allergies   Allergen Reactions    Peanut (Diagnostic) Shortness Of Breath and Rash    Peanut Butter Flavor Shortness Of Breath and Rash    Simvastatin Myalgia     Other reaction(s): muscle cramps     Past Medical History:   Diagnosis Date    A-V fistula     right arm    Anemia of chronic renal failure 04/12/2021    Antiplatelet or antithrombotic long-term use      plavix    Arthritis     Bilateral leg pain 11/05/2021    CAD (coronary artery disease)     h/o CABG 2003 and stents 11/2022, Dr Shirley follows    Chest pain     saw cardiology 4/10/23, pt states better if he takes his medications    Dialysis patient     Tues, Thursday, Saturday, has chest wall catheter currently    Gout     Heart attack     Hyperlipidemia     Hypertension     Kidney disease     stage 4, Dr Ornelas follows    Neck pain 08/30/2022    Neuritis of lower extremity, right 12/19/2019    Proteinuria 05/23/2022    Rheumatoid factor positive 07/06/2021    Seasonal allergies 03/13/2014    Vitamin D deficiency 05/23/2022     Past Surgical History:   Procedure Laterality Date    ARTERIOVENOUS FISTULA Right     CARDIAC CATHETERIZATION      CARPAL TUNNEL RELEASE      CATARACT EXTRACTION W/ INTRAOCULAR LENS IMPLANT      COLONOSCOPY N/A 2006    COLONOSCOPY N/A 12/14/2018    Procedure: COLONOSCOPY TO CECUM WITH COLD BIOPSY POLYPECTOMY;  Surgeon: Elliott Harding MD;  Location: [facility] ENDOSCOPY;  Service: General    CORONARY ANGIOPLASTY WITH STENT PLACEMENT  11/09/2022    CORONARY ARTERY BYPASS GRAFT N/A 06/20/2003    INGUINAL HERNIA REPAIR Right 06/04/2014    Open incarcerated inguinal hernia repair-Dr. Elliott Harding    INGUINAL HERNIA REPAIR Left 4/26/2023    Procedure: OPEN LEFT INGUINAL HERNIA REPAIR;  Surgeon: Elliott Harding MD;  Location: [facility] OR;  Service: General;  Laterality: Left;    LUMBAR DISC SURGERY N/A 1990, 1992    L4-L5, X2     Family History   Problem Relation Age of Onset    Heart disease Mother     Heart disease Father     Malig Hyperthermia Neg Hx        Home Medications:  Prior to Admission medications    Medication Sig Start Date End Date Taking? Authorizing Provider   amLODIPine (NORVASC) 10 MG tablet TAKE ONE TABLET BY MOUTH DAILY  Patient taking differently: Take 1 tablet by mouth Daily. 9/8/23   Shara Talley APRN   aspirin 81 MG EC tablet Take 1 tablet by mouth Daily. 4/27/22    Arash Elkins MD   atorvastatin (LIPITOR) 80 MG tablet Take 1 tablet by mouth Daily. 10/10/23   Arash Elkins MD   budesonide-formoterol (SYMBICORT) 80-4.5 MCG/ACT inhaler Inhale 2 puffs. 3/30/24   Arash Elkins MD   bumetanide (BUMEX) 1 MG tablet Take 3 tablets by mouth 2 (Two) Times a Day.    Arash Elkins MD   calcium acetate (PHOSLO) 667 MG tablet Take 1 tablet by mouth 3 (Three) Times a Day. 5/18/23   Arash Elkins MD   clopidogrel (PLAVIX) 75 MG tablet Take 1 tablet by mouth Daily. 5/13/22   Arash Elkins MD   Docusate Sodium 100 MG capsule Take 1 tablet by mouth 2 (Two) Times a Day. 3/2/23   Arash Elkins MD   ferrous sulfate 325 (65 FE) MG tablet Take 1 tablet by mouth Daily With Breakfast.    Arash Elkins MD   hydrALAZINE (APRESOLINE) 25 MG tablet Take 1 tablet by mouth 3 (Three) Times a Day. 10/20/22   Rich Delgadillo MD   hydrOXYzine (ATARAX) 25 MG tablet Take 1 tablet by mouth At Night As Needed for Anxiety. 3/26/24   Arash Elkins MD   isosorbide mononitrate (IMDUR) 120 MG 24 hr tablet Take 1 tablet by mouth Daily. 6/9/23   Arash Elkins MD   Methoxy PEG-Epoetin Beta (MIRCERA IJ) 75 mcg Every 28 (Twenty-Eight) Days. 6/3/24 6/2/25  Arash Elkins MD   nitroglycerin (NITROSTAT) 0.4 MG SL tablet Place 1 tablet under the tongue Every 5 (Five) Minutes As Needed for Chest Pain (Do not take more than 3 at one time. Go to ER or call 911 if chest pain persists). Take no more than 3 doses in 15 minutes. 9/22/22   Rich Delgadillo MD   ranolazine (RANEXA) 500 MG 12 hr tablet Take 1 tablet by mouth 2 (Two) Times a Day. Take dos 4/10/23   Arash Elkins MD   Renvela 800 MG tablet Take 3 tablets by mouth 3 (Three) Times a Day. 5/17/24   Arash Elkins MD   sertraline (Zoloft) 50 MG tablet Take one half tablet at bedtime x 1 week then increase to 1 tablet at bedtime 7/9/24   Shara Talley APRN   tamsulosin  "(FLOMAX) 0.4 MG capsule 24 hr capsule Take 1 capsule by mouth Daily. 3/1/23   Provider, MD Arash   Zinc Sulfate 220 (50 Zn) MG tablet Take 1 tablet by mouth. 3/30/24   Provider, MD Arash        Social History:   Social History     Tobacco Use    Smoking status: Never    Smokeless tobacco: Never   Vaping Use    Vaping status: Never Used   Substance Use Topics    Alcohol use: Yes     Comment: occassionally    Drug use: No         Review of Systems:  Review of Systems   Constitutional:  Positive for fatigue. Negative for chills, diaphoresis and fever.   HENT:  Negative for congestion, postnasal drip, rhinorrhea and sore throat.    Eyes:  Negative for photophobia.   Respiratory:  Negative for cough, chest tightness and shortness of breath.    Cardiovascular:  Negative for chest pain, palpitations and leg swelling.   Gastrointestinal:  Negative for abdominal pain, diarrhea, nausea and vomiting.   Genitourinary:  Negative for difficulty urinating, dysuria, flank pain, frequency, hematuria and urgency.   Musculoskeletal:  Negative for neck pain and neck stiffness.   Skin:  Negative for pallor and rash.   Neurological:  Positive for weakness. Negative for dizziness, syncope, numbness and headaches.   Hematological:  Negative for adenopathy. Does not bruise/bleed easily.   Psychiatric/Behavioral: Negative.          Physical Exam:  /77 (BP Location: Left arm, Patient Position: Lying)   Pulse 68   Temp 97.7 °F (36.5 °C) (Oral)   Resp 18   Ht 177.8 cm (70\")   Wt 79.5 kg (175 lb 4.3 oz)   SpO2 99%   BMI 25.15 kg/m²         Physical Exam  Vitals and nursing note reviewed.   Constitutional:       General: He is not in acute distress.     Appearance: Normal appearance. He is not ill-appearing, toxic-appearing or diaphoretic.   HENT:      Head: Normocephalic and atraumatic.      Mouth/Throat:      Mouth: Mucous membranes are moist.   Eyes:      Pupils: Pupils are equal, round, and reactive to light. "   Cardiovascular:      Rate and Rhythm: Normal rate and regular rhythm.      Pulses: Normal pulses.           Carotid pulses are 2+ on the right side and 2+ on the left side.       Radial pulses are 2+ on the right side and 2+ on the left side.        Femoral pulses are 2+ on the right side and 2+ on the left side.       Popliteal pulses are 2+ on the right side and 2+ on the left side.        Dorsalis pedis pulses are 2+ on the right side and 2+ on the left side.        Posterior tibial pulses are 2+ on the right side and 2+ on the left side.      Heart sounds: Normal heart sounds. No murmur heard.  Pulmonary:      Effort: Pulmonary effort is normal. No accessory muscle usage, respiratory distress or retractions.      Breath sounds: No transmitted upper airway sounds. Examination of the right-upper field reveals decreased breath sounds. Examination of the left-upper field reveals decreased breath sounds. Examination of the right-middle field reveals decreased breath sounds. Examination of the left-middle field reveals decreased breath sounds. Examination of the right-lower field reveals decreased breath sounds. Examination of the left-lower field reveals decreased breath sounds. Decreased breath sounds present. No wheezing, rhonchi or rales.   Abdominal:      General: Abdomen is flat. There is no distension.      Palpations: Abdomen is soft. There is no mass or pulsatile mass.      Tenderness: There is no abdominal tenderness. There is no right CVA tenderness, left CVA tenderness, guarding or rebound.      Comments: No rigidity   Musculoskeletal:         General: No swelling, tenderness or deformity.      Cervical back: Neck supple. No tenderness.      Right lower leg: Edema present.      Left lower leg: Edema present.      Comments: Patient has a fistula located in the right upper extremity.  There is a palpable thrill   Skin:     General: Skin is warm and dry.      Capillary Refill: Capillary refill takes less  than 2 seconds.      Coloration: Skin is not jaundiced or pale.      Findings: No erythema.   Neurological:      General: No focal deficit present.      Mental Status: He is alert and oriented to person, place, and time. Mental status is at baseline.      Cranial Nerves: Cranial nerves 2-12 are intact. No cranial nerve deficit, dysarthria or facial asymmetry.      Sensory: Sensation is intact. No sensory deficit.      Motor: Weakness present.      Coordination: Coordination is intact. Coordination normal.      Comments: The patient has generalized weakness but there is no focal weakness or unilateral weakness   Psychiatric:         Mood and Affect: Mood normal.         Behavior: Behavior normal.                    Procedures:  Procedures      Medical Decision Making:      Comorbidities that affect care:    Hypertension, coronary disease, hyperlipidemia, chronic renal failure requiring dialysis, chronic anemia    External Notes reviewed:    None      The following orders were placed and all results were independently analyzed by me:  Orders Placed This Encounter   Procedures    COVID-19, FLU A/B, RSV PCR 1 HR TAT - Swab, Nasopharynx    XR Chest 1 View    [place] Draw    Comprehensive Metabolic Panel    Single High Sensitivity Troponin T    Magnesium    Urinalysis With Microscopic If Indicated (No Culture) - Urine, Clean Catch    CBC Auto Differential    BNP    High Sensitivity Troponin T 2Hr    Urinalysis, Microscopic Only - Urine, Clean Catch    Comprehensive Metabolic Panel    High Sensitivity Troponin T    Ambulatory Referral to Nephrology    Ambulatory Referral to Cardiology    Undress & Gown    Continuous Pulse Oximetry    Vital Signs    Orthostatic Blood Pressure    Discharge Follow-up with PCP    POC Glucose Once    POC Glucose Once    POC Glucose Once    POC Glucose Once    POC Glucose Once    ECG 12 Lead ED Triage Standing Order; Weak / Dizzy / AMS    ECG 12 Lead Chest Pain    Hemodialysis Inpatient     Hemodialysis Inpatient    Inpatient Admission    Initiate Observation Status    Discharge patient    CBC & Differential    Green Top (Gel)    Lavender Top    Gold Top - SST    Light Blue Top       Medications Given in the Emergency Department:  Medications   sodium zirconium cyclosilicate (LOKELMA) packet 10 g (10 g Oral Given 7/10/24 2147)   insulin regular (humuLIN R,novoLIN R) injection 5 Units (5 Units Intravenous Given 7/10/24 2142)   dextrose (D50W) (25 g/50 mL) IV injection 25 g (25 g Intravenous Given 7/10/24 2143)   dextrose (D50W) (25 g/50 mL) IV injection 25 mL (25 mL Intravenous Given 7/11/24 0104)        ED Course:    The patient was initially evaluated in the triage area where orders were placed. The patient was later dispositioned by Torrey Lucero DO.      The patient was advised to stay for completion of workup which includes but is not limited to communication of labs and radiological results, reassessment and plan. The patient was advised that leaving prior to disposition by a provider could result in critical findings that are not communicated to the patient.     ED Course as of 07/15/24 0344   Wed Jul 10, 2024   1738 PROVIDER IN TRIAGE  Patient was evaluated by Stan nguyen PA-C. Orders were placed and awaiting final results and disposition.   [MV]   6703 EKG:    Rhythm: Sinus rhythm  Rate: 74   Intervals: Normal VT and QT interval  T-wave: T wave inversion I, aVL, V4, V5, V6  ST Segment: No pathological ST elevation or reciprocal ST depression to suggest STEMI    EKG Comparison: The patient appears to have new T wave inversion in V4 V5 and V6 comparison to the EKG performed July 1, 2024    Interpreted by me   [SD]      ED Course User Index  [MV] Stan Mcdaniel PA  [SD] Torrey Lucero DO       Labs:    Lab Results (last 24 hours)       ** No results found for the last 24 hours. **             Imaging:    No Radiology Exams Resulted Within Past 24 Hours      Differential Diagnosis and  Discussion:      Weakness: Based on the patient's history, signs, and symptoms, the diffential diagnosis includes but is not limited to meningitis, stroke, sepsis, subarachnoid hemorrhage, intracranial bleeding, encephalitis, acute uti, dehydration, MS, myasthenia gravis, Guillan McDowell, migraine variant, neuromuscular disorders vertigo, electrolyte imbalance, and metabolic disorders.    All labs were reviewed and interpreted by me.  All X-rays impressions were independently interpreted by me.  EKG was interpreted by me.    MDM  Number of Diagnoses or Management Options  Abnormal EKG  Chronic renal failure, unspecified CKD stage  Elevated troponin  Hyperkalemia  Diagnosis management comments: The patient's CBC was reviewed and shows no abnormalities of critical concern.  Of note, there is no anemia requiring a blood transfusion and the platelet count is acceptable    Patient's BUN was 79.  The patient's creatinine was 8.44.  This is consistent with the patient's chronic renal failure.    The patient's potassium was elevated at 5.7.  He was treated with Lokelma, D50 and insulin.    Patient's proBNP was elevated at 70,000.  I do think this is in relation to the patient's renal failure    The patient had a elevated troponin of 170    Patient's EKG demonstrates new T wave inversion in V4 V5 and V6.      The patient's Covid swab was negative   The patient's Influenza swab was negative    Chest x-ray was performed while in the emergency room.  The chest x-ray demonstrated no acute cardiopulmonary process    Due to the patient's significant elevated troponin which appears to be more elevated than in the past as well as a new T wave inversion the patient was subsequently admitted to the hospital for further evaluation of these EKG changes troponin as well as his hyperkalemia         Amount and/or Complexity of Data Reviewed  Clinical lab tests: reviewed  Tests in the radiology section of CPT®: reviewed  Tests in the medicine  section of CPT®: reviewed  Decide to obtain previous medical records or to obtain history from someone other than the patient: yes  Discuss the patient with other providers: yes (21:01 EDT  I discussed case with Dr. Sinclair, the patient's nephrologist.  Have discussed the patient's labs, specifically the hyperkalemia.  He agrees with the assessment, plan and treatment.  He will see the patient tomorrow in consultation    21:22 EDT  I discussed the case with the hospitalist, Dr. Aviles.  We have discussed the patient's presenting symptoms, laboratory values, imaging and condition at the time of admission.  They will evaluate the patient in the emergency room and admit the patient to the hospital)    Critical Care  Total time providing critical care: 35 minutes (Total critical care time of.  Total critical care time documented does not include time spent on separately billed procedures for services of nurses or physician assistants.  I personally saw and examined the patient.  I have reviewed all diagnostic interpretations and treatment plans as written.  I was present for the key portions of any procedures performed and the inclusive time noted in any critical care statement.  Critical care time includes patient management by me, time spent at the patient bedside, time to review labs/ ABG's, imaging results, discussing patient care, documentation in the medical record and time spent with family or caregiver)       Social Determinants of Health:    Patient is independent, reliable, and has access to care.       Disposition and Care Coordination:    Admit:   Through independent evaluation of the patient's history, physical, and imperical data, the patient meets criteria for inpatient admission to the hospital.        Final diagnoses:   Chronic renal failure, unspecified CKD stage   Hyperkalemia   Abnormal EKG   Elevated troponin        ED Disposition       ED Disposition   Decision to Admit    Condition   --    Comment    Level of Care: Telemetry [5]   Diagnosis: Generalized weakness [595101]   Admitting Physician: LEONEL ADAME [[id number]]   Certification: I Certify That Inpatient Hospital Services Are Medically Necessary For Greater Than 2 Midnights                 This medical record created using voice recognition software.             Torrey Lucero DO  07/15/24 0344

## 2024-07-10 NOTE — OUTREACH NOTE
AMBULATORY CASE MANAGEMENT NOTE    Names and Relationships of Patient/Support Persons: Caller: [facility]; Relationship:  -     Reaching out to cardiology regarding bnp lab.  Left message at office to return call and have MD review lab.      Reached out to patient at 4:20pm  he has never heard from cardiology office today.  He is in distress today so he will proceed to ER ([facility])       Education Documentation  No documentation found.        Elisabet EPPERSON  Ambulatory Case Management    7/10/2024, 09:39 EDT

## 2024-07-10 NOTE — PROGRESS NOTES
BNP is notably higher.  Sharing this result with cardiology.  Congestive heart failure has worsened.  Scheduled for dialysis today.  Lipid panel numbers are at goal.  Needs to talk with cardiology office.

## 2024-07-11 PROBLEM — R07.89 CHEST PAIN, ATYPICAL: Status: ACTIVE | Noted: 2024-07-11

## 2024-07-11 PROBLEM — R94.31 ABNORMAL EKG: Status: ACTIVE | Noted: 2024-07-11

## 2024-07-11 LAB
ALBUMIN SERPL-MCNC: 3.7 G/DL (ref 3.5–5.2)
ALBUMIN/GLOB SERPL: 1.2 G/DL
ALP SERPL-CCNC: 231 U/L (ref 39–117)
ALT SERPL W P-5'-P-CCNC: 17 U/L (ref 1–41)
ANION GAP SERPL CALCULATED.3IONS-SCNC: 20 MMOL/L (ref 5–15)
AST SERPL-CCNC: 25 U/L (ref 1–40)
BILIRUB SERPL-MCNC: 1.5 MG/DL (ref 0–1.2)
BUN SERPL-MCNC: 82 MG/DL (ref 8–23)
BUN/CREAT SERPL: 9.2 (ref 7–25)
CALCIUM SPEC-SCNC: 9.3 MG/DL (ref 8.6–10.5)
CHLORIDE SERPL-SCNC: 99 MMOL/L (ref 98–107)
CO2 SERPL-SCNC: 20 MMOL/L (ref 22–29)
CREAT SERPL-MCNC: 8.95 MG/DL (ref 0.76–1.27)
DEPRECATED RDW RBC AUTO: 60.3 FL (ref 37–54)
EGFRCR SERPLBLD CKD-EPI 2021: 5.8 ML/MIN/1.73
ERYTHROCYTE [DISTWIDTH] IN BLOOD BY AUTOMATED COUNT: 17.7 % (ref 12.3–15.4)
GLOBULIN UR ELPH-MCNC: 3.2 GM/DL
GLUCOSE BLDC GLUCOMTR-MCNC: 126 MG/DL (ref 70–99)
GLUCOSE BLDC GLUCOMTR-MCNC: 130 MG/DL (ref 70–99)
GLUCOSE BLDC GLUCOMTR-MCNC: 55 MG/DL (ref 70–99)
GLUCOSE BLDC GLUCOMTR-MCNC: 56 MG/DL (ref 70–99)
GLUCOSE BLDC GLUCOMTR-MCNC: 60 MG/DL (ref 70–99)
GLUCOSE SERPL-MCNC: 96 MG/DL (ref 65–99)
HCT VFR BLD AUTO: 32 % (ref 37.5–51)
HGB BLD-MCNC: 10.7 G/DL (ref 13–17.7)
MCH RBC QN AUTO: 32.3 PG (ref 26.6–33)
MCHC RBC AUTO-ENTMCNC: 33.4 G/DL (ref 31.5–35.7)
MCV RBC AUTO: 96.7 FL (ref 79–97)
PLATELET # BLD AUTO: 113 10*3/MM3 (ref 140–450)
PMV BLD AUTO: 11.1 FL (ref 6–12)
POTASSIUM SERPL-SCNC: 5.5 MMOL/L (ref 3.5–5.2)
PROT SERPL-MCNC: 6.9 G/DL (ref 6–8.5)
RBC # BLD AUTO: 3.31 10*6/MM3 (ref 4.14–5.8)
SODIUM SERPL-SCNC: 139 MMOL/L (ref 136–145)
TROPONIN T SERPL HS-MCNC: 157 NG/L
WBC NRBC COR # BLD AUTO: 4.94 10*3/MM3 (ref 3.4–10.8)

## 2024-07-11 PROCEDURE — 94799 UNLISTED PULMONARY SVC/PX: CPT

## 2024-07-11 PROCEDURE — G0257 UNSCHED DIALYSIS ESRD PT HOS: HCPCS

## 2024-07-11 PROCEDURE — 93010 ELECTROCARDIOGRAM REPORT: CPT | Performed by: INTERNAL MEDICINE

## 2024-07-11 PROCEDURE — 84484 ASSAY OF TROPONIN QUANT: CPT | Performed by: FAMILY MEDICINE

## 2024-07-11 PROCEDURE — 93005 ELECTROCARDIOGRAM TRACING: CPT | Performed by: FAMILY MEDICINE

## 2024-07-11 PROCEDURE — 80053 COMPREHEN METABOLIC PANEL: CPT | Performed by: FAMILY MEDICINE

## 2024-07-11 PROCEDURE — 96376 TX/PRO/DX INJ SAME DRUG ADON: CPT

## 2024-07-11 PROCEDURE — 96372 THER/PROPH/DIAG INJ SC/IM: CPT

## 2024-07-11 PROCEDURE — 82948 REAGENT STRIP/BLOOD GLUCOSE: CPT | Performed by: EMERGENCY MEDICINE

## 2024-07-11 PROCEDURE — 25010000002 HEPARIN (PORCINE) PER 1000 UNITS: Performed by: FAMILY MEDICINE

## 2024-07-11 PROCEDURE — 99221 1ST HOSP IP/OBS SF/LOW 40: CPT | Performed by: SPECIALIST

## 2024-07-11 PROCEDURE — 82948 REAGENT STRIP/BLOOD GLUCOSE: CPT

## 2024-07-11 PROCEDURE — 99223 1ST HOSP IP/OBS HIGH 75: CPT | Performed by: FAMILY MEDICINE

## 2024-07-11 PROCEDURE — 94640 AIRWAY INHALATION TREATMENT: CPT

## 2024-07-11 PROCEDURE — 85027 COMPLETE CBC AUTOMATED: CPT | Performed by: FAMILY MEDICINE

## 2024-07-11 RX ORDER — DOCUSATE SODIUM 100 MG/1
100 CAPSULE, LIQUID FILLED ORAL 2 TIMES DAILY
Status: DISCONTINUED | OUTPATIENT
Start: 2024-07-11 | End: 2024-07-12 | Stop reason: HOSPADM

## 2024-07-11 RX ORDER — ISOSORBIDE MONONITRATE 30 MG/1
120 TABLET, EXTENDED RELEASE ORAL DAILY
Status: DISCONTINUED | OUTPATIENT
Start: 2024-07-11 | End: 2024-07-12 | Stop reason: HOSPADM

## 2024-07-11 RX ORDER — AMOXICILLIN 250 MG
2 CAPSULE ORAL 2 TIMES DAILY PRN
Status: DISCONTINUED | OUTPATIENT
Start: 2024-07-11 | End: 2024-07-12 | Stop reason: HOSPADM

## 2024-07-11 RX ORDER — HYDROXYZINE HYDROCHLORIDE 25 MG/1
25 TABLET, FILM COATED ORAL NIGHTLY PRN
Status: DISCONTINUED | OUTPATIENT
Start: 2024-07-11 | End: 2024-07-12 | Stop reason: HOSPADM

## 2024-07-11 RX ORDER — ONDANSETRON 2 MG/ML
4 INJECTION INTRAMUSCULAR; INTRAVENOUS EVERY 6 HOURS PRN
Status: DISCONTINUED | OUTPATIENT
Start: 2024-07-11 | End: 2024-07-11

## 2024-07-11 RX ORDER — ACETAMINOPHEN 160 MG/5ML
650 SOLUTION ORAL EVERY 4 HOURS PRN
Status: DISCONTINUED | OUTPATIENT
Start: 2024-07-11 | End: 2024-07-12 | Stop reason: HOSPADM

## 2024-07-11 RX ORDER — SODIUM CHLORIDE 0.9 % (FLUSH) 0.9 %
10 SYRINGE (ML) INJECTION AS NEEDED
Status: DISCONTINUED | OUTPATIENT
Start: 2024-07-11 | End: 2024-07-12 | Stop reason: HOSPADM

## 2024-07-11 RX ORDER — ONDANSETRON 2 MG/ML
4 INJECTION INTRAMUSCULAR; INTRAVENOUS EVERY 6 HOURS PRN
Status: DISCONTINUED | OUTPATIENT
Start: 2024-07-11 | End: 2024-07-12 | Stop reason: HOSPADM

## 2024-07-11 RX ORDER — POLYETHYLENE GLYCOL 3350 17 G/17G
17 POWDER, FOR SOLUTION ORAL DAILY PRN
Status: DISCONTINUED | OUTPATIENT
Start: 2024-07-11 | End: 2024-07-12 | Stop reason: HOSPADM

## 2024-07-11 RX ORDER — SODIUM CHLORIDE 9 MG/ML
40 INJECTION, SOLUTION INTRAVENOUS AS NEEDED
Status: DISCONTINUED | OUTPATIENT
Start: 2024-07-11 | End: 2024-07-12 | Stop reason: HOSPADM

## 2024-07-11 RX ORDER — NALOXONE HCL 0.4 MG/ML
0.4 VIAL (ML) INJECTION
Status: DISCONTINUED | OUTPATIENT
Start: 2024-07-11 | End: 2024-07-12 | Stop reason: HOSPADM

## 2024-07-11 RX ORDER — RANOLAZINE 500 MG/1
500 TABLET, EXTENDED RELEASE ORAL 2 TIMES DAILY
Status: DISCONTINUED | OUTPATIENT
Start: 2024-07-11 | End: 2024-07-12 | Stop reason: HOSPADM

## 2024-07-11 RX ORDER — ACETAMINOPHEN 325 MG/1
650 TABLET ORAL EVERY 4 HOURS PRN
Status: DISCONTINUED | OUTPATIENT
Start: 2024-07-11 | End: 2024-07-12 | Stop reason: HOSPADM

## 2024-07-11 RX ORDER — ASPIRIN 81 MG/1
81 TABLET ORAL DAILY
Status: DISCONTINUED | OUTPATIENT
Start: 2024-07-11 | End: 2024-07-12 | Stop reason: HOSPADM

## 2024-07-11 RX ORDER — ACETAMINOPHEN 650 MG/1
650 SUPPOSITORY RECTAL EVERY 4 HOURS PRN
Status: DISCONTINUED | OUTPATIENT
Start: 2024-07-11 | End: 2024-07-12 | Stop reason: HOSPADM

## 2024-07-11 RX ORDER — SODIUM CHLORIDE 0.9 % (FLUSH) 0.9 %
10 SYRINGE (ML) INJECTION EVERY 12 HOURS SCHEDULED
Status: DISCONTINUED | OUTPATIENT
Start: 2024-07-11 | End: 2024-07-12 | Stop reason: HOSPADM

## 2024-07-11 RX ORDER — BISACODYL 5 MG/1
5 TABLET, DELAYED RELEASE ORAL DAILY PRN
Status: DISCONTINUED | OUTPATIENT
Start: 2024-07-11 | End: 2024-07-12 | Stop reason: HOSPADM

## 2024-07-11 RX ORDER — FERROUS SULFATE 325(65) MG
325 TABLET ORAL
Status: DISCONTINUED | OUTPATIENT
Start: 2024-07-11 | End: 2024-07-12 | Stop reason: HOSPADM

## 2024-07-11 RX ORDER — CLOPIDOGREL BISULFATE 75 MG/1
75 TABLET ORAL DAILY
Status: DISCONTINUED | OUTPATIENT
Start: 2024-07-11 | End: 2024-07-12 | Stop reason: HOSPADM

## 2024-07-11 RX ORDER — BUDESONIDE AND FORMOTEROL FUMARATE DIHYDRATE 160; 4.5 UG/1; UG/1
2 AEROSOL RESPIRATORY (INHALATION)
Status: DISCONTINUED | OUTPATIENT
Start: 2024-07-11 | End: 2024-07-12 | Stop reason: HOSPADM

## 2024-07-11 RX ORDER — BISACODYL 10 MG
10 SUPPOSITORY, RECTAL RECTAL DAILY PRN
Status: DISCONTINUED | OUTPATIENT
Start: 2024-07-11 | End: 2024-07-12 | Stop reason: HOSPADM

## 2024-07-11 RX ORDER — DEXTROSE MONOHYDRATE 25 G/50ML
25 INJECTION, SOLUTION INTRAVENOUS ONCE
Status: COMPLETED | OUTPATIENT
Start: 2024-07-11 | End: 2024-07-11

## 2024-07-11 RX ORDER — TAMSULOSIN HYDROCHLORIDE 0.4 MG/1
0.4 CAPSULE ORAL DAILY
Status: DISCONTINUED | OUTPATIENT
Start: 2024-07-11 | End: 2024-07-12 | Stop reason: HOSPADM

## 2024-07-11 RX ORDER — HYDRALAZINE HYDROCHLORIDE 25 MG/1
25 TABLET, FILM COATED ORAL 3 TIMES DAILY
Status: DISCONTINUED | OUTPATIENT
Start: 2024-07-11 | End: 2024-07-12 | Stop reason: HOSPADM

## 2024-07-11 RX ORDER — ATORVASTATIN CALCIUM 40 MG/1
80 TABLET, FILM COATED ORAL DAILY
Status: DISCONTINUED | OUTPATIENT
Start: 2024-07-11 | End: 2024-07-12 | Stop reason: HOSPADM

## 2024-07-11 RX ORDER — NITROGLYCERIN 0.4 MG/1
0.4 TABLET SUBLINGUAL
Status: DISCONTINUED | OUTPATIENT
Start: 2024-07-11 | End: 2024-07-12 | Stop reason: HOSPADM

## 2024-07-11 RX ORDER — TRAMADOL HYDROCHLORIDE 50 MG/1
25 TABLET ORAL EVERY 12 HOURS PRN
Status: DISCONTINUED | OUTPATIENT
Start: 2024-07-11 | End: 2024-07-12 | Stop reason: HOSPADM

## 2024-07-11 RX ORDER — BUMETANIDE 1 MG/1
3 TABLET ORAL 2 TIMES DAILY
Status: DISCONTINUED | OUTPATIENT
Start: 2024-07-11 | End: 2024-07-12 | Stop reason: HOSPADM

## 2024-07-11 RX ORDER — AMLODIPINE BESYLATE 10 MG/1
10 TABLET ORAL DAILY
Status: DISCONTINUED | OUTPATIENT
Start: 2024-07-11 | End: 2024-07-12 | Stop reason: HOSPADM

## 2024-07-11 RX ORDER — SEVELAMER CARBONATE 800 MG/1
800 TABLET, FILM COATED ORAL 3 TIMES DAILY
Status: DISCONTINUED | OUTPATIENT
Start: 2024-07-11 | End: 2024-07-12 | Stop reason: HOSPADM

## 2024-07-11 RX ORDER — ZINC SULFATE 50(220)MG
220 CAPSULE ORAL DAILY
Status: DISCONTINUED | OUTPATIENT
Start: 2024-07-11 | End: 2024-07-12 | Stop reason: HOSPADM

## 2024-07-11 RX ORDER — HEPARIN SODIUM 5000 [USP'U]/ML
5000 INJECTION, SOLUTION INTRAVENOUS; SUBCUTANEOUS EVERY 12 HOURS SCHEDULED
Status: DISCONTINUED | OUTPATIENT
Start: 2024-07-11 | End: 2024-07-12 | Stop reason: HOSPADM

## 2024-07-11 RX ORDER — CALCIUM CARBONATE 500 MG/1
2 TABLET, CHEWABLE ORAL 3 TIMES DAILY PRN
Status: DISCONTINUED | OUTPATIENT
Start: 2024-07-11 | End: 2024-07-12 | Stop reason: HOSPADM

## 2024-07-11 RX ORDER — FAMOTIDINE 20 MG/1
20 TABLET, FILM COATED ORAL NIGHTLY
Status: DISCONTINUED | OUTPATIENT
Start: 2024-07-11 | End: 2024-07-12 | Stop reason: HOSPADM

## 2024-07-11 RX ORDER — SODIUM BICARBONATE 650 MG/1
1300 TABLET ORAL 2 TIMES DAILY
Status: DISCONTINUED | OUTPATIENT
Start: 2024-07-11 | End: 2024-07-12 | Stop reason: HOSPADM

## 2024-07-11 RX ADMIN — ISOSORBIDE MONONITRATE 120 MG: 30 TABLET, EXTENDED RELEASE ORAL at 14:06

## 2024-07-11 RX ADMIN — HEPARIN SODIUM 5000 UNITS: 5000 INJECTION INTRAVENOUS; SUBCUTANEOUS at 21:25

## 2024-07-11 RX ADMIN — SEVELAMER CARBONATE 800 MG: 800 TABLET, FILM COATED ORAL at 17:38

## 2024-07-11 RX ADMIN — DEXTROSE MONOHYDRATE 25 ML: 25 INJECTION, SOLUTION INTRAVENOUS at 01:04

## 2024-07-11 RX ADMIN — Medication 10 ML: at 01:09

## 2024-07-11 RX ADMIN — SODIUM BICARBONATE 650 MG TABLET 1300 MG: at 21:25

## 2024-07-11 RX ADMIN — ASPIRIN 81 MG: 81 TABLET, COATED ORAL at 14:06

## 2024-07-11 RX ADMIN — RANOLAZINE 500 MG: 500 TABLET, FILM COATED, EXTENDED RELEASE ORAL at 14:07

## 2024-07-11 RX ADMIN — SODIUM BICARBONATE 650 MG TABLET 1300 MG: at 14:08

## 2024-07-11 RX ADMIN — ATORVASTATIN CALCIUM 80 MG: 40 TABLET, FILM COATED ORAL at 14:06

## 2024-07-11 RX ADMIN — BUMETANIDE 3 MG: 1 TABLET ORAL at 21:25

## 2024-07-11 RX ADMIN — SEVELAMER CARBONATE 800 MG: 800 TABLET, FILM COATED ORAL at 21:25

## 2024-07-11 RX ADMIN — Medication 10 ML: at 14:08

## 2024-07-11 RX ADMIN — TAMSULOSIN HYDROCHLORIDE 0.4 MG: 0.4 CAPSULE ORAL at 14:07

## 2024-07-11 RX ADMIN — DOCUSATE SODIUM 100 MG: 100 CAPSULE, LIQUID FILLED ORAL at 21:25

## 2024-07-11 RX ADMIN — CLOPIDOGREL BISULFATE 75 MG: 75 TABLET ORAL at 14:07

## 2024-07-11 RX ADMIN — SERTRALINE HYDROCHLORIDE 50 MG: 50 TABLET ORAL at 21:25

## 2024-07-11 RX ADMIN — FAMOTIDINE 20 MG: 20 TABLET ORAL at 21:25

## 2024-07-11 RX ADMIN — Medication 10 ML: at 21:27

## 2024-07-11 RX ADMIN — HYDRALAZINE HYDROCHLORIDE 25 MG: 25 TABLET ORAL at 21:25

## 2024-07-11 RX ADMIN — AMLODIPINE BESYLATE 10 MG: 10 TABLET ORAL at 14:06

## 2024-07-11 RX ADMIN — HYDRALAZINE HYDROCHLORIDE 25 MG: 25 TABLET ORAL at 17:38

## 2024-07-11 RX ADMIN — DOCUSATE SODIUM 100 MG: 100 CAPSULE, LIQUID FILLED ORAL at 14:07

## 2024-07-11 RX ADMIN — FERROUS SULFATE TAB 325 MG (65 MG ELEMENTAL FE) 325 MG: 325 (65 FE) TAB at 14:07

## 2024-07-11 RX ADMIN — ZINC SULFATE 220 MG (50 MG) CAPSULE 220 MG: CAPSULE at 14:06

## 2024-07-11 RX ADMIN — HEPARIN SODIUM 5000 UNITS: 5000 INJECTION INTRAVENOUS; SUBCUTANEOUS at 14:08

## 2024-07-11 RX ADMIN — RANOLAZINE 500 MG: 500 TABLET, FILM COATED, EXTENDED RELEASE ORAL at 21:25

## 2024-07-11 RX ADMIN — BUMETANIDE 3 MG: 1 TABLET ORAL at 14:08

## 2024-07-11 RX ADMIN — BUDESONIDE AND FORMOTEROL FUMARATE DIHYDRATE 2 PUFF: 160; 4.5 AEROSOL RESPIRATORY (INHALATION) at 19:50

## 2024-07-11 NOTE — NURSING NOTE
Duration of Treatment 4.0 Hours   Access Site Tunneled Dialysis Catheter   Dialyzer Revaclear    mL/min   Dialysate Temperature (C) 36   BFR-As tolerated to a maximum of: 400 mL/min   Dialysate Solution Bath: K+ = 2 mEq, Ca = 2.5mEq   Bicarb 35 mEq   Na+ 137 meq   Fluid Removal: 4L       Patient treatment initiated without difficulty. Pt. BP elevated but stable. With 40 min remaining patient UF turned off due to leg cramps. Patient issued 200ml NS, that did eventually ease some discomfort, and patient was able to complete entire treatment.     Post report given to primary nurse agnes Mi /71

## 2024-07-11 NOTE — PLAN OF CARE
Goal Outcome Evaluation:              Outcome Evaluation: call light in reach. patient up to chair today. completed dialysis today. patient states he does not want his son to know anything of his medical care. son attempted to call today, notified patient's daughter and patient. patient wants to be moved to a different room that the sons does not know about. 2 West Manager and Bed Placement aware.

## 2024-07-11 NOTE — CONSULTS
Patient Care Team:  Shara Talley APRN as PCP - General (Nurse Practitioner)  Moe Bower MD as Consulting Physician (Cardiology)  Daniel Lyons APRN (Family Medicine)  Silas Muniz MD as Consulting Physician (Nephrology)  Miriam Corbin APRN as Nurse Practitioner (Nurse Practitioner)  Rich Delgadillo MD as Consulting Physician (Cardiology)  Erasto Shirley MD as Consulting Physician (Cardiology)  Ivan Byrd MD (Transplant Surgery)  Elisabet Luong, AYAN as Ambulatory  ([facility])    Chief complaint: ESRD    Subjective     History of Present Illness  75yo with h/o ESRD on dialysis m/w/f at [facility].  He had recently been discharged and not back to dialysis since that time.  He has history of CAD s/p CABG previously.  He had reported left sided chest pain.  He had elevated troponin and admitted for cardiac w/u.  We were asked to see him for dialysis needs. Pt seen on dialysis today, no acute complaints.    Review of Systems   Constitutional:  Positive for fatigue.   Respiratory:  Negative for cough and shortness of breath.    Cardiovascular:  Positive for chest pain. Negative for palpitations and leg swelling.   Gastrointestinal:  Negative for diarrhea, nausea and vomiting.   Musculoskeletal:  Negative for back pain.   Neurological:  Negative for headaches.        Past Medical History:   Diagnosis Date    A-V fistula     right arm    Anemia of chronic renal failure 04/12/2021    Antiplatelet or antithrombotic long-term use     plavix    Arthritis     Bilateral leg pain 11/05/2021    CAD (coronary artery disease)     h/o CABG 2003 and stents 11/2022, Dr Shirley follows    Chest pain     saw cardiology 4/10/23, pt states better if he takes his medications    Dialysis patient     Tues, Thursday, Saturday, has chest wall catheter currently    Gout     Heart attack     Hyperlipidemia     Hypertension     Kidney disease     stage 4, Dr Ornelas follows    Neck pain  08/30/2022    Neuritis of lower extremity, right 12/19/2019    Proteinuria 05/23/2022    Rheumatoid factor positive 07/06/2021    Seasonal allergies 03/13/2014    Vitamin D deficiency 05/23/2022   ,   Past Surgical History:   Procedure Laterality Date    ARTERIOVENOUS FISTULA Right     CARDIAC CATHETERIZATION      CARPAL TUNNEL RELEASE      CATARACT EXTRACTION W/ INTRAOCULAR LENS IMPLANT      COLONOSCOPY N/A 2006    COLONOSCOPY N/A 12/14/2018    Procedure: COLONOSCOPY TO CECUM WITH COLD BIOPSY POLYPECTOMY;  Surgeon: Elliott Harding MD;  Location:  [facility];  Service: General    CORONARY ANGIOPLASTY WITH STENT PLACEMENT  11/09/2022    CORONARY ARTERY BYPASS GRAFT N/A 06/20/2003    INGUINAL HERNIA REPAIR Right 06/04/2014    Open incarcerated inguinal hernia repair-Dr. Elliott Harding    INGUINAL HERNIA REPAIR Left 4/26/2023    Procedure: OPEN LEFT INGUINAL HERNIA REPAIR;  Surgeon: Elliott Harding MD;  Location:  [facility];  Service: General;  Laterality: Left;    LUMBAR DISC SURGERY N/A 1990, 1992    L4-L5, X2   ,   Family History   Problem Relation Age of Onset    Heart disease Mother     Heart disease Father     Malig Hyperthermia Neg Hx    ,   Social History     Socioeconomic History    Marital status:    Tobacco Use    Smoking status: Never    Smokeless tobacco: Never   Vaping Use    Vaping status: Never Used   Substance and Sexual Activity    Alcohol use: Yes     Comment: occassionally    Drug use: No    Sexual activity: Defer     E-cigarette/Vaping    E-cigarette/Vaping Use Never User      E-cigarette/Vaping Substances    Nicotine No     THC No     CBD No     Flavoring No      E-cigarette/Vaping Devices    Disposable No     Pre-filled or Refillable Cartridge No     Refillable Tank No     Pre-filled Pod No          ,   Medications Prior to Admission   Medication Sig Dispense Refill Last Dose    amLODIPine (NORVASC) 10 MG tablet TAKE ONE TABLET BY MOUTH DAILY (Patient taking differently: Take 1  tablet by mouth Daily.) 90 tablet 0 7/10/2024    aspirin 81 MG EC tablet Take 1 tablet by mouth Daily.   7/10/2024    atorvastatin (LIPITOR) 80 MG tablet Take 1 tablet by mouth Daily.   7/10/2024    budesonide-formoterol (SYMBICORT) 80-4.5 MCG/ACT inhaler Inhale 2 puffs.   7/10/2024    bumetanide (BUMEX) 1 MG tablet Take 3 tablets by mouth 2 (Two) Times a Day.   7/10/2024    calcium acetate (PHOSLO) 667 MG tablet Take 1 tablet by mouth 3 (Three) Times a Day.   7/10/2024    clopidogrel (PLAVIX) 75 MG tablet Take 1 tablet by mouth Daily.   7/10/2024    Docusate Sodium 100 MG capsule Take 1 tablet by mouth 2 (Two) Times a Day.   7/10/2024    ferrous sulfate 325 (65 FE) MG tablet Take 1 tablet by mouth Daily With Breakfast.   7/10/2024    hydrALAZINE (APRESOLINE) 25 MG tablet Take 1 tablet by mouth 3 (Three) Times a Day. 90 tablet 11 7/10/2024    hydrOXYzine (ATARAX) 25 MG tablet Take 1 tablet by mouth At Night As Needed for Anxiety.       isosorbide mononitrate (IMDUR) 120 MG 24 hr tablet Take 1 tablet by mouth Daily.   7/10/2024    Methoxy PEG-Epoetin Beta (MIRCERA IJ) 75 mcg Every 28 (Twenty-Eight) Days.       ranolazine (RANEXA) 500 MG 12 hr tablet Take 1 tablet by mouth 2 (Two) Times a Day. Take dos   7/10/2024    Renvela 800 MG tablet Take 1 tablet by mouth 3 (Three) Times a Day.   7/10/2024    sertraline (Zoloft) 50 MG tablet Take one half tablet at bedtime x 1 week then increase to 1 tablet at bedtime (Patient taking differently: Take 1 tablet by mouth Every Night. Take one half tablet at bedtime x 1 week then increase to 1 tablet at bedtime) 30 tablet 2 7/9/2024    tamsulosin (FLOMAX) 0.4 MG capsule 24 hr capsule Take 1 capsule by mouth Daily.   7/10/2024    Zinc Sulfate 220 (50 Zn) MG tablet Take 1 tablet by mouth.   7/10/2024    nitroglycerin (NITROSTAT) 0.4 MG SL tablet Place 1 tablet under the tongue Every 5 (Five) Minutes As Needed for Chest Pain (Do not take more than 3 at one time. Go to ER or call 911  if chest pain persists). Take no more than 3 doses in 15 minutes. 60 tablet 3    , Scheduled Meds:  amLODIPine, 10 mg, Oral, Daily  aspirin, 81 mg, Oral, Daily  atorvastatin, 80 mg, Oral, Daily  clopidogrel, 75 mg, Oral, Daily  heparin (porcine), 5,000 Units, Subcutaneous, Q12H  hydrALAZINE, 25 mg, Oral, TID  isosorbide mononitrate, 120 mg, Oral, Daily  ranolazine, 500 mg, Oral, BID  sodium chloride, 10 mL, Intravenous, Q12H  tamsulosin, 0.4 mg, Oral, Daily   , Continuous Infusions:   , PRN Meds:    senna-docusate sodium **AND** polyethylene glycol **AND** bisacodyl **AND** bisacodyl    hydrOXYzine    nitroglycerin    nitroglycerin    ondansetron    sodium chloride    sodium chloride    sodium chloride, and Allergies:  Peanut (diagnostic), Peanut butter flavor, and Simvastatin    Objective     Vital Signs  Temp:  [97.2 °F (36.2 °C)-97.9 °F (36.6 °C)] 97.7 °F (36.5 °C)  Heart Rate:  [65-76] 65  Resp:  [13-20] 16  BP: (147-164)/() 164/80    No intake/output data recorded.  No intake/output data recorded.    Physical Exam  Constitutional:       Appearance: Normal appearance.   HENT:      Nose: Nose normal.      Mouth/Throat:      Mouth: Mucous membranes are moist.   Eyes:      General: No scleral icterus.     Pupils: Pupils are equal, round, and reactive to light.   Cardiovascular:      Rate and Rhythm: Normal rate and regular rhythm.      Pulses: Normal pulses.   Pulmonary:      Effort: Pulmonary effort is normal.      Breath sounds: Normal breath sounds.   Abdominal:      General: Abdomen is flat.      Palpations: Abdomen is soft.   Musculoskeletal:      Right lower leg: No edema.      Left lower leg: No edema.   Skin:     General: Skin is warm and dry.   Neurological:      General: No focal deficit present.      Mental Status: He is alert.         Results Review:    I reviewed the patient's new clinical results.  I reviewed the patient's new imaging results and agree with the interpretation.  I reviewed the  "patient's other test results and agree with the interpretation    WBC WBC   Date Value Ref Range Status   07/11/2024 4.94 3.40 - 10.80 10*3/mm3 Final   07/10/2024 4.59 3.40 - 10.80 10*3/mm3 Final   07/09/2024 4.49 3.40 - 10.80 10*3/mm3 Final      HGB Hemoglobin   Date Value Ref Range Status   07/11/2024 10.7 (L) 13.0 - 17.7 g/dL Final   07/10/2024 10.7 (L) 13.0 - 17.7 g/dL Final   07/09/2024 9.8 (L) 13.0 - 17.7 g/dL Final      HCT Hematocrit   Date Value Ref Range Status   07/11/2024 32.0 (L) 37.5 - 51.0 % Final   07/10/2024 31.5 (L) 37.5 - 51.0 % Final   07/09/2024 28.9 (L) 37.5 - 51.0 % Final      Platlets No results found for: \"LABPLAT\"   MCV MCV   Date Value Ref Range Status   07/11/2024 96.7 79.0 - 97.0 fL Final   07/10/2024 94.0 79.0 - 97.0 fL Final   07/09/2024 97.0 79.0 - 97.0 fL Final          Sodium Sodium   Date Value Ref Range Status   07/11/2024 139 136 - 145 mmol/L Final   07/10/2024 140 136 - 145 mmol/L Final      Potassium Potassium   Date Value Ref Range Status   07/11/2024 5.5 (H) 3.5 - 5.2 mmol/L Final   07/10/2024 5.7 (H) 3.5 - 5.2 mmol/L Final     Comment:     Slight hemolysis detected by analyzer. Result may be falsely elevated.      Chloride Chloride   Date Value Ref Range Status   07/11/2024 99 98 - 107 mmol/L Final   07/10/2024 99 98 - 107 mmol/L Final      CO2 CO2   Date Value Ref Range Status   07/11/2024 20.0 (L) 22.0 - 29.0 mmol/L Final   07/10/2024 18.3 (L) 22.0 - 29.0 mmol/L Final      BUN BUN   Date Value Ref Range Status   07/11/2024 82 (H) 8 - 23 mg/dL Final   07/10/2024 79 (H) 8 - 23 mg/dL Final      Creatinine Creatinine   Date Value Ref Range Status   07/11/2024 8.95 (H) 0.76 - 1.27 mg/dL Final   07/10/2024 8.44 (H) 0.76 - 1.27 mg/dL Final      Calcium Calcium   Date Value Ref Range Status   07/11/2024 9.3 8.6 - 10.5 mg/dL Final   07/10/2024 9.2 8.6 - 10.5 mg/dL Final      PO4 No results found for: \"CAPO4\"   Albumin Albumin   Date Value Ref Range Status   07/11/2024 3.7 3.5 - 5.2 " g/dL Final   07/10/2024 3.7 3.5 - 5.2 g/dL Final      Magnesium Magnesium   Date Value Ref Range Status   07/10/2024 2.5 (H) 1.6 - 2.4 mg/dL Final      Uric Acid Uric Acid   Date Value Ref Range Status   07/09/2024 6.6 3.4 - 7.0 mg/dL Final            Assessment & Plan       Generalized weakness    Hyperlipidemia    Stented coronary artery    CHF (congestive heart failure)    Hyperkalemia    End stage renal disease    Edema    Elevated troponin    Depression    Abnormal EKG    Chest pain, atypical      Assessment & Plan  ESRD-  On HD m/w/f at [facility].  Poor compliance, hasn't been this week.  Dialysis today with noted hyperkalemia.  Will plan dialysis again in AM and continue regular schedule after that.    Chest Pain-  Cardiology evaluating.    CAD- s/p CABG    Anemia of CKD- at goal for ckd.      I discussed the patients findings and my recommendations with patient, nursing staff, and primary care team    John Sinclair MD  07/11/24  10:04 EDT

## 2024-07-11 NOTE — SIGNIFICANT NOTE
07/11/24 1156   Physical Therapy Time and Intention   Mode of Treatment individual therapy;physical therapy   Session Not Performed patient unavailable for evaluation  (pt in dialysis)

## 2024-07-11 NOTE — PLAN OF CARE
Goal Outcome Evaluation:  Plan of Care Reviewed With: patient        Progress: no change  Outcome Evaluation: Patient new admisson from ED during shift. Patient very weak and lethargic. Blood sugar cheked upo arrival and found to be low at 55. MD notified, D50 given. Patient sugar then to be 130. Patient remaind lethargic through shift. No complaints at this time. Patient rested through the night. Call light is in reach.

## 2024-07-11 NOTE — H&P
[facility]   HISTORY AND PHYSICAL    Patient Name: Mohamud Jarrett  : 1950  MRN: [id number]  Primary Care Physician:  Shara Talley APRN  Date of admission: 7/10/2024    Subjective   Subjective     Chief Complaint: Generalized weakness, missed dialysis    HPI:    Mohamud Jarrett is a 74 y.o. male with past medical history of end-stage renal disease on hemodialysis, hypertension, CAD status post CABG, prior MIs, and GERD presented to the ED for evaluation of generalized weakness.  Patient was discharged from this hospital 4 days ago due to multiple missed dialysis sessions.  Patient states that after his dialysis session on Friday he felt rather weak over the weekend and due to that he is missed his hemodialysis on Monday.  Patient also been having intermittent chest pain which he described as left-sided, radiating to his left arm, pressure-like, 6 out of 10, associated shortness of breath.  Patient weakness continued to worsen along with decreased appetite and limited to no p.o. intake so family brought him to the ED for further evaluation.  In the ED patient was slightly hypertensive on arrival with remaining vitals being within normal limits.  Labs showed hyperkalemia with elevated troponin and mild anemia to go along with the status quo chemistry for his end-stage renal disease.  His COVID flu RSV was negative but his proBNP was significantly elevated.  Chest x-ray was negative for any acute findings.  When asked he denied any recent fevers, chills, headaches, focal weakness, chest pain, palpitation, shortness of breath, cough, abdominal pain, nausea, vomiting, diarrhea, constipation, dysuria, hematuria, hematochezia, melena, or anxiety.  Patient admitted for further evaluation and treatment.          Review of Systems   All systems were reviewed and negative except for: As per HPI    Personal History     Past Medical History:   Diagnosis Date    A-V fistula     right arm    Anemia of chronic  renal failure 04/12/2021    Antiplatelet or antithrombotic long-term use     plavix    Arthritis     Bilateral leg pain 11/05/2021    CAD (coronary artery disease)     h/o CABG 2003 and stents 11/2022, Dr Shirley follows    Chest pain     saw cardiology 4/10/23, pt states better if he takes his medications    Dialysis patient     Tues, Thursday, Saturday, has chest wall catheter currently    Gout     Heart attack     Hyperlipidemia     Hypertension     Kidney disease     stage 4, Dr Ornelas follows    Neck pain 08/30/2022    Neuritis of lower extremity, right 12/19/2019    Proteinuria 05/23/2022    Rheumatoid factor positive 07/06/2021    Seasonal allergies 03/13/2014    Vitamin D deficiency 05/23/2022       Past Surgical History:   Procedure Laterality Date    ARTERIOVENOUS FISTULA Right     CARDIAC CATHETERIZATION      CARPAL TUNNEL RELEASE      CATARACT EXTRACTION W/ INTRAOCULAR LENS IMPLANT      COLONOSCOPY N/A 2006    COLONOSCOPY N/A 12/14/2018    Procedure: COLONOSCOPY TO CECUM WITH COLD BIOPSY POLYPECTOMY;  Surgeon: Elliott Harding MD;  Location: [facility];  Service: General    CORONARY ANGIOPLASTY WITH STENT PLACEMENT  11/09/2022    CORONARY ARTERY BYPASS GRAFT N/A 06/20/2003    INGUINAL HERNIA REPAIR Right 06/04/2014    Open incarcerated inguinal hernia repair-Dr. Elliott Harding    INGUINAL HERNIA REPAIR Left 4/26/2023    Procedure: OPEN LEFT INGUINAL HERNIA REPAIR;  Surgeon: Elliott Harding MD;  Location: [facility] OR;  Service: General;  Laterality: Left;    LUMBAR DISC SURGERY N/A 1990, 1992    L4-L5, X2       Family History: family history includes Heart disease in his father and mother. Otherwise pertinent FHx was reviewed and not pertinent to current issue.    Social History:  reports that he has never smoked. He has never used smokeless tobacco. He reports current alcohol use. He reports that he does not use drugs.    Home Medications:  Docusate Sodium, Methoxy PEG-Epoetin Beta, Zinc Sulfate,  amLODIPine, aspirin, atorvastatin, budesonide-formoterol, bumetanide, calcium acetate, clopidogrel, ferrous sulfate, hydrALAZINE, hydrOXYzine, isosorbide mononitrate, nitroglycerin, ranolazine, sertraline, sevelamer, and tamsulosin      Allergies:  Allergies   Allergen Reactions    Peanut (Diagnostic) Shortness Of Breath and Rash    Peanut Butter Flavor Shortness Of Breath and Rash    Simvastatin Myalgia     Other reaction(s): muscle cramps       Objective   Objective     Vitals:   Temp:  [97.2 °F (36.2 °C)-97.5 °F (36.4 °C)] 97.5 °F (36.4 °C)  Heart Rate:  [73-76] 75  Resp:  [13-20] 16  BP: (151-163)/(69-90) 151/85  Physical Exam    Constitutional: Awake, alert, ill-appearing   Eyes: PERRLA, sclerae anicteric, no conjunctival injection   HENT: NCAT, mucous membranes moist   Neck: Supple, no thyromegaly, no lymphadenopathy, trachea midline   Respiratory: Decreased breath sounds bilaterally, nonlabored respirations    Cardiovascular: RRR, no murmurs, rubs, or gallops, palpable pedal pulses bilaterally   Gastrointestinal: Positive bowel sounds, soft, nontender, nondistended   Musculoskeletal: Bilateral lower extremity edema, no clubbing or cyanosis to extremities   Psychiatric: Appropriate affect, cooperative   Neurologic: Oriented x 3, weakness to lower extremities, Cranial Nerves grossly intact to confrontation, speech clear   Skin: No rashes     Result Review    Result Review:  I have personally reviewed the results from the time of this admission to 7/11/2024 00:56 EDT and agree with these findings:  [x]  Laboratory list / accordion  []  Microbiology  [x]  Radiology  [x]  EKG/Telemetry   []  Cardiology/Vascular   []  Pathology  []  Old records  []  Other:  Most notable findings include: Hyperkalemia, EKG with inverted T waves in the lateral leads, elevated troponin, negative COVID flu RSV, anemia, chest x-ray negative      Assessment & Plan   Assessment / Plan     Brief Patient Summary:  Mohamud Jarrtet is a 74  y.o. male with past medical history of end-stage renal disease on hemodialysis, hypertension, CAD status post CABG, prior MIs, and GERD presented to the ED for evaluation of generalized weakness    Active Hospital Problems:  Active Hospital Problems    Diagnosis     **Generalized weakness     Abnormal EKG     Chest pain, atypical     Depression     Edema     Elevated troponin     Hyperkalemia     End stage renal disease     CHF (congestive heart failure)     Stented coronary artery     Hyperlipidemia      Plan:     Generalized weakness  -Admit to medical floor  -Patient with missed dialysis session once again  -Fall precautions  -Hemodialysis per nephro  -PT  -Patient advised the importance of compliance  -Supportive care      Chest Pain  -Admit telemetry  -Symptoms intermittent  -Chest x-ray reviewed  -EKG reviewed.  Inverted T waves in lateral leads  -Initial troponin elevated, will trend  -Heparin drip if warranted  -KEVIN  -A1c  -BNP significantly elevated  -Echocardiogram if warranted  -Lipid panel  -Stress test if warranted  -We will consult cardiology   -Supportive care    End-stage renal disease  -Patient with poor compliance with hemodialysis  -Hyperkalemia noted  -Nephrology consulted  -Dialysis per nephro    HTN  -Currently well controlled  -PRN BP meds  -Resume home meds when available  -Titrate if needed    History of CAD  History of CHF  History of depression    GI ppx  DVT ppx      VTE Prophylaxis:  Pharmacologic VTE prophylaxis orders are present.        CODE STATUS:    Level Of Support Discussed With: Patient  Code Status (Patient has no pulse and is not breathing): CPR (Attempt to Resuscitate)  Medical Interventions (Patient has pulse or is breathing): Full Support    Admission Status:  I believe this patient meets inpatient status.      Electronically signed by Lior Aviles MD, 07/11/24, 12:56 AM EDT.

## 2024-07-11 NOTE — CONSULTS
[facility]   Cardiology Consult Note    Patient Name: Mohamud Jarrett  : 1950  MRN: [id number]  Primary Care Physician:  Shara Talley APRN  Referring Physician: No ref. provider found  Date of admission: 7/10/2024    Subjective   Subjective     Reason for Consult/ Chief Complaint: Weakness    HPI:  Mohamud Jarrett is a 74 y.o. male with history of coronary disease status post CABG, end-stage renal disease on hemodialysis admitted with missed dialysis and weakness.  Noncompliant.  Sees a cardiologist in [place].  He had noted nonspecific chest pain last week.  No anginal symptoms.    Review of Systems:      Constitutional no fever,  no weight loss   Skin no rash   Otolaryngeal no difficulty swallowing   Cardiovascular See HPI   Pulmonary no cough, no sputum production   Gastrointestinal no constipation, no diarrhea   Genitourinary no dysuria, no hematuria   Hematologic no easy bruisability, no abnormal bleeding   Musculoskeletal no muscle pain   Neurologic no dizziness, no falls     Personal History       Past Medical/Surgical History:   Past Medical History:   Diagnosis Date    A-V fistula     right arm    Anemia of chronic renal failure 2021    Antiplatelet or antithrombotic long-term use     plavix    Arthritis     Bilateral leg pain 2021    CAD (coronary artery disease)     h/o CABG  and stents 2022, Dr Shirley follows    Chest pain     saw cardiology 4/10/23, pt states better if he takes his medications    Dialysis patient     Tues, Thursday, Saturday, has chest wall catheter currently    Gout     Heart attack     Hyperlipidemia     Hypertension     Kidney disease     stage 4, Dr Ornelas follows    Neck pain 2022    Neuritis of lower extremity, right 2019    Proteinuria 2022    Rheumatoid factor positive 2021    Seasonal allergies 2014    Vitamin D deficiency 2022     Past Surgical History:   Procedure Laterality Date    ARTERIOVENOUS  FISTULA Right     CARDIAC CATHETERIZATION      CARPAL TUNNEL RELEASE      CATARACT EXTRACTION W/ INTRAOCULAR LENS IMPLANT      COLONOSCOPY N/A 2006    COLONOSCOPY N/A 12/14/2018    Procedure: COLONOSCOPY TO CECUM WITH COLD BIOPSY POLYPECTOMY;  Surgeon: Elliott Harding MD;  Location: [facility] ENDOSCOPY;  Service: General    CORONARY ANGIOPLASTY WITH STENT PLACEMENT  11/09/2022    CORONARY ARTERY BYPASS GRAFT N/A 06/20/2003    INGUINAL HERNIA REPAIR Right 06/04/2014    Open incarcerated inguinal hernia repair-Dr. Elliott Harding    INGUINAL HERNIA REPAIR Left 4/26/2023    Procedure: OPEN LEFT INGUINAL HERNIA REPAIR;  Surgeon: Elliott Harding MD;  Location:  [facility] OR;  Service: General;  Laterality: Left;    LUMBAR DISC SURGERY N/A 1990, 1992    L4-L5, X2         Family History: No Family History of CAD.    Social History:  reports that he has never smoked. He has never used smokeless tobacco. He reports current alcohol use. He reports that he does not use drugs.    Medications:  Medications Prior to Admission   Medication Sig Dispense Refill Last Dose    amLODIPine (NORVASC) 10 MG tablet TAKE ONE TABLET BY MOUTH DAILY (Patient taking differently: Take 1 tablet by mouth Daily.) 90 tablet 0 7/10/2024    aspirin 81 MG EC tablet Take 1 tablet by mouth Daily.   7/10/2024    atorvastatin (LIPITOR) 80 MG tablet Take 1 tablet by mouth Daily.   7/10/2024    budesonide-formoterol (SYMBICORT) 80-4.5 MCG/ACT inhaler Inhale 2 puffs.   7/10/2024    bumetanide (BUMEX) 1 MG tablet Take 3 tablets by mouth 2 (Two) Times a Day.   7/10/2024    calcium acetate (PHOSLO) 667 MG tablet Take 1 tablet by mouth 3 (Three) Times a Day.   7/10/2024    clopidogrel (PLAVIX) 75 MG tablet Take 1 tablet by mouth Daily.   7/10/2024    Docusate Sodium 100 MG capsule Take 1 tablet by mouth 2 (Two) Times a Day.   7/10/2024    ferrous sulfate 325 (65 FE) MG tablet Take 1 tablet by mouth Daily With Breakfast.   7/10/2024    hydrALAZINE (APRESOLINE) 25 MG  tablet Take 1 tablet by mouth 3 (Three) Times a Day. 90 tablet 11 7/10/2024    hydrOXYzine (ATARAX) 25 MG tablet Take 1 tablet by mouth At Night As Needed for Anxiety.       isosorbide mononitrate (IMDUR) 120 MG 24 hr tablet Take 1 tablet by mouth Daily.   7/10/2024    Methoxy PEG-Epoetin Beta (MIRCERA IJ) 75 mcg Every 28 (Twenty-Eight) Days.       ranolazine (RANEXA) 500 MG 12 hr tablet Take 1 tablet by mouth 2 (Two) Times a Day. Take dos   7/10/2024    Renvela 800 MG tablet Take 1 tablet by mouth 3 (Three) Times a Day.   7/10/2024    sertraline (Zoloft) 50 MG tablet Take one half tablet at bedtime x 1 week then increase to 1 tablet at bedtime (Patient taking differently: Take 1 tablet by mouth Every Night. Take one half tablet at bedtime x 1 week then increase to 1 tablet at bedtime) 30 tablet 2 7/9/2024    tamsulosin (FLOMAX) 0.4 MG capsule 24 hr capsule Take 1 capsule by mouth Daily.   7/10/2024    Zinc Sulfate 220 (50 Zn) MG tablet Take 1 tablet by mouth.   7/10/2024    nitroglycerin (NITROSTAT) 0.4 MG SL tablet Place 1 tablet under the tongue Every 5 (Five) Minutes As Needed for Chest Pain (Do not take more than 3 at one time. Go to ER or call 911 if chest pain persists). Take no more than 3 doses in 15 minutes. 60 tablet 3      Current medications:  amLODIPine, 10 mg, Oral, Daily  aspirin, 81 mg, Oral, Daily  atorvastatin, 80 mg, Oral, Daily  clopidogrel, 75 mg, Oral, Daily  heparin (porcine), 5,000 Units, Subcutaneous, Q12H  hydrALAZINE, 25 mg, Oral, TID  isosorbide mononitrate, 120 mg, Oral, Daily  ranolazine, 500 mg, Oral, BID  sodium chloride, 10 mL, Intravenous, Q12H  tamsulosin, 0.4 mg, Oral, Daily      Current IV drips:       Allergies:  Allergies   Allergen Reactions    Peanut (Diagnostic) Shortness Of Breath and Rash    Peanut Butter Flavor Shortness Of Breath and Rash    Simvastatin Myalgia     Other reaction(s): muscle cramps       Objective    Objective     Vitals:   Temp:  [97.2 °F (36.2 °C)-97.9  "°F (36.6 °C)] 97.7 °F (36.5 °C)  Heart Rate:  [66-76] 67  Resp:  [13-20] 16  BP: (147-163)/() 154/84      Physical Exam:    Constitutional: Awake, alert, No acute distress   Eyes: PERRLA, sclerae anicteric, no conjunctival injection  HENT: NCAT, mucous membranes moist  Neck: Supple, no thyromegaly, no lymphadenopathy, trachea midline  Respiratory: Clear to auscultation bilaterally, nonlabored respirations   Cardiovascular: RRR, no murmurs, rubs, or gallops, palpable pedal pulses bilaterally  Gastrointestinal: Positive bowel sounds, soft, nontender, nondistended  Musculoskeletal: No bilateral ankle edema, no clubbing or cyanosis to extremities  Psychiatric: Appropriate affect, cooperative  Neurologic: Oriented x 3, strength symmetric in all extremities, Cranial Nerves grossly intact to confrontation, speech clear  Skin: No rashes.    Result Review    Result Review:  I have personally reviewed the results from the time of this admission to 7/11/2024 09:08 EDT and agree with these findings:  [x]  Laboratory  [x]  EKG/Telemetry   [x]  Cardiology/Vascular   []  Pathology  [x]  Old records  [x]  Medications  Basic Metabolic Panel    Sodium Sodium   Date Value Ref Range Status   07/11/2024 139 136 - 145 mmol/L Final   07/10/2024 140 136 - 145 mmol/L Final      Potassium Potassium   Date Value Ref Range Status   07/11/2024 5.5 (H) 3.5 - 5.2 mmol/L Final   07/10/2024 5.7 (H) 3.5 - 5.2 mmol/L Final     Comment:     Slight hemolysis detected by analyzer. Result may be falsely elevated.      Chloride Chloride   Date Value Ref Range Status   07/11/2024 99 98 - 107 mmol/L Final   07/10/2024 99 98 - 107 mmol/L Final      Bicarbonate No results found for: \"PLASMABICARB\"   BUN BUN   Date Value Ref Range Status   07/11/2024 82 (H) 8 - 23 mg/dL Final   07/10/2024 79 (H) 8 - 23 mg/dL Final      Creatinine Creatinine   Date Value Ref Range Status   07/11/2024 8.95 (H) 0.76 - 1.27 mg/dL Final   07/10/2024 8.44 (H) 0.76 - 1.27 mg/dL " "Final      Calcium Calcium   Date Value Ref Range Status   07/11/2024 9.3 8.6 - 10.5 mg/dL Final   07/10/2024 9.2 8.6 - 10.5 mg/dL Final      Glucose      No components found for: \"GLUCOSE.*\"        Lab Results   Component Value Date    PROBNP >70,000.0 (H) 07/10/2024          EKG shows sinus rhythm with no acute changes.  Telemetry reviewed    Assessment / Plan     Impression:   1.  CORONARY ARTERY DISEASE s/p CABG s/p recent PTCA/stent.  2.  Atypical chest pain  3.  End-stage renal disease on dialysis.  4.  Slightly increased troponin secondary to above.  Plan:   1.  Continue current medical management.  2.  Reviewed recent cath/PTCA report.  3.  No acute changes on EKG.    Electronically signed by Juan A Griffith MD, 07/11/24, 9:08 AM EDT.   "

## 2024-07-12 ENCOUNTER — READMISSION MANAGEMENT (OUTPATIENT)
Dept: CALL CENTER | Facility: HOSPITAL | Age: 74
End: 2024-07-12
Payer: MEDICARE

## 2024-07-12 ENCOUNTER — TELEPHONE (OUTPATIENT)
Dept: CARDIOLOGY | Facility: CLINIC | Age: 74
End: 2024-07-12

## 2024-07-12 VITALS
TEMPERATURE: 97.7 F | HEIGHT: 70 IN | DIASTOLIC BLOOD PRESSURE: 77 MMHG | SYSTOLIC BLOOD PRESSURE: 141 MMHG | RESPIRATION RATE: 18 BRPM | OXYGEN SATURATION: 99 % | BODY MASS INDEX: 25.09 KG/M2 | WEIGHT: 175.27 LBS | HEART RATE: 68 BPM

## 2024-07-12 PROBLEM — N18.6 ESRD (END STAGE RENAL DISEASE): Status: ACTIVE | Noted: 2024-07-12

## 2024-07-12 LAB
ALBUMIN SERPL-MCNC: 3.2 G/DL (ref 3.5–5.2)
ANION GAP SERPL CALCULATED.3IONS-SCNC: 14.2 MMOL/L (ref 5–15)
BUN SERPL-MCNC: 44 MG/DL (ref 8–23)
BUN/CREAT SERPL: 7.1 (ref 7–25)
CALCIUM SPEC-SCNC: 8.5 MG/DL (ref 8.6–10.5)
CHLORIDE SERPL-SCNC: 101 MMOL/L (ref 98–107)
CO2 SERPL-SCNC: 23.8 MMOL/L (ref 22–29)
CREAT SERPL-MCNC: 6.18 MG/DL (ref 0.76–1.27)
DEPRECATED RDW RBC AUTO: 55.8 FL (ref 37–54)
EGFRCR SERPLBLD CKD-EPI 2021: 9 ML/MIN/1.73
ERYTHROCYTE [DISTWIDTH] IN BLOOD BY AUTOMATED COUNT: 17.6 % (ref 12.3–15.4)
GLUCOSE SERPL-MCNC: 89 MG/DL (ref 65–99)
HCT VFR BLD AUTO: 27.1 % (ref 37.5–51)
HGB BLD-MCNC: 9.4 G/DL (ref 13–17.7)
MCH RBC QN AUTO: 32.2 PG (ref 26.6–33)
MCHC RBC AUTO-ENTMCNC: 34.7 G/DL (ref 31.5–35.7)
MCV RBC AUTO: 92.8 FL (ref 79–97)
PHOSPHATE SERPL-MCNC: 4.1 MG/DL (ref 2.5–4.5)
PLATELET # BLD AUTO: 110 10*3/MM3 (ref 140–450)
PMV BLD AUTO: 11.8 FL (ref 6–12)
POTASSIUM SERPL-SCNC: 4.4 MMOL/L (ref 3.5–5.2)
QT INTERVAL: 422 MS
QT INTERVAL: 448 MS
QTC INTERVAL: 465 MS
QTC INTERVAL: 469 MS
RBC # BLD AUTO: 2.92 10*6/MM3 (ref 4.14–5.8)
SODIUM SERPL-SCNC: 139 MMOL/L (ref 136–145)
WBC NRBC COR # BLD AUTO: 3.79 10*3/MM3 (ref 3.4–10.8)

## 2024-07-12 PROCEDURE — A9270 NON-COVERED ITEM OR SERVICE: HCPCS | Performed by: INTERNAL MEDICINE

## 2024-07-12 PROCEDURE — G0257 UNSCHED DIALYSIS ESRD PT HOS: HCPCS

## 2024-07-12 PROCEDURE — 63710000001 DOCUSATE SODIUM 100 MG CAPSULE: Performed by: INTERNAL MEDICINE

## 2024-07-12 PROCEDURE — A9270 NON-COVERED ITEM OR SERVICE: HCPCS | Performed by: FAMILY MEDICINE

## 2024-07-12 PROCEDURE — 63710000001 RANOLAZINE 500 MG TABLET SUSTAINED-RELEASE 12 HOUR: Performed by: FAMILY MEDICINE

## 2024-07-12 PROCEDURE — 63710000001 SODIUM BICARBONATE 650 MG TABLET: Performed by: INTERNAL MEDICINE

## 2024-07-12 PROCEDURE — 63710000001 HYDRALAZINE 25 MG TABLET: Performed by: FAMILY MEDICINE

## 2024-07-12 PROCEDURE — 63710000001 SEVELAMER 800 MG TABLET: Performed by: INTERNAL MEDICINE

## 2024-07-12 PROCEDURE — 99239 HOSP IP/OBS DSCHRG MGMT >30: CPT | Performed by: FAMILY MEDICINE

## 2024-07-12 PROCEDURE — 63710000001 SERTRALINE 50 MG TABLET: Performed by: INTERNAL MEDICINE

## 2024-07-12 PROCEDURE — 63710000001 ZINC SULFATE 220 (50 ZN) MG CAPSULE: Performed by: INTERNAL MEDICINE

## 2024-07-12 PROCEDURE — 63710000001 CLOPIDOGREL 75 MG TABLET: Performed by: FAMILY MEDICINE

## 2024-07-12 PROCEDURE — 63710000001 TAMSULOSIN 0.4 MG CAPSULE: Performed by: FAMILY MEDICINE

## 2024-07-12 PROCEDURE — 63710000001 FAMOTIDINE 20 MG TABLET: Performed by: INTERNAL MEDICINE

## 2024-07-12 PROCEDURE — 63710000001 ATORVASTATIN 40 MG TABLET: Performed by: FAMILY MEDICINE

## 2024-07-12 PROCEDURE — 63710000001 FERROUS SULFATE 325 (65 FE) MG TABLET: Performed by: INTERNAL MEDICINE

## 2024-07-12 PROCEDURE — 80069 RENAL FUNCTION PANEL: CPT | Performed by: INTERNAL MEDICINE

## 2024-07-12 PROCEDURE — 99213 OFFICE O/P EST LOW 20 MIN: CPT | Performed by: SPECIALIST

## 2024-07-12 PROCEDURE — 63710000001 AMLODIPINE 10 MG TABLET: Performed by: FAMILY MEDICINE

## 2024-07-12 PROCEDURE — G0378 HOSPITAL OBSERVATION PER HR: HCPCS

## 2024-07-12 PROCEDURE — 25010000002 HEPARIN (PORCINE) PER 1000 UNITS: Performed by: FAMILY MEDICINE

## 2024-07-12 PROCEDURE — 63710000001 ISOSORBIDE MONONITRATE 30 MG TABLET SUSTAINED-RELEASE 24 HOUR: Performed by: FAMILY MEDICINE

## 2024-07-12 PROCEDURE — 63710000001 ASPIRIN 81 MG TABLET DELAYED-RELEASE: Performed by: FAMILY MEDICINE

## 2024-07-12 PROCEDURE — 96372 THER/PROPH/DIAG INJ SC/IM: CPT

## 2024-07-12 PROCEDURE — 85027 COMPLETE CBC AUTOMATED: CPT | Performed by: FAMILY MEDICINE

## 2024-07-12 PROCEDURE — 63710000001 BUMETANIDE 1 MG TABLET: Performed by: INTERNAL MEDICINE

## 2024-07-12 RX ORDER — ASPIRIN 81 MG
100 TABLET, DELAYED RELEASE (ENTERIC COATED) ORAL 2 TIMES DAILY
Qty: 60 TABLET | Refills: 0 | Status: SHIPPED | OUTPATIENT
Start: 2024-07-12 | End: 2024-08-11

## 2024-07-12 RX ORDER — BUMETANIDE 1 MG/1
3 TABLET ORAL 2 TIMES DAILY
Qty: 180 TABLET | Refills: 0 | Status: SHIPPED | OUTPATIENT
Start: 2024-07-12 | End: 2024-08-11

## 2024-07-12 RX ORDER — CLOPIDOGREL BISULFATE 75 MG/1
75 TABLET ORAL DAILY
Qty: 30 TABLET | Refills: 0 | Status: SHIPPED | OUTPATIENT
Start: 2024-07-12 | End: 2024-08-11

## 2024-07-12 RX ORDER — AMLODIPINE BESYLATE 10 MG/1
10 TABLET ORAL DAILY
Qty: 90 TABLET | Refills: 0 | Status: SHIPPED | OUTPATIENT
Start: 2024-07-12

## 2024-07-12 RX ORDER — SEVELAMER CARBONATE 800 MG/1
800 TABLET, FILM COATED ORAL 3 TIMES DAILY
Qty: 90 TABLET | Refills: 0 | Status: SHIPPED | OUTPATIENT
Start: 2024-07-12 | End: 2024-08-11

## 2024-07-12 RX ORDER — NITROGLYCERIN 0.4 MG/1
0.4 TABLET SUBLINGUAL
Qty: 60 TABLET | Refills: 0 | Status: SHIPPED | OUTPATIENT
Start: 2024-07-12

## 2024-07-12 RX ORDER — TAMSULOSIN HYDROCHLORIDE 0.4 MG/1
1 CAPSULE ORAL DAILY
Qty: 30 CAPSULE | Refills: 0 | Status: SHIPPED | OUTPATIENT
Start: 2024-07-12 | End: 2024-08-11

## 2024-07-12 RX ORDER — ATORVASTATIN CALCIUM 80 MG/1
80 TABLET, FILM COATED ORAL DAILY
Qty: 30 TABLET | Refills: 0 | Status: SHIPPED | OUTPATIENT
Start: 2024-07-12 | End: 2024-08-11

## 2024-07-12 RX ORDER — HYDRALAZINE HYDROCHLORIDE 25 MG/1
25 TABLET, FILM COATED ORAL 3 TIMES DAILY
Qty: 90 TABLET | Refills: 0 | Status: SHIPPED | OUTPATIENT
Start: 2024-07-12 | End: 2024-08-11

## 2024-07-12 RX ORDER — RANOLAZINE 500 MG/1
500 TABLET, EXTENDED RELEASE ORAL 2 TIMES DAILY
Qty: 60 TABLET | Refills: 0 | Status: SHIPPED | OUTPATIENT
Start: 2024-07-12 | End: 2024-08-11

## 2024-07-12 RX ORDER — ASPIRIN 81 MG/1
81 TABLET ORAL DAILY
Qty: 30 TABLET | Refills: 0 | Status: SHIPPED | OUTPATIENT
Start: 2024-07-12 | End: 2024-08-11

## 2024-07-12 RX ORDER — ISOSORBIDE MONONITRATE 120 MG/1
120 TABLET, EXTENDED RELEASE ORAL DAILY
Qty: 30 TABLET | Refills: 0 | Status: SHIPPED | OUTPATIENT
Start: 2024-07-12 | End: 2024-08-11

## 2024-07-12 RX ORDER — HYDROXYZINE HYDROCHLORIDE 25 MG/1
25 TABLET, FILM COATED ORAL NIGHTLY PRN
Qty: 10 TABLET | Refills: 0 | Status: SHIPPED | OUTPATIENT
Start: 2024-07-12

## 2024-07-12 RX ORDER — UREA 10 %
1 LOTION (ML) TOPICAL
Qty: 30 EACH | Refills: 0 | Status: SHIPPED | OUTPATIENT
Start: 2024-07-12 | End: 2024-08-11

## 2024-07-12 RX ORDER — FERROUS SULFATE 325(65) MG
325 TABLET ORAL
Qty: 30 TABLET | Refills: 0 | Status: SHIPPED | OUTPATIENT
Start: 2024-07-12 | End: 2024-08-11

## 2024-07-12 RX ORDER — BUDESONIDE AND FORMOTEROL FUMARATE DIHYDRATE 80; 4.5 UG/1; UG/1
2 AEROSOL RESPIRATORY (INHALATION)
Qty: 10.2 G | Refills: 0 | Status: SHIPPED | OUTPATIENT
Start: 2024-07-12 | End: 2024-08-11

## 2024-07-12 RX ADMIN — SEVELAMER CARBONATE 800 MG: 800 TABLET, FILM COATED ORAL at 20:53

## 2024-07-12 RX ADMIN — DOCUSATE SODIUM 100 MG: 100 CAPSULE, LIQUID FILLED ORAL at 20:53

## 2024-07-12 RX ADMIN — ATORVASTATIN CALCIUM 80 MG: 40 TABLET, FILM COATED ORAL at 14:22

## 2024-07-12 RX ADMIN — RANOLAZINE 500 MG: 500 TABLET, FILM COATED, EXTENDED RELEASE ORAL at 20:53

## 2024-07-12 RX ADMIN — ZINC SULFATE 220 MG (50 MG) CAPSULE 220 MG: CAPSULE at 14:23

## 2024-07-12 RX ADMIN — BUMETANIDE 3 MG: 1 TABLET ORAL at 14:22

## 2024-07-12 RX ADMIN — DOCUSATE SODIUM 100 MG: 100 CAPSULE, LIQUID FILLED ORAL at 14:22

## 2024-07-12 RX ADMIN — SODIUM BICARBONATE 650 MG TABLET 1300 MG: at 20:53

## 2024-07-12 RX ADMIN — TAMSULOSIN HYDROCHLORIDE 0.4 MG: 0.4 CAPSULE ORAL at 14:23

## 2024-07-12 RX ADMIN — FERROUS SULFATE TAB 325 MG (65 MG ELEMENTAL FE) 325 MG: 325 (65 FE) TAB at 14:22

## 2024-07-12 RX ADMIN — FAMOTIDINE 20 MG: 20 TABLET ORAL at 20:53

## 2024-07-12 RX ADMIN — HEPARIN SODIUM 5000 UNITS: 5000 INJECTION INTRAVENOUS; SUBCUTANEOUS at 14:23

## 2024-07-12 RX ADMIN — SODIUM BICARBONATE 650 MG TABLET 1300 MG: at 14:23

## 2024-07-12 RX ADMIN — SERTRALINE HYDROCHLORIDE 50 MG: 50 TABLET ORAL at 20:53

## 2024-07-12 RX ADMIN — ISOSORBIDE MONONITRATE 120 MG: 30 TABLET, EXTENDED RELEASE ORAL at 14:23

## 2024-07-12 RX ADMIN — RANOLAZINE 500 MG: 500 TABLET, FILM COATED, EXTENDED RELEASE ORAL at 14:23

## 2024-07-12 RX ADMIN — CLOPIDOGREL BISULFATE 75 MG: 75 TABLET ORAL at 14:22

## 2024-07-12 RX ADMIN — ASPIRIN 81 MG: 81 TABLET, COATED ORAL at 14:22

## 2024-07-12 RX ADMIN — SEVELAMER CARBONATE 800 MG: 800 TABLET, FILM COATED ORAL at 16:39

## 2024-07-12 RX ADMIN — BUMETANIDE 3 MG: 1 TABLET ORAL at 20:53

## 2024-07-12 RX ADMIN — HYDRALAZINE HYDROCHLORIDE 25 MG: 25 TABLET ORAL at 16:39

## 2024-07-12 RX ADMIN — Medication 10 ML: at 14:23

## 2024-07-12 RX ADMIN — AMLODIPINE BESYLATE 10 MG: 10 TABLET ORAL at 14:22

## 2024-07-12 RX ADMIN — HYDRALAZINE HYDROCHLORIDE 25 MG: 25 TABLET ORAL at 20:53

## 2024-07-12 NOTE — PROGRESS NOTES
[facility]     Nephrology Progress Note      Patient Name: Mohamud Jarrett  : 1950  MRN: [id number]  Primary Care Physician:  Shara Talley APRN  Date of admission: 7/10/2024    Subjective   Subjective     Interval History:  Patient Reports feeling okay.  Seen earlier this morning while receiving dialysis.  Yesterday had significant cramping.  Tolerating p.o.  No shortness of breath or chest pain.    Review of Systems   All systems were reviewed and negative except for: What is mentioned above.    Objective   Objective     Vitals:   Temp:  [97.2 °F (36.2 °C)-97.9 °F (36.6 °C)] 97.3 °F (36.3 °C)  Heart Rate:  [62-75] 67  Resp:  [14-16] 15  BP: (111-143)/(54-80) 142/62  Physical Exam:   Constitutional: Awake, alert   Eyes: sclerae anicteric, no conjunctival injection   HENT: mucous membranes moist   Neck: Supple, no thyromegaly, no lymphadenopathy, trachea midline, No JVD   Respiratory: Clear to auscultation bilaterally, nonlabored respirations    Cardiovascular: RRR, no murmurs, rubs, or gallops.   Gastrointestinal: Positive bowel sounds, soft, nontender, nondistended   Musculoskeletal: Trace edema, no clubbing or cyanosis, patent right upper extremity AV fistula   Psychiatric: Appropriate affect, cooperative   Neurologic: Oriented x 3, moving all extremities, Cranial Nerves grossly intact, speech clear   Skin: warm and dry, no rashes        Result Review    Result Reviewed:  I have personally reviewed the results from the time of this admission to 2024 16:21 EDT and agree with these findings:  [x]  Laboratory  []  Microbiology  [x]  Radiology  []  EKG/Telemetry   []  Cardiology/Vascular   []  Pathology  []  Old records  []  Other:        Lab 24  0432 24  0425 07/10/24  1755   SODIUM 139 139 140   POTASSIUM 4.4 5.5* 5.7*   CHLORIDE 101 99 99   CO2 23.8 20.0* 18.3*   BUN 44* 82* 79*   CREATININE 6.18* 8.95* 8.44*   GLUCOSE 89 96 84   EGFR 9.0* 5.8* 6.2*   ANION GAP 14.2 20.0* 22.7*    MAGNESIUM  --   --  2.5*   PHOSPHORUS 4.1  --   --            Most notable findings include: Labs reviewed.    Assessment & Plan   Assessment / Plan       Active Hospital Problems:  Active Hospital Problems    Diagnosis     **Generalized weakness     ESRD (end stage renal disease)     Abnormal EKG     Chest pain, atypical     Depression     Edema     Elevated troponin     Hyperkalemia     End stage renal disease     CHF (congestive heart failure)     Stented coronary artery     Hyperlipidemia        Assessment and Plan:    ESRD-  On HD m/w/f at [facility] in [place], right upper extremity AV fistula.  Seen on dialysis earlier today.  UF decreased to 2 L and potassium bath changed to 3.0.   Discussed with him the importance of adherence to dialysis schedule.      Chest Pain-  Cardiology signed off.     CAD- s/p CABG, no more chest pain.     Anemia of CKD- at goal for ckd.    Hyperkalemia, Improved.    Discussed with dialysis staff.    Okay to DC from renal standpoint.  Will follow.      Electronically signed by Silas Muniz MD, 7/12/2024, 16:21 EDT.

## 2024-07-12 NOTE — PLAN OF CARE
Goal Outcome Evaluation:  Plan of Care Reviewed With: patient           Outcome Evaluation: patient discharging today. dialysis completed. patient given a number for public transportation for dialysis. no complaints at this time.

## 2024-07-12 NOTE — NURSING NOTE
Duration of Treatment 4.0 Hours   Access Site Tunneled Dialysis Catheter   Dialyzer Revaclear    mL/min   Dialysate Temperature (C) 36   BFR-As tolerated to a maximum of: 400 mL/min   Dialysate Solution Bath: K+ = 2 mEq, Ca = 2.5mEq   Bicarb 35 mEq   Na+ 137 meq   Fluid Removal: 3L       HD TREATMENT WENT WELL.    REMOVED 2500 ML.    ARTERIAL SIDE BLED FOR 17 MINUTES, HOLD PRESSURE.    PATIENT STABLE AFTER TREATMENT.

## 2024-07-12 NOTE — TELEPHONE ENCOUNTER
Caller: [facility]    Relationship to patient: Provider    Best call back number: [phone]    New or established patient?  [] New  [x] Established    Date of discharge: 7-12-24    Facility discharged from: [facility]    Specialty Only: Did you see a [facility] provider?    [x] Yes  [] No  If so, who? DR. ORR    2 WEEK HFU SCHEDULED FOR FIRST AVAILABLE OF 8-16-24

## 2024-07-12 NOTE — DISCHARGE SUMMARY
[facility]         HOSPITALIST  DISCHARGE SUMMARY    Patient Name: Mohamud Jarrett  : 1950  MRN: [id number]    Date of Admission: 7/10/2024  Date of Discharge:  2024    Primary Care Physician: Shara Talley APRN    Consults       Date and Time Order Name Status Description    2024  1:37 AM Inpatient Cardiology Consult      7/10/2024  9:03 PM Inpatient Hospitalist Consult      7/10/2024  8:53 PM General MD Inpatient Consult      2024  3:38 PM Nephrology  (on-call MD unless specified) Completed             Active and Resolved Hospital Problems:    Chest pain, atypical    Depression    Edema    Elevated troponin    Hyperkalemia    End stage renal disease    CHF (congestive heart failure)    Stented coronary artery    Hyperlipidemia      Active Hospital Problems    Diagnosis POA    **Generalized weakness [R53.1] Yes    ESRD (end stage renal disease) [N18.6] Yes    Abnormal EKG [R94.31] Unknown    Chest pain, atypical [R07.89] Unknown    Depression [F32.A] Yes    Edema [R60.9] Yes    Elevated troponin [R79.89] Yes    Hyperkalemia [E87.5] Yes    End stage renal disease [N18.6] Yes    CHF (congestive heart failure) [I50.9] Yes    Stented coronary artery [Z95.5] Not Applicable    Hyperlipidemia [E78.5] Yes      Resolved Hospital Problems   No resolved problems to display.       Hospital Course     Hospital Course:  74-year-old male with end-stage renal disease on hemodialysis, hypertension, CAD with history of CABG, prior MIs, GERD without esophagitis, hospitalized on 2024 with chief complaint of weakness, missed several sessions of dialysis, was hyperkalemic, cardiology was consulted, ruled out ACS, in addition to nephrology, underwent dialysis on subsequent days, continued on aspirin and Plavix, weakness improved, patient had an established chair time, counseled patient on compliance, discharged in hemodynamically stable condition on 2024 to follow-up with nephrologist  and cardiologist outpatient.    Day of Discharge     Vital Signs:  Temp:  [97.2 °F (36.2 °C)-97.9 °F (36.6 °C)] 97.3 °F (36.3 °C)  Heart Rate:  [62-75] 67  Resp:  [14-16] 15  BP: (111-143)/(54-80) 142/62  Review of systems:  All systems reviewed and negative except for weakness, fatigue    Physical Exam                         Constitutional: Awake, alert, ill-appearing              Eyes: PERRLA, sclerae anicteric, no conjunctival injection              HENT: NCAT, mucous membranes moist              Neck: Supple, no thyromegaly, no lymphadenopathy, trachea midline              Respiratory: Decreased breath sounds bilaterally, nonlabored respirations               Cardiovascular: RRR, no murmurs, rubs, or gallops, palpable pedal pulses bilaterally              Gastrointestinal: Positive bowel sounds, soft, nontender, nondistended              Musculoskeletal: Bilateral lower extremity edema, no clubbing or cyanosis to extremities              Psychiatric: Appropriate affect, cooperative              Neurologic: Oriented x 3, weakness to lower extremities, Cranial Nerves grossly intact to confrontation, speech clear              Skin: No rashes     Discharge Details        Discharge Medications        Changes to Medications        Instructions Start Date   budesonide-formoterol 80-4.5 MCG/ACT inhaler  Commonly known as: SYMBICORT  What changed: when to take this   2 puffs, Inhalation, 2 Times Daily - RT      sertraline 50 MG tablet  Commonly known as: Zoloft  What changed:   how much to take  how to take this  when to take this  additional instructions   50 mg, Oral, Nightly      Zinc Sulfate 220 (50 Zn) MG tablet  What changed: when to take this   1 tablet, Oral, Daily at 1100             Continue These Medications        Instructions Start Date   amLODIPine 10 MG tablet  Commonly known as: NORVASC   10 mg, Oral, Daily      aspirin 81 MG EC tablet   81 mg, Oral, Daily      atorvastatin 80 MG tablet  Commonly known  as: LIPITOR   80 mg, Oral, Daily      bumetanide 1 MG tablet  Commonly known as: BUMEX   3 mg, Oral, 2 Times Daily      clopidogrel 75 MG tablet  Commonly known as: PLAVIX   75 mg, Oral, Daily      Docusate Sodium 100 MG capsule   100 mg, Oral, 2 Times Daily      ferrous sulfate 325 (65 FE) MG tablet   325 mg, Oral, Daily With Breakfast      hydrALAZINE 25 MG tablet  Commonly known as: APRESOLINE   25 mg, Oral, 3 Times Daily      hydrOXYzine 25 MG tablet  Commonly known as: ATARAX   25 mg, Oral, Nightly PRN      isosorbide mononitrate 120 MG 24 hr tablet  Commonly known as: IMDUR   120 mg, Oral, Daily      MIRCERA IJ   75 mcg, Every 28 Days      nitroglycerin 0.4 MG SL tablet  Commonly known as: NITROSTAT   0.4 mg, Sublingual, Every 5 Minutes PRN, Take no more than 3 doses in 15 minutes.      ranolazine 500 MG 12 hr tablet  Commonly known as: RANEXA   500 mg, Oral, 2 Times Daily, Take dos      Renvela 800 MG tablet  Generic drug: sevelamer   800 mg, Oral, 3 Times Daily      tamsulosin 0.4 MG capsule 24 hr capsule  Commonly known as: FLOMAX   0.4 mg, Oral, Daily             Stop These Medications      calcium acetate 667 MG tablet  Commonly known as: PHOSLO              Allergies   Allergen Reactions    Peanut (Diagnostic) Shortness Of Breath and Rash    Peanut Butter Flavor Shortness Of Breath and Rash    Simvastatin Myalgia     Other reaction(s): muscle cramps       Discharge Disposition:  Home or Self Care    Diet:  Hospital:  Diet Order   Procedures    Diet: Renal; Low Potassium, Low Sodium (2-3g); Fluid Consistency: Thin (IDDSI 0)       Discharge Activity: as tolerates      CODE STATUS:  Code Status and Medical Interventions:   Ordered at: 07/11/24 0054     Level Of Support Discussed With:    Patient     Code Status (Patient has no pulse and is not breathing):    CPR (Attempt to Resuscitate)     Medical Interventions (Patient has pulse or is breathing):    Full Support         Future Appointments   Date Time  Provider Department [place]   8/13/2024 11:00 AM Shara Talley APRN [facility] [place] [facility]   8/16/2024 12:15 PM Juan A Griffith MD [facility] [place] [facility]   9/27/2024  9:15 AM Shara Talley APRN [facility] [place] [facility]       Additional Instructions for the Follow-ups that You Need to Schedule       Discharge Follow-up with PCP   As directed       Currently Documented PCP:    Shara Talley APRN    PCP Phone Number:    [phone]     Follow Up Details: 3 to 7 days                Pertinent  and/or Most Recent Results     PROCEDURES:   XR Chest 1 View    Result Date: 7/10/2024  XR CHEST 1 VW Date of Exam: 7/10/2024 6:08 PM EDT Indication: Weak/Dizzy/AMS triage protocol Comparison: 7/1/2024 FINDINGS: No new consolidations or pleural effusions are observed. The cardiac silhouette and mediastinum are stable. Postoperative changes are noted. No acute osseous abnormalities are identified.     There is no significant change when compared to the prior study. There is no evidence for acute cardiopulmonary process. Electronically Signed: Hay Cotton MD  7/10/2024 6:56 PM EDT  Workstation ID: [id number]    XR Chest 1 View    Result Date: 7/1/2024  XR CHEST 1 VW Date of Exam: 7/1/2024 3:40 PM EDT Indication: Weakness Comparison: None available. Findings: Sternotomy wires overlie the midline. No focal pulmonary consolidations are evident. Pulmonary vascularity is within normal limits. There is cardiomegaly. Aortic vascular calcification is noted. No pneumothorax or large effusion identified.     Impression: Cardiomegaly. Electronically Signed: Mitchell Ellis MD  7/1/2024 3:59 PM EDT  Workstation ID: [id number]       LAB RESULTS:      Lab 07/12/24  0432 07/11/24  0425 07/10/24  1755 07/09/24  1253   WBC 3.79 4.94 4.59 4.49   HEMOGLOBIN 9.4* 10.7* 10.7* 9.8*   HEMATOCRIT 27.1* 32.0* 31.5* 28.9*   PLATELETS 110* 113* 124* 119*   NEUTROS ABS  --   --  3.08 2.90   IMMATURE GRANS (ABS)  --   --  0.02 0.02   LYMPHS ABS  --   --  0.86 0.96    MONOS ABS  --   --  0.50 0.45   EOS ABS  --   --  0.07 0.06   MCV 92.8 96.7 94.0 97.0         Lab 07/12/24  0432 07/11/24  0425 07/10/24  1755   SODIUM 139 139 140   POTASSIUM 4.4 5.5* 5.7*   CHLORIDE 101 99 99   CO2 23.8 20.0* 18.3*   ANION GAP 14.2 20.0* 22.7*   BUN 44* 82* 79*   CREATININE 6.18* 8.95* 8.44*   EGFR 9.0* 5.8* 6.2*   GLUCOSE 89 96 84   CALCIUM 8.5* 9.3 9.2   MAGNESIUM  --   --  2.5*   PHOSPHORUS 4.1  --   --          Lab 07/12/24  0432 07/11/24  0425 07/10/24  1755   TOTAL PROTEIN  --  6.9 7.0   ALBUMIN 3.2* 3.7 3.7   GLOBULIN  --  3.2 3.3   ALT (SGPT)  --  17 17   AST (SGOT)  --  25 27   BILIRUBIN  --  1.5* 1.5*   ALK PHOS  --  231* 250*         Lab 07/11/24  0119 07/10/24  1944 07/10/24  1755 07/09/24  1253   PROBNP  --   --  >70,000.0* >70,000.0*   HSTROP T 157* 169* 170*  --          Lab 07/09/24  1253   CHOLESTEROL 125   LDL CHOL 64   HDL CHOL 43   TRIGLYCERIDES 97         Lab 07/09/24  1253   IRON 77   IRON SATURATION (TSAT) 35   TIBC 222*   TRANSFERRIN 149*         Brief Urine Lab Results  (Last result in the past 365 days)        Color   Clarity   Blood   Leuk Est   Nitrite   Protein   CREAT   Urine HCG        07/10/24 1854 Yellow   Clear   Trace   Trace   Negative   100 mg/dL (2+)                 Microbiology Results (last 10 days)       Procedure Component Value - Date/Time    COVID-19, FLU A/B, RSV PCR 1 HR TAT - Swab, Nasopharynx [[id number]]  (Normal) Collected: 07/10/24 2055    Lab Status: Final result Specimen: Swab from Nasopharynx Updated: 07/10/24 2151     COVID19 Not Detected     Influenza A PCR Not Detected     Influenza B PCR Not Detected     RSV, PCR Not Detected    Narrative:      Fact sheet for providers: https://www.fda.gov/media/802311/download    Fact sheet for patients: https://www.fda.gov/media/011838/download    Test performed by PCR.            XR Chest 1 View    Result Date: 7/10/2024  Impression: There is no significant change when compared to the prior study. There  is no evidence for acute cardiopulmonary process. Electronically Signed: Hay Cotton MD  7/10/2024 6:56 PM EDT  Workstation ID: [id number]    XR Chest 1 View    Result Date: 7/1/2024  Impression: Impression: Cardiomegaly. Electronically Signed: Mitchell Ellis MD  7/1/2024 3:59 PM EDT  Workstation ID: [id number]                 Labs Pending at Discharge:        Time spent on Discharge including face to face service:  35 minutes    Electronically signed by Kevyn Cantrell MD, 07/12/24, 4:10 PM EDT.    Portions of this documentation were transcribed electronically from a voice recognition software.  I confirm all data accurately represents the service(s) I performed at today's visit.

## 2024-07-12 NOTE — CONSULTS
[facility]   Cardiology Progress Note      Patient Name: Mohamud Jarrett  : 1950  MRN: [id number]  Primary Care Physician:  Shara Talley APRN  Referring Physician: Tu Lora MD  Date of admission: 7/10/2024    Subjective   Subjective     Chief Complaint: CAD    HPI:  Mohamud Jarrett is a 74 y.o. male with history of CAD recent PTCA/stent.  Admitted with noncompliance with dialysis.  And atypical chest pain.  No further chest pain.    REVIEW OF SYSTEMS    Constitutional:    No fever, no weight loss  Skin:     No rash  Otolaryngeal:    No difficulty swallowing  Cardiovascular:  No chest pain or shortness of breath  Pulmonary:    No cough, no sputum production    Objective    Objective     Vitals:   Vitals:    24 1950 24 2300 24 0305 24 0750   BP: 133/68 123/57 111/54 130/65   BP Location: Left arm Left arm Left arm Left arm   Patient Position: Lying Lying Lying Lying   Pulse: 69 71 75 66   Resp: 16 16 16 16   Temp: 97.2 °F (36.2 °C) 97.9 °F (36.6 °C) 97.9 °F (36.6 °C) 97.3 °F (36.3 °C)   TempSrc: Oral Oral Oral Oral   SpO2: 99% 95% 96% 97%   Weight:       Height:                Physical Exam:   Constitutional: Awake, alert, No acute distress    Eyes: PERRLA, sclerae anicteric, no conjunctival injection   HENT: NCAT, mucous membranes moist   Neck: Supple, no thyromegaly, no lymphadenopathy, trachea midline   Respiratory: Clear to auscultation bilaterally, nonlabored respirations    Cardiovascular: RRR, no murmurs, rubs, or gallops, palpable pedal pulses bilaterally      Current medications:  amLODIPine, 10 mg, Oral, Daily  aspirin, 81 mg, Oral, Daily  atorvastatin, 80 mg, Oral, Daily  budesonide-formoterol, 2 puff, Inhalation, BID - RT  bumetanide, 3 mg, Oral, BID  clopidogrel, 75 mg, Oral, Daily  docusate sodium, 100 mg, Oral, BID  famotidine, 20 mg, Oral, Nightly  ferrous sulfate, 325 mg, Oral, Daily With Breakfast  heparin (porcine), 5,000 Units, Subcutaneous,  Q12H  hydrALAZINE, 25 mg, Oral, TID  isosorbide mononitrate, 120 mg, Oral, Daily  ranolazine, 500 mg, Oral, BID  sertraline, 50 mg, Oral, Nightly  sevelamer, 800 mg, Oral, TID  sodium bicarbonate, 1,300 mg, Oral, BID  sodium chloride, 10 mL, Intravenous, Q12H  tamsulosin, 0.4 mg, Oral, Daily  zinc sulfate, 220 mg, Oral, Daily      Current IV drips:       Result Review    Result Review:  I have personally reviewed the results from the time of this admission to 7/12/2024 08:55 EDT and agree with these findings:  []  Laboratory  []  EKG/Telemetry   []  Cardiology/Vascular   []  Radiology         CBC          7/10/2024    17:55 7/11/2024    04:25 7/12/2024    04:32   CBC   WBC 4.59  4.94  3.79    RBC 3.35  3.31  2.92    Hemoglobin 10.7  10.7  9.4    Hematocrit 31.5  32.0  27.1    MCV 94.0  96.7  92.8    MCH 31.9  32.3  32.2    MCHC 34.0  33.4  34.7    RDW 17.4  17.7  17.6    Platelets 124  113  110      CMP          7/10/2024    17:55 7/11/2024    04:25 7/12/2024    04:32   CMP   Glucose 84  96  89    BUN 79  82  44    Creatinine 8.44  8.95  6.18    EGFR 6.2  5.8  9.0    Sodium 140  139  139    Potassium 5.7  5.5  4.4    Chloride 99  99  101    Calcium 9.2  9.3  8.5    Total Protein 7.0  6.9     Albumin 3.7  3.7  3.2    Globulin 3.3  3.2     Total Bilirubin 1.5  1.5     Alkaline Phosphatase 250  231     AST (SGOT) 27  25     ALT (SGPT) 17  17     Albumin/Globulin Ratio 1.1  1.2     BUN/Creatinine Ratio 9.4  9.2  7.1    Anion Gap 22.7  20.0  14.2            Lab Results   Component Value Date    PROBNP >70,000.0 (H) 07/10/2024             Assessment / Plan     ASSESSMENT:    Generalized weakness    Hyperlipidemia    Stented coronary artery    CHF (congestive heart failure)    Hyperkalemia    End stage renal disease    Edema    Elevated troponin    Depression    Abnormal EKG    Chest pain, atypical        PLAN:  1.  Continue current home meds.  2.  Continue aspirin and Plavix.  3.  Continue current management.  4.  Will  sign off and see as needed.  Electronically signed by Juan A Griffith MD, 07/12/24, 8:55 AM EDT.

## 2024-07-12 NOTE — SIGNIFICANT NOTE
07/12/24 0931   OTHER   Discipline occupational therapist   Rehab Time/Intention   Session Not Performed patient unavailable for evaluation  (Off unit: Dialysis)

## 2024-07-12 NOTE — SIGNIFICANT NOTE
07/12/24 1200   Physical Therapy Time and Intention   Mode of Treatment physical therapy   Session Not Performed patient unavailable for evaluation   Comment, Session Not Performed Pt out of room recieving Dialysis.

## 2024-07-13 NOTE — OUTREACH NOTE
Prep Survey      Flowsheet Row Responses   [facility] patient discharged from? [facility]   Is LACE score < 7 ? No   Eligibility [facility]   Date of Admission 07/10/24   Date of Discharge 07/12/24   Discharge Disposition Home or Self Care   Discharge diagnosis Generalized weakness, ESRD on HD   Does the patient have one of the following disease processes/diagnoses(primary or secondary)? Other   Does the patient have Home health ordered? Yes   What is the Home health agency?  [facility]   Is there a DME ordered? No   Is this a private patient? Yes   Prep survey completed? Yes            Lurdes DAS - Registered Nurse

## 2024-07-14 ENCOUNTER — TRANSITIONAL CARE MANAGEMENT TELEPHONE ENCOUNTER (OUTPATIENT)
Dept: CALL CENTER | Facility: HOSPITAL | Age: 74
End: 2024-07-14
Payer: MEDICARE

## 2024-07-14 NOTE — OUTREACH NOTE
Call Center TCM Note      Flowsheet Row Responses   [facility] patient discharged from? [facility]   Does the patient have one of the following disease processes/diagnoses(primary or secondary)? Other   TCM attempt successful? No   Unsuccessful attempts Attempt 1   Call Status Voice mail issues  [mailbox is full]            Shavonne Lyons RN    7/14/2024, 08:46 EDT

## 2024-07-15 ENCOUNTER — TRANSITIONAL CARE MANAGEMENT TELEPHONE ENCOUNTER (OUTPATIENT)
Dept: CALL CENTER | Facility: HOSPITAL | Age: 74
End: 2024-07-15
Payer: MEDICARE

## 2024-07-15 NOTE — OUTREACH NOTE
Call Center TCM Note      Flowsheet Row Responses   [facility] patient discharged from? [facility]   Does the patient have one of the following disease processes/diagnoses(primary or secondary)? Other   TCM attempt successful? No   Unsuccessful attempts Attempt 2  [Cristian son on verbal release-no number listed]            Mamta Muñoz RN    7/15/2024, 08:58 EDT

## 2024-07-16 ENCOUNTER — PATIENT OUTREACH (OUTPATIENT)
Dept: CASE MANAGEMENT | Facility: OTHER | Age: 74
End: 2024-07-16
Payer: MEDICARE

## 2024-07-16 ENCOUNTER — TRANSITIONAL CARE MANAGEMENT TELEPHONE ENCOUNTER (OUTPATIENT)
Dept: CALL CENTER | Facility: HOSPITAL | Age: 74
End: 2024-07-16
Payer: MEDICARE

## 2024-07-16 NOTE — OUTREACH NOTE
Call Center TCM Note      Flowsheet Row Responses   [facility] patient discharged from? [facility]   Does the patient have one of the following disease processes/diagnoses(primary or secondary)? Other   TCM attempt successful? No  [VR- SON but no number]   Unsuccessful attempts Attempt 3            Ann Mckeon RN    7/16/2024, 09:14 EDT

## 2024-07-16 NOTE — OUTREACH NOTE
AMBULATORY CASE MANAGEMENT NOTE    Names and Relationships of Patient/Support Persons:  -     Reached out to daughter Ilana today and left message to return call at her convenience.     Education Documentation  No documentation found.        Elisabet EPPERSON  Ambulatory Case Management    7/16/2024, 09:37 EDT

## 2024-07-22 NOTE — PROGRESS NOTES
Late entry from 7/19/24 due to computers being downtime:  Spoke with patient.  He is being followed by [facility].  His budesonide, Sertraline and zinc were discontinued at discharge.  His arm was bleeding some where blood draw was.  He is going to dialysis m, w, f at 4am.  Feeling better.

## 2024-07-25 ENCOUNTER — OFFICE VISIT (OUTPATIENT)
Dept: FAMILY MEDICINE CLINIC | Age: 74
End: 2024-07-25
Payer: MEDICARE

## 2024-07-25 VITALS
SYSTOLIC BLOOD PRESSURE: 126 MMHG | HEART RATE: 73 BPM | WEIGHT: 177.2 LBS | HEIGHT: 70 IN | TEMPERATURE: 98.2 F | BODY MASS INDEX: 25.37 KG/M2 | DIASTOLIC BLOOD PRESSURE: 66 MMHG | OXYGEN SATURATION: 95 %

## 2024-07-25 DIAGNOSIS — F32.A DEPRESSION, UNSPECIFIED DEPRESSION TYPE: Primary | ICD-10-CM

## 2024-07-25 DIAGNOSIS — N18.6 ANEMIA IN CHRONIC KIDNEY DISEASE, ON CHRONIC DIALYSIS: ICD-10-CM

## 2024-07-25 DIAGNOSIS — I50.9 CHRONIC CONGESTIVE HEART FAILURE, UNSPECIFIED HEART FAILURE TYPE: ICD-10-CM

## 2024-07-25 DIAGNOSIS — L29.9 ITCHY SCALP: ICD-10-CM

## 2024-07-25 DIAGNOSIS — D63.1 ANEMIA IN CHRONIC KIDNEY DISEASE, ON CHRONIC DIALYSIS: ICD-10-CM

## 2024-07-25 DIAGNOSIS — Z99.2 ANEMIA IN CHRONIC KIDNEY DISEASE, ON CHRONIC DIALYSIS: ICD-10-CM

## 2024-07-25 DIAGNOSIS — N18.6 ESRD (END STAGE RENAL DISEASE): ICD-10-CM

## 2024-07-25 PROBLEM — R79.89 ELEVATED TROPONIN: Status: RESOLVED | Noted: 2024-07-09 | Resolved: 2024-07-25

## 2024-07-25 PROBLEM — D50.9 IRON DEFICIENCY ANEMIA: Status: RESOLVED | Noted: 2023-02-25 | Resolved: 2024-07-25

## 2024-07-25 PROBLEM — E87.5 HYPERKALEMIA: Status: RESOLVED | Noted: 2024-07-01 | Resolved: 2024-07-25

## 2024-07-25 PROBLEM — R60.9 EDEMA: Status: RESOLVED | Noted: 2024-07-09 | Resolved: 2024-07-25

## 2024-07-25 PROBLEM — R07.89 CHEST PAIN, ATYPICAL: Status: RESOLVED | Noted: 2024-07-11 | Resolved: 2024-07-25

## 2024-07-25 PROCEDURE — 3074F SYST BP LT 130 MM HG: CPT | Performed by: NURSE PRACTITIONER

## 2024-07-25 PROCEDURE — 1125F AMNT PAIN NOTED PAIN PRSNT: CPT | Performed by: NURSE PRACTITIONER

## 2024-07-25 PROCEDURE — 1160F RVW MEDS BY RX/DR IN RCRD: CPT | Performed by: NURSE PRACTITIONER

## 2024-07-25 PROCEDURE — 3078F DIAST BP <80 MM HG: CPT | Performed by: NURSE PRACTITIONER

## 2024-07-25 PROCEDURE — 1111F DSCHRG MED/CURRENT MED MERGE: CPT | Performed by: NURSE PRACTITIONER

## 2024-07-25 PROCEDURE — 99214 OFFICE O/P EST MOD 30 MIN: CPT | Performed by: NURSE PRACTITIONER

## 2024-07-25 PROCEDURE — 1159F MED LIST DOCD IN RCRD: CPT | Performed by: NURSE PRACTITIONER

## 2024-07-25 NOTE — PROGRESS NOTES
"Chief Complaint  Hospital Follow Up Visit (07/10/2024-07/12/2024)    Subjective          Mohamud Jarrett presents to [facility]     Patient is a 74-year-old male who is here today for a follow-up after a recent hospitalization.  He was at [facility] from July 10 through the 12th.  End-stage renal disease on dialysis and congestive heart failure and weakness.  Patient had missed some dialysis sessions intermittently over the last few weeks due to weakness.  He states he realizes that missing his dialysis treatments worsens his symptoms.  Anemia due to end-stage renal disease with hemoglobin hematocrit checked yesterday.  He is on iron supplement.  He is to get dialysis 3 days/week.  He will soon follow-up with cardiology regarding congestive heart failure, hypertension, and hyperlipidemia and history of heart disease and myocardial infarction.  Patient is not clear on whether he is currently taking sertraline 50 mg at bedtime.  He states home health nurse did not find that medicine at his home.  Advised patient that per chart he picked up a 30-day supply of sertraline on July 9.  I discussed with patient the purpose of sertraline is to help relieve anxiety and/or depression.  Patient states he is feeling better.    Patient states someone told him there was something wrong with his scalp and would like to have his scalp examined.  He denies any symptoms other than intermittent itching.  He states he does wear a hat most days.     Objective   Vital Signs:   Vitals:    07/25/24 1305   BP: 126/66   BP Location: Left arm   Patient Position: Sitting   Cuff Size: Adult   Pulse: 73   Temp: 98.2 °F (36.8 °C)   SpO2: 95%   Weight: 80.4 kg (177 lb 3.2 oz)   Height: 177.8 cm (70\")       Wt Readings from Last 3 Encounters:   07/25/24 80.4 kg (177 lb 3.2 oz)   07/11/24 79.5 kg (175 lb 4.3 oz)   07/09/24 76.2 kg (168 lb)      BP Readings from Last 3 Encounters:   07/25/24 126/66 "   07/12/24 141/77   07/09/24 146/68       Body mass index is 25.43 kg/m².             Physical Exam  Vitals reviewed.   Constitutional:       General: He is not in acute distress.     Appearance: Normal appearance. He is well-developed.   Cardiovascular:      Rate and Rhythm: Normal rate and regular rhythm.      Heart sounds: Normal heart sounds.   Pulmonary:      Effort: Pulmonary effort is normal.      Breath sounds: Normal breath sounds.   Musculoskeletal:      Right lower leg: No edema.      Left lower leg: No edema.   Skin:     General: Skin is warm and dry.      Comments: Normal scalp   Neurological:      General: No focal deficit present.      Mental Status: He is alert.   Psychiatric:         Attention and Perception: Attention normal.         Mood and Affect: Mood and affect normal.         Behavior: Behavior normal.           Current Outpatient Medications:     amLODIPine (NORVASC) 10 MG tablet, Take 1 tablet by mouth Daily., Disp: 90 tablet, Rfl: 0    aspirin 81 MG EC tablet, Take 1 tablet by mouth Daily for 30 days., Disp: 30 tablet, Rfl: 0    atorvastatin (LIPITOR) 80 MG tablet, Take 1 tablet by mouth Daily for 30 days., Disp: 30 tablet, Rfl: 0    budesonide-formoterol (SYMBICORT) 80-4.5 MCG/ACT inhaler, Inhale 2 puffs 2 (Two) Times a Day for 30 days., Disp: 10.2 g, Rfl: 0    bumetanide (BUMEX) 1 MG tablet, Take 3 tablets by mouth 2 (Two) Times a Day for 30 days., Disp: 180 tablet, Rfl: 0    clopidogrel (PLAVIX) 75 MG tablet, Take 1 tablet by mouth Daily for 30 days., Disp: 30 tablet, Rfl: 0    Docusate Sodium 100 MG capsule, Take 1 tablet by mouth 2 (Two) Times a Day for 30 days., Disp: 60 tablet, Rfl: 0    ferrous sulfate 325 (65 FE) MG tablet, Take 1 tablet by mouth Daily With Breakfast for 30 days., Disp: 30 tablet, Rfl: 0    hydrALAZINE (APRESOLINE) 25 MG tablet, Take 1 tablet by mouth 3 (Three) Times a Day for 30 days., Disp: 90 tablet, Rfl: 0    hydrOXYzine (ATARAX) 25 MG tablet, Take 1 tablet by  mouth At Night As Needed for Anxiety., Disp: 10 tablet, Rfl: 0    isosorbide mononitrate (IMDUR) 120 MG 24 hr tablet, Take 1 tablet by mouth Daily for 30 days., Disp: 30 tablet, Rfl: 0    Methoxy PEG-Epoetin Beta (MIRCERA IJ), 75 mcg Every 28 (Twenty-Eight) Days., Disp: , Rfl:     nitroglycerin (NITROSTAT) 0.4 MG SL tablet, Place 1 tablet under the tongue Every 5 (Five) Minutes As Needed for Chest Pain (Do not take more than 3 at one time. Go to ER or call 911 if chest pain persists). Take no more than 3 doses in 15 minutes., Disp: 60 tablet, Rfl: 0    ranolazine (RANEXA) 500 MG 12 hr tablet, Take 1 tablet by mouth 2 (Two) Times a Day for 30 days. Take dos, Disp: 60 tablet, Rfl: 0    Renvela 800 MG tablet, Take 1 tablet by mouth 3 (Three) Times a Day for 30 days., Disp: 90 tablet, Rfl: 0    sertraline (Zoloft) 50 MG tablet, Take 1 tablet by mouth Every Night for 30 days., Disp: 30 tablet, Rfl: 2    tamsulosin (FLOMAX) 0.4 MG capsule 24 hr capsule, Take 1 capsule by mouth Daily for 30 days., Disp: 30 capsule, Rfl: 0    Zinc Sulfate 220 (50 Zn) MG tablet, Take 1 tablet by mouth Daily for 30 days., Disp: 30 each, Rfl: 0   Past Medical History:   Diagnosis Date    A-V fistula     right arm    Anemia of chronic renal failure 04/12/2021    Antiplatelet or antithrombotic long-term use     plavix    Arthritis     Bilateral leg pain 11/05/2021    CAD (coronary artery disease)     h/o CABG 2003 and stents 11/2022, Dr Shirley follows    Chest pain     saw cardiology 4/10/23, pt states better if he takes his medications    Dialysis patient     Tues, Thursday, Saturday, has chest wall catheter currently    Gout     Heart attack     Hyperlipidemia     Hypertension     Kidney disease     stage 4, Dr Ornelas follows    Neck pain 08/30/2022    Neuritis of lower extremity, right 12/19/2019    Proteinuria 05/23/2022    Rheumatoid factor positive 07/06/2021    Seasonal allergies 03/13/2014    Vitamin D deficiency 05/23/2022      Allergies   Allergen Reactions    Peanut (Diagnostic) Shortness Of Breath and Rash    Peanut Butter Flavor Shortness Of Breath and Rash    Simvastatin Myalgia     Other reaction(s): muscle cramps               Result Review :     Common labs          7/11/2024    04:25 7/12/2024    04:32 7/24/2024    00:00   Common Labs   Glucose 96  89     BUN 82  44     Creatinine 8.95  6.18     Sodium 139  139     Potassium 5.5  4.4     Chloride 99  101     Calcium 9.3  8.5     Albumin 3.7  3.2     Total Bilirubin 1.5      Alkaline Phosphatase 231      AST (SGOT) 25      ALT (SGPT) 17      WBC 4.94  3.79     Hemoglobin 10.7  9.4  10.1        30.3       Hematocrit 32.0  27.1     Platelets 113  110        Details          This result is from an external source.                XR Chest 1 View    Result Date: 7/10/2024  There is no significant change when compared to the prior study. There is no evidence for acute cardiopulmonary process. Electronically Signed: Hay Cotton MD  7/10/2024 6:56 PM EDT  Workstation ID: [id number]    XR Chest 1 View    Result Date: 7/1/2024  Impression: Cardiomegaly. Electronically Signed: Mitchell Ellis MD  7/1/2024 3:59 PM EDT  Workstation ID: [id number]    XR Chest 1 View    Result Date: 5/31/2024  Prominent  interstitial changes are  likely secondary to vascular congestion.  Continued follow-up is recommended. Images reviewed, interpreted, and dictated by Dr. ASHLEY Yoon. Transcribed by Michelle Heck PA-C.             Social History     Tobacco Use   Smoking Status Never   Smokeless Tobacco Never           Assessment and Plan    Diagnoses and all orders for this visit:    1. Depression, unspecified depression type (Primary)  -     sertraline (Zoloft) 50 MG tablet; Take 1 tablet by mouth Every Night for 30 days.  Dispense: 30 tablet; Refill: 2    2. ESRD (end stage renal disease)  Assessment & Plan:  Reinforced the importance of regular dialysis treatment      3. Chronic  congestive heart failure, unspecified heart failure type  Assessment & Plan:  Continue to see cardiology as directed.      4. Itchy scalp  Assessment & Plan:  No abnormality on exam.  Not unreasonable to use head and shoulders or Selsun Blue shampoo      5. Anemia in chronic kidney disease, on chronic dialysis        Follow Up    No follow-ups on file.  Patient was given instructions and counseling regarding his condition or for health maintenance advice. Please see specific information pulled into the AVS if appropriate.

## 2024-08-01 ENCOUNTER — OUTSIDE FACILITY SERVICE (OUTPATIENT)
Dept: FAMILY MEDICINE CLINIC | Age: 74
End: 2024-08-01
Payer: MEDICARE

## 2024-08-02 ENCOUNTER — HOSPITAL ENCOUNTER (EMERGENCY)
Facility: HOSPITAL | Age: 74
Discharge: HOME | End: 2024-08-03
Attending: EMERGENCY MEDICINE
Payer: MEDICARE

## 2024-08-02 ENCOUNTER — APPOINTMENT (OUTPATIENT)
Dept: GENERAL RADIOLOGY | Facility: HOSPITAL | Age: 74
End: 2024-08-02
Payer: MEDICARE

## 2024-08-02 DIAGNOSIS — R07.9 NONSPECIFIC CHEST PAIN: ICD-10-CM

## 2024-08-02 DIAGNOSIS — N18.6 END STAGE RENAL DISEASE: Primary | ICD-10-CM

## 2024-08-02 LAB
ALBUMIN SERPL-MCNC: 3.9 G/DL (ref 3.5–5.2)
ALBUMIN/GLOB SERPL: 1.1 G/DL
ALP SERPL-CCNC: 351 U/L (ref 39–117)
ALT SERPL W P-5'-P-CCNC: 14 U/L (ref 1–41)
ANION GAP SERPL CALCULATED.3IONS-SCNC: 14 MMOL/L (ref 5–15)
AST SERPL-CCNC: 26 U/L (ref 1–40)
BASOPHILS # BLD AUTO: 0.08 10*3/MM3 (ref 0–0.2)
BASOPHILS NFR BLD AUTO: 1.9 % (ref 0–1.5)
BILIRUB SERPL-MCNC: 2 MG/DL (ref 0–1.2)
BUN SERPL-MCNC: 28 MG/DL (ref 8–23)
BUN/CREAT SERPL: 7.8 (ref 7–25)
CALCIUM SPEC-SCNC: 8.9 MG/DL (ref 8.6–10.5)
CHLORIDE SERPL-SCNC: 93 MMOL/L (ref 98–107)
CO2 SERPL-SCNC: 31 MMOL/L (ref 22–29)
CREAT SERPL-MCNC: 3.61 MG/DL (ref 0.76–1.27)
DEPRECATED RDW RBC AUTO: 70.8 FL (ref 37–54)
EGFRCR SERPLBLD CKD-EPI 2021: 17.1 ML/MIN/1.73
EOSINOPHIL # BLD AUTO: 0.17 10*3/MM3 (ref 0–0.4)
EOSINOPHIL NFR BLD AUTO: 3.9 % (ref 0.3–6.2)
ERYTHROCYTE [DISTWIDTH] IN BLOOD BY AUTOMATED COUNT: 20.6 % (ref 12.3–15.4)
GEN 5 2HR TROPONIN T REFLEX: 105 NG/L
GLOBULIN UR ELPH-MCNC: 3.4 GM/DL
GLUCOSE SERPL-MCNC: 92 MG/DL (ref 65–99)
HCT VFR BLD AUTO: 27.2 % (ref 37.5–51)
HGB BLD-MCNC: 9.2 G/DL (ref 13–17.7)
HOLD SPECIMEN: NORMAL
HOLD SPECIMEN: NORMAL
IMM GRANULOCYTES # BLD AUTO: 0.01 10*3/MM3 (ref 0–0.05)
IMM GRANULOCYTES NFR BLD AUTO: 0.2 % (ref 0–0.5)
LIPASE SERPL-CCNC: 109 U/L (ref 13–60)
LYMPHOCYTES # BLD AUTO: 0.72 10*3/MM3 (ref 0.7–3.1)
LYMPHOCYTES NFR BLD AUTO: 16.7 % (ref 19.6–45.3)
MAGNESIUM SERPL-MCNC: 2 MG/DL (ref 1.6–2.4)
MCH RBC QN AUTO: 33 PG (ref 26.6–33)
MCHC RBC AUTO-ENTMCNC: 33.8 G/DL (ref 31.5–35.7)
MCV RBC AUTO: 97.5 FL (ref 79–97)
MONOCYTES # BLD AUTO: 0.54 10*3/MM3 (ref 0.1–0.9)
MONOCYTES NFR BLD AUTO: 12.5 % (ref 5–12)
NEUTROPHILS NFR BLD AUTO: 2.79 10*3/MM3 (ref 1.7–7)
NEUTROPHILS NFR BLD AUTO: 64.8 % (ref 42.7–76)
NRBC BLD AUTO-RTO: 0 /100 WBC (ref 0–0.2)
NT-PROBNP SERPL-MCNC: ABNORMAL PG/ML (ref 0–900)
PLATELET # BLD AUTO: 186 10*3/MM3 (ref 140–450)
PMV BLD AUTO: 10.3 FL (ref 6–12)
POTASSIUM SERPL-SCNC: 3.4 MMOL/L (ref 3.5–5.2)
PROT SERPL-MCNC: 7.3 G/DL (ref 6–8.5)
RBC # BLD AUTO: 2.79 10*6/MM3 (ref 4.14–5.8)
SODIUM SERPL-SCNC: 138 MMOL/L (ref 136–145)
TROPONIN T DELTA: -38 NG/L
TROPONIN T SERPL HS-MCNC: 143 NG/L
WBC NRBC COR # BLD AUTO: 4.31 10*3/MM3 (ref 3.4–10.8)
WHOLE BLOOD HOLD COAG: NORMAL
WHOLE BLOOD HOLD SPECIMEN: NORMAL

## 2024-08-02 PROCEDURE — 36415 COLL VENOUS BLD VENIPUNCTURE: CPT

## 2024-08-02 PROCEDURE — 83690 ASSAY OF LIPASE: CPT

## 2024-08-02 PROCEDURE — 84484 ASSAY OF TROPONIN QUANT: CPT

## 2024-08-02 PROCEDURE — 93005 ELECTROCARDIOGRAM TRACING: CPT | Performed by: EMERGENCY MEDICINE

## 2024-08-02 PROCEDURE — 93005 ELECTROCARDIOGRAM TRACING: CPT

## 2024-08-02 PROCEDURE — 85025 COMPLETE CBC W/AUTO DIFF WBC: CPT

## 2024-08-02 PROCEDURE — 84484 ASSAY OF TROPONIN QUANT: CPT | Performed by: EMERGENCY MEDICINE

## 2024-08-02 PROCEDURE — 80053 COMPREHEN METABOLIC PANEL: CPT

## 2024-08-02 PROCEDURE — 83735 ASSAY OF MAGNESIUM: CPT

## 2024-08-02 PROCEDURE — 99284 EMERGENCY DEPT VISIT MOD MDM: CPT

## 2024-08-02 PROCEDURE — 71045 X-RAY EXAM CHEST 1 VIEW: CPT

## 2024-08-02 PROCEDURE — 83880 ASSAY OF NATRIURETIC PEPTIDE: CPT

## 2024-08-02 RX ORDER — ASPIRIN 81 MG/1
324 TABLET, CHEWABLE ORAL ONCE
Status: DISCONTINUED | OUTPATIENT
Start: 2024-08-02 | End: 2024-08-03 | Stop reason: HOSPADM

## 2024-08-02 RX ORDER — SODIUM CHLORIDE 0.9 % (FLUSH) 0.9 %
10 SYRINGE (ML) INJECTION AS NEEDED
Status: DISCONTINUED | OUTPATIENT
Start: 2024-08-02 | End: 2024-08-03 | Stop reason: HOSPADM

## 2024-08-02 NOTE — ED TRIAGE NOTES
Pt arrives from home via ems with complaints of chest pain that started around 0800 after dialysis appointment.. pt states the chest pain is bilateral upper chest, denies that pain radiates anywhere.. ems placed 20g in left AC.. and gave 2 of aspirin on the way in to ED

## 2024-08-03 VITALS
OXYGEN SATURATION: 95 % | TEMPERATURE: 97.9 F | RESPIRATION RATE: 18 BRPM | WEIGHT: 172.4 LBS | DIASTOLIC BLOOD PRESSURE: 62 MMHG | SYSTOLIC BLOOD PRESSURE: 110 MMHG | HEIGHT: 70 IN | HEART RATE: 92 BPM | BODY MASS INDEX: 24.68 KG/M2

## 2024-08-03 LAB
QT INTERVAL: 444 MS
QT INTERVAL: 465 MS
QTC INTERVAL: 508 MS
QTC INTERVAL: 528 MS

## 2024-08-03 NOTE — ED PROVIDER NOTES
Time: 9:59 PM EDT  Date of encounter:  8/2/2024  Independent Historian/Clinical History and Information was obtained by:   Patient    History is limited by: N/A    Chief Complaint: Chest pain      History of Present Illness:  Patient is a 74 y.o. year old male who presents to the emergency department for evaluation of chest pain that started this morning right after dialysis.  Patient states he feels this way in the past when the take too much fluid off.  He denies any known fever.  Not complaining of dyspnea.    HPI    Patient Care Team  Primary Care Provider: Shara Talley APRN    Past Medical History:     Allergies   Allergen Reactions    Peanut (Diagnostic) Shortness Of Breath and Rash    Peanut Butter Flavor Shortness Of Breath and Rash    Simvastatin Myalgia     Other reaction(s): muscle cramps     Past Medical History:   Diagnosis Date    A-V fistula     right arm    Anemia of chronic renal failure 04/12/2021    Antiplatelet or antithrombotic long-term use     plavix    Arthritis     Bilateral leg pain 11/05/2021    CAD (coronary artery disease)     h/o CABG 2003 and stents 11/2022, Dr Shirley follows    Chest pain     saw cardiology 4/10/23, pt states better if he takes his medications    Dialysis patient     Tues, Thursday, Saturday, has chest wall catheter currently    Gout     Heart attack     Hyperlipidemia     Hypertension     Kidney disease     stage 4, Dr Ornelas follows    Neck pain 08/30/2022    Neuritis of lower extremity, right 12/19/2019    Proteinuria 05/23/2022    Rheumatoid factor positive 07/06/2021    Seasonal allergies 03/13/2014    Vitamin D deficiency 05/23/2022     Past Surgical History:   Procedure Laterality Date    ARTERIOVENOUS FISTULA Right     CARDIAC CATHETERIZATION      CARPAL TUNNEL RELEASE      CATARACT EXTRACTION W/ INTRAOCULAR LENS IMPLANT      COLONOSCOPY N/A 2006    COLONOSCOPY N/A 12/14/2018    Procedure: COLONOSCOPY TO CECUM WITH COLD BIOPSY POLYPECTOMY;  Surgeon:  Elliott Harding MD;  Location:  [facility];  Service: General    CORONARY ANGIOPLASTY WITH STENT PLACEMENT  11/09/2022    CORONARY ARTERY BYPASS GRAFT N/A 06/20/2003    INGUINAL HERNIA REPAIR Right 06/04/2014    Open incarcerated inguinal hernia repair-Dr. Elliott Harding    INGUINAL HERNIA REPAIR Left 4/26/2023    Procedure: OPEN LEFT INGUINAL HERNIA REPAIR;  Surgeon: Elliott Harding MD;  Location:  [facility];  Service: General;  Laterality: Left;    LUMBAR DISC SURGERY N/A 1990, 1992    L4-L5, X2     Family History   Problem Relation Age of Onset    Heart disease Mother     Heart disease Father     Malig Hyperthermia Neg Hx        Home Medications:  Prior to Admission medications    Medication Sig Start Date End Date Taking? Authorizing Provider   amLODIPine (NORVASC) 10 MG tablet Take 1 tablet by mouth Daily. 7/12/24   Kevyn Cantrell MD   aspirin 81 MG EC tablet Take 1 tablet by mouth Daily for 30 days. 7/12/24 8/11/24  Kevyn Cantrell MD   atorvastatin (LIPITOR) 80 MG tablet Take 1 tablet by mouth Daily for 30 days. 7/12/24 8/11/24  Kevyn Cantrell MD   budesonide-formoterol (SYMBICORT) 80-4.5 MCG/ACT inhaler Inhale 2 puffs 2 (Two) Times a Day for 30 days. 7/12/24 8/11/24  Kevyn Cantrell MD   bumetanide (BUMEX) 1 MG tablet Take 3 tablets by mouth 2 (Two) Times a Day for 30 days. 7/12/24 8/11/24  Kevyn Cantrell MD   clopidogrel (PLAVIX) 75 MG tablet Take 1 tablet by mouth Daily for 30 days. 7/12/24 8/11/24  Kevyn Cantrell MD   Docusate Sodium 100 MG capsule Take 1 tablet by mouth 2 (Two) Times a Day for 30 days. 7/12/24 8/11/24  Kevyn Cantrell MD   ferrous sulfate 325 (65 FE) MG tablet Take 1 tablet by mouth Daily With Breakfast for 30 days. 7/12/24 8/11/24  Kevyn Cantrell MD   hydrALAZINE (APRESOLINE) 25 MG tablet Take 1 tablet by mouth 3 (Three) Times a Day for 30 days. 7/12/24 8/11/24  Kevyn Cantrell MD   hydrOXYzine (ATARAX) 25 MG tablet Take 1 tablet by mouth At  Night As Needed for Anxiety. 7/12/24   Kevyn Cantrell MD   isosorbide mononitrate (IMDUR) 120 MG 24 hr tablet Take 1 tablet by mouth Daily for 30 days. 7/12/24 8/11/24  Kevyn Cantrell MD   Methoxy PEG-Epoetin Beta (MIRCERA IJ) 75 mcg Every 28 (Twenty-Eight) Days. 6/3/24 6/2/25  ProviderArash MD   nitroglycerin (NITROSTAT) 0.4 MG SL tablet Place 1 tablet under the tongue Every 5 (Five) Minutes As Needed for Chest Pain (Do not take more than 3 at one time. Go to ER or call 911 if chest pain persists). Take no more than 3 doses in 15 minutes. 7/12/24   Kevyn Cantrell MD   ranolazine (RANEXA) 500 MG 12 hr tablet Take 1 tablet by mouth 2 (Two) Times a Day for 30 days. Take dos 7/12/24 8/11/24  Kevyn Cantrell MD   Renvela 800 MG tablet Take 1 tablet by mouth 3 (Three) Times a Day for 30 days. 7/12/24 8/11/24  Kevyn Cantrell MD   sertraline (Zoloft) 50 MG tablet Take 1 tablet by mouth Every Night for 30 days. 7/25/24 8/24/24  Shara Talley APRN   tamsulosin (FLOMAX) 0.4 MG capsule 24 hr capsule Take 1 capsule by mouth Daily for 30 days. 7/12/24 8/11/24  Kevyn Cantrell MD   Zinc Sulfate 220 (50 Zn) MG tablet Take 1 tablet by mouth Daily for 30 days. 7/12/24 8/11/24  Kevyn Cantrell MD        Social History:   Social History     Tobacco Use    Smoking status: Never    Smokeless tobacco: Never   Vaping Use    Vaping status: Never Used   Substance Use Topics    Alcohol use: Yes     Comment: occassionally    Drug use: No         Review of Systems:  Review of Systems   Constitutional:  Negative for chills and fever.   HENT:  Negative for congestion, rhinorrhea and sore throat.    Eyes:  Negative for photophobia.   Respiratory:  Positive for cough. Negative for apnea, chest tightness and shortness of breath.    Cardiovascular:  Positive for chest pain. Negative for palpitations.   Gastrointestinal:  Negative for abdominal pain, diarrhea, nausea and vomiting.   Endocrine: Negative.   "  Genitourinary:  Negative for difficulty urinating and dysuria.   Musculoskeletal:  Negative for back pain, joint swelling and myalgias.   Skin:  Negative for color change and wound.   Allergic/Immunologic: Negative.    Neurological:  Negative for seizures and headaches.   Psychiatric/Behavioral: Negative.     All other systems reviewed and are negative.       Physical Exam:  /62   Pulse 92   Temp 97.9 °F (36.6 °C) (Oral)   Resp 18   Ht 177.8 cm (70\")   Wt 78.2 kg (172 lb 6.4 oz)   SpO2 95%   BMI 24.74 kg/m²     Physical Exam  Vitals and nursing note reviewed.   Constitutional:       General: He is awake.      Appearance: Normal appearance. He is well-developed.   HENT:      Head: Normocephalic and atraumatic.      Nose: Nose normal.      Mouth/Throat:      Mouth: Mucous membranes are moist.   Eyes:      Extraocular Movements: Extraocular movements intact.      Pupils: Pupils are equal, round, and reactive to light.   Cardiovascular:      Rate and Rhythm: Normal rate and regular rhythm.      Heart sounds: Normal heart sounds.   Pulmonary:      Effort: Pulmonary effort is normal. No respiratory distress.      Breath sounds: Normal breath sounds. No wheezing, rhonchi or rales.   Abdominal:      General: Bowel sounds are normal.      Palpations: Abdomen is soft.      Tenderness: There is no abdominal tenderness. There is no guarding or rebound.      Comments: No rigidity   Musculoskeletal:         General: No tenderness. Normal range of motion.      Cervical back: Normal range of motion and neck supple.      Right lower leg: No edema.      Left lower leg: No edema.   Skin:     General: Skin is warm and dry.      Coloration: Skin is not jaundiced.   Neurological:      General: No focal deficit present.      Mental Status: He is alert. Mental status is at baseline.   Psychiatric:         Mood and Affect: Mood normal.                  Procedures:  Procedures      Medical Decision Making:      Comorbidities " that affect care:    Hypertension, hyperlipidemia, CAD, end-stage renal disease on hemodialysis    External Notes reviewed:    Hospital Discharge Summary:    Discharge Summary  Kevyn Cantrell MD (Physician)  Hospitalist  Expand All Collapse [facility]                                                                           HOSPITALIST  DISCHARGE SUMMARY     Patient Name: Mohamud Jarrett  : 1950  MRN: [id number]     Date of Admission: 7/10/2024  Date of Discharge:  2024     Primary Care Physician: Shara Talley APRN     Consults         Date and Time Order Name Status Description     2024  1:37 AM Inpatient Cardiology Consult         7/10/2024  9:03 PM Inpatient Hospitalist Consult         7/10/2024  8:53 PM General MD Inpatient Consult         2024  3:38 PM Nephrology  (on-call MD unless specified) Completed                  Active and Resolved Hospital Problems:     Chest pain, atypical    Depression    Edema    Elevated troponin    Hyperkalemia    End stage renal disease    CHF (congestive heart failure)    Stented coronary artery    Hyperlipidemia            Active Hospital Problems     Diagnosis POA    **Generalized weakness [R53.1] Yes    ESRD (end stage renal disease) [N18.6] Yes    Abnormal EKG [R94.31] Unknown    Chest pain, atypical [R07.89] Unknown    Depression [F32.A] Yes    Edema [R60.9] Yes    Elevated troponin [R79.89] Yes    Hyperkalemia [E87.5] Yes    End stage renal disease [N18.6] Yes    CHF (congestive heart failure) [I50.9] Yes    Stented coronary artery [Z95.5] Not Applicable    Hyperlipidemia [E78.5] Yes       Resolved Hospital Problems   No resolved problems to display.         Hospital Course      Hospital Course:  74-year-old male with end-stage renal disease on hemodialysis, hypertension, CAD with history of CABG, prior MIs, GERD without esophagitis, hospitalized on  7/11/2024 with chief complaint of weakness, missed several sessions of dialysis, was hyperkalemic, cardiology was consulted, ruled out ACS, in addition to nephrology, underwent dialysis on subsequent days, continued on aspirin and Plavix, weakness improved, patient had an established chair time, counseled patient on compliance, discharged in hemodynamically stable condition on 7/12/2024 to follow-up with nephrologist and cardiologist outpatient.             The following orders were placed and all results were independently analyzed by me:  Orders Placed This Encounter   Procedures    XR Chest 1 View    [place] Draw    High Sensitivity Troponin T    Comprehensive Metabolic Panel    Lipase    BNP    Magnesium    CBC Auto Differential    High Sensitivity Troponin T 2Hr    NPO Diet NPO Type: Strict NPO    Undress & Gown    Continuous Pulse Oximetry    Oxygen Therapy- Nasal Cannula; Titrate 1-6 LPM Per SpO2; 90 - 95%    ECG 12 Lead ED Triage Standing Order; Chest Pain    ECG 12 Lead ED Triage Standing Order; Chest Pain    Insert Peripheral IV    CBC & Differential    Green Top (Gel)    Lavender Top    Gold Top - SST    Light Blue Top       Medications Given in the Emergency Department:  Medications   sodium chloride 0.9 % flush 10 mL (has no administration in time range)   aspirin chewable tablet 324 mg (324 mg Oral Not Given 8/2/24 1926)        ED Course:    ED Course as of 08/02/24 2304   Fri Aug 02, 2024   2137 I have personally interpreted the EKG today and it shows no evidence of any acute ischemia or heart arrhythmia.  Nonspecific ST segment changes with no significant interval change from EKG dated 7/11/2024. [RP]      ED Course User Index  [RP] Andre Hart MD       Labs:    Lab Results (last 24 hours)       Procedure Component Value Units Date/Time    High Sensitivity Troponin T [[id number]]  (Abnormal) Collected: 08/02/24 1932    Specimen: Blood Updated: 08/02/24 2037     HS Troponin T 143 ng/L      Narrative:      High Sensitive Troponin T Reference Range:  <14.0 ng/L- Negative Female for AMI  <22.0 ng/L- Negative Male for AMI  >=14 - Abnormal Female indicating possible myocardial injury.  >=22 - Abnormal Male indicating possible myocardial injury.   Clinicians would have to utilize clinical acumen, EKG, Troponin, and serial changes to determine if it is an Acute Myocardial Infarction or myocardial injury due to an underlying chronic condition.         CBC & Differential [[id number]]  (Abnormal) Collected: 08/02/24 1932    Specimen: Blood Updated: 08/02/24 1948    Narrative:      The following orders were created for panel order CBC & Differential.  Procedure                               Abnormality         Status                     ---------                               -----------         ------                     CBC Auto Differential[[id number]]        Abnormal            Final result                 Please view results for these tests on the individual orders.    Comprehensive Metabolic Panel [[id number]]  (Abnormal) Collected: 08/02/24 1932    Specimen: Blood Updated: 08/02/24 2022     Glucose 92 mg/dL      BUN 28 mg/dL      Creatinine 3.61 mg/dL      Sodium 138 mmol/L      Potassium 3.4 mmol/L      Chloride 93 mmol/L      CO2 31.0 mmol/L      Calcium 8.9 mg/dL      Total Protein 7.3 g/dL      Albumin 3.9 g/dL      ALT (SGPT) 14 U/L      AST (SGOT) 26 U/L      Alkaline Phosphatase 351 U/L      Total Bilirubin 2.0 mg/dL      Globulin 3.4 gm/dL      A/G Ratio 1.1 g/dL      BUN/Creatinine Ratio 7.8     Anion Gap 14.0 mmol/L      eGFR 17.1 mL/min/1.73     Narrative:      GFR Normal >60  Chronic Kidney Disease <60  Kidney Failure <15    The GFR formula is only valid for adults with stable renal function between ages 18 and 70.    Lipase [[id number]]  (Abnormal) Collected: 08/02/24 1932    Specimen: Blood Updated: 08/02/24 2022     Lipase 109 U/L     BNP [[id number]]  (Abnormal) Collected: 08/02/24 1932     Specimen: Blood Updated: 08/02/24 2041     proBNP >70,000.0 pg/mL     Narrative:      This assay is used as an aid in the diagnosis of individuals suspected of having heart failure. It can be used as an aid in the diagnosis of acute decompensated heart failure (ADHF) in patients presenting with signs and symptoms of ADHF to the emergency department (ED). In addition, NT-proBNP of <300 pg/mL indicates ADHF is not likely.    Age Range Result Interpretation  NT-proBNP Concentration (pg/mL:      <50             Positive            >450                   Gray                 300-450                    Negative             <300    50-75           Positive            >900                  Gray                300-900                  Negative            <300      >75             Positive            >1800                  Gray                300-1800                  Negative            <300    Magnesium [[id number]]  (Normal) Collected: 08/02/24 1932    Specimen: Blood Updated: 08/02/24 2022     Magnesium 2.0 mg/dL     CBC Auto Differential [[id number]]  (Abnormal) Collected: 08/02/24 1932    Specimen: Blood Updated: 08/02/24 1948     WBC 4.31 10*3/mm3      RBC 2.79 10*6/mm3      Hemoglobin 9.2 g/dL      Hematocrit 27.2 %      MCV 97.5 fL      MCH 33.0 pg      MCHC 33.8 g/dL      RDW 20.6 %      RDW-SD 70.8 fl      MPV 10.3 fL      Platelets 186 10*3/mm3      Neutrophil % 64.8 %      Lymphocyte % 16.7 %      Monocyte % 12.5 %      Eosinophil % 3.9 %      Basophil % 1.9 %      Immature Grans % 0.2 %      Neutrophils, Absolute 2.79 10*3/mm3      Lymphocytes, Absolute 0.72 10*3/mm3      Monocytes, Absolute 0.54 10*3/mm3      Eosinophils, Absolute 0.17 10*3/mm3      Basophils, Absolute 0.08 10*3/mm3      Immature Grans, Absolute 0.01 10*3/mm3      nRBC 0.0 /100 WBC     High Sensitivity Troponin T 2Hr [[id number]]  (Abnormal) Collected: 08/02/24 2159    Specimen: Blood Updated: 08/02/24 2236     HS Troponin T 105 ng/L      Troponin  T Delta -38 ng/L     Narrative:      High Sensitive Troponin T Reference Range:  <14.0 ng/L- Negative Female for AMI  <22.0 ng/L- Negative Male for AMI  >=14 - Abnormal Female indicating possible myocardial injury.  >=22 - Abnormal Male indicating possible myocardial injury.   Clinicians would have to utilize clinical acumen, EKG, Troponin, and serial changes to determine if it is an Acute Myocardial Infarction or myocardial injury due to an underlying chronic condition.                  Imaging:    XR Chest 1 View    Result Date: 8/2/2024  XR CHEST 1 VW Date of Exam: 8/2/2024 7:42 PM EDT Indication: Chest Pain Triage Protocol Comparison: 7/10/2024 Findings: Lines: None Lungs: Poor respiratory effort accentuates the pulmonary vasculature and cardiomediastinal silhouette. No definite consolidation. Pleura: No pleural effusion or pneumothorax. Cardiomediastinum: Status post median sternotomy. Borderline cardiomegaly. Otherwise unremarkable Soft Tissues: Unremarkable. Bones: No acute osseous abnormality.     Impression: No definite acute abnormality. Electronically Signed: Oren Gunter DO  8/2/2024 7:59 PM EDT  Workstation ID: [id number]       Differential Diagnosis and Discussion:    Chest Pain:  Based on the patient's signs and symptoms, I considered aortic dissection, myocardial infaction, pulmonary embolism, cardiac tamponade, pericarditis, pneumothorax, musculoskeletal chest pain and other differential diagnosis as an etiology of the patient's chest pain.     All labs were reviewed and interpreted by me.  All X-rays impressions were independently interpreted by me.  EKG was interpreted by me.    MDM     Amount and/or Complexity of Data Reviewed  Decide to obtain previous medical records or to obtain history from someone other than the patient: yes                 Patient Care Considerations:    CONSULT: I considered consulting cardiology, however EKG nonischemic x 2 with baseline troponin not elevated from  most recent evaluations.  In addition, the patient describes his chest pain is quite atypical.      Consultants/Shared Management Plan:    None    Social Determinants of Health:    Patient is independent, reliable, and has access to care.       Disposition and Care Coordination:    Discharged: The patient is suitable and stable for discharge with no need for consideration of admission.        Final diagnoses:   End stage renal disease   Nonspecific chest pain        ED Disposition       ED Disposition   Discharge    Condition   Stable    Comment   --               This medical record created using voice recognition software.             Andre Hart MD  08/02/24 2260

## 2024-08-12 ENCOUNTER — APPOINTMENT (OUTPATIENT)
Dept: GENERAL RADIOLOGY | Facility: HOSPITAL | Age: 74
End: 2024-08-12
Payer: MEDICARE

## 2024-08-12 ENCOUNTER — HOSPITAL ENCOUNTER (EMERGENCY)
Facility: HOSPITAL | Age: 74
Discharge: HOME | End: 2024-08-12
Attending: EMERGENCY MEDICINE | Admitting: EMERGENCY MEDICINE
Payer: MEDICARE

## 2024-08-12 VITALS
HEIGHT: 70 IN | OXYGEN SATURATION: 96 % | DIASTOLIC BLOOD PRESSURE: 80 MMHG | BODY MASS INDEX: 24.68 KG/M2 | RESPIRATION RATE: 20 BRPM | WEIGHT: 172.4 LBS | SYSTOLIC BLOOD PRESSURE: 135 MMHG | TEMPERATURE: 98 F | HEART RATE: 75 BPM

## 2024-08-12 DIAGNOSIS — N18.6 END STAGE RENAL DISEASE ON DIALYSIS: ICD-10-CM

## 2024-08-12 DIAGNOSIS — E87.5 HYPERKALEMIA: ICD-10-CM

## 2024-08-12 DIAGNOSIS — Z99.2 END STAGE RENAL DISEASE ON DIALYSIS: ICD-10-CM

## 2024-08-12 DIAGNOSIS — T78.40XA ACUTE ALLERGIC REACTION, INITIAL ENCOUNTER: Primary | ICD-10-CM

## 2024-08-12 LAB
ALBUMIN SERPL-MCNC: 3.6 G/DL (ref 3.5–5.2)
ALBUMIN/GLOB SERPL: 1 G/DL
ALP SERPL-CCNC: 327 U/L (ref 39–117)
ALT SERPL W P-5'-P-CCNC: 11 U/L (ref 1–41)
ANION GAP SERPL CALCULATED.3IONS-SCNC: 17.3 MMOL/L (ref 5–15)
AST SERPL-CCNC: 25 U/L (ref 1–40)
BASOPHILS # BLD AUTO: 0.08 10*3/MM3 (ref 0–0.2)
BASOPHILS NFR BLD AUTO: 1.8 % (ref 0–1.5)
BILIRUB SERPL-MCNC: 2 MG/DL (ref 0–1.2)
BUN SERPL-MCNC: 62 MG/DL (ref 8–23)
BUN/CREAT SERPL: 8.4 (ref 7–25)
CALCIUM SPEC-SCNC: 8.8 MG/DL (ref 8.6–10.5)
CHLORIDE SERPL-SCNC: 96 MMOL/L (ref 98–107)
CO2 SERPL-SCNC: 26.7 MMOL/L (ref 22–29)
CREAT SERPL-MCNC: 7.35 MG/DL (ref 0.76–1.27)
DEPRECATED RDW RBC AUTO: 72 FL (ref 37–54)
EGFRCR SERPLBLD CKD-EPI 2021: 7.3 ML/MIN/1.73
EOSINOPHIL # BLD AUTO: 0.11 10*3/MM3 (ref 0–0.4)
EOSINOPHIL NFR BLD AUTO: 2.5 % (ref 0.3–6.2)
ERYTHROCYTE [DISTWIDTH] IN BLOOD BY AUTOMATED COUNT: 20.6 % (ref 12.3–15.4)
GLOBULIN UR ELPH-MCNC: 3.5 GM/DL
GLUCOSE SERPL-MCNC: 91 MG/DL (ref 65–99)
HCT VFR BLD AUTO: 26.7 % (ref 37.5–51)
HGB BLD-MCNC: 8.9 G/DL (ref 13–17.7)
HOLD SPECIMEN: NORMAL
IMM GRANULOCYTES # BLD AUTO: 0.02 10*3/MM3 (ref 0–0.05)
IMM GRANULOCYTES NFR BLD AUTO: 0.4 % (ref 0–0.5)
LYMPHOCYTES # BLD AUTO: 0.76 10*3/MM3 (ref 0.7–3.1)
LYMPHOCYTES NFR BLD AUTO: 17 % (ref 19.6–45.3)
MCH RBC QN AUTO: 32.4 PG (ref 26.6–33)
MCHC RBC AUTO-ENTMCNC: 33.3 G/DL (ref 31.5–35.7)
MCV RBC AUTO: 97.1 FL (ref 79–97)
MONOCYTES # BLD AUTO: 0.42 10*3/MM3 (ref 0.1–0.9)
MONOCYTES NFR BLD AUTO: 9.4 % (ref 5–12)
NEUTROPHILS NFR BLD AUTO: 3.09 10*3/MM3 (ref 1.7–7)
NEUTROPHILS NFR BLD AUTO: 68.9 % (ref 42.7–76)
NRBC BLD AUTO-RTO: 0 /100 WBC (ref 0–0.2)
PLATELET # BLD AUTO: 134 10*3/MM3 (ref 140–450)
PMV BLD AUTO: 11 FL (ref 6–12)
POTASSIUM SERPL-SCNC: 5.3 MMOL/L (ref 3.5–5.2)
PROT SERPL-MCNC: 7.1 G/DL (ref 6–8.5)
RBC # BLD AUTO: 2.75 10*6/MM3 (ref 4.14–5.8)
SODIUM SERPL-SCNC: 140 MMOL/L (ref 136–145)
WBC NRBC COR # BLD AUTO: 4.48 10*3/MM3 (ref 3.4–10.8)
WHOLE BLOOD HOLD COAG: NORMAL

## 2024-08-12 PROCEDURE — 71045 X-RAY EXAM CHEST 1 VIEW: CPT

## 2024-08-12 PROCEDURE — 25010000002 DIPHENHYDRAMINE PER 50 MG

## 2024-08-12 PROCEDURE — 85025 COMPLETE CBC W/AUTO DIFF WBC: CPT | Performed by: EMERGENCY MEDICINE

## 2024-08-12 PROCEDURE — 25810000003 SODIUM CHLORIDE 0.9 % SOLUTION: Performed by: EMERGENCY MEDICINE

## 2024-08-12 PROCEDURE — 94640 AIRWAY INHALATION TREATMENT: CPT

## 2024-08-12 PROCEDURE — 96374 THER/PROPH/DIAG INJ IV PUSH: CPT

## 2024-08-12 PROCEDURE — 80053 COMPREHEN METABOLIC PANEL: CPT | Performed by: EMERGENCY MEDICINE

## 2024-08-12 PROCEDURE — 96375 TX/PRO/DX INJ NEW DRUG ADDON: CPT

## 2024-08-12 PROCEDURE — 25010000002 METHYLPREDNISOLONE PER 125 MG: Performed by: EMERGENCY MEDICINE

## 2024-08-12 PROCEDURE — 94799 UNLISTED PULMONARY SVC/PX: CPT

## 2024-08-12 PROCEDURE — 99283 EMERGENCY DEPT VISIT LOW MDM: CPT

## 2024-08-12 RX ORDER — METHYLPREDNISOLONE SODIUM SUCCINATE 125 MG/2ML
INJECTION, POWDER, LYOPHILIZED, FOR SOLUTION INTRAMUSCULAR; INTRAVENOUS
Status: DISCONTINUED
Start: 2024-08-12 | End: 2024-08-12 | Stop reason: HOSPADM

## 2024-08-12 RX ORDER — DIPHENHYDRAMINE HYDROCHLORIDE 50 MG/ML
INJECTION INTRAMUSCULAR; INTRAVENOUS
Status: COMPLETED
Start: 2024-08-12 | End: 2024-08-12

## 2024-08-12 RX ORDER — DIPHENHYDRAMINE HYDROCHLORIDE 50 MG/ML
25 INJECTION INTRAMUSCULAR; INTRAVENOUS ONCE
Status: COMPLETED | OUTPATIENT
Start: 2024-08-12 | End: 2024-08-12

## 2024-08-12 RX ORDER — FAMOTIDINE 10 MG/ML
20 INJECTION, SOLUTION INTRAVENOUS ONCE
Status: COMPLETED | OUTPATIENT
Start: 2024-08-12 | End: 2024-08-12

## 2024-08-12 RX ORDER — IPRATROPIUM BROMIDE AND ALBUTEROL SULFATE 2.5; .5 MG/3ML; MG/3ML
3 SOLUTION RESPIRATORY (INHALATION) ONCE
Status: COMPLETED | OUTPATIENT
Start: 2024-08-12 | End: 2024-08-12

## 2024-08-12 RX ADMIN — DIPHENHYDRAMINE HYDROCHLORIDE 25 MG: 50 INJECTION, SOLUTION INTRAMUSCULAR; INTRAVENOUS at 01:01

## 2024-08-12 RX ADMIN — DIPHENHYDRAMINE HYDROCHLORIDE 25 MG: 50 INJECTION INTRAMUSCULAR; INTRAVENOUS at 01:01

## 2024-08-12 RX ADMIN — SODIUM CHLORIDE 1000 ML: 9 INJECTION, SOLUTION INTRAVENOUS at 01:19

## 2024-08-12 RX ADMIN — IPRATROPIUM BROMIDE AND ALBUTEROL SULFATE 3 ML: .5; 3 SOLUTION RESPIRATORY (INHALATION) at 01:14

## 2024-08-12 RX ADMIN — FAMOTIDINE 20 MG: 10 INJECTION INTRAVENOUS at 01:19

## 2024-08-12 RX ADMIN — METHYLPREDNISOLONE SODIUM SUCCINATE 125 MG: 125 INJECTION INTRAMUSCULAR; INTRAVENOUS at 01:18

## 2024-08-12 NOTE — ED TRIAGE NOTES
Ems reported that patient is here for flu like symptoms. Pt reports that he is allergic to peanuts and ate a granola bare called nuts n honey.

## 2024-08-12 NOTE — ED NOTES
Called Ilana Jarrett, daughter, at [phone], no answer, left message with call back number of [phone], ER.

## 2024-08-12 NOTE — ED NOTES
Talked with daughter and sister of patient. Daughter says she can't drive because she can't see. Sister says she does not have enough gas and there are no gas stations open in [place]. Sister called back and states she will come to get her brother when the gas stations open and she can get gas. Charge RN aware.

## 2024-08-12 NOTE — DISCHARGE INSTRUCTIONS
Please go directly to dialysis.  It is important that you do not miss your dialysis session today.

## 2024-08-12 NOTE — ED PROVIDER NOTES
Time: 12:56 AM EDT  Date of encounter:  8/12/2024  Independent Historian/Clinical History and Information was obtained by:   Patient    History is limited by: N/A    Chief Complaint: Allergic reaction      History of Present Illness:  Patient is a 74 y.o. year old male who presents to the emergency department for evaluation of allergic reaction    Patient presents emerged from today complaining of shortness of breath.  He believes it is due to exposure to peanuts and a granola bar that he ate at approximately 5 PM.  Since that time is developed progressive shortness of breath.  He did not know that the bar contained peanuts.  He states that he has had reactions to peanuts in the past but never requiring intubation or mechanical ventilation.  He denies any rash.  No nausea or vomiting.  He states he had his normal dialysis session on Friday and is due again later today on Monday.  He denies fevers or chills.  No cough.      Patient Care Team  Primary Care Provider: Shara Talley APRN    Past Medical History:     Allergies   Allergen Reactions    Peanut (Diagnostic) Shortness Of Breath and Rash    Peanut Butter Flavor Shortness Of Breath and Rash    Simvastatin Myalgia     Other reaction(s): muscle cramps     Past Medical History:   Diagnosis Date    A-V fistula     right arm    Anemia of chronic renal failure 04/12/2021    Antiplatelet or antithrombotic long-term use     plavix    Arthritis     Bilateral leg pain 11/05/2021    CAD (coronary artery disease)     h/o CABG 2003 and stents 11/2022, Dr Shirley follows    Chest pain     saw cardiology 4/10/23, pt states better if he takes his medications    Dialysis patient     Tues, Thursday, Saturday, has chest wall catheter currently    Gout     Heart attack     Hyperlipidemia     Hypertension     Kidney disease     stage 4, Dr Ornelas follows    Neck pain 08/30/2022    Neuritis of lower extremity, right 12/19/2019    Proteinuria 05/23/2022    Rheumatoid factor  positive 07/06/2021    Seasonal allergies 03/13/2014    Vitamin D deficiency 05/23/2022     Past Surgical History:   Procedure Laterality Date    ARTERIOVENOUS FISTULA Right     CARDIAC CATHETERIZATION      CARPAL TUNNEL RELEASE      CATARACT EXTRACTION W/ INTRAOCULAR LENS IMPLANT      COLONOSCOPY N/A 2006    COLONOSCOPY N/A 12/14/2018    Procedure: COLONOSCOPY TO CECUM WITH COLD BIOPSY POLYPECTOMY;  Surgeon: Elliott Harding MD;  Location: [facility] ENDOSCOPY;  Service: General    CORONARY ANGIOPLASTY WITH STENT PLACEMENT  11/09/2022    CORONARY ARTERY BYPASS GRAFT N/A 06/20/2003    INGUINAL HERNIA REPAIR Right 06/04/2014    Open incarcerated inguinal hernia repair-Dr. Elliott Harding    INGUINAL HERNIA REPAIR Left 4/26/2023    Procedure: OPEN LEFT INGUINAL HERNIA REPAIR;  Surgeon: Elliott Harding MD;  Location:  [facility] OR;  Service: General;  Laterality: Left;    LUMBAR DISC SURGERY N/A 1990, 1992    L4-L5, X2     Family History   Problem Relation Age of Onset    Heart disease Mother     Heart disease Father     Malig Hyperthermia Neg Hx        Home Medications:  Prior to Admission medications    Medication Sig Start Date End Date Taking? Authorizing Provider   amLODIPine (NORVASC) 10 MG tablet Take 1 tablet by mouth Daily. 7/12/24   Kevyn Cantrell MD   aspirin 81 MG EC tablet Take 1 tablet by mouth Daily for 30 days. 7/12/24 8/11/24  Kevyn Cantrell MD   atorvastatin (LIPITOR) 80 MG tablet Take 1 tablet by mouth Daily for 30 days. 7/12/24 8/11/24  Kevyn Cantrell MD   budesonide-formoterol (SYMBICORT) 80-4.5 MCG/ACT inhaler Inhale 2 puffs 2 (Two) Times a Day for 30 days. 7/12/24 8/11/24  Kevyn Cantrell MD   bumetanide (BUMEX) 1 MG tablet Take 3 tablets by mouth 2 (Two) Times a Day for 30 days. 7/12/24 8/11/24  Kevyn Cantrell MD   clopidogrel (PLAVIX) 75 MG tablet Take 1 tablet by mouth Daily for 30 days. 7/12/24 8/11/24  Kevyn Cantrell MD   Docusate Sodium 100 MG capsule Take 1 tablet by  mouth 2 (Two) Times a Day for 30 days. 7/12/24 8/11/24  Kevyn Cantrell MD   ferrous sulfate 325 (65 FE) MG tablet Take 1 tablet by mouth Daily With Breakfast for 30 days. 7/12/24 8/11/24  Kevyn Cantrell MD   hydrALAZINE (APRESOLINE) 25 MG tablet Take 1 tablet by mouth 3 (Three) Times a Day for 30 days. 7/12/24 8/11/24  Kevyn Cantrell MD   hydrOXYzine (ATARAX) 25 MG tablet Take 1 tablet by mouth At Night As Needed for Anxiety. 7/12/24   Kevyn Cantrell MD   isosorbide mononitrate (IMDUR) 120 MG 24 hr tablet Take 1 tablet by mouth Daily for 30 days. 7/12/24 8/11/24  Kevyn Cantrell MD   Methoxy PEG-Epoetin Beta (MIRCERA IJ) 75 mcg Every 28 (Twenty-Eight) Days. 6/3/24 6/2/25  ProviderArash MD   nitroglycerin (NITROSTAT) 0.4 MG SL tablet Place 1 tablet under the tongue Every 5 (Five) Minutes As Needed for Chest Pain (Do not take more than 3 at one time. Go to ER or call 911 if chest pain persists). Take no more than 3 doses in 15 minutes. 7/12/24   Kevyn Cantrell MD   ranolazine (RANEXA) 500 MG 12 hr tablet Take 1 tablet by mouth 2 (Two) Times a Day for 30 days. Take dos 7/12/24 8/11/24  Kevyn Cantrell MD   Renvela 800 MG tablet Take 1 tablet by mouth 3 (Three) Times a Day for 30 days. 7/12/24 8/11/24  Kevyn Cantrell MD   sertraline (Zoloft) 50 MG tablet Take 1 tablet by mouth Every Night for 30 days. 7/25/24 8/24/24  Shara Talley APRN   tamsulosin (FLOMAX) 0.4 MG capsule 24 hr capsule Take 1 capsule by mouth Daily for 30 days. 7/12/24 8/11/24  Kevyn Cantrell MD   Zinc Sulfate 220 (50 Zn) MG tablet Take 1 tablet by mouth Daily for 30 days. 7/12/24 8/11/24  Kevyn Cantrell MD        Social History:   Social History     Tobacco Use    Smoking status: Never    Smokeless tobacco: Never   Vaping Use    Vaping status: Never Used   Substance Use Topics    Alcohol use: Yes     Comment: occassionally    Drug use: No         Review of Systems:  Review of Systems   Constitutional:   "Negative for chills and fever.   HENT:  Negative for congestion, ear pain and sore throat.    Eyes:  Negative for pain.   Respiratory:  Positive for shortness of breath. Negative for cough and chest tightness.    Cardiovascular:  Negative for chest pain.   Gastrointestinal:  Negative for abdominal pain, diarrhea, nausea and vomiting.   Genitourinary:  Negative for flank pain and hematuria.   Musculoskeletal:  Negative for joint swelling.   Skin:  Negative for pallor.   Neurological:  Negative for seizures and headaches.   All other systems reviewed and are negative.       Physical Exam:  /78   Pulse 81   Temp 98 °F (36.7 °C) (Oral)   Resp 22   Ht 177.8 cm (70\")   Wt 78.2 kg (172 lb 6.4 oz)   SpO2 96%   BMI 24.74 kg/m²     Physical Exam  Vitals and nursing note reviewed.   Constitutional:       General: He is not in acute distress.     Appearance: Normal appearance. He is not toxic-appearing.   HENT:      Head: Normocephalic and atraumatic.      Jaw: There is normal jaw occlusion.   Eyes:      General: Lids are normal.      Extraocular Movements: Extraocular movements intact.      Conjunctiva/sclera: Conjunctivae normal.      Pupils: Pupils are equal, round, and reactive to light.   Cardiovascular:      Rate and Rhythm: Normal rate and regular rhythm.      Pulses: Normal pulses.      Heart sounds: Normal heart sounds.      Comments: Right upper extremity AV fistula with palpable thrill  Pulmonary:      Effort: Pulmonary effort is normal. No respiratory distress.      Breath sounds: Normal breath sounds. No wheezing or rhonchi.   Abdominal:      General: Abdomen is flat.      Palpations: Abdomen is soft.      Tenderness: There is no abdominal tenderness. There is no guarding or rebound.   Musculoskeletal:         General: Normal range of motion.      Cervical back: Normal range of motion and neck supple.      Right lower leg: No edema.      Left lower leg: No edema.   Skin:     General: Skin is warm and " dry.   Neurological:      Mental Status: He is alert and oriented to person, place, and time. Mental status is at baseline.   Psychiatric:         Mood and Affect: Mood normal.            Procedures:  Procedures      Medical Decision Making:      Comorbidities that affect care:    Hypertension, coronary disease, gout, end-stage renal disease on dialysis    External Notes reviewed:    Previous Clinic Note: Outpatient family medicine visit 7/25/2024      The following orders were placed and all results were independently analyzed by me:  Orders Placed This Encounter   Procedures    XR Chest 1 View    Comprehensive Metabolic Panel    CBC Auto Differential    [facility] Draw    Nephrology  (on-call MD unless specified)    Insert peripheral IV    CBC & Differential    Gold Top - SST    Light Blue Top       Medications Given in the Emergency Department:  Medications   methylPREDNISolone sodium succinate (SOLU-Medrol) 125 MG injection  - ADS Override Pull (  Return to Cabinet 8/12/24 0148)   diphenhydrAMINE (BENADRYL) injection 25 mg (25 mg Intravenous Given 8/12/24 0101)   famotidine (PEPCID) injection 20 mg (20 mg Intravenous Given 8/12/24 0119)   methylPREDNISolone sodium succinate (SOLU-Medrol) 125 mg in sterile water (preservative free) 2 mL (125 mg Intravenous Given 8/12/24 0118)   sodium chloride 0.9 % bolus 1,000 mL (0 mL Intravenous Stopped 8/12/24 0219)   ipratropium-albuterol (DUO-NEB) nebulizer solution 3 mL (3 mL Nebulization Given 8/12/24 0114)        ED Course:    ED Course as of 08/12/24 0745   Mon Aug 12, 2024   0740 I discussed patient's presentation to the emergency department with Dr. Sinclair, who agrees the patient does not have any admission criteria but requests that we call the patient's dialysis center to assist in arranging a later dialysis appointment. [JS]      ED Course User Index  [JS] Leonel Booker MD       Labs:    Lab Results (last 24 hours)       Procedure Component Value Units Date/Time     CBC & Differential [[id number]]  (Abnormal) Collected: 08/12/24 0107    Specimen: Blood Updated: 08/12/24 0114    Narrative:      The following orders were created for panel order CBC & Differential.  Procedure                               Abnormality         Status                     ---------                               -----------         ------                     CBC Auto Differential[[id number]]        Abnormal            Final result                 Please view results for these tests on the individual orders.    Comprehensive Metabolic Panel [[id number]]  (Abnormal) Collected: 08/12/24 0107    Specimen: Blood Updated: 08/12/24 0136     Glucose 91 mg/dL      BUN 62 mg/dL      Creatinine 7.35 mg/dL      Sodium 140 mmol/L      Potassium 5.3 mmol/L      Chloride 96 mmol/L      CO2 26.7 mmol/L      Calcium 8.8 mg/dL      Total Protein 7.1 g/dL      Albumin 3.6 g/dL      ALT (SGPT) 11 U/L      AST (SGOT) 25 U/L      Alkaline Phosphatase 327 U/L      Total Bilirubin 2.0 mg/dL      Globulin 3.5 gm/dL      A/G Ratio 1.0 g/dL      BUN/Creatinine Ratio 8.4     Anion Gap 17.3 mmol/L      eGFR 7.3 mL/min/1.73      Comment: <15 Indicative of kidney failure       Narrative:      GFR Normal >60  Chronic Kidney Disease <60  Kidney Failure <15    The GFR formula is only valid for adults with stable renal function between ages 18 and 70.    CBC Auto Differential [[id number]]  (Abnormal) Collected: 08/12/24 0107    Specimen: Blood Updated: 08/12/24 0114     WBC 4.48 10*3/mm3      RBC 2.75 10*6/mm3      Hemoglobin 8.9 g/dL      Hematocrit 26.7 %      MCV 97.1 fL      MCH 32.4 pg      MCHC 33.3 g/dL      RDW 20.6 %      RDW-SD 72.0 fl      MPV 11.0 fL      Platelets 134 10*3/mm3      Neutrophil % 68.9 %      Lymphocyte % 17.0 %      Monocyte % 9.4 %      Eosinophil % 2.5 %      Basophil % 1.8 %      Immature Grans % 0.4 %      Neutrophils, Absolute 3.09 10*3/mm3      Lymphocytes, Absolute 0.76 10*3/mm3      Monocytes, Absolute  0.42 10*3/mm3      Eosinophils, Absolute 0.11 10*3/mm3      Basophils, Absolute 0.08 10*3/mm3      Immature Grans, Absolute 0.02 10*3/mm3      nRBC 0.0 /100 WBC              Imaging:    XR Chest 1 View    Result Date: 8/12/2024  XR CHEST 1 VW Date of exam: 8/12/2024, 1:22 A.M., EDT. Indication: dyspnea Comparisons: 8/2/2024; 7/10/2024.  FINDINGS: A single AP (or PA) upright portable chest radiograph was performed. Borderline cardiac enlargement is seen. The patient has undergone median sternotomy and suspected CABG surgery. There is a left atrial appendage closure/occlusion device is in place. There is chronic calcified granulomatous disease of the chest. No acute infiltrate is appreciated. No pleural effusion or pneumothorax is identified. The thoracic aorta is atherosclerotic. Skinfolds are projected over the left pulmonary apex. There is nonspecific distention of the stomach. Degenerative changes involve the shoulders. No significant interval change is seen since the prior study (or studies).      No acute infiltrate is appreciated. Borderline cardiac enlargement is noted.    Please note that portions of this note were completed with a voice recognition program.  Electronically Signed: Karlos Delgado MD  8/12/2024 1:36 AM EDT  Workstation ID: [id number]       Differential Diagnosis and Discussion:    Allergic Reaction: Differential diagnosis includes but is not limited to drug side effects, contact dermatitis, autoimmune conditions, infections, mast cell disorders, serum sickness, anaphylactoid reactions, angioedema, panic or anxiety attacks.    All labs were reviewed and interpreted by me.  All X-rays impressions were independently interpreted by me.    MDM               Patient Care Considerations:    EPINEPHRINE: I considered giving epinephrine, however the patient has no respiratory distress, stridor and improved with treatment.      Consultants/Shared Management Plan:    Consultant: I have discussed the case  with nephrology who states the patient can go to dialysis later today.    Social Determinants of Health:    Patient is independent, reliable, and has access to care.       Disposition and Care Coordination:    Discharged: The patient is suitable and stable for discharge with no need for consideration of admission.    I have explained the patient´s condition, diagnoses and treatment plan based on the information available to me at this time. I have answered questions and addressed any concerns. The patient has a good  understanding of the patient´s diagnosis, condition, and treatment plan as can be expected at this point. The vital signs have been stable. The patient´s condition is stable and appropriate for discharge from the emergency department.      The patient will pursue further outpatient evaluation with the primary care physician or other designated or consulting physician as outlined in the discharge instructions. They are agreeable to this plan of care and follow-up instructions have been explained in detail. The patient has received these instructions in written format and has expressed an understanding of the discharge instructions. The patient is aware that any significant change in condition or worsening of symptoms should prompt an immediate return to this or the closest emergency department or call to 911.    Final diagnoses:   Acute allergic reaction, initial encounter   End stage renal disease on dialysis   Hyperkalemia        ED Disposition       ED Disposition   Discharge    Condition   Stable    Comment   --               This medical record created using voice recognition software.             Leonel Booker MD  08/12/24 2822

## 2024-08-25 ENCOUNTER — HOSPITAL ENCOUNTER (EMERGENCY)
Facility: HOSPITAL | Age: 74
Discharge: HOME | End: 2024-08-25
Attending: EMERGENCY MEDICINE | Admitting: EMERGENCY MEDICINE
Payer: MEDICARE

## 2024-08-25 ENCOUNTER — APPOINTMENT (OUTPATIENT)
Dept: GENERAL RADIOLOGY | Facility: HOSPITAL | Age: 74
End: 2024-08-25
Payer: MEDICARE

## 2024-08-25 VITALS
BODY MASS INDEX: 24.62 KG/M2 | RESPIRATION RATE: 19 BRPM | HEART RATE: 82 BPM | DIASTOLIC BLOOD PRESSURE: 80 MMHG | WEIGHT: 171.96 LBS | HEIGHT: 70 IN | SYSTOLIC BLOOD PRESSURE: 139 MMHG | TEMPERATURE: 97.9 F | OXYGEN SATURATION: 91 %

## 2024-08-25 DIAGNOSIS — R06.00 DYSPNEA, UNSPECIFIED TYPE: Primary | ICD-10-CM

## 2024-08-25 LAB
ALBUMIN SERPL-MCNC: 3.6 G/DL (ref 3.5–5.2)
ALBUMIN/GLOB SERPL: 1 G/DL
ALP SERPL-CCNC: 310 U/L (ref 39–117)
ALT SERPL W P-5'-P-CCNC: 12 U/L (ref 1–41)
ANION GAP SERPL CALCULATED.3IONS-SCNC: 18.8 MMOL/L (ref 5–15)
AST SERPL-CCNC: 26 U/L (ref 1–40)
BASOPHILS # BLD AUTO: 0.08 10*3/MM3 (ref 0–0.2)
BASOPHILS NFR BLD AUTO: 2.4 % (ref 0–1.5)
BILIRUB SERPL-MCNC: 2 MG/DL (ref 0–1.2)
BUN SERPL-MCNC: 68 MG/DL (ref 8–23)
BUN/CREAT SERPL: 8.4 (ref 7–25)
CALCIUM SPEC-SCNC: 9.4 MG/DL (ref 8.6–10.5)
CHLORIDE SERPL-SCNC: 93 MMOL/L (ref 98–107)
CO2 SERPL-SCNC: 27.2 MMOL/L (ref 22–29)
CREAT SERPL-MCNC: 8.12 MG/DL (ref 0.76–1.27)
DEPRECATED RDW RBC AUTO: 70.1 FL (ref 37–54)
EGFRCR SERPLBLD CKD-EPI 2021: 6.5 ML/MIN/1.73
EOSINOPHIL # BLD AUTO: 0.04 10*3/MM3 (ref 0–0.4)
EOSINOPHIL NFR BLD AUTO: 1.2 % (ref 0.3–6.2)
ERYTHROCYTE [DISTWIDTH] IN BLOOD BY AUTOMATED COUNT: 19.7 % (ref 12.3–15.4)
FLUAV SUBTYP SPEC NAA+PROBE: NOT DETECTED
FLUBV RNA ISLT QL NAA+PROBE: NOT DETECTED
GLOBULIN UR ELPH-MCNC: 3.6 GM/DL
GLUCOSE SERPL-MCNC: 110 MG/DL (ref 65–99)
HCT VFR BLD AUTO: 33.7 % (ref 37.5–51)
HGB BLD-MCNC: 11.1 G/DL (ref 13–17.7)
HOLD SPECIMEN: NORMAL
HOLD SPECIMEN: NORMAL
HYPOCHROMIA BLD QL: NORMAL
IMM GRANULOCYTES # BLD AUTO: 0.01 10*3/MM3 (ref 0–0.05)
IMM GRANULOCYTES NFR BLD AUTO: 0.3 % (ref 0–0.5)
LYMPHOCYTES # BLD AUTO: 0.73 10*3/MM3 (ref 0.7–3.1)
LYMPHOCYTES NFR BLD AUTO: 21.8 % (ref 19.6–45.3)
MACROCYTES BLD QL SMEAR: NORMAL
MCH RBC QN AUTO: 32.1 PG (ref 26.6–33)
MCHC RBC AUTO-ENTMCNC: 32.9 G/DL (ref 31.5–35.7)
MCV RBC AUTO: 97.4 FL (ref 79–97)
MONOCYTES # BLD AUTO: 0.77 10*3/MM3 (ref 0.1–0.9)
MONOCYTES NFR BLD AUTO: 23 % (ref 5–12)
NEUTROPHILS NFR BLD AUTO: 1.72 10*3/MM3 (ref 1.7–7)
NEUTROPHILS NFR BLD AUTO: 51.3 % (ref 42.7–76)
NRBC BLD AUTO-RTO: 0 /100 WBC (ref 0–0.2)
NT-PROBNP SERPL-MCNC: ABNORMAL PG/ML (ref 0–900)
OVALOCYTES BLD QL SMEAR: NORMAL
PLAT MORPH BLD: NORMAL
PLATELET # BLD AUTO: 123 10*3/MM3 (ref 140–450)
PMV BLD AUTO: 11.2 FL (ref 6–12)
POTASSIUM SERPL-SCNC: 4.6 MMOL/L (ref 3.5–5.2)
PROT SERPL-MCNC: 7.2 G/DL (ref 6–8.5)
RBC # BLD AUTO: 3.46 10*6/MM3 (ref 4.14–5.8)
RSV RNA NPH QL NAA+NON-PROBE: NOT DETECTED
SARS-COV-2 RNA RESP QL NAA+PROBE: NOT DETECTED
SODIUM SERPL-SCNC: 139 MMOL/L (ref 136–145)
TARGETS BLD QL SMEAR: NORMAL
TROPONIN T SERPL HS-MCNC: 189 NG/L
WBC MORPH BLD: NORMAL
WBC NRBC COR # BLD AUTO: 3.35 10*3/MM3 (ref 3.4–10.8)
WHOLE BLOOD HOLD COAG: NORMAL
WHOLE BLOOD HOLD SPECIMEN: NORMAL

## 2024-08-25 PROCEDURE — 93010 ELECTROCARDIOGRAM REPORT: CPT | Performed by: INTERNAL MEDICINE

## 2024-08-25 PROCEDURE — 87637 SARSCOV2&INF A&B&RSV AMP PRB: CPT | Performed by: EMERGENCY MEDICINE

## 2024-08-25 PROCEDURE — 85007 BL SMEAR W/DIFF WBC COUNT: CPT | Performed by: EMERGENCY MEDICINE

## 2024-08-25 PROCEDURE — 80053 COMPREHEN METABOLIC PANEL: CPT | Performed by: EMERGENCY MEDICINE

## 2024-08-25 PROCEDURE — 93005 ELECTROCARDIOGRAM TRACING: CPT

## 2024-08-25 PROCEDURE — 84484 ASSAY OF TROPONIN QUANT: CPT | Performed by: EMERGENCY MEDICINE

## 2024-08-25 PROCEDURE — 71045 X-RAY EXAM CHEST 1 VIEW: CPT

## 2024-08-25 PROCEDURE — 99284 EMERGENCY DEPT VISIT MOD MDM: CPT

## 2024-08-25 PROCEDURE — 83880 ASSAY OF NATRIURETIC PEPTIDE: CPT | Performed by: EMERGENCY MEDICINE

## 2024-08-25 PROCEDURE — 85025 COMPLETE CBC W/AUTO DIFF WBC: CPT | Performed by: EMERGENCY MEDICINE

## 2024-08-25 PROCEDURE — 93005 ELECTROCARDIOGRAM TRACING: CPT | Performed by: EMERGENCY MEDICINE

## 2024-08-25 RX ORDER — SODIUM CHLORIDE 0.9 % (FLUSH) 0.9 %
10 SYRINGE (ML) INJECTION AS NEEDED
Status: DISCONTINUED | OUTPATIENT
Start: 2024-08-25 | End: 2024-08-25 | Stop reason: HOSPADM

## 2024-08-25 NOTE — ED PROVIDER NOTES
Time: 5:40 PM EDT  Date of encounter:  8/25/2024  Independent Historian/Clinical History and Information was obtained by:   Patient    History is limited by: N/A    Chief Complaint: Dyspnea      History of Present Illness:  Patient is a 74 y.o. year old male who presents to the emergency department for evaluation of dyspnea on exertion.  Patient denies chest pain.  Patient has no nausea or vomiting.  Patient denies diarrhea.  Patient has had mild lower extremity edema.  Patient denies subjective neurological deficit including numbness and tingling.    HPI    Patient Care Team  Primary Care Provider: Shara Talley APRN    Past Medical History:     Allergies   Allergen Reactions    Peanut (Diagnostic) Shortness Of Breath and Rash    Peanut Butter Flavor Shortness Of Breath and Rash    Simvastatin Myalgia     Other reaction(s): muscle cramps     Past Medical History:   Diagnosis Date    A-V fistula     right arm    Anemia of chronic renal failure 04/12/2021    Antiplatelet or antithrombotic long-term use     plavix    Arthritis     Bilateral leg pain 11/05/2021    CAD (coronary artery disease)     h/o CABG 2003 and stents 11/2022, Dr Shirley follows    Chest pain     saw cardiology 4/10/23, pt states better if he takes his medications    Dialysis patient     Tues, Thursday, Saturday, has chest wall catheter currently    Gout     Heart attack     Hyperlipidemia     Hypertension     Kidney disease     stage 4, Dr Ornelas follows    Neck pain 08/30/2022    Neuritis of lower extremity, right 12/19/2019    Proteinuria 05/23/2022    Rheumatoid factor positive 07/06/2021    Seasonal allergies 03/13/2014    Vitamin D deficiency 05/23/2022     Past Surgical History:   Procedure Laterality Date    ARTERIOVENOUS FISTULA Right     CARDIAC CATHETERIZATION      CARPAL TUNNEL RELEASE      CATARACT EXTRACTION W/ INTRAOCULAR LENS IMPLANT      COLONOSCOPY N/A 2006    COLONOSCOPY N/A 12/14/2018    Procedure: COLONOSCOPY TO CECUM WITH  COLD BIOPSY POLYPECTOMY;  Surgeon: Elliott Harding MD;  Location:  [facility];  Service: General    CORONARY ANGIOPLASTY WITH STENT PLACEMENT  11/09/2022    CORONARY ARTERY BYPASS GRAFT N/A 06/20/2003    INGUINAL HERNIA REPAIR Right 06/04/2014    Open incarcerated inguinal hernia repair-Dr. Elliott Harding    INGUINAL HERNIA REPAIR Left 4/26/2023    Procedure: OPEN LEFT INGUINAL HERNIA REPAIR;  Surgeon: Elliott Harding MD;  Location:  [facility];  Service: General;  Laterality: Left;    LUMBAR DISC SURGERY N/A 1990, 1992    L4-L5, X2     Family History   Problem Relation Age of Onset    Heart disease Mother     Heart disease Father     Malig Hyperthermia Neg Hx        Home Medications:  Prior to Admission medications    Medication Sig Start Date End Date Taking? Authorizing Provider   amLODIPine (NORVASC) 10 MG tablet Take 1 tablet by mouth Daily. 7/12/24   Kevyn Cantrell MD   budesonide-formoterol (SYMBICORT) 80-4.5 MCG/ACT inhaler Inhale 2 puffs 2 (Two) Times a Day for 30 days. 7/12/24 8/11/24  Kevyn Cantrell MD   bumetanide (BUMEX) 1 MG tablet Take 3 tablets by mouth 2 (Two) Times a Day for 30 days. 7/12/24 8/11/24  Kevyn Cantrell MD   hydrALAZINE (APRESOLINE) 25 MG tablet Take 1 tablet by mouth 3 (Three) Times a Day for 30 days. 7/12/24 8/11/24  Kevyn Cantrell MD   hydrOXYzine (ATARAX) 25 MG tablet Take 1 tablet by mouth At Night As Needed for Anxiety. 7/12/24   Kevyn Cantrell MD   isosorbide mononitrate (IMDUR) 120 MG 24 hr tablet Take 1 tablet by mouth Daily for 30 days. 7/12/24 8/11/24  Kevyn Cantrell MD   Methoxy PEG-Epoetin Beta (MIRCERA IJ) 75 mcg Every 28 (Twenty-Eight) Days. 6/3/24 6/2/25  Provider, MD Arash   nitroglycerin (NITROSTAT) 0.4 MG SL tablet Place 1 tablet under the tongue Every 5 (Five) Minutes As Needed for Chest Pain (Do not take more than 3 at one time. Go to ER or call 911 if chest pain persists). Take no more than 3 doses in 15 minutes. 7/12/24    "Kevyn Cantrell MD   ranolazine (RANEXA) 500 MG 12 hr tablet Take 1 tablet by mouth 2 (Two) Times a Day for 30 days. Take dos 7/12/24 8/11/24  Kevyn Cantrell MD   sertraline (Zoloft) 50 MG tablet Take 1 tablet by mouth Every Night for 30 days. 7/25/24 8/24/24  Shara Talley APRN        Social History:   Social History     Tobacco Use    Smoking status: Never    Smokeless tobacco: Never   Vaping Use    Vaping status: Never Used   Substance Use Topics    Alcohol use: Yes     Comment: occassionally    Drug use: No         Review of Systems:  Review of Systems   Constitutional:  Negative for chills and fever.   HENT:  Negative for congestion, rhinorrhea and sore throat.    Eyes:  Negative for pain and visual disturbance.   Respiratory:  Positive for shortness of breath. Negative for apnea, cough and chest tightness.    Cardiovascular:  Negative for chest pain and palpitations.   Gastrointestinal:  Negative for abdominal pain, diarrhea, nausea and vomiting.   Genitourinary:  Negative for difficulty urinating and dysuria.   Musculoskeletal:  Negative for joint swelling and myalgias.   Skin:  Negative for color change.   Neurological:  Negative for seizures and headaches.   Psychiatric/Behavioral: Negative.     All other systems reviewed and are negative.       Physical Exam:  /80   Pulse 82   Temp 97.7 °F (36.5 °C) (Oral)   Resp 22   Ht 177.8 cm (70\")   Wt 78 kg (171 lb 15.3 oz)   SpO2 96%   BMI 24.67 kg/m²     Physical Exam  Vitals and nursing note reviewed.   Constitutional:       General: He is not in acute distress.     Appearance: Normal appearance. He is not toxic-appearing.   HENT:      Head: Normocephalic and atraumatic.      Jaw: There is normal jaw occlusion.   Eyes:      General: Lids are normal.      Extraocular Movements: Extraocular movements intact.      Conjunctiva/sclera: Conjunctivae normal.      Pupils: Pupils are equal, round, and reactive to light.   Cardiovascular:      Rate " and Rhythm: Normal rate and regular rhythm.      Pulses: Normal pulses.      Heart sounds: Normal heart sounds.   Pulmonary:      Effort: Pulmonary effort is normal. No respiratory distress.      Breath sounds: Normal breath sounds. No wheezing or rhonchi.   Abdominal:      General: Abdomen is flat.      Palpations: Abdomen is soft.      Tenderness: There is no abdominal tenderness. There is no guarding or rebound.   Musculoskeletal:         General: Normal range of motion.      Cervical back: Normal range of motion and neck supple.      Right lower leg: No edema.      Left lower leg: No edema.   Skin:     General: Skin is warm and dry.   Neurological:      Mental Status: He is alert and oriented to person, place, and time. Mental status is at baseline.   Psychiatric:         Mood and Affect: Mood normal.                  Procedures:  Procedures      Medical Decision Making:      Comorbidities that affect care:    End-stage renal disease on dialysis    External Notes reviewed:    Previous ED Note: Patient was last seen in the emergency department for an allergic reaction.      The following orders were placed and all results were independently analyzed by me:  Orders Placed This Encounter   Procedures    COVID PRE-OP / PRE-PROCEDURE SCREENING ORDER (NO ISOLATION) - Swab, Nasopharynx    COVID-19, FLU A/B, RSV PCR 1 HR TAT - Swab, Nasopharynx    XR Chest 1 View    [place] Draw    Comprehensive Metabolic Panel    BNP    Single High Sensitivity Troponin T    CBC Auto Differential    Scan Slide    High Sensitivity Troponin T 2Hr    NPO Diet NPO Type: Strict NPO    Undress & Gown    Continuous Pulse Oximetry    Vital Signs    General MD Inpatient Consult    Oxygen Therapy- Nasal Cannula; Titrate 1-6 LPM Per SpO2; 90 - 95%    ECG 12 Lead ED Triage Standing Order; SOA    Insert Peripheral IV    CBC & Differential    Green Top (Gel)    Lavender Top    Gold Top - SST    Light Blue Top       Medications Given in the Emergency  Department:  Medications   sodium chloride 0.9 % flush 10 mL (has no administration in time range)        ED Course:         Labs:    Lab Results (last 24 hours)       Procedure Component Value Units Date/Time    CBC & Differential [[id number]]  (Abnormal) Collected: 08/25/24 1505    Specimen: Blood from Arm, Left Updated: 08/25/24 1531    Narrative:      The following orders were created for panel order CBC & Differential.  Procedure                               Abnormality         Status                     ---------                               -----------         ------                     CBC Auto Differential[[id number]]        Abnormal            Final result               Scan Slide[[id number]]                                       Final result                 Please view results for these tests on the individual orders.    Comprehensive Metabolic Panel [[id number]]  (Abnormal) Collected: 08/25/24 1505    Specimen: Blood from Arm, Left Updated: 08/25/24 1530     Glucose 110 mg/dL      BUN 68 mg/dL      Creatinine 8.12 mg/dL      Sodium 139 mmol/L      Potassium 4.6 mmol/L      Chloride 93 mmol/L      CO2 27.2 mmol/L      Calcium 9.4 mg/dL      Total Protein 7.2 g/dL      Albumin 3.6 g/dL      ALT (SGPT) 12 U/L      AST (SGOT) 26 U/L      Alkaline Phosphatase 310 U/L      Total Bilirubin 2.0 mg/dL      Globulin 3.6 gm/dL      A/G Ratio 1.0 g/dL      BUN/Creatinine Ratio 8.4     Anion Gap 18.8 mmol/L      eGFR 6.5 mL/min/1.73      Comment: <15 Indicative of kidney failure       Narrative:      GFR Normal >60  Chronic Kidney Disease <60  Kidney Failure <15    The GFR formula is only valid for adults with stable renal function between ages 18 and 70.    BNP [[id number]]  (Abnormal) Collected: 08/25/24 1505    Specimen: Blood from Arm, Left Updated: 08/25/24 1558     proBNP >70,000.0 pg/mL     Narrative:      This assay is used as an aid in the diagnosis of individuals suspected of having heart failure. It can be  used as an aid in the diagnosis of acute decompensated heart failure (ADHF) in patients presenting with signs and symptoms of ADHF to the emergency department (ED). In addition, NT-proBNP of <300 pg/mL indicates ADHF is not likely.    Age Range Result Interpretation  NT-proBNP Concentration (pg/mL:      <50             Positive            >450                   Gray                 300-450                    Negative             <300    50-75           Positive            >900                  Gray                300-900                  Negative            <300      >75             Positive            >1800                  Gray                300-1800                  Negative            <300    Single High Sensitivity Troponin T [[id number]]  (Abnormal) Collected: 08/25/24 1505    Specimen: Blood from Arm, Left Updated: 08/25/24 1627     HS Troponin T 189 ng/L     Narrative:      High Sensitive Troponin T Reference Range:  <14.0 ng/L- Negative Female for AMI  <22.0 ng/L- Negative Male for AMI  >=14 - Abnormal Female indicating possible myocardial injury.  >=22 - Abnormal Male indicating possible myocardial injury.   Clinicians would have to utilize clinical acumen, EKG, Troponin, and serial changes to determine if it is an Acute Myocardial Infarction or myocardial injury due to an underlying chronic condition.         CBC Auto Differential [[id number]]  (Abnormal) Collected: 08/25/24 1505    Specimen: Blood from Arm, Left Updated: 08/25/24 1521     WBC 3.35 10*3/mm3      RBC 3.46 10*6/mm3      Hemoglobin 11.1 g/dL      Hematocrit 33.7 %      MCV 97.4 fL      MCH 32.1 pg      MCHC 32.9 g/dL      RDW 19.7 %      RDW-SD 70.1 fl      MPV 11.2 fL      Platelets 123 10*3/mm3      Neutrophil % 51.3 %      Lymphocyte % 21.8 %      Monocyte % 23.0 %      Eosinophil % 1.2 %      Basophil % 2.4 %      Immature Grans % 0.3 %      Neutrophils, Absolute 1.72 10*3/mm3      Lymphocytes, Absolute 0.73 10*3/mm3      Monocytes,  Absolute 0.77 10*3/mm3      Eosinophils, Absolute 0.04 10*3/mm3      Basophils, Absolute 0.08 10*3/mm3      Immature Grans, Absolute 0.01 10*3/mm3      nRBC 0.0 /100 WBC     Scan Slide [[id number]] Collected: 08/25/24 1505    Specimen: Blood from Arm, Left Updated: 08/25/24 1531     Hypochromia Slight/1+     Macrocytes Slight/1+     Ovalocytes Slight/1+     Target Cells Slight/1+     WBC Morphology Normal     Platelet Morphology Normal    COVID PRE-OP / PRE-PROCEDURE SCREENING ORDER (NO ISOLATION) - Swab, Nasopharynx [[id number]]  (Normal) Collected: 08/25/24 1530    Specimen: Swab from Nasopharynx Updated: 08/25/24 1613    Narrative:      The following orders were created for panel order COVID PRE-OP / PRE-PROCEDURE SCREENING ORDER (NO ISOLATION) - Swab, Nasopharynx.  Procedure                               Abnormality         Status                     ---------                               -----------         ------                     COVID-19, FLU A/B, RSV P...[[id number]]  Normal              Final result                 Please view results for these tests on the individual orders.    COVID-19, FLU A/B, RSV PCR 1 HR TAT - Swab, Nasopharynx [[id number]]  (Normal) Collected: 08/25/24 1530    Specimen: Swab from Nasopharynx Updated: 08/25/24 1613     COVID19 Not Detected     Influenza A PCR Not Detected     Influenza B PCR Not Detected     RSV, PCR Not Detected    Narrative:      Fact sheet for providers: https://www.fda.gov/media/533082/download    Fact sheet for patients: https://www.fda.gov/media/504534/download    Test performed by PCR.             Imaging:    XR Chest 1 View    Result Date: 8/25/2024  XR CHEST 1 VW Date of Exam: 8/25/2024 2:25 PM EDT Indication: SOA Triage Protocol Comparison: 8/12/2024, 8/2/2024, 7/10/2024 FINDINGS: There is a new small right pleural effusion. There may be underlying atelectasis versus infiltrate in the right lower lung. The cardiac silhouette and mediastinum are stable.  Postoperative changes are noted. No acute osseous abnormalities are identified.     Evidence for developing small right pleural effusion with underlying atelectasis versus infiltrate in the right lower lobe. The findings may be related to developing pulmonary edema. Developing right lower lobe pneumonia could also be considered. Electronically Signed: Hay Cotton MD  8/25/2024 2:34 PM EDT  Workstation ID: [id number]       Differential Diagnosis and Discussion:    Dyspnea: Differential diagnosis includes but is not limited to metabolic acidosis, neurological disorders, psychogenic, asthma, pneumothorax, upper airway obstruction, COPD, pneumonia, noncardiogenic pulmonary edema, interstitial lung disease, anemia, congestive heart failure, and pulmonary embolism    All labs were reviewed and interpreted by me.  All X-rays impressions were independently interpreted by me.  EKG was interpreted by me.    MDM     The patient´s CBC that was reviewed and interpreted by me shows no abnormalities of critical concern. Of note, there is no anemia requiring a blood transfusion and the platelet count is acceptable.  CMP shows an elevated BUN and creatinine.  Troponin is 189.  COVID swabs are negative.  Patient has no respiratory distress or hypoxia.          Patient Care Considerations:    PERC: I used the PERC score to risk stratify the patient for PE and a CT of the chest was considered but ultimately not indicated in today's visit.      Consultants/Shared Management Plan:    Case was discussed with Dr. Rivera who states that as the patient is not hypoxic or in respiratory distress, he can follow-up with dialysis tomorrow for dialysis.    Social Determinants of Health:    Patient is independent, reliable, and has access to care.       Disposition and Care Coordination:    Discharged: I considered escalation of care by admitting this patient to the hospital, however patient has no respiratory distress or hypoxia.    I have  explained the patient´s condition, diagnoses and treatment plan based on the information available to me at this time. I have answered questions and addressed any concerns. The patient has a good  understanding of the patient´s diagnosis, condition, and treatment plan as can be expected at this point. The vital signs have been stable. The patient´s condition is stable and appropriate for discharge from the emergency department.      The patient will pursue further outpatient evaluation with the primary care physician or other designated or consulting physician as outlined in the discharge instructions. They are agreeable to this plan of care and follow-up instructions have been explained in detail. The patient has received these instructions in written format and has expressed an understanding of the discharge instructions. The patient is aware that any significant change in condition or worsening of symptoms should prompt an immediate return to this or the closest emergency department or call to 911.  I have explained discharge medications and the need for follow up with the patient/caretakers. This was also printed in the discharge instructions. Patient was discharged with the following medications and follow up:      Medication List      No changes were made to your prescriptions during this visit.      Shara Talley, APRN  [address] [place] [address]  [phone]          Follow-up with dialysis tomorrow.             Final diagnoses:   Dyspnea, unspecified type        ED Disposition       ED Disposition   Discharge    Condition   Stable    Comment   --               This medical record created using voice recognition software.             Khoa Rider MD  08/25/24 [id number]

## 2024-08-30 LAB
QT INTERVAL: 432 MS
QTC INTERVAL: 487 MS

## 2024-09-03 ENCOUNTER — TELEPHONE (OUTPATIENT)
Dept: FAMILY MEDICINE CLINIC | Age: 74
End: 2024-09-03
Payer: MEDICARE

## 2024-09-03 ENCOUNTER — HOSPITAL ENCOUNTER (EMERGENCY)
Facility: HOSPITAL | Age: 74
Discharge: OTHER INSTITUTION | DRG: 070 | End: 2024-09-03
Attending: EMERGENCY MEDICINE
Payer: MEDICARE

## 2024-09-03 ENCOUNTER — HOSPITAL ENCOUNTER (INPATIENT)
Facility: HOSPITAL | Age: 74
LOS: 14 days | Discharge: HOME | DRG: 070 | End: 2024-09-17
Attending: STUDENT IN AN ORGANIZED HEALTH CARE EDUCATION/TRAINING PROGRAM | Admitting: STUDENT IN AN ORGANIZED HEALTH CARE EDUCATION/TRAINING PROGRAM
Payer: MEDICARE

## 2024-09-03 ENCOUNTER — APPOINTMENT (OUTPATIENT)
Dept: GENERAL RADIOLOGY | Facility: HOSPITAL | Age: 74
DRG: 070 | End: 2024-09-03
Payer: MEDICARE

## 2024-09-03 VITALS
WEIGHT: 172.84 LBS | TEMPERATURE: 99.1 F | RESPIRATION RATE: 15 BRPM | DIASTOLIC BLOOD PRESSURE: 61 MMHG | BODY MASS INDEX: 24.74 KG/M2 | HEIGHT: 70 IN | SYSTOLIC BLOOD PRESSURE: 111 MMHG | OXYGEN SATURATION: 100 % | HEART RATE: 90 BPM

## 2024-09-03 DIAGNOSIS — K92.2 GASTROINTESTINAL HEMORRHAGE, UNSPECIFIED GASTROINTESTINAL HEMORRHAGE TYPE: Primary | ICD-10-CM

## 2024-09-03 DIAGNOSIS — E87.5 HYPERKALEMIA: Primary | ICD-10-CM

## 2024-09-03 DIAGNOSIS — D64.9 ACUTE ANEMIA: ICD-10-CM

## 2024-09-03 LAB
ABO GROUP BLD: NORMAL
ABO GROUP BLD: NORMAL
ALBUMIN SERPL-MCNC: 3.4 G/DL (ref 3.5–5.2)
ALBUMIN/GLOB SERPL: 1 G/DL
ALP SERPL-CCNC: 224 U/L (ref 39–117)
ALT SERPL W P-5'-P-CCNC: 12 U/L (ref 1–41)
AMORPH URATE CRY URNS QL MICRO: ABNORMAL /HPF
ANION GAP SERPL CALCULATED.3IONS-SCNC: 33.2 MMOL/L (ref 5–15)
AST SERPL-CCNC: 40 U/L (ref 1–40)
BACTERIA UR QL AUTO: ABNORMAL /HPF
BASOPHILS # BLD AUTO: 0.02 10*3/MM3 (ref 0–0.2)
BASOPHILS NFR BLD AUTO: 0.6 % (ref 0–1.5)
BILIRUB SERPL-MCNC: 2 MG/DL (ref 0–1.2)
BILIRUB UR QL STRIP: NEGATIVE
BLD GP AB SCN SERPL QL: NEGATIVE
BUN SERPL-MCNC: 129 MG/DL (ref 8–23)
BUN/CREAT SERPL: 13.3 (ref 7–25)
CALCIUM SPEC-SCNC: 9.4 MG/DL (ref 8.6–10.5)
CHLORIDE SERPL-SCNC: 93 MMOL/L (ref 98–107)
CLARITY UR: ABNORMAL
CO2 SERPL-SCNC: 15.8 MMOL/L (ref 22–29)
COLOR UR: ABNORMAL
CREAT SERPL-MCNC: 9.68 MG/DL (ref 0.76–1.27)
DEPRECATED RDW RBC AUTO: 65.7 FL (ref 37–54)
EGFRCR SERPLBLD CKD-EPI 2021: 5.2 ML/MIN/1.73
EOSINOPHIL # BLD AUTO: 0 10*3/MM3 (ref 0–0.4)
EOSINOPHIL NFR BLD AUTO: 0 % (ref 0.3–6.2)
ERYTHROCYTE [DISTWIDTH] IN BLOOD BY AUTOMATED COUNT: 18.9 % (ref 12.3–15.4)
GEN 5 2HR TROPONIN T REFLEX: 412 NG/L
GLOBULIN UR ELPH-MCNC: 3.3 GM/DL
GLUCOSE BLDC GLUCOMTR-MCNC: 135 MG/DL (ref 70–99)
GLUCOSE BLDC GLUCOMTR-MCNC: 146 MG/DL (ref 70–99)
GLUCOSE BLDC GLUCOMTR-MCNC: 190 MG/DL (ref 70–99)
GLUCOSE BLDC GLUCOMTR-MCNC: 51 MG/DL (ref 70–99)
GLUCOSE SERPL-MCNC: 63 MG/DL (ref 65–99)
GLUCOSE UR STRIP-MCNC: NEGATIVE MG/DL
HCT VFR BLD AUTO: 23.8 % (ref 37.5–51)
HEMOCCULT STL QL IA: POSITIVE
HGB BLD-MCNC: 7.5 G/DL (ref 13–17.7)
HGB UR QL STRIP.AUTO: ABNORMAL
HOLD SPECIMEN: NORMAL
HOLD SPECIMEN: NORMAL
HYALINE CASTS UR QL AUTO: ABNORMAL /LPF
IMM GRANULOCYTES # BLD AUTO: 0.01 10*3/MM3 (ref 0–0.05)
IMM GRANULOCYTES NFR BLD AUTO: 0.3 % (ref 0–0.5)
KETONES UR QL STRIP: ABNORMAL
LEUKOCYTE ESTERASE UR QL STRIP.AUTO: ABNORMAL
LYMPHOCYTES # BLD AUTO: 0.36 10*3/MM3 (ref 0.7–3.1)
LYMPHOCYTES NFR BLD AUTO: 9.9 % (ref 19.6–45.3)
MAGNESIUM SERPL-MCNC: 2.6 MG/DL (ref 1.6–2.4)
MCH RBC QN AUTO: 30.6 PG (ref 26.6–33)
MCHC RBC AUTO-ENTMCNC: 31.5 G/DL (ref 31.5–35.7)
MCV RBC AUTO: 97.1 FL (ref 79–97)
MONOCYTES # BLD AUTO: 0.17 10*3/MM3 (ref 0.1–0.9)
MONOCYTES NFR BLD AUTO: 4.7 % (ref 5–12)
NEUTROPHILS NFR BLD AUTO: 3.07 10*3/MM3 (ref 1.7–7)
NEUTROPHILS NFR BLD AUTO: 84.5 % (ref 42.7–76)
NITRITE UR QL STRIP: NEGATIVE
NRBC BLD AUTO-RTO: 0 /100 WBC (ref 0–0.2)
PH UR STRIP.AUTO: 5.5 [PH] (ref 5–8)
PLATELET # BLD AUTO: 109 10*3/MM3 (ref 140–450)
PMV BLD AUTO: 12.7 FL (ref 6–12)
POTASSIUM SERPL-SCNC: 6.5 MMOL/L (ref 3.5–5.2)
PROT SERPL-MCNC: 6.7 G/DL (ref 6–8.5)
PROT UR QL STRIP: ABNORMAL
RBC # BLD AUTO: 2.45 10*6/MM3 (ref 4.14–5.8)
RBC # UR STRIP: ABNORMAL /HPF
REF LAB TEST METHOD: ABNORMAL
RH BLD: POSITIVE
RH BLD: POSITIVE
SODIUM SERPL-SCNC: 142 MMOL/L (ref 136–145)
SP GR UR STRIP: 1.02 (ref 1–1.03)
SQUAMOUS #/AREA URNS HPF: ABNORMAL /HPF
T&S EXPIRATION DATE: NORMAL
TROPONIN T DELTA: 8 NG/L
TROPONIN T SERPL HS-MCNC: 404 NG/L
UROBILINOGEN UR QL STRIP: ABNORMAL
WBC # UR STRIP: ABNORMAL /HPF
WBC NRBC COR # BLD AUTO: 3.63 10*3/MM3 (ref 3.4–10.8)
WHOLE BLOOD HOLD COAG: NORMAL
WHOLE BLOOD HOLD SPECIMEN: NORMAL

## 2024-09-03 PROCEDURE — 63710000001 INSULIN REGULAR HUMAN PER 5 UNITS: Performed by: EMERGENCY MEDICINE

## 2024-09-03 PROCEDURE — 86901 BLOOD TYPING SEROLOGIC RH(D): CPT

## 2024-09-03 PROCEDURE — P9612 CATHETERIZE FOR URINE SPEC: HCPCS

## 2024-09-03 PROCEDURE — 85027 COMPLETE CBC AUTOMATED: CPT | Performed by: NURSE PRACTITIONER

## 2024-09-03 PROCEDURE — 94640 AIRWAY INHALATION TREATMENT: CPT

## 2024-09-03 PROCEDURE — 86850 RBC ANTIBODY SCREEN: CPT | Performed by: EMERGENCY MEDICINE

## 2024-09-03 PROCEDURE — 83735 ASSAY OF MAGNESIUM: CPT

## 2024-09-03 PROCEDURE — 82948 REAGENT STRIP/BLOOD GLUCOSE: CPT

## 2024-09-03 PROCEDURE — 99285 EMERGENCY DEPT VISIT HI MDM: CPT

## 2024-09-03 PROCEDURE — 94799 UNLISTED PULMONARY SVC/PX: CPT

## 2024-09-03 PROCEDURE — 96375 TX/PRO/DX INJ NEW DRUG ADDON: CPT

## 2024-09-03 PROCEDURE — 82948 REAGENT STRIP/BLOOD GLUCOSE: CPT | Performed by: EMERGENCY MEDICINE

## 2024-09-03 PROCEDURE — 99291 CRITICAL CARE FIRST HOUR: CPT

## 2024-09-03 PROCEDURE — 86900 BLOOD TYPING SEROLOGIC ABO: CPT

## 2024-09-03 PROCEDURE — 83605 ASSAY OF LACTIC ACID: CPT | Performed by: NURSE PRACTITIONER

## 2024-09-03 PROCEDURE — 36415 COLL VENOUS BLD VENIPUNCTURE: CPT | Performed by: EMERGENCY MEDICINE

## 2024-09-03 PROCEDURE — 81001 URINALYSIS AUTO W/SCOPE: CPT | Performed by: EMERGENCY MEDICINE

## 2024-09-03 PROCEDURE — 86901 BLOOD TYPING SEROLOGIC RH(D): CPT | Performed by: EMERGENCY MEDICINE

## 2024-09-03 PROCEDURE — 80053 COMPREHEN METABOLIC PANEL: CPT | Performed by: NURSE PRACTITIONER

## 2024-09-03 PROCEDURE — 86900 BLOOD TYPING SEROLOGIC ABO: CPT | Performed by: EMERGENCY MEDICINE

## 2024-09-03 PROCEDURE — 71045 X-RAY EXAM CHEST 1 VIEW: CPT

## 2024-09-03 PROCEDURE — 82274 ASSAY TEST FOR BLOOD FECAL: CPT | Performed by: EMERGENCY MEDICINE

## 2024-09-03 PROCEDURE — 84484 ASSAY OF TROPONIN QUANT: CPT | Performed by: NURSE PRACTITIONER

## 2024-09-03 PROCEDURE — 85025 COMPLETE CBC W/AUTO DIFF WBC: CPT

## 2024-09-03 PROCEDURE — 96374 THER/PROPH/DIAG INJ IV PUSH: CPT

## 2024-09-03 PROCEDURE — 93005 ELECTROCARDIOGRAM TRACING: CPT

## 2024-09-03 PROCEDURE — 84484 ASSAY OF TROPONIN QUANT: CPT | Performed by: EMERGENCY MEDICINE

## 2024-09-03 PROCEDURE — 93010 ELECTROCARDIOGRAM REPORT: CPT | Performed by: SPECIALIST

## 2024-09-03 PROCEDURE — 80053 COMPREHEN METABOLIC PANEL: CPT

## 2024-09-03 PROCEDURE — 84100 ASSAY OF PHOSPHORUS: CPT | Performed by: HOSPITALIST

## 2024-09-03 PROCEDURE — 25010000002 CALCIUM GLUCONATE-NACL 1-0.675 GM/50ML-% SOLUTION: Performed by: EMERGENCY MEDICINE

## 2024-09-03 PROCEDURE — 84484 ASSAY OF TROPONIN QUANT: CPT

## 2024-09-03 PROCEDURE — 93005 ELECTROCARDIOGRAM TRACING: CPT | Performed by: EMERGENCY MEDICINE

## 2024-09-03 PROCEDURE — 84145 PROCALCITONIN (PCT): CPT | Performed by: NURSE PRACTITIONER

## 2024-09-03 RX ORDER — ZINC SULFATE 50(220)MG
220 CAPSULE ORAL DAILY
Status: ON HOLD | COMMUNITY
End: 2024-09-04

## 2024-09-03 RX ORDER — SODIUM CHLORIDE 0.9 % (FLUSH) 0.9 %
10 SYRINGE (ML) INJECTION AS NEEDED
Status: DISCONTINUED | OUTPATIENT
Start: 2024-09-03 | End: 2024-09-03 | Stop reason: HOSPADM

## 2024-09-03 RX ORDER — BISACODYL 5 MG/1
5 TABLET, DELAYED RELEASE ORAL DAILY PRN
Status: DISCONTINUED | OUTPATIENT
Start: 2024-09-03 | End: 2024-09-17 | Stop reason: HOSPADM

## 2024-09-03 RX ORDER — NITROGLYCERIN 0.4 MG/1
0.4 TABLET SUBLINGUAL
Status: DISCONTINUED | OUTPATIENT
Start: 2024-09-03 | End: 2024-09-17 | Stop reason: HOSPADM

## 2024-09-03 RX ORDER — AMOXICILLIN 250 MG
2 CAPSULE ORAL 2 TIMES DAILY PRN
Status: DISCONTINUED | OUTPATIENT
Start: 2024-09-03 | End: 2024-09-17 | Stop reason: HOSPADM

## 2024-09-03 RX ORDER — BISACODYL 10 MG
10 SUPPOSITORY, RECTAL RECTAL DAILY PRN
Status: DISCONTINUED | OUTPATIENT
Start: 2024-09-03 | End: 2024-09-17 | Stop reason: HOSPADM

## 2024-09-03 RX ORDER — ASPIRIN 81 MG/1
81 TABLET, CHEWABLE ORAL DAILY
COMMUNITY

## 2024-09-03 RX ORDER — ALBUTEROL SULFATE 5 MG/ML
10 SOLUTION RESPIRATORY (INHALATION) ONCE
Status: COMPLETED | OUTPATIENT
Start: 2024-09-03 | End: 2024-09-03

## 2024-09-03 RX ORDER — SODIUM CHLORIDE 0.9 % (FLUSH) 0.9 %
10 SYRINGE (ML) INJECTION AS NEEDED
Status: DISCONTINUED | OUTPATIENT
Start: 2024-09-03 | End: 2024-09-17 | Stop reason: HOSPADM

## 2024-09-03 RX ORDER — CLOPIDOGREL BISULFATE 75 MG/1
75 TABLET ORAL DAILY
COMMUNITY
End: 2024-09-17 | Stop reason: HOSPADM

## 2024-09-03 RX ORDER — FERROUS SULFATE 325(65) MG
325 TABLET ORAL
Status: ON HOLD | COMMUNITY
End: 2024-09-04

## 2024-09-03 RX ORDER — SODIUM CHLORIDE 0.9 % (FLUSH) 0.9 %
10 SYRINGE (ML) INJECTION EVERY 12 HOURS SCHEDULED
Status: DISCONTINUED | OUTPATIENT
Start: 2024-09-04 | End: 2024-09-17 | Stop reason: HOSPADM

## 2024-09-03 RX ORDER — TAMSULOSIN HYDROCHLORIDE 0.4 MG/1
1 CAPSULE ORAL DAILY
COMMUNITY

## 2024-09-03 RX ORDER — ALLOPURINOL 300 MG/1
300 TABLET ORAL DAILY
Status: ON HOLD | COMMUNITY
End: 2024-09-04

## 2024-09-03 RX ORDER — DEXTROSE MONOHYDRATE 25 G/50ML
50 INJECTION, SOLUTION INTRAVENOUS ONCE
Status: COMPLETED | OUTPATIENT
Start: 2024-09-03 | End: 2024-09-03

## 2024-09-03 RX ORDER — CALCIUM GLUCONATE 20 MG/ML
1000 INJECTION, SOLUTION INTRAVENOUS ONCE
Status: COMPLETED | OUTPATIENT
Start: 2024-09-03 | End: 2024-09-03

## 2024-09-03 RX ORDER — SEVELAMER HYDROCHLORIDE 800 MG/1
800 TABLET, FILM COATED ORAL
Status: ON HOLD | COMMUNITY
End: 2024-09-04

## 2024-09-03 RX ORDER — CALCIUM CARBONATE 500 MG/1
2 TABLET, CHEWABLE ORAL 2 TIMES DAILY PRN
Status: DISCONTINUED | OUTPATIENT
Start: 2024-09-03 | End: 2024-09-17 | Stop reason: HOSPADM

## 2024-09-03 RX ORDER — SODIUM CHLORIDE 9 MG/ML
40 INJECTION, SOLUTION INTRAVENOUS AS NEEDED
Status: DISCONTINUED | OUTPATIENT
Start: 2024-09-03 | End: 2024-09-17 | Stop reason: HOSPADM

## 2024-09-03 RX ORDER — POLYETHYLENE GLYCOL 3350 17 G/17G
17 POWDER, FOR SOLUTION ORAL DAILY PRN
Status: DISCONTINUED | OUTPATIENT
Start: 2024-09-03 | End: 2024-09-17 | Stop reason: HOSPADM

## 2024-09-03 RX ADMIN — ALBUTEROL SULFATE 10 MG: 2.5 SOLUTION RESPIRATORY (INHALATION) at 18:26

## 2024-09-03 RX ADMIN — SODIUM BICARBONATE 50 MEQ: 84 INJECTION INTRAVENOUS at 18:45

## 2024-09-03 RX ADMIN — DEXTROSE MONOHYDRATE 50 G: 25 INJECTION, SOLUTION INTRAVENOUS at 18:47

## 2024-09-03 RX ADMIN — SODIUM ZIRCONIUM CYCLOSILICATE 10 G: 10 POWDER, FOR SUSPENSION ORAL at 19:01

## 2024-09-03 RX ADMIN — CALCIUM GLUCONATE 1000 MG: 20 INJECTION, SOLUTION INTRAVENOUS at 18:31

## 2024-09-03 RX ADMIN — INSULIN HUMAN 5 UNITS: 100 INJECTION, SOLUTION PARENTERAL at 19:16

## 2024-09-03 NOTE — TELEPHONE ENCOUNTER
CALLER NOT ON  [facility]    Caller: Ilana Jarrett    Relationship to patient: Emergency Contact    Best call back number: [phone]     Patient is needing: CALLER WAS LETTING OFFICE KNOW THAT PATIENT WOULD NOT BE AT HIS 1530 APPOINTMENT TODAY DUE TO FALLING AND THE AMBULANCE HAD TO COME GET HIM AND TAKE HIM TO THE HOSPITAL.    PATIENT IS NOT ALERT AT THIS TIME AND MISSED DIALYSIS ON MONDAY. PATIENT IS NOT EATING.    PATIENT WAS SENT TO [facility]. PATIENT IS IN ROUTE RIGHT NOW.    [facility] UNABLE TO CANCEL APPOINTMENT DUE TO CALLER NOT BEING ABLE TO CONFIRM PROVIDER PATIENT WAS GOING TO SEE TODAY. SHE DID KNOW THE DATE AND TIME.    CALLER REQUESTED THAT PRIMARY CONTACT HOSPITAL TO MAKE SURE THAT THEY DO DIALYSIS ON HIM AND MAKE SURE HE EATS AND DRINKS.     [facility] ADVISED CALLER TO CONTACT OFFICE TO SCHEDULE HOSPITAL FOLLOW UP WHEN HE GETS OUT.    PLEASE CONTACT CALLER FOR ANY FURTHER QUESTIONS OR CONCERNS.        SAMPSON ESCOBAR, CALL PREFERRED MAY LEAVE VOICEMAIL.

## 2024-09-03 NOTE — ED PROVIDER NOTES
Time: 6:23 PM EDT  Date of encounter:  9/3/2024  Independent Historian/Clinical History and Information was obtained by:   Patient    History is limited by: N/A    Chief Complaint: Confusion, missed dialysis      History of Present Illness:  Patient is a 74 y.o. year old male who presents to the emergency department for evaluation of confusion and missed dialysis.  Patient unable to provide any further details.       Patient Care Team  Primary Care Provider: Shara Talley APRN    Past Medical History:     Allergies   Allergen Reactions    Peanut (Diagnostic) Shortness Of Breath and Rash    Peanut Butter Flavor Shortness Of Breath and Rash    Simvastatin Myalgia     Other reaction(s): muscle cramps     Past Medical History:   Diagnosis Date    A-V fistula     right arm    Anemia of chronic renal failure 04/12/2021    Antiplatelet or antithrombotic long-term use     plavix    Arthritis     Bilateral leg pain 11/05/2021    CAD (coronary artery disease)     h/o CABG 2003 and stents 11/2022, Dr Shirley follows    Chest pain     saw cardiology 4/10/23, pt states better if he takes his medications    Dialysis patient     Tues, Thursday, Saturday, has chest wall catheter currently    Gout     Heart attack     Hyperlipidemia     Hypertension     Kidney disease     stage 4, Dr Ornelas follows    Neck pain 08/30/2022    Neuritis of lower extremity, right 12/19/2019    Proteinuria 05/23/2022    Rheumatoid factor positive 07/06/2021    Seasonal allergies 03/13/2014    Vitamin D deficiency 05/23/2022     Past Surgical History:   Procedure Laterality Date    ARTERIOVENOUS FISTULA Right     CARDIAC CATHETERIZATION      CARPAL TUNNEL RELEASE      CATARACT EXTRACTION W/ INTRAOCULAR LENS IMPLANT      COLONOSCOPY N/A 2006    COLONOSCOPY N/A 12/14/2018    Procedure: COLONOSCOPY TO CECUM WITH COLD BIOPSY POLYPECTOMY;  Surgeon: Elliott Harding MD;  Location: [facility] ENDOSCOPY;  Service: General    CORONARY ANGIOPLASTY WITH STENT  PLACEMENT  11/09/2022    CORONARY ARTERY BYPASS GRAFT N/A 06/20/2003    INGUINAL HERNIA REPAIR Right 06/04/2014    Open incarcerated inguinal hernia repair-Dr. Elliott Harding    INGUINAL HERNIA REPAIR Left 4/26/2023    Procedure: OPEN LEFT INGUINAL HERNIA REPAIR;  Surgeon: Elliott Harding MD;  Location: [facility] OR;  Service: General;  Laterality: Left;    LUMBAR DISC SURGERY N/A 1990, 1992    L4-L5, X2     Family History   Problem Relation Age of Onset    Heart disease Mother     Heart disease Father     Malig Hyperthermia Neg Hx        Home Medications:  Prior to Admission medications    Medication Sig Start Date End Date Taking? Authorizing Provider   amLODIPine (NORVASC) 10 MG tablet Take 1 tablet by mouth Daily. 7/12/24   Kevyn Cantrell MD   budesonide-formoterol (SYMBICORT) 80-4.5 MCG/ACT inhaler Inhale 2 puffs 2 (Two) Times a Day for 30 days. 7/12/24 8/11/24  Kevyn Cantrell MD   bumetanide (BUMEX) 1 MG tablet Take 3 tablets by mouth 2 (Two) Times a Day for 30 days. 7/12/24 8/11/24  Kevyn Cantrell MD   hydrALAZINE (APRESOLINE) 25 MG tablet Take 1 tablet by mouth 3 (Three) Times a Day for 30 days. 7/12/24 8/11/24  Kevyn Cantrell MD   hydrOXYzine (ATARAX) 25 MG tablet Take 1 tablet by mouth At Night As Needed for Anxiety. 7/12/24   Kevyn Cantrell MD   isosorbide mononitrate (IMDUR) 120 MG 24 hr tablet Take 1 tablet by mouth Daily for 30 days. 7/12/24 8/11/24  Kevyn Cantrell MD   Methoxy PEG-Epoetin Beta (MIRCERA IJ) 75 mcg Every 28 (Twenty-Eight) Days. 6/3/24 6/2/25  Provider, MD Arash   nitroglycerin (NITROSTAT) 0.4 MG SL tablet Place 1 tablet under the tongue Every 5 (Five) Minutes As Needed for Chest Pain (Do not take more than 3 at one time. Go to ER or call 911 if chest pain persists). Take no more than 3 doses in 15 minutes. 7/12/24   Kevyn Cantrell MD   ranolazine (RANEXA) 500 MG 12 hr tablet Take 1 tablet by mouth 2 (Two) Times a Day for 30 days. Take dos 7/12/24  "8/11/24  Kevyn Cantrell MD   sertraline (Zoloft) 50 MG tablet Take 1 tablet by mouth Every Night for 30 days. 7/25/24 8/24/24  Shara Talley, POLI        Social History:   Social History     Tobacco Use    Smoking status: Never    Smokeless tobacco: Never   Vaping Use    Vaping status: Never Used   Substance Use Topics    Alcohol use: Yes     Comment: occassionally    Drug use: No         Review of Systems:  Review of Systems   Unable to perform ROS: Mental status change        Physical Exam:  /61   Pulse 90   Temp 99.1 °F (37.3 °C) (Oral)   Resp 15   Ht 177.8 cm (70\")   Wt 78.4 kg (172 lb 13.5 oz)   SpO2 100%   BMI 24.80 kg/m²     Physical Exam  Vitals and nursing note reviewed.   Constitutional:       General: He is awake.      Appearance: Normal appearance. He is well-developed.   HENT:      Head: Normocephalic and atraumatic.      Nose: Nose normal.      Mouth/Throat:      Mouth: Mucous membranes are moist.   Eyes:      Extraocular Movements: Extraocular movements intact.      Pupils: Pupils are equal, round, and reactive to light.   Cardiovascular:      Rate and Rhythm: Normal rate and regular rhythm.      Heart sounds: Normal heart sounds.   Pulmonary:      Effort: Pulmonary effort is normal. No respiratory distress.      Breath sounds: Normal breath sounds. No wheezing, rhonchi or rales.   Abdominal:      General: Bowel sounds are normal.      Palpations: Abdomen is soft.      Tenderness: There is no abdominal tenderness. There is no guarding or rebound.      Comments: No rigidity   Musculoskeletal:         General: No tenderness. Normal range of motion.      Cervical back: Normal range of motion and neck supple.   Skin:     General: Skin is warm and dry.      Coloration: Skin is not jaundiced.   Neurological:      General: No focal deficit present.      Mental Status: He is alert. He is confused.      Cranial Nerves: No cranial nerve deficit or facial asymmetry.      Motor: No weakness. "   Psychiatric:         Mood and Affect: Mood normal.                  Procedures:  Procedures      Medical Decision Making:      Comorbidities that affect care:    Hypertension, CAD, gout, hyperlipidemia, end-stage renal disease    External Notes reviewed:    Previous Clinic Note: Vascular surgery office visit 8/22/2024.  Description: End-stage renal disease.      The following orders were placed and all results were independently analyzed by me:  Orders Placed This Encounter   Procedures    XR Chest 1 View    [place] Draw    Comprehensive Metabolic Panel    Single High Sensitivity Troponin T    Magnesium    Urinalysis With Microscopic If Indicated (No Culture) - Urine, Clean Catch    CBC Auto Differential    High Sensitivity Troponin T 2Hr    Occult Blood, Fecal By Immunoassay - Stool, Per Rectum    Urinalysis, Microscopic Only - Urine, Clean Catch    Undress & Gown    Continuous Pulse Oximetry    Vital Signs    POC Glucose Once    POC Glucose Once    POC Glucose Once    POC Glucose Once    ECG 12 Lead ED Triage Standing Order; Weak / Dizzy / AMS    Type & Screen    ABO RH Specimen Verification    CBC & Differential    Green Top (Gel)    Lavender Top    Gold Top - SST    Light Blue Top       Medications Given in the Emergency Department:  Medications   albuterol (PROVENTIL) nebulizer solution 0.5% 2.5 mg/0.5mL (10 mg Nebulization Given 9/3/24 1826)   insulin regular (humuLIN R,novoLIN R) injection 5 Units (5 Units Intravenous Given 9/3/24 1916)   dextrose (D50W) (25 g/50 mL) IV injection 50 g (50 g Intravenous Given 9/3/24 1847)   calcium gluconate 1000 Mg/50ml 0.675% NaCl IV SOLN (0 mg Intravenous Stopped 9/3/24 1839)   sodium zirconium cyclosilicate (LOKELMA) packet 10 g (10 g Oral Given 9/3/24 1901)   sodium bicarbonate injection 8.4% 50 mEq (50 mEq Intravenous Given 9/3/24 1845)        ED Course:    ED Course as of 09/04/24 0159   Tue Sep 03, 2024   1809 I have personally interpreted the EKG today and it shows  no evidence of any acute ischemia or heart arrhythmia.  First-degree AV block, prolonged QT interval. [RP]   1837 Case discussed with Dr. Sinclair, nephrology on-call. [RP]   1840 [place] just had a conversation with dialysis nurse.  They are not able to perform emergency dialysis at our facility tonight and recommend transfer. [RP]   1905 Hemoglobin(!): 7.5 [RP]   2022 ABO Type: A [RP]      ED Course User Index  [RP] Andre Hart MD       Labs:    Lab Results (last 24 hours)       Procedure Component Value Units Date/Time    CBC & Differential [[id number]]  (Abnormal) Collected: 09/03/24 1704    Specimen: Blood Updated: 09/03/24 1712    Narrative:      The following orders were created for panel order CBC & Differential.  Procedure                               Abnormality         Status                     ---------                               -----------         ------                     CBC Auto Differential[[id number]]        Abnormal            Final result                 Please view results for these tests on the individual orders.    Comprehensive Metabolic Panel [[id number]]  (Abnormal) Collected: 09/03/24 1704    Specimen: Blood Updated: 09/03/24 1757     Glucose 63 mg/dL       mg/dL      Creatinine 9.68 mg/dL      Sodium 142 mmol/L      Potassium 6.5 mmol/L      Chloride 93 mmol/L      CO2 15.8 mmol/L      Calcium 9.4 mg/dL      Total Protein 6.7 g/dL      Albumin 3.4 g/dL      ALT (SGPT) 12 U/L      AST (SGOT) 40 U/L      Alkaline Phosphatase 224 U/L      Total Bilirubin 2.0 mg/dL      Globulin 3.3 gm/dL      A/G Ratio 1.0 g/dL      BUN/Creatinine Ratio 13.3     Anion Gap 33.2 mmol/L      eGFR 5.2 mL/min/1.73      Comment: <15 Indicative of kidney failure       Narrative:      GFR Normal >60  Chronic Kidney Disease <60  Kidney Failure <15    The GFR formula is only valid for adults with stable renal function between ages 18 and 70.    Single High Sensitivity Troponin T [[id number]]   (Abnormal) Collected: 09/03/24 1704    Specimen: Blood Updated: 09/03/24 1757     HS Troponin T 404 ng/L     Narrative:      High Sensitive Troponin T Reference Range:  <14.0 ng/L- Negative Female for AMI  <22.0 ng/L- Negative Male for AMI  >=14 - Abnormal Female indicating possible myocardial injury.  >=22 - Abnormal Male indicating possible myocardial injury.   Clinicians would have to utilize clinical acumen, EKG, Troponin, and serial changes to determine if it is an Acute Myocardial Infarction or myocardial injury due to an underlying chronic condition.         Magnesium [[id number]]  (Abnormal) Collected: 09/03/24 1704    Specimen: Blood Updated: 09/03/24 1748     Magnesium 2.6 mg/dL     CBC Auto Differential [[id number]]  (Abnormal) Collected: 09/03/24 1704    Specimen: Blood Updated: 09/03/24 1712     WBC 3.63 10*3/mm3      RBC 2.45 10*6/mm3      Hemoglobin 7.5 g/dL      Hematocrit 23.8 %      MCV 97.1 fL      MCH 30.6 pg      MCHC 31.5 g/dL      RDW 18.9 %      RDW-SD 65.7 fl      MPV 12.7 fL      Platelets 109 10*3/mm3      Neutrophil % 84.5 %      Lymphocyte % 9.9 %      Monocyte % 4.7 %      Eosinophil % 0.0 %      Basophil % 0.6 %      Immature Grans % 0.3 %      Neutrophils, Absolute 3.07 10*3/mm3      Lymphocytes, Absolute 0.36 10*3/mm3      Monocytes, Absolute 0.17 10*3/mm3      Eosinophils, Absolute 0.00 10*3/mm3      Basophils, Absolute 0.02 10*3/mm3      Immature Grans, Absolute 0.01 10*3/mm3      nRBC 0.0 /100 WBC     POC Glucose Once [[id number]]  (Abnormal) Collected: 09/03/24 1829    Specimen: Blood Updated: 09/03/24 1832     Glucose 51 mg/dL      Comment: Serial Number: [id number]:  [id number]       Urinalysis With Microscopic If Indicated (No Culture) - Straight Cath [[id number]]  (Abnormal) Collected: 09/03/24 1913    Specimen: Urine from Straight Cath Updated: 09/03/24 1925     Color, UA Dark Yellow     Appearance, UA Cloudy     pH, UA 5.5     Specific Gravity, UA 1.018     Glucose, UA  Negative     Ketones, UA Trace     Bilirubin, UA Negative     Blood, UA Small (1+)     Protein, UA >=300 mg/dL (3+)     Leuk Esterase, UA Small (1+)     Nitrite, UA Negative     Urobilinogen, UA 1.0 E.U./dL    Urinalysis, Microscopic Only - Straight Cath [[id number]]  (Abnormal) Collected: 09/03/24 1913    Specimen: Urine from Straight Cath Updated: 09/03/24 1934     RBC, UA 0-2 /HPF      WBC, UA 0-2 /HPF      Bacteria, UA Trace /HPF      Squamous Epithelial Cells, UA None Seen /HPF      Hyaline Casts, UA None Seen /LPF      Amorphous Crystals, UA Small/1+ /HPF      Methodology Manual Light Microscopy    Occult Blood, Fecal By Immunoassay - Stool, Per Rectum [[id number]]  (Abnormal) Collected: 09/03/24 1914    Specimen: Stool from Per Rectum Updated: 09/03/24 1925     Occult Blood, Fecal by Immunoassay Positive    POC Glucose Once [[id number]]  (Abnormal) Collected: 09/03/24 1915    Specimen: Blood Updated: 09/03/24 1917     Glucose 190 mg/dL      Comment: Serial Number: [id number]:  [id number]       High Sensitivity Troponin T 2Hr [[id number]]  (Abnormal) Collected: 09/03/24 1920    Specimen: Blood from Arm, Left Updated: 09/03/24 2008     HS Troponin T 412 ng/L      Troponin T Delta 8 ng/L     Narrative:      High Sensitive Troponin T Reference Range:  <14.0 ng/L- Negative Female for AMI  <22.0 ng/L- Negative Male for AMI  >=14 - Abnormal Female indicating possible myocardial injury.  >=22 - Abnormal Male indicating possible myocardial injury.   Clinicians would have to utilize clinical acumen, EKG, Troponin, and serial changes to determine if it is an Acute Myocardial Infarction or myocardial injury due to an underlying chronic condition.         POC Glucose Once [[id number]]  (Abnormal) Collected: 09/03/24 1950    Specimen: Blood Updated: 09/03/24 1951     Glucose 146 mg/dL      Comment: Serial Number: [id number]:  [id number]       POC Glucose Once [[id number]]  (Abnormal) Collected: 09/03/24 2023     Specimen: Blood Updated: 09/03/24 2025     Glucose 135 mg/dL      Comment: Serial Number: [id number]:  [id number]       CBC (No Diff) [[id number]]  (Abnormal) Collected: 09/03/24 2359    Specimen: Blood Updated: 09/04/24 0053     WBC 3.43 10*3/mm3      RBC 2.14 10*6/mm3      Hemoglobin 6.8 g/dL      Hematocrit 21.0 %      MCV 98.1 fL      MCH 31.8 pg      MCHC 32.4 g/dL      RDW 14.5 %      RDW-SD 50.8 fl      MPV 11.7 fL      Platelets 96 10*3/mm3     Comprehensive Metabolic Panel [[id number]]  (Abnormal) Collected: 09/03/24 2359    Specimen: Blood Updated: 09/04/24 0053     Glucose 94 mg/dL       mg/dL      Creatinine 9.76 mg/dL      Sodium 143 mmol/L      Potassium 5.3 mmol/L      Chloride 94 mmol/L      CO2 15.0 mmol/L      Calcium 9.4 mg/dL      Total Protein 5.9 g/dL      Albumin 3.2 g/dL      ALT (SGPT) 13 U/L      AST (SGOT) 43 U/L      Alkaline Phosphatase 207 U/L      Total Bilirubin 1.7 mg/dL      Globulin 2.7 gm/dL      A/G Ratio 1.2 g/dL      BUN/Creatinine Ratio 12.7     Anion Gap 34.0 mmol/L      eGFR 5.2 mL/min/1.73      Comment: <15 Indicative of kidney failure       Narrative:      GFR Normal >60  Chronic Kidney Disease <60  Kidney Failure <15    The GFR formula is only valid for adults with stable renal function between ages 18 and 70.    Procalcitonin [[id number]]  (Abnormal) Collected: 09/03/24 2359    Specimen: Blood Updated: 09/04/24 0049     Procalcitonin 0.44 ng/mL     Narrative:      As a Marker for Sepsis (Non-Neonates):    1. <0.5 ng/mL represents a low risk of severe sepsis and/or septic shock.  2. >2 ng/mL represents a high risk of severe sepsis and/or septic shock.    As a Marker for Lower Respiratory Tract Infections that require antibiotic therapy:    PCT on Admission    Antibiotic Therapy       6-12 Hrs later    >0.5                Strongly Recommended  >0.25 - <0.5        Recommended   0.1 - 0.25          Discouraged              Remeasure/reassess PCT  <0.1                " Strongly Discouraged     Remeasure/reassess PCT    As 28 day mortality risk marker: \"Change in Procalcitonin Result\" (>80% or <=80%) if Day 0 (or Day 1) and Day 4 values are available. Refer to http://www.[facility]-pct-calculator.com    Change in PCT <=80%  A decrease of PCT levels below or equal to 80% defines a positive change in PCT test result representing a higher risk for 28-day all-cause mortality of patients diagnosed with severe sepsis for septic shock.    Change in PCT >80%  A decrease of PCT levels of more than 80% defines a negative change in PCT result representing a lower risk for 28-day all-cause mortality of patients diagnosed with severe sepsis or septic shock.       Lactic Acid, Plasma [[id number]]  (Abnormal) Collected: 09/03/24 2359    Specimen: Blood Updated: 09/04/24 0044     Lactate 10.6 mmol/L     High Sensitivity Troponin T [[id number]]  (Abnormal) Collected: 09/03/24 2359    Specimen: Blood Updated: 09/04/24 0051     HS Troponin T 643 ng/L     Narrative:      High Sensitive Troponin T Reference Range:  <14.0 ng/L- Negative Female for AMI  <22.0 ng/L- Negative Male for AMI  >=14 - Abnormal Female indicating possible myocardial injury.  >=22 - Abnormal Male indicating possible myocardial injury.   Clinicians would have to utilize clinical acumen, EKG, Troponin, and serial changes to determine if it is an Acute Myocardial Infarction or myocardial injury due to an underlying chronic condition.         Phosphorus [[id number]]  (Abnormal) Collected: 09/03/24 2359    Specimen: Blood Updated: 09/04/24 0043     Phosphorus 8.2 mg/dL     STAT Lactic Acid, Reflex [[id number]] Collected: 09/04/24 0140    Specimen: Blood Updated: 09/04/24 0144             Imaging:    XR Chest 1 View    Result Date: 9/3/2024  XR CHEST 1 VW Date of Exam: 9/3/2024 4:52 PM EDT Indication: Weak/Dizzy/AMS triage protocol Comparison: Chest radiograph 8/25/2024 Findings: Mediastinum: Cardiomediastinal silhouette appears unchanged " including postoperative changes and cardiomegaly Lungs: The lungs are clear. Pleura: No pleural effusion or pneumothorax. Bones and soft tissues: Median sternotomy     Impression: No acute intrathoracic finding. Electronically Signed: Jesus JONG Boriskristin  9/3/2024 5:07 PM EDT  Workstation ID: [id number]       Differential Diagnosis and Discussion:    Altered Mental Status: Based on the patient's signs and symptoms, differential diagnosis includes but is not limited to meningitis, stroke, sepsis, subarachnoid hemorrhage, intracranial bleeding, encephalitis, and metabolic encephalopathy.  Metabolic: Differential diagnosis includes but is not limited to hypertension, hyperglycemia, hyperkalemia, hypocalcemia, metabolic acidosis, hypokalemia, hypoglycemia, malnutrition, hypothyroidism, hyperthyroidism, and adrenal insufficiency.     All labs were reviewed and interpreted by me.  All X-rays impressions were independently interpreted by me.  EKG was interpreted by me.    MDM     Amount and/or Complexity of Data Reviewed  Clinical lab tests: reviewed  Tests in the radiology section of CPT®: reviewed  Tests in the medicine section of CPT®: reviewed  Decide to obtain previous medical records or to obtain history from someone other than the patient: yes             Patient was placed on the cardiac monitor after being given IV calcium and IV insulin, sodium bicarbonate.  They were monitored for ventricular ectopy, arrhythmia, tachycardia, hypoxia, and changes in blood pressure.  Patient was rechecked several times throughout their stay for mental status decline and for reassessment of worsening changes in vital signs.     Total Critical Care time of 75 minutes. Total critical care time documented does not include time spent on separately billed procedures for services of nurses or physician assistants. I personally saw and examined the patient. I have reviewed all diagnostic interpretations and treatment plans as written. I was  present for the key portions of any procedures performed and the inclusive time noted in any critical care statement. Critical care time includes patient management by me, time spent at the patients bedside,  time to review lab and imaging results, discussing patient care, documentation in the medical record, and time spent with family or caregiver.          Patient Care Considerations:          Consultants/Shared Management Plan:    Consultant: I have discussed the case with nephrology on-call, Dr. Sinclair who states patient needs emergency dialysis tonight.  Recommends we call to see if we can staff dialysis here at our hospital.  If not, the patient needs to be transferred.  Transfer Provider: I have discussed the case with Dr. Mondragon at [facility] who agrees to accept the patient as a transfer.      Social Determinants of Health:    Patient is independent, reliable, and has access to care.       Disposition and Care Coordination:    Transferred: Through independent evaluation of the patient's history, physical, and imperical data, the patient meets criteria to be transferred to another hospital for evaluation/admission.        Final diagnoses:   Hyperkalemia   Acute anemia        ED Disposition       ED Disposition   Transfer to Another Facility     Condition   --    Comment   [facility]               This medical record created using voice recognition software.             Andre Hart MD  09/04/24 0159

## 2024-09-04 ENCOUNTER — APPOINTMENT (OUTPATIENT)
Dept: GENERAL RADIOLOGY | Facility: HOSPITAL | Age: 74
DRG: 070 | End: 2024-09-04
Payer: MEDICARE

## 2024-09-04 PROBLEM — E43 SEVERE MALNUTRITION: Status: ACTIVE | Noted: 2024-09-04

## 2024-09-04 PROBLEM — G93.41 METABOLIC ENCEPHALOPATHY: Status: ACTIVE | Noted: 2024-09-04

## 2024-09-04 PROBLEM — K92.2 GI BLEED: Status: ACTIVE | Noted: 2024-09-04

## 2024-09-04 PROBLEM — G93.41 ACUTE METABOLIC ENCEPHALOPATHY: Status: ACTIVE | Noted: 2024-09-04

## 2024-09-04 PROBLEM — E87.20 LACTIC ACIDOSIS: Status: ACTIVE | Noted: 2024-09-04

## 2024-09-04 LAB
ABO GROUP BLD: NORMAL
ALBUMIN SERPL-MCNC: 3.2 G/DL (ref 3.5–5.2)
ALBUMIN SERPL-MCNC: 3.4 G/DL (ref 3.5–5.2)
ALBUMIN/GLOB SERPL: 1.2 G/DL
ALBUMIN/GLOB SERPL: 1.2 G/DL
ALP SERPL-CCNC: 205 U/L (ref 39–117)
ALP SERPL-CCNC: 207 U/L (ref 39–117)
ALT SERPL W P-5'-P-CCNC: 13 U/L (ref 1–41)
ALT SERPL W P-5'-P-CCNC: 15 U/L (ref 1–41)
AMMONIA BLD-SCNC: 57 UMOL/L (ref 16–60)
ANION GAP SERPL CALCULATED.3IONS-SCNC: 24.6 MMOL/L (ref 5–15)
ANION GAP SERPL CALCULATED.3IONS-SCNC: 34 MMOL/L (ref 5–15)
AST SERPL-CCNC: 43 U/L (ref 1–40)
AST SERPL-CCNC: 75 U/L (ref 1–40)
BASOPHILS # BLD AUTO: 0.02 10*3/MM3 (ref 0–0.2)
BASOPHILS NFR BLD AUTO: 0.5 % (ref 0–1.5)
BH BB BLOOD EXPIRATION DATE: NORMAL
BH BB BLOOD TYPE BARCODE: 6200
BH BB DISPENSE STATUS: NORMAL
BH BB PRODUCT CODE: NORMAL
BH BB UNIT NUMBER: NORMAL
BILIRUB SERPL-MCNC: 1.7 MG/DL (ref 0–1.2)
BILIRUB SERPL-MCNC: 2.1 MG/DL (ref 0–1.2)
BLD GP AB SCN SERPL QL: NEGATIVE
BUN SERPL-MCNC: 124 MG/DL (ref 8–23)
BUN SERPL-MCNC: 51 MG/DL (ref 8–23)
BUN/CREAT SERPL: 10.2 (ref 7–25)
BUN/CREAT SERPL: 12.7 (ref 7–25)
CALCIUM SPEC-SCNC: 9.4 MG/DL (ref 8.6–10.5)
CALCIUM SPEC-SCNC: 9.5 MG/DL (ref 8.6–10.5)
CHLORIDE SERPL-SCNC: 94 MMOL/L (ref 98–107)
CHLORIDE SERPL-SCNC: 98 MMOL/L (ref 98–107)
CO2 SERPL-SCNC: 15 MMOL/L (ref 22–29)
CO2 SERPL-SCNC: 18.4 MMOL/L (ref 22–29)
CREAT SERPL-MCNC: 5.01 MG/DL (ref 0.76–1.27)
CREAT SERPL-MCNC: 9.76 MG/DL (ref 0.76–1.27)
CROSSMATCH INTERPRETATION: NORMAL
D-LACTATE SERPL-SCNC: 10.6 MMOL/L (ref 0.5–2)
D-LACTATE SERPL-SCNC: 11.8 MMOL/L (ref 0.5–2)
D-LACTATE SERPL-SCNC: 2.3 MMOL/L (ref 0.5–2)
D-LACTATE SERPL-SCNC: 4.3 MMOL/L (ref 0.5–2)
D-LACTATE SERPL-SCNC: 6.6 MMOL/L (ref 0.5–2)
DEPRECATED RDW RBC AUTO: 50.8 FL (ref 37–54)
DEPRECATED RDW RBC AUTO: 53.2 FL (ref 37–54)
EGFRCR SERPLBLD CKD-EPI 2021: 11.6 ML/MIN/1.73
EGFRCR SERPLBLD CKD-EPI 2021: 5.2 ML/MIN/1.73
EOSINOPHIL # BLD AUTO: 0 10*3/MM3 (ref 0–0.4)
EOSINOPHIL NFR BLD AUTO: 0 % (ref 0.3–6.2)
ERYTHROCYTE [DISTWIDTH] IN BLOOD BY AUTOMATED COUNT: 14.5 % (ref 12.3–15.4)
ERYTHROCYTE [DISTWIDTH] IN BLOOD BY AUTOMATED COUNT: 15.6 % (ref 12.3–15.4)
GLOBULIN UR ELPH-MCNC: 2.7 GM/DL
GLOBULIN UR ELPH-MCNC: 2.8 GM/DL
GLUCOSE BLDC GLUCOMTR-MCNC: 108 MG/DL (ref 70–130)
GLUCOSE BLDC GLUCOMTR-MCNC: 57 MG/DL (ref 70–130)
GLUCOSE BLDC GLUCOMTR-MCNC: 77 MG/DL (ref 70–130)
GLUCOSE BLDC GLUCOMTR-MCNC: 79 MG/DL (ref 70–130)
GLUCOSE BLDC GLUCOMTR-MCNC: 80 MG/DL (ref 70–130)
GLUCOSE BLDC GLUCOMTR-MCNC: 81 MG/DL (ref 70–130)
GLUCOSE BLDC GLUCOMTR-MCNC: 87 MG/DL (ref 70–130)
GLUCOSE SERPL-MCNC: 80 MG/DL (ref 65–99)
GLUCOSE SERPL-MCNC: 94 MG/DL (ref 65–99)
HAV IGM SERPL QL IA: NORMAL
HBV CORE IGM SERPL QL IA: NORMAL
HBV SURFACE AG SERPL QL IA: NORMAL
HCT VFR BLD AUTO: 21 % (ref 37.5–51)
HCT VFR BLD AUTO: 23.9 % (ref 37.5–51)
HCT VFR BLD AUTO: 24.2 % (ref 37.5–51)
HCT VFR BLD AUTO: 24.2 % (ref 37.5–51)
HCV AB SER QL: NORMAL
HGB BLD-MCNC: 6.8 G/DL (ref 13–17.7)
HGB BLD-MCNC: 7.4 G/DL (ref 13–17.7)
HGB BLD-MCNC: 7.8 G/DL (ref 13–17.7)
HGB BLD-MCNC: 7.8 G/DL (ref 13–17.7)
IMM GRANULOCYTES # BLD AUTO: 0.03 10*3/MM3 (ref 0–0.05)
IMM GRANULOCYTES NFR BLD AUTO: 0.7 % (ref 0–0.5)
LYMPHOCYTES # BLD AUTO: 0.39 10*3/MM3 (ref 0.7–3.1)
LYMPHOCYTES NFR BLD AUTO: 9.2 % (ref 19.6–45.3)
MCH RBC QN AUTO: 30.7 PG (ref 26.6–33)
MCH RBC QN AUTO: 31.8 PG (ref 26.6–33)
MCHC RBC AUTO-ENTMCNC: 32.2 G/DL (ref 31.5–35.7)
MCHC RBC AUTO-ENTMCNC: 32.4 G/DL (ref 31.5–35.7)
MCV RBC AUTO: 95.3 FL (ref 79–97)
MCV RBC AUTO: 98.1 FL (ref 79–97)
MONOCYTES # BLD AUTO: 0.51 10*3/MM3 (ref 0.1–0.9)
MONOCYTES NFR BLD AUTO: 12 % (ref 5–12)
NEUTROPHILS NFR BLD AUTO: 3.29 10*3/MM3 (ref 1.7–7)
NEUTROPHILS NFR BLD AUTO: 77.6 % (ref 42.7–76)
NRBC BLD AUTO-RTO: 0 /100 WBC (ref 0–0.2)
PHOSPHATE SERPL-MCNC: 4.5 MG/DL (ref 2.5–4.5)
PHOSPHATE SERPL-MCNC: 8.2 MG/DL (ref 2.5–4.5)
PLATELET # BLD AUTO: 104 10*3/MM3 (ref 140–450)
PLATELET # BLD AUTO: 96 10*3/MM3 (ref 140–450)
PMV BLD AUTO: 11.7 FL (ref 6–12)
PMV BLD AUTO: 11.8 FL (ref 6–12)
POTASSIUM SERPL-SCNC: 4.1 MMOL/L (ref 3.5–5.2)
POTASSIUM SERPL-SCNC: 5.3 MMOL/L (ref 3.5–5.2)
PROCALCITONIN SERPL-MCNC: 0.44 NG/ML (ref 0–0.25)
PROT SERPL-MCNC: 5.9 G/DL (ref 6–8.5)
PROT SERPL-MCNC: 6.2 G/DL (ref 6–8.5)
QT INTERVAL: 459 MS
QT INTERVAL: 490 MS
QTC INTERVAL: 540 MS
QTC INTERVAL: 552 MS
RBC # BLD AUTO: 2.14 10*6/MM3 (ref 4.14–5.8)
RBC # BLD AUTO: 2.54 10*6/MM3 (ref 4.14–5.8)
RH BLD: POSITIVE
SODIUM SERPL-SCNC: 141 MMOL/L (ref 136–145)
SODIUM SERPL-SCNC: 143 MMOL/L (ref 136–145)
T&S EXPIRATION DATE: NORMAL
TROPONIN T SERPL HS-MCNC: 643 NG/L
UNIT  ABO: NORMAL
UNIT  RH: NORMAL
WBC NRBC COR # BLD AUTO: 3.43 10*3/MM3 (ref 3.4–10.8)
WBC NRBC COR # BLD AUTO: 4.24 10*3/MM3 (ref 3.4–10.8)

## 2024-09-04 PROCEDURE — P9016 RBC LEUKOCYTES REDUCED: HCPCS

## 2024-09-04 PROCEDURE — 85018 HEMOGLOBIN: CPT | Performed by: NURSE PRACTITIONER

## 2024-09-04 PROCEDURE — 86900 BLOOD TYPING SEROLOGIC ABO: CPT

## 2024-09-04 PROCEDURE — 93005 ELECTROCARDIOGRAM TRACING: CPT | Performed by: NURSE PRACTITIONER

## 2024-09-04 PROCEDURE — 82948 REAGENT STRIP/BLOOD GLUCOSE: CPT

## 2024-09-04 PROCEDURE — 87040 BLOOD CULTURE FOR BACTERIA: CPT | Performed by: STUDENT IN AN ORGANIZED HEALTH CARE EDUCATION/TRAINING PROGRAM

## 2024-09-04 PROCEDURE — 80053 COMPREHEN METABOLIC PANEL: CPT | Performed by: STUDENT IN AN ORGANIZED HEALTH CARE EDUCATION/TRAINING PROGRAM

## 2024-09-04 PROCEDURE — 86900 BLOOD TYPING SEROLOGIC ABO: CPT | Performed by: NURSE PRACTITIONER

## 2024-09-04 PROCEDURE — 84100 ASSAY OF PHOSPHORUS: CPT

## 2024-09-04 PROCEDURE — 5A1D70Z PERFORMANCE OF URINARY FILTRATION, INTERMITTENT, LESS THAN 6 HOURS PER DAY: ICD-10-PCS | Performed by: INTERNAL MEDICINE

## 2024-09-04 PROCEDURE — 86901 BLOOD TYPING SEROLOGIC RH(D): CPT | Performed by: NURSE PRACTITIONER

## 2024-09-04 PROCEDURE — 99222 1ST HOSP IP/OBS MODERATE 55: CPT | Performed by: INTERNAL MEDICINE

## 2024-09-04 PROCEDURE — 86850 RBC ANTIBODY SCREEN: CPT | Performed by: NURSE PRACTITIONER

## 2024-09-04 PROCEDURE — 99222 1ST HOSP IP/OBS MODERATE 55: CPT | Performed by: PSYCHIATRY & NEUROLOGY

## 2024-09-04 PROCEDURE — 82140 ASSAY OF AMMONIA: CPT | Performed by: STUDENT IN AN ORGANIZED HEALTH CARE EDUCATION/TRAINING PROGRAM

## 2024-09-04 PROCEDURE — 85014 HEMATOCRIT: CPT | Performed by: NURSE PRACTITIONER

## 2024-09-04 PROCEDURE — 36430 TRANSFUSION BLD/BLD COMPNT: CPT

## 2024-09-04 PROCEDURE — 93010 ELECTROCARDIOGRAM REPORT: CPT | Performed by: STUDENT IN AN ORGANIZED HEALTH CARE EDUCATION/TRAINING PROGRAM

## 2024-09-04 PROCEDURE — 85025 COMPLETE CBC W/AUTO DIFF WBC: CPT

## 2024-09-04 PROCEDURE — 86923 COMPATIBILITY TEST ELECTRIC: CPT

## 2024-09-04 PROCEDURE — 71045 X-RAY EXAM CHEST 1 VIEW: CPT

## 2024-09-04 PROCEDURE — 80074 ACUTE HEPATITIS PANEL: CPT | Performed by: STUDENT IN AN ORGANIZED HEALTH CARE EDUCATION/TRAINING PROGRAM

## 2024-09-04 PROCEDURE — 83605 ASSAY OF LACTIC ACID: CPT | Performed by: NURSE PRACTITIONER

## 2024-09-04 RX ORDER — DEXTROSE MONOHYDRATE 25 G/50ML
25 INJECTION, SOLUTION INTRAVENOUS
Status: DISCONTINUED | OUTPATIENT
Start: 2024-09-04 | End: 2024-09-17 | Stop reason: HOSPADM

## 2024-09-04 RX ORDER — DEXTROSE MONOHYDRATE 25 G/50ML
INJECTION, SOLUTION INTRAVENOUS
Status: COMPLETED
Start: 2024-09-04 | End: 2024-09-04

## 2024-09-04 RX ORDER — NICOTINE POLACRILEX 4 MG
15 LOZENGE BUCCAL
Status: DISCONTINUED | OUTPATIENT
Start: 2024-09-04 | End: 2024-09-17 | Stop reason: HOSPADM

## 2024-09-04 RX ORDER — IBUPROFEN 600 MG/1
1 TABLET ORAL
Status: DISCONTINUED | OUTPATIENT
Start: 2024-09-04 | End: 2024-09-17 | Stop reason: HOSPADM

## 2024-09-04 RX ADMIN — DEXTROSE MONOHYDRATE 25 G: 25 INJECTION, SOLUTION INTRAVENOUS at 06:23

## 2024-09-04 RX ADMIN — Medication 10 ML: at 20:55

## 2024-09-04 RX ADMIN — Medication 10 ML: at 08:55

## 2024-09-04 RX ADMIN — Medication 10 ML: at 00:45

## 2024-09-05 LAB
ALBUMIN SERPL-MCNC: 3.2 G/DL (ref 3.5–5.2)
ANION GAP SERPL CALCULATED.3IONS-SCNC: 20.2 MMOL/L (ref 5–15)
BASOPHILS # BLD AUTO: 0.02 10*3/MM3 (ref 0–0.2)
BASOPHILS NFR BLD AUTO: 0.4 % (ref 0–1.5)
BUN SERPL-MCNC: 77 MG/DL (ref 8–23)
BUN/CREAT SERPL: 12.4 (ref 7–25)
CALCIUM SPEC-SCNC: 9.4 MG/DL (ref 8.6–10.5)
CHLORIDE SERPL-SCNC: 99 MMOL/L (ref 98–107)
CO2 SERPL-SCNC: 22.8 MMOL/L (ref 22–29)
CREAT SERPL-MCNC: 6.21 MG/DL (ref 0.76–1.27)
D-LACTATE SERPL-SCNC: 2.1 MMOL/L (ref 0.5–2)
DEPRECATED RDW RBC AUTO: 57.8 FL (ref 37–54)
EGFRCR SERPLBLD CKD-EPI 2021: 8.9 ML/MIN/1.73
EOSINOPHIL # BLD AUTO: 0.01 10*3/MM3 (ref 0–0.4)
EOSINOPHIL NFR BLD AUTO: 0.2 % (ref 0.3–6.2)
ERYTHROCYTE [DISTWIDTH] IN BLOOD BY AUTOMATED COUNT: 16 % (ref 12.3–15.4)
GLUCOSE BLDC GLUCOMTR-MCNC: 67 MG/DL (ref 70–130)
GLUCOSE BLDC GLUCOMTR-MCNC: 72 MG/DL (ref 70–130)
GLUCOSE BLDC GLUCOMTR-MCNC: 81 MG/DL (ref 70–130)
GLUCOSE SERPL-MCNC: 82 MG/DL (ref 65–99)
HCT VFR BLD AUTO: 23.3 % (ref 37.5–51)
HCT VFR BLD AUTO: 25.1 % (ref 37.5–51)
HCT VFR BLD AUTO: 25.1 % (ref 37.5–51)
HGB BLD-MCNC: 7.4 G/DL (ref 13–17.7)
HGB BLD-MCNC: 7.9 G/DL (ref 13–17.7)
HGB BLD-MCNC: 7.9 G/DL (ref 13–17.7)
IMM GRANULOCYTES # BLD AUTO: 0.02 10*3/MM3 (ref 0–0.05)
IMM GRANULOCYTES NFR BLD AUTO: 0.4 % (ref 0–0.5)
LYMPHOCYTES # BLD AUTO: 0.53 10*3/MM3 (ref 0.7–3.1)
LYMPHOCYTES NFR BLD AUTO: 11.1 % (ref 19.6–45.3)
MCH RBC QN AUTO: 31 PG (ref 26.6–33)
MCHC RBC AUTO-ENTMCNC: 31.5 G/DL (ref 31.5–35.7)
MCV RBC AUTO: 98.4 FL (ref 79–97)
MONOCYTES # BLD AUTO: 0.53 10*3/MM3 (ref 0.1–0.9)
MONOCYTES NFR BLD AUTO: 11.1 % (ref 5–12)
NEUTROPHILS NFR BLD AUTO: 3.65 10*3/MM3 (ref 1.7–7)
NEUTROPHILS NFR BLD AUTO: 76.8 % (ref 42.7–76)
NRBC BLD AUTO-RTO: 0 /100 WBC (ref 0–0.2)
PHOSPHATE SERPL-MCNC: 6.5 MG/DL (ref 2.5–4.5)
PLATELET # BLD AUTO: 111 10*3/MM3 (ref 140–450)
PMV BLD AUTO: 11.7 FL (ref 6–12)
POTASSIUM SERPL-SCNC: 4.4 MMOL/L (ref 3.5–5.2)
RBC # BLD AUTO: 2.55 10*6/MM3 (ref 4.14–5.8)
SODIUM SERPL-SCNC: 142 MMOL/L (ref 136–145)
WBC NRBC COR # BLD AUTO: 4.76 10*3/MM3 (ref 3.4–10.8)

## 2024-09-05 PROCEDURE — 85018 HEMOGLOBIN: CPT | Performed by: NURSE PRACTITIONER

## 2024-09-05 PROCEDURE — 85025 COMPLETE CBC W/AUTO DIFF WBC: CPT

## 2024-09-05 PROCEDURE — 82948 REAGENT STRIP/BLOOD GLUCOSE: CPT

## 2024-09-05 PROCEDURE — 80069 RENAL FUNCTION PANEL: CPT

## 2024-09-05 PROCEDURE — 83605 ASSAY OF LACTIC ACID: CPT | Performed by: NURSE PRACTITIONER

## 2024-09-05 PROCEDURE — 99231 SBSQ HOSP IP/OBS SF/LOW 25: CPT | Performed by: PSYCHIATRY & NEUROLOGY

## 2024-09-05 PROCEDURE — 85014 HEMATOCRIT: CPT | Performed by: NURSE PRACTITIONER

## 2024-09-05 PROCEDURE — 99232 SBSQ HOSP IP/OBS MODERATE 35: CPT | Performed by: NURSE PRACTITIONER

## 2024-09-05 RX ORDER — PANTOPRAZOLE SODIUM 40 MG/10ML
40 INJECTION, POWDER, LYOPHILIZED, FOR SOLUTION INTRAVENOUS
Status: DISCONTINUED | OUTPATIENT
Start: 2024-09-05 | End: 2024-09-10

## 2024-09-05 RX ADMIN — Medication 10 ML: at 20:58

## 2024-09-05 RX ADMIN — PANTOPRAZOLE SODIUM 40 MG: 40 INJECTION, POWDER, FOR SOLUTION INTRAVENOUS at 18:28

## 2024-09-06 ENCOUNTER — APPOINTMENT (OUTPATIENT)
Dept: MRI IMAGING | Facility: HOSPITAL | Age: 74
DRG: 070 | End: 2024-09-06
Payer: MEDICARE

## 2024-09-06 ENCOUNTER — APPOINTMENT (OUTPATIENT)
Dept: CARDIOLOGY | Facility: HOSPITAL | Age: 74
DRG: 070 | End: 2024-09-06
Payer: MEDICARE

## 2024-09-06 LAB
ANION GAP SERPL CALCULATED.3IONS-SCNC: 17.6 MMOL/L (ref 5–15)
AORTIC ARCH: 2.7 CM
AORTIC DIMENSIONLESS INDEX: 0.6 (DI)
ASCENDING AORTA: 3.1 CM
BASOPHILS # BLD AUTO: 0.03 10*3/MM3 (ref 0–0.2)
BASOPHILS NFR BLD AUTO: 0.9 % (ref 0–1.5)
BH CV ECHO MEAS - ACS: 1.56 CM
BH CV ECHO MEAS - AI P1/2T: 431.4 MSEC
BH CV ECHO MEAS - AO MAX PG: 8.2 MMHG
BH CV ECHO MEAS - AO MEAN PG: 4 MMHG
BH CV ECHO MEAS - AO ROOT DIAM: 3 CM
BH CV ECHO MEAS - AO V2 MAX: 143 CM/SEC
BH CV ECHO MEAS - AO V2 VTI: 28.2 CM
BH CV ECHO MEAS - AVA(I,D): 1.52 CM2
BH CV ECHO MEAS - EDV(CUBED): 166.8 ML
BH CV ECHO MEAS - EDV(MOD-SP2): 278 ML
BH CV ECHO MEAS - EDV(MOD-SP4): 261 ML
BH CV ECHO MEAS - EF(MOD-BP): 33.6 %
BH CV ECHO MEAS - EF(MOD-SP2): 33.1 %
BH CV ECHO MEAS - EF(MOD-SP4): 33.7 %
BH CV ECHO MEAS - ESV(CUBED): 121.7 ML
BH CV ECHO MEAS - ESV(MOD-SP2): 186 ML
BH CV ECHO MEAS - ESV(MOD-SP4): 173 ML
BH CV ECHO MEAS - FS: 10 %
BH CV ECHO MEAS - IVS/LVPW: 1.12 CM
BH CV ECHO MEAS - IVSD: 1.06 CM
BH CV ECHO MEAS - LAT PEAK E' VEL: 8.2 CM/SEC
BH CV ECHO MEAS - LV MASS(C)D: 214 GRAMS
BH CV ECHO MEAS - LV MAX PG: 3 MMHG
BH CV ECHO MEAS - LV MEAN PG: 2 MMHG
BH CV ECHO MEAS - LV V1 MAX: 86.8 CM/SEC
BH CV ECHO MEAS - LV V1 VTI: 17.6 CM
BH CV ECHO MEAS - LVIDD: 5.5 CM
BH CV ECHO MEAS - LVIDS: 5 CM
BH CV ECHO MEAS - LVOT AREA: 2.44 CM2
BH CV ECHO MEAS - LVOT DIAM: 1.76 CM
BH CV ECHO MEAS - LVPWD: 0.95 CM
BH CV ECHO MEAS - MED PEAK E' VEL: 5 CM/SEC
BH CV ECHO MEAS - MR MAX PG: 37.5 MMHG
BH CV ECHO MEAS - MR MAX VEL: 306.2 CM/SEC
BH CV ECHO MEAS - MV A DUR: 0.13 SEC
BH CV ECHO MEAS - MV A MAX VEL: 24.3 CM/SEC
BH CV ECHO MEAS - MV DEC SLOPE: 519 CM/SEC2
BH CV ECHO MEAS - MV DEC TIME: 0.18 SEC
BH CV ECHO MEAS - MV E MAX VEL: 75.2 CM/SEC
BH CV ECHO MEAS - MV E/A: 3.1
BH CV ECHO MEAS - MV MAX PG: 2.23 MMHG
BH CV ECHO MEAS - MV MEAN PG: 0.92 MMHG
BH CV ECHO MEAS - MV P1/2T: 43.3 MSEC
BH CV ECHO MEAS - MV V2 VTI: 22.5 CM
BH CV ECHO MEAS - MVA(P1/2T): 5.1 CM2
BH CV ECHO MEAS - MVA(VTI): 1.91 CM2
BH CV ECHO MEAS - PA ACC TIME: 0.04 SEC
BH CV ECHO MEAS - PA V2 MAX: 86.3 CM/SEC
BH CV ECHO MEAS - PULM A REVS DUR: 0.11 SEC
BH CV ECHO MEAS - PULM A REVS VEL: 18.8 CM/SEC
BH CV ECHO MEAS - PULM DIAS VEL: 51 CM/SEC
BH CV ECHO MEAS - PULM S/D: 0.53
BH CV ECHO MEAS - PULM SYS VEL: 27.1 CM/SEC
BH CV ECHO MEAS - RAP SYSTOLE: 15 MMHG
BH CV ECHO MEAS - RV MAX PG: 1.02 MMHG
BH CV ECHO MEAS - RV V1 MAX: 50.4 CM/SEC
BH CV ECHO MEAS - RV V1 VTI: 11 CM
BH CV ECHO MEAS - RVSP: 54 MMHG
BH CV ECHO MEAS - SUP REN AO DIAM: 2.3 CM
BH CV ECHO MEAS - SV(LVOT): 43 ML
BH CV ECHO MEAS - SV(MOD-SP2): 92 ML
BH CV ECHO MEAS - SV(MOD-SP4): 88 ML
BH CV ECHO MEAS - TAPSE (>1.6): 1.31 CM
BH CV ECHO MEAS - TR MAX PG: 38.6 MMHG
BH CV ECHO MEAS - TR MAX VEL: 310.8 CM/SEC
BH CV ECHO MEASUREMENTS AVERAGE E/E' RATIO: 11.39
BH CV ECHO SHUNT ASSESSMENT PERFORMED (HIDDEN SCRIPTING): 1
BH CV XLRA - RV BASE: 4.3 CM
BH CV XLRA - RV LENGTH: 7.8 CM
BH CV XLRA - RV MID: 3 CM
BH CV XLRA - TDI S': 9.6 CM/SEC
BUN SERPL-MCNC: 41 MG/DL (ref 8–23)
BUN/CREAT SERPL: 10.4 (ref 7–25)
CALCIUM SPEC-SCNC: 9 MG/DL (ref 8.6–10.5)
CHLORIDE SERPL-SCNC: 101 MMOL/L (ref 98–107)
CO2 SERPL-SCNC: 23.4 MMOL/L (ref 22–29)
CREAT SERPL-MCNC: 3.94 MG/DL (ref 0.76–1.27)
DEPRECATED RDW RBC AUTO: 55.3 FL (ref 37–54)
EGFRCR SERPLBLD CKD-EPI 2021: 15.4 ML/MIN/1.73
EOSINOPHIL # BLD AUTO: 0.08 10*3/MM3 (ref 0–0.4)
EOSINOPHIL NFR BLD AUTO: 2.3 % (ref 0.3–6.2)
ERYTHROCYTE [DISTWIDTH] IN BLOOD BY AUTOMATED COUNT: 15.6 % (ref 12.3–15.4)
GLUCOSE BLDC GLUCOMTR-MCNC: 107 MG/DL (ref 70–130)
GLUCOSE BLDC GLUCOMTR-MCNC: 111 MG/DL (ref 70–130)
GLUCOSE BLDC GLUCOMTR-MCNC: 71 MG/DL (ref 70–130)
GLUCOSE BLDC GLUCOMTR-MCNC: 75 MG/DL (ref 70–130)
GLUCOSE BLDC GLUCOMTR-MCNC: 83 MG/DL (ref 70–130)
GLUCOSE SERPL-MCNC: 82 MG/DL (ref 65–99)
HCT VFR BLD AUTO: 27 % (ref 37.5–51)
HGB BLD-MCNC: 8.5 G/DL (ref 13–17.7)
IMM GRANULOCYTES # BLD AUTO: 0.01 10*3/MM3 (ref 0–0.05)
IMM GRANULOCYTES NFR BLD AUTO: 0.3 % (ref 0–0.5)
LEFT ATRIUM VOLUME INDEX: 59.1 ML/M2
LYMPHOCYTES # BLD AUTO: 0.59 10*3/MM3 (ref 0.7–3.1)
LYMPHOCYTES NFR BLD AUTO: 17.3 % (ref 19.6–45.3)
MCH RBC QN AUTO: 31 PG (ref 26.6–33)
MCHC RBC AUTO-ENTMCNC: 31.5 G/DL (ref 31.5–35.7)
MCV RBC AUTO: 98.5 FL (ref 79–97)
MONOCYTES # BLD AUTO: 0.44 10*3/MM3 (ref 0.1–0.9)
MONOCYTES NFR BLD AUTO: 12.9 % (ref 5–12)
NEUTROPHILS NFR BLD AUTO: 2.27 10*3/MM3 (ref 1.7–7)
NEUTROPHILS NFR BLD AUTO: 66.3 % (ref 42.7–76)
NRBC BLD AUTO-RTO: 0 /100 WBC (ref 0–0.2)
PLATELET # BLD AUTO: 106 10*3/MM3 (ref 140–450)
PMV BLD AUTO: 11.5 FL (ref 6–12)
POTASSIUM SERPL-SCNC: 3.6 MMOL/L (ref 3.5–5.2)
RBC # BLD AUTO: 2.74 10*6/MM3 (ref 4.14–5.8)
SINUS: 2.9 CM
SODIUM SERPL-SCNC: 142 MMOL/L (ref 136–145)
STJ: 2.7 CM
WBC NRBC COR # BLD AUTO: 3.42 10*3/MM3 (ref 3.4–10.8)

## 2024-09-06 PROCEDURE — 93306 TTE W/DOPPLER COMPLETE: CPT | Performed by: INTERNAL MEDICINE

## 2024-09-06 PROCEDURE — 82948 REAGENT STRIP/BLOOD GLUCOSE: CPT

## 2024-09-06 PROCEDURE — 97161 PT EVAL LOW COMPLEX 20 MIN: CPT

## 2024-09-06 PROCEDURE — 97166 OT EVAL MOD COMPLEX 45 MIN: CPT

## 2024-09-06 PROCEDURE — 85025 COMPLETE CBC W/AUTO DIFF WBC: CPT | Performed by: STUDENT IN AN ORGANIZED HEALTH CARE EDUCATION/TRAINING PROGRAM

## 2024-09-06 PROCEDURE — 25510000001 PERFLUTREN 6.52 MG/ML SUSPENSION 2 ML VIAL: Performed by: STUDENT IN AN ORGANIZED HEALTH CARE EDUCATION/TRAINING PROGRAM

## 2024-09-06 PROCEDURE — 97530 THERAPEUTIC ACTIVITIES: CPT

## 2024-09-06 PROCEDURE — 70551 MRI BRAIN STEM W/O DYE: CPT

## 2024-09-06 PROCEDURE — 93306 TTE W/DOPPLER COMPLETE: CPT

## 2024-09-06 PROCEDURE — 80048 BASIC METABOLIC PNL TOTAL CA: CPT | Performed by: STUDENT IN AN ORGANIZED HEALTH CARE EDUCATION/TRAINING PROGRAM

## 2024-09-06 RX ADMIN — PANTOPRAZOLE SODIUM 40 MG: 40 INJECTION, POWDER, FOR SOLUTION INTRAVENOUS at 18:10

## 2024-09-06 RX ADMIN — PERFLUTREN 4.5 ML: 6.52 INJECTION, SUSPENSION INTRAVENOUS at 16:11

## 2024-09-06 RX ADMIN — Medication 10 ML: at 21:04

## 2024-09-06 RX ADMIN — PANTOPRAZOLE SODIUM 40 MG: 40 INJECTION, POWDER, FOR SOLUTION INTRAVENOUS at 06:35

## 2024-09-06 RX ADMIN — Medication 10 ML: at 08:11

## 2024-09-07 LAB
ALBUMIN SERPL-MCNC: 3.1 G/DL (ref 3.5–5.2)
ANION GAP SERPL CALCULATED.3IONS-SCNC: 14.6 MMOL/L (ref 5–15)
ANION GAP SERPL CALCULATED.3IONS-SCNC: 14.6 MMOL/L (ref 5–15)
BASOPHILS # BLD AUTO: 0.04 10*3/MM3 (ref 0–0.2)
BASOPHILS NFR BLD AUTO: 1.1 % (ref 0–1.5)
BUN SERPL-MCNC: 45 MG/DL (ref 8–23)
BUN SERPL-MCNC: 45 MG/DL (ref 8–23)
BUN/CREAT SERPL: 9.3 (ref 7–25)
BUN/CREAT SERPL: 9.3 (ref 7–25)
CALCIUM SPEC-SCNC: 8.7 MG/DL (ref 8.6–10.5)
CALCIUM SPEC-SCNC: 8.7 MG/DL (ref 8.6–10.5)
CHLORIDE SERPL-SCNC: 99 MMOL/L (ref 98–107)
CHLORIDE SERPL-SCNC: 99 MMOL/L (ref 98–107)
CO2 SERPL-SCNC: 22.4 MMOL/L (ref 22–29)
CO2 SERPL-SCNC: 22.4 MMOL/L (ref 22–29)
CREAT SERPL-MCNC: 4.82 MG/DL (ref 0.76–1.27)
CREAT SERPL-MCNC: 4.82 MG/DL (ref 0.76–1.27)
DEPRECATED RDW RBC AUTO: 51.4 FL (ref 37–54)
EGFRCR SERPLBLD CKD-EPI 2021: 12.1 ML/MIN/1.73
EGFRCR SERPLBLD CKD-EPI 2021: 12.1 ML/MIN/1.73
EOSINOPHIL # BLD AUTO: 0.11 10*3/MM3 (ref 0–0.4)
EOSINOPHIL NFR BLD AUTO: 2.9 % (ref 0.3–6.2)
ERYTHROCYTE [DISTWIDTH] IN BLOOD BY AUTOMATED COUNT: 15.1 % (ref 12.3–15.4)
GLUCOSE BLDC GLUCOMTR-MCNC: 104 MG/DL (ref 70–130)
GLUCOSE BLDC GLUCOMTR-MCNC: 149 MG/DL (ref 70–130)
GLUCOSE BLDC GLUCOMTR-MCNC: 80 MG/DL (ref 70–130)
GLUCOSE SERPL-MCNC: 73 MG/DL (ref 65–99)
GLUCOSE SERPL-MCNC: 73 MG/DL (ref 65–99)
HCT VFR BLD AUTO: 25.9 % (ref 37.5–51)
HGB BLD-MCNC: 8.5 G/DL (ref 13–17.7)
IMM GRANULOCYTES # BLD AUTO: 0.02 10*3/MM3 (ref 0–0.05)
IMM GRANULOCYTES NFR BLD AUTO: 0.5 % (ref 0–0.5)
LYMPHOCYTES # BLD AUTO: 0.72 10*3/MM3 (ref 0.7–3.1)
LYMPHOCYTES NFR BLD AUTO: 19.1 % (ref 19.6–45.3)
MCH RBC QN AUTO: 31.5 PG (ref 26.6–33)
MCHC RBC AUTO-ENTMCNC: 32.8 G/DL (ref 31.5–35.7)
MCV RBC AUTO: 95.9 FL (ref 79–97)
MONOCYTES # BLD AUTO: 0.61 10*3/MM3 (ref 0.1–0.9)
MONOCYTES NFR BLD AUTO: 16.2 % (ref 5–12)
NEUTROPHILS NFR BLD AUTO: 2.26 10*3/MM3 (ref 1.7–7)
NEUTROPHILS NFR BLD AUTO: 60.2 % (ref 42.7–76)
NRBC BLD AUTO-RTO: 0.8 /100 WBC (ref 0–0.2)
PHOSPHATE SERPL-MCNC: 5.2 MG/DL (ref 2.5–4.5)
PLATELET # BLD AUTO: 115 10*3/MM3 (ref 140–450)
PMV BLD AUTO: 12.7 FL (ref 6–12)
POTASSIUM SERPL-SCNC: 4 MMOL/L (ref 3.5–5.2)
POTASSIUM SERPL-SCNC: 4 MMOL/L (ref 3.5–5.2)
RBC # BLD AUTO: 2.7 10*6/MM3 (ref 4.14–5.8)
SODIUM SERPL-SCNC: 136 MMOL/L (ref 136–145)
SODIUM SERPL-SCNC: 136 MMOL/L (ref 136–145)
WBC NRBC COR # BLD AUTO: 3.76 10*3/MM3 (ref 3.4–10.8)

## 2024-09-07 PROCEDURE — 80069 RENAL FUNCTION PANEL: CPT | Performed by: INTERNAL MEDICINE

## 2024-09-07 PROCEDURE — 85025 COMPLETE CBC W/AUTO DIFF WBC: CPT | Performed by: INTERNAL MEDICINE

## 2024-09-07 PROCEDURE — 99232 SBSQ HOSP IP/OBS MODERATE 35: CPT | Performed by: INTERNAL MEDICINE

## 2024-09-07 PROCEDURE — 80048 BASIC METABOLIC PNL TOTAL CA: CPT | Performed by: STUDENT IN AN ORGANIZED HEALTH CARE EDUCATION/TRAINING PROGRAM

## 2024-09-07 PROCEDURE — 82948 REAGENT STRIP/BLOOD GLUCOSE: CPT

## 2024-09-07 RX ADMIN — PANTOPRAZOLE SODIUM 40 MG: 40 INJECTION, POWDER, FOR SOLUTION INTRAVENOUS at 18:29

## 2024-09-07 RX ADMIN — PANTOPRAZOLE SODIUM 40 MG: 40 INJECTION, POWDER, FOR SOLUTION INTRAVENOUS at 06:30

## 2024-09-07 RX ADMIN — Medication 10 ML: at 10:08

## 2024-09-07 RX ADMIN — Medication 10 ML: at 21:02

## 2024-09-08 LAB
ANION GAP SERPL CALCULATED.3IONS-SCNC: 12.8 MMOL/L (ref 5–15)
BASOPHILS # BLD AUTO: 0.03 10*3/MM3 (ref 0–0.2)
BASOPHILS NFR BLD AUTO: 0.8 % (ref 0–1.5)
BUN SERPL-MCNC: 23 MG/DL (ref 8–23)
BUN/CREAT SERPL: 6.1 (ref 7–25)
CALCIUM SPEC-SCNC: 8.5 MG/DL (ref 8.6–10.5)
CHLORIDE SERPL-SCNC: 102 MMOL/L (ref 98–107)
CO2 SERPL-SCNC: 24.2 MMOL/L (ref 22–29)
CREAT SERPL-MCNC: 3.79 MG/DL (ref 0.76–1.27)
DEPRECATED RDW RBC AUTO: 54.5 FL (ref 37–54)
EGFRCR SERPLBLD CKD-EPI 2021: 16.2 ML/MIN/1.73
EOSINOPHIL # BLD AUTO: 0.11 10*3/MM3 (ref 0–0.4)
EOSINOPHIL NFR BLD AUTO: 2.8 % (ref 0.3–6.2)
ERYTHROCYTE [DISTWIDTH] IN BLOOD BY AUTOMATED COUNT: 15.4 % (ref 12.3–15.4)
GLUCOSE BLDC GLUCOMTR-MCNC: 105 MG/DL (ref 70–130)
GLUCOSE BLDC GLUCOMTR-MCNC: 71 MG/DL (ref 70–130)
GLUCOSE BLDC GLUCOMTR-MCNC: 83 MG/DL (ref 70–130)
GLUCOSE BLDC GLUCOMTR-MCNC: 91 MG/DL (ref 70–130)
GLUCOSE SERPL-MCNC: 96 MG/DL (ref 65–99)
HCT VFR BLD AUTO: 26.2 % (ref 37.5–51)
HGB BLD-MCNC: 8.3 G/DL (ref 13–17.7)
IMM GRANULOCYTES # BLD AUTO: 0.02 10*3/MM3 (ref 0–0.05)
IMM GRANULOCYTES NFR BLD AUTO: 0.5 % (ref 0–0.5)
LYMPHOCYTES # BLD AUTO: 0.76 10*3/MM3 (ref 0.7–3.1)
LYMPHOCYTES NFR BLD AUTO: 19 % (ref 19.6–45.3)
MCH RBC QN AUTO: 31.2 PG (ref 26.6–33)
MCHC RBC AUTO-ENTMCNC: 31.7 G/DL (ref 31.5–35.7)
MCV RBC AUTO: 98.5 FL (ref 79–97)
MONOCYTES # BLD AUTO: 0.47 10*3/MM3 (ref 0.1–0.9)
MONOCYTES NFR BLD AUTO: 11.8 % (ref 5–12)
NEUTROPHILS NFR BLD AUTO: 2.61 10*3/MM3 (ref 1.7–7)
NEUTROPHILS NFR BLD AUTO: 65.1 % (ref 42.7–76)
NRBC BLD AUTO-RTO: 1.3 /100 WBC (ref 0–0.2)
PLATELET # BLD AUTO: 116 10*3/MM3 (ref 140–450)
PMV BLD AUTO: 12.5 FL (ref 6–12)
POTASSIUM SERPL-SCNC: 3.6 MMOL/L (ref 3.5–5.2)
RBC # BLD AUTO: 2.66 10*6/MM3 (ref 4.14–5.8)
SODIUM SERPL-SCNC: 139 MMOL/L (ref 136–145)
WBC NRBC COR # BLD AUTO: 4 10*3/MM3 (ref 3.4–10.8)

## 2024-09-08 PROCEDURE — 82948 REAGENT STRIP/BLOOD GLUCOSE: CPT

## 2024-09-08 PROCEDURE — 97110 THERAPEUTIC EXERCISES: CPT

## 2024-09-08 PROCEDURE — 80048 BASIC METABOLIC PNL TOTAL CA: CPT | Performed by: STUDENT IN AN ORGANIZED HEALTH CARE EDUCATION/TRAINING PROGRAM

## 2024-09-08 PROCEDURE — 97530 THERAPEUTIC ACTIVITIES: CPT

## 2024-09-08 PROCEDURE — 99232 SBSQ HOSP IP/OBS MODERATE 35: CPT

## 2024-09-08 PROCEDURE — 85025 COMPLETE CBC W/AUTO DIFF WBC: CPT | Performed by: STUDENT IN AN ORGANIZED HEALTH CARE EDUCATION/TRAINING PROGRAM

## 2024-09-08 RX ORDER — POLYETHYLENE GLYCOL 3350 17 G/17G
0.5 POWDER, FOR SOLUTION ORAL EVERY 12 HOURS
Status: DISCONTINUED | OUTPATIENT
Start: 2024-09-08 | End: 2024-09-08

## 2024-09-08 RX ADMIN — POLYETHYLENE GLYCOL 3350, SODIUM SULFATE ANHYDROUS, SODIUM BICARBONATE, SODIUM CHLORIDE, POTASSIUM CHLORIDE 2000 ML: 236; 22.74; 6.74; 5.86; 2.97 POWDER, FOR SOLUTION ORAL at 17:47

## 2024-09-08 RX ADMIN — Medication 10 ML: at 10:00

## 2024-09-08 RX ADMIN — PANTOPRAZOLE SODIUM 40 MG: 40 INJECTION, POWDER, FOR SOLUTION INTRAVENOUS at 06:41

## 2024-09-08 RX ADMIN — PANTOPRAZOLE SODIUM 40 MG: 40 INJECTION, POWDER, FOR SOLUTION INTRAVENOUS at 17:47

## 2024-09-08 RX ADMIN — Medication 10 ML: at 21:23

## 2024-09-09 ENCOUNTER — ANESTHESIA (OUTPATIENT)
Dept: GASTROENTEROLOGY | Facility: HOSPITAL | Age: 74
End: 2024-09-09
Payer: MEDICARE

## 2024-09-09 ENCOUNTER — ANESTHESIA EVENT (OUTPATIENT)
Dept: GASTROENTEROLOGY | Facility: HOSPITAL | Age: 74
End: 2024-09-09
Payer: MEDICARE

## 2024-09-09 LAB
ALBUMIN SERPL-MCNC: 3.3 G/DL (ref 3.5–5.2)
ANION GAP SERPL CALCULATED.3IONS-SCNC: 14.2 MMOL/L (ref 5–15)
BACTERIA SPEC AEROBE CULT: NORMAL
BACTERIA SPEC AEROBE CULT: NORMAL
BASOPHILS # BLD AUTO: 0.04 10*3/MM3 (ref 0–0.2)
BASOPHILS NFR BLD AUTO: 1 % (ref 0–1.5)
BUN SERPL-MCNC: 33 MG/DL (ref 8–23)
BUN/CREAT SERPL: 7.1 (ref 7–25)
CALCIUM SPEC-SCNC: 8.2 MG/DL (ref 8.6–10.5)
CHLORIDE SERPL-SCNC: 97 MMOL/L (ref 98–107)
CO2 SERPL-SCNC: 23.8 MMOL/L (ref 22–29)
CREAT SERPL-MCNC: 4.64 MG/DL (ref 0.76–1.27)
DEPRECATED RDW RBC AUTO: 56.9 FL (ref 37–54)
EGFRCR SERPLBLD CKD-EPI 2021: 12.7 ML/MIN/1.73
EOSINOPHIL # BLD AUTO: 0.08 10*3/MM3 (ref 0–0.4)
EOSINOPHIL NFR BLD AUTO: 2 % (ref 0.3–6.2)
ERYTHROCYTE [DISTWIDTH] IN BLOOD BY AUTOMATED COUNT: 15.8 % (ref 12.3–15.4)
FERRITIN SERPL-MCNC: 1065 NG/ML (ref 30–400)
GLUCOSE BLDC GLUCOMTR-MCNC: 170 MG/DL (ref 70–130)
GLUCOSE BLDC GLUCOMTR-MCNC: 59 MG/DL (ref 70–130)
GLUCOSE BLDC GLUCOMTR-MCNC: 73 MG/DL (ref 70–130)
GLUCOSE BLDC GLUCOMTR-MCNC: 79 MG/DL (ref 70–130)
GLUCOSE BLDC GLUCOMTR-MCNC: 81 MG/DL (ref 70–130)
GLUCOSE SERPL-MCNC: 82 MG/DL (ref 65–99)
HBV SURFACE AB SER RIA-ACNC: REACTIVE
HCT VFR BLD AUTO: 26 % (ref 37.5–51)
HGB BLD-MCNC: 8.3 G/DL (ref 13–17.7)
IMM GRANULOCYTES # BLD AUTO: 0.02 10*3/MM3 (ref 0–0.05)
IMM GRANULOCYTES NFR BLD AUTO: 0.5 % (ref 0–0.5)
IRON 24H UR-MRATE: 92 MCG/DL (ref 59–158)
IRON SATN MFR SERPL: 40 % (ref 20–50)
LYMPHOCYTES # BLD AUTO: 0.97 10*3/MM3 (ref 0.7–3.1)
LYMPHOCYTES NFR BLD AUTO: 24.6 % (ref 19.6–45.3)
MCH RBC QN AUTO: 32 PG (ref 26.6–33)
MCHC RBC AUTO-ENTMCNC: 31.9 G/DL (ref 31.5–35.7)
MCV RBC AUTO: 100.4 FL (ref 79–97)
MONOCYTES # BLD AUTO: 0.57 10*3/MM3 (ref 0.1–0.9)
MONOCYTES NFR BLD AUTO: 14.4 % (ref 5–12)
NEUTROPHILS NFR BLD AUTO: 2.27 10*3/MM3 (ref 1.7–7)
NEUTROPHILS NFR BLD AUTO: 57.5 % (ref 42.7–76)
NRBC BLD AUTO-RTO: 0.8 /100 WBC (ref 0–0.2)
PHOSPHATE SERPL-MCNC: 4 MG/DL (ref 2.5–4.5)
PLATELET # BLD AUTO: 124 10*3/MM3 (ref 140–450)
PMV BLD AUTO: 12.3 FL (ref 6–12)
POTASSIUM SERPL-SCNC: 3.9 MMOL/L (ref 3.5–5.2)
RBC # BLD AUTO: 2.59 10*6/MM3 (ref 4.14–5.8)
SODIUM SERPL-SCNC: 135 MMOL/L (ref 136–145)
TIBC SERPL-MCNC: 228 MCG/DL (ref 298–536)
TRANSFERRIN SERPL-MCNC: 153 MG/DL (ref 200–360)
WBC NRBC COR # BLD AUTO: 3.95 10*3/MM3 (ref 3.4–10.8)

## 2024-09-09 PROCEDURE — 82948 REAGENT STRIP/BLOOD GLUCOSE: CPT

## 2024-09-09 PROCEDURE — 82728 ASSAY OF FERRITIN: CPT | Performed by: HOSPITALIST

## 2024-09-09 PROCEDURE — 97116 GAIT TRAINING THERAPY: CPT

## 2024-09-09 PROCEDURE — 25810000003 SODIUM CHLORIDE 0.9 % SOLUTION: Performed by: INTERNAL MEDICINE

## 2024-09-09 PROCEDURE — 45380 COLONOSCOPY AND BIOPSY: CPT | Performed by: INTERNAL MEDICINE

## 2024-09-09 PROCEDURE — 25010000002 PROPOFOL 10 MG/ML EMULSION: Performed by: NURSE ANESTHETIST, CERTIFIED REGISTERED

## 2024-09-09 PROCEDURE — 97530 THERAPEUTIC ACTIVITIES: CPT

## 2024-09-09 PROCEDURE — 0DBK8ZZ EXCISION OF ASCENDING COLON, VIA NATURAL OR ARTIFICIAL OPENING ENDOSCOPIC: ICD-10-PCS | Performed by: INTERNAL MEDICINE

## 2024-09-09 PROCEDURE — 45385 COLONOSCOPY W/LESION REMOVAL: CPT | Performed by: INTERNAL MEDICINE

## 2024-09-09 PROCEDURE — 43239 EGD BIOPSY SINGLE/MULTIPLE: CPT | Performed by: INTERNAL MEDICINE

## 2024-09-09 PROCEDURE — 88305 TISSUE EXAM BY PATHOLOGIST: CPT | Performed by: INTERNAL MEDICINE

## 2024-09-09 PROCEDURE — 25010000002 PHENYLEPHRINE 10 MG/ML SOLUTION: Performed by: NURSE ANESTHETIST, CERTIFIED REGISTERED

## 2024-09-09 PROCEDURE — 80069 RENAL FUNCTION PANEL: CPT | Performed by: HOSPITALIST

## 2024-09-09 PROCEDURE — 0DB78ZX EXCISION OF STOMACH, PYLORUS, VIA NATURAL OR ARTIFICIAL OPENING ENDOSCOPIC, DIAGNOSTIC: ICD-10-PCS | Performed by: INTERNAL MEDICINE

## 2024-09-09 PROCEDURE — 0DBL8ZZ EXCISION OF TRANSVERSE COLON, VIA NATURAL OR ARTIFICIAL OPENING ENDOSCOPIC: ICD-10-PCS | Performed by: INTERNAL MEDICINE

## 2024-09-09 PROCEDURE — 83540 ASSAY OF IRON: CPT | Performed by: HOSPITALIST

## 2024-09-09 PROCEDURE — 88342 IMHCHEM/IMCYTCHM 1ST ANTB: CPT | Performed by: INTERNAL MEDICINE

## 2024-09-09 PROCEDURE — 85025 COMPLETE CBC W/AUTO DIFF WBC: CPT | Performed by: STUDENT IN AN ORGANIZED HEALTH CARE EDUCATION/TRAINING PROGRAM

## 2024-09-09 PROCEDURE — 86704 HEP B CORE ANTIBODY TOTAL: CPT | Performed by: INTERNAL MEDICINE

## 2024-09-09 PROCEDURE — 25010000002 GLYCOPYRROLATE 0.2 MG/ML SOLUTION: Performed by: NURSE ANESTHETIST, CERTIFIED REGISTERED

## 2024-09-09 PROCEDURE — 86706 HEP B SURFACE ANTIBODY: CPT | Performed by: INTERNAL MEDICINE

## 2024-09-09 PROCEDURE — 0DBM8ZZ EXCISION OF DESCENDING COLON, VIA NATURAL OR ARTIFICIAL OPENING ENDOSCOPIC: ICD-10-PCS | Performed by: INTERNAL MEDICINE

## 2024-09-09 PROCEDURE — 84466 ASSAY OF TRANSFERRIN: CPT | Performed by: HOSPITALIST

## 2024-09-09 RX ORDER — PHENYLEPHRINE HYDROCHLORIDE 10 MG/ML
INJECTION INTRAVENOUS AS NEEDED
Status: DISCONTINUED | OUTPATIENT
Start: 2024-09-09 | End: 2024-09-09 | Stop reason: SURG

## 2024-09-09 RX ORDER — SODIUM CHLORIDE 9 MG/ML
30 INJECTION, SOLUTION INTRAVENOUS CONTINUOUS PRN
Status: DISCONTINUED | OUTPATIENT
Start: 2024-09-09 | End: 2024-09-17 | Stop reason: HOSPADM

## 2024-09-09 RX ORDER — PROPOFOL 10 MG/ML
VIAL (ML) INTRAVENOUS CONTINUOUS PRN
Status: DISCONTINUED | OUTPATIENT
Start: 2024-09-09 | End: 2024-09-09 | Stop reason: SURG

## 2024-09-09 RX ORDER — GLYCOPYRROLATE 0.2 MG/ML
INJECTION INTRAMUSCULAR; INTRAVENOUS AS NEEDED
Status: DISCONTINUED | OUTPATIENT
Start: 2024-09-09 | End: 2024-09-09 | Stop reason: SURG

## 2024-09-09 RX ORDER — LIDOCAINE HYDROCHLORIDE 20 MG/ML
INJECTION, SOLUTION INFILTRATION; PERINEURAL AS NEEDED
Status: DISCONTINUED | OUTPATIENT
Start: 2024-09-09 | End: 2024-09-09 | Stop reason: SURG

## 2024-09-09 RX ORDER — MANNITOL 250 MG/ML
12.5 INJECTION, SOLUTION INTRAVENOUS AS NEEDED
Status: ACTIVE | OUTPATIENT
Start: 2024-09-10 | End: 2024-09-10

## 2024-09-09 RX ORDER — MANNITOL 250 MG/ML
12.5 INJECTION, SOLUTION INTRAVENOUS AS NEEDED
Status: ACTIVE | OUTPATIENT
Start: 2024-09-09 | End: 2024-09-09

## 2024-09-09 RX ORDER — EPHEDRINE SULFATE 50 MG/ML
INJECTION, SOLUTION INTRAVENOUS AS NEEDED
Status: DISCONTINUED | OUTPATIENT
Start: 2024-09-09 | End: 2024-09-09 | Stop reason: SURG

## 2024-09-09 RX ADMIN — PANTOPRAZOLE SODIUM 40 MG: 40 INJECTION, POWDER, FOR SOLUTION INTRAVENOUS at 05:15

## 2024-09-09 RX ADMIN — GLYCOPYRROLATE 0.2 MG: 0.2 INJECTION INTRAMUSCULAR; INTRAVENOUS at 13:52

## 2024-09-09 RX ADMIN — EPHEDRINE SULFATE 5 MG: 50 INJECTION INTRAVENOUS at 14:16

## 2024-09-09 RX ADMIN — PROPOFOL 30 MG: 10 INJECTION, EMULSION INTRAVENOUS at 13:53

## 2024-09-09 RX ADMIN — Medication 10 ML: at 09:48

## 2024-09-09 RX ADMIN — Medication 10 ML: at 20:43

## 2024-09-09 RX ADMIN — PHENYLEPHRINE HYDROCHLORIDE 100 MCG: 10 INJECTION INTRAVENOUS at 14:03

## 2024-09-09 RX ADMIN — PROPOFOL 180 MCG/KG/MIN: 10 INJECTION, EMULSION INTRAVENOUS at 13:52

## 2024-09-09 RX ADMIN — PANTOPRAZOLE SODIUM 40 MG: 40 INJECTION, POWDER, FOR SOLUTION INTRAVENOUS at 17:00

## 2024-09-09 RX ADMIN — PHENYLEPHRINE HYDROCHLORIDE 100 MCG: 10 INJECTION INTRAVENOUS at 14:09

## 2024-09-09 RX ADMIN — SODIUM CHLORIDE 30 ML/HR: 9 INJECTION, SOLUTION INTRAVENOUS at 13:25

## 2024-09-09 RX ADMIN — PHENYLEPHRINE HYDROCHLORIDE 100 MCG: 10 INJECTION INTRAVENOUS at 14:13

## 2024-09-09 RX ADMIN — PHENYLEPHRINE HYDROCHLORIDE 200 MCG: 10 INJECTION INTRAVENOUS at 13:59

## 2024-09-09 RX ADMIN — LIDOCAINE HYDROCHLORIDE 60 MG: 20 INJECTION, SOLUTION INFILTRATION; PERINEURAL at 13:52

## 2024-09-09 NOTE — ANESTHESIA PREPROCEDURE EVALUATION
Anesthesia Evaluation     Patient summary reviewed and Nursing notes reviewed                Airway   Mallampati: III  TM distance: >3 FB  Neck ROM: limited  Possible difficult intubation  Dental    (+) upper dentures and poor dentition    Pulmonary - negative pulmonary ROS   Cardiovascular     ECG reviewed  PT is on anticoagulation therapy  Rhythm: regular  Rate: normal    (+) hypertension, valvular problems/murmurs TI, past MI  >12 months, CAD, CABG >6 Months, cardiac stents Drug eluting stent more than 12 months ago , CHF , hyperlipidemia      Neuro/Psych  (+) numbness, psychiatric history Anxiety and Depression  GI/Hepatic/Renal/Endo    (+) GI bleeding , renal disease- dialysis and ESRD    Musculoskeletal     (+) neck pain  Abdominal    Substance History   (+) alcohol use     OB/GYN negative ob/gyn ROS         Other   arthritis,                 Anesthesia Plan    ASA 4     MAC     (EF around 30%  Dialysis  Poor dentition  No upper teeth in)  intravenous induction     Anesthetic plan, risks, benefits, and alternatives have been provided, discussed and informed consent has been obtained with: patient.    CODE STATUS:    Code Status (Patient has no pulse and is not breathing): CPR (Attempt to Resuscitate)  Medical Interventions (Patient has pulse or is breathing): Full Support

## 2024-09-09 NOTE — ANESTHESIA POSTPROCEDURE EVALUATION
Patient: Mohamud Jarrett    Procedure Summary       Date: 09/09/24 Room / Location:  [facility]    Anesthesia Start: 1350 Anesthesia Stop: 1432    Procedures:       COLONOSCOPY with cold snare polypectomies      ESOPHAGOGASTRODUODENOSCOPY with biopsies (Esophagus) Diagnosis:       Gastrointestinal hemorrhage, unspecified gastrointestinal hemorrhage type      (Gastrointestinal hemorrhage, unspecified gastrointestinal hemorrhage type [K92.2])    Surgeons: Alvarado Irvin MD Provider: Demar Akbar MD    Anesthesia Type: MAC ASA Status: 4            Anesthesia Type: MAC    Vitals  Vitals Value Taken Time   /59 09/09/24 1449   Temp     Pulse 64 09/09/24 1455   Resp 16 09/09/24 1448   SpO2 95 % 09/09/24 1454   Vitals shown include unfiled device data.        Post Anesthesia Care and Evaluation    Patient location during evaluation: PACU  Patient participation: complete - patient participated  Level of consciousness: awake and alert  Pain management: adequate    Airway patency: patent  Anesthetic complications: No anesthetic complications    Cardiovascular status: acceptable  Respiratory status: acceptable  Hydration status: acceptable    Comments: --------------------            09/09/24               1448     --------------------   BP:       116/59     Pulse:      67       Resp:       16       Temp:                SpO2:      95%      --------------------

## 2024-09-10 ENCOUNTER — APPOINTMENT (OUTPATIENT)
Dept: CARDIOLOGY | Facility: HOSPITAL | Age: 74
DRG: 070 | End: 2024-09-10
Payer: MEDICARE

## 2024-09-10 LAB
ALBUMIN SERPL-MCNC: 3.2 G/DL (ref 3.5–5.2)
ANION GAP SERPL CALCULATED.3IONS-SCNC: 16 MMOL/L (ref 5–15)
BASOPHILS # BLD AUTO: 0.04 10*3/MM3 (ref 0–0.2)
BASOPHILS NFR BLD AUTO: 1 % (ref 0–1.5)
BH CV UPPER VENOUS LEFT AXILLARY AUGMENT: NORMAL
BH CV UPPER VENOUS LEFT AXILLARY COMPRESS: NORMAL
BH CV UPPER VENOUS LEFT AXILLARY PHASIC: NORMAL
BH CV UPPER VENOUS LEFT AXILLARY SPONT: NORMAL
BH CV UPPER VENOUS LEFT BASILIC FOREARM COMPRESS: NORMAL
BH CV UPPER VENOUS LEFT BASILIC UPPER COMPRESS: NORMAL
BH CV UPPER VENOUS LEFT BRACHIAL COMPRESS: NORMAL
BH CV UPPER VENOUS LEFT CEPHALIC FOREARM COMPRESS: NORMAL
BH CV UPPER VENOUS LEFT CEPHALIC UPPER COMPRESS: NORMAL
BH CV UPPER VENOUS LEFT INTERNAL JUGULAR COMPRESS: NORMAL
BH CV UPPER VENOUS LEFT INTERNAL JUGULAR PHASIC: NORMAL
BH CV UPPER VENOUS LEFT INTERNAL JUGULAR SPONT: NORMAL
BH CV UPPER VENOUS LEFT RADIAL COMPRESS: NORMAL
BH CV UPPER VENOUS LEFT SUBCLAVIAN AUGMENT: NORMAL
BH CV UPPER VENOUS LEFT SUBCLAVIAN COMPRESS: NORMAL
BH CV UPPER VENOUS LEFT SUBCLAVIAN PHASIC: NORMAL
BH CV UPPER VENOUS LEFT SUBCLAVIAN SPONT: NORMAL
BH CV UPPER VENOUS LEFT ULNAR COMPRESS: NORMAL
BH CV UPPER VENOUS RIGHT INTERNAL JUGULAR AUGMENT: NORMAL
BH CV UPPER VENOUS RIGHT INTERNAL JUGULAR COMPRESS: NORMAL
BH CV UPPER VENOUS RIGHT INTERNAL JUGULAR PHASIC: NORMAL
BH CV UPPER VENOUS RIGHT INTERNAL JUGULAR SPONT: NORMAL
BH CV UPPER VENOUS RIGHT SUBCLAVIAN AUGMENT: NORMAL
BH CV UPPER VENOUS RIGHT SUBCLAVIAN COMPRESS: NORMAL
BH CV UPPER VENOUS RIGHT SUBCLAVIAN PHASIC: NORMAL
BH CV UPPER VENOUS RIGHT SUBCLAVIAN SPONT: NORMAL
BUN SERPL-MCNC: 41 MG/DL (ref 8–23)
BUN/CREAT SERPL: 8.2 (ref 7–25)
CALCIUM SPEC-SCNC: 8.1 MG/DL (ref 8.6–10.5)
CHLORIDE SERPL-SCNC: 98 MMOL/L (ref 98–107)
CO2 SERPL-SCNC: 22 MMOL/L (ref 22–29)
CREAT SERPL-MCNC: 5.02 MG/DL (ref 0.76–1.27)
DEPRECATED RDW RBC AUTO: 54.9 FL (ref 37–54)
EGFRCR SERPLBLD CKD-EPI 2021: 11.5 ML/MIN/1.73
EOSINOPHIL # BLD AUTO: 0.09 10*3/MM3 (ref 0–0.4)
EOSINOPHIL NFR BLD AUTO: 2.2 % (ref 0.3–6.2)
ERYTHROCYTE [DISTWIDTH] IN BLOOD BY AUTOMATED COUNT: 15.6 % (ref 12.3–15.4)
GLUCOSE BLDC GLUCOMTR-MCNC: 107 MG/DL (ref 70–130)
GLUCOSE BLDC GLUCOMTR-MCNC: 96 MG/DL (ref 70–130)
GLUCOSE SERPL-MCNC: 74 MG/DL (ref 65–99)
HBV CORE AB SERPL QL IA: NEGATIVE
HCT VFR BLD AUTO: 26.3 % (ref 37.5–51)
HGB BLD-MCNC: 8.4 G/DL (ref 13–17.7)
IMM GRANULOCYTES # BLD AUTO: 0.02 10*3/MM3 (ref 0–0.05)
IMM GRANULOCYTES NFR BLD AUTO: 0.5 % (ref 0–0.5)
LYMPHOCYTES # BLD AUTO: 0.75 10*3/MM3 (ref 0.7–3.1)
LYMPHOCYTES NFR BLD AUTO: 18.2 % (ref 19.6–45.3)
MCH RBC QN AUTO: 31.1 PG (ref 26.6–33)
MCHC RBC AUTO-ENTMCNC: 31.9 G/DL (ref 31.5–35.7)
MCV RBC AUTO: 97.4 FL (ref 79–97)
MONOCYTES # BLD AUTO: 0.45 10*3/MM3 (ref 0.1–0.9)
MONOCYTES NFR BLD AUTO: 10.9 % (ref 5–12)
NEUTROPHILS NFR BLD AUTO: 2.76 10*3/MM3 (ref 1.7–7)
NEUTROPHILS NFR BLD AUTO: 67.2 % (ref 42.7–76)
NRBC BLD AUTO-RTO: 1 /100 WBC (ref 0–0.2)
PHOSPHATE SERPL-MCNC: 4.8 MG/DL (ref 2.5–4.5)
PLATELET # BLD AUTO: 141 10*3/MM3 (ref 140–450)
PMV BLD AUTO: 12.4 FL (ref 6–12)
POTASSIUM SERPL-SCNC: 4.8 MMOL/L (ref 3.5–5.2)
RBC # BLD AUTO: 2.7 10*6/MM3 (ref 4.14–5.8)
SODIUM SERPL-SCNC: 136 MMOL/L (ref 136–145)
WBC NRBC COR # BLD AUTO: 4.11 10*3/MM3 (ref 3.4–10.8)

## 2024-09-10 PROCEDURE — 82948 REAGENT STRIP/BLOOD GLUCOSE: CPT

## 2024-09-10 PROCEDURE — 93971 EXTREMITY STUDY: CPT | Performed by: SURGERY

## 2024-09-10 PROCEDURE — 85025 COMPLETE CBC W/AUTO DIFF WBC: CPT | Performed by: INTERNAL MEDICINE

## 2024-09-10 PROCEDURE — 99232 SBSQ HOSP IP/OBS MODERATE 35: CPT

## 2024-09-10 PROCEDURE — 80069 RENAL FUNCTION PANEL: CPT | Performed by: INTERNAL MEDICINE

## 2024-09-10 PROCEDURE — 93971 EXTREMITY STUDY: CPT

## 2024-09-10 RX ORDER — ACETAMINOPHEN 325 MG/1
650 TABLET ORAL EVERY 6 HOURS PRN
Status: DISCONTINUED | OUTPATIENT
Start: 2024-09-10 | End: 2024-09-17 | Stop reason: HOSPADM

## 2024-09-10 RX ORDER — TAMSULOSIN HYDROCHLORIDE 0.4 MG/1
0.4 CAPSULE ORAL DAILY
Status: DISCONTINUED | OUTPATIENT
Start: 2024-09-10 | End: 2024-09-17 | Stop reason: HOSPADM

## 2024-09-10 RX ORDER — ASPIRIN 81 MG/1
81 TABLET, CHEWABLE ORAL DAILY
Status: DISCONTINUED | OUTPATIENT
Start: 2024-09-10 | End: 2024-09-17 | Stop reason: HOSPADM

## 2024-09-10 RX ORDER — MUSCLE RUB CREAM 100; 150 MG/G; MG/G
1 CREAM TOPICAL
Status: DISCONTINUED | OUTPATIENT
Start: 2024-09-10 | End: 2024-09-17 | Stop reason: HOSPADM

## 2024-09-10 RX ORDER — PANTOPRAZOLE SODIUM 40 MG/1
40 TABLET, DELAYED RELEASE ORAL
Status: DISCONTINUED | OUTPATIENT
Start: 2024-09-10 | End: 2024-09-17 | Stop reason: HOSPADM

## 2024-09-10 RX ADMIN — PANTOPRAZOLE SODIUM 40 MG: 40 TABLET, DELAYED RELEASE ORAL at 17:11

## 2024-09-10 RX ADMIN — ACETAMINOPHEN 325MG 650 MG: 325 TABLET ORAL at 00:31

## 2024-09-10 RX ADMIN — Medication 10 ML: at 08:08

## 2024-09-10 RX ADMIN — Medication 10 ML: at 21:26

## 2024-09-10 RX ADMIN — ASPIRIN 81 MG: 81 TABLET, CHEWABLE ORAL at 13:51

## 2024-09-10 RX ADMIN — PANTOPRAZOLE SODIUM 40 MG: 40 INJECTION, POWDER, FOR SOLUTION INTRAVENOUS at 05:15

## 2024-09-10 RX ADMIN — TAMSULOSIN HYDROCHLORIDE 0.4 MG: 0.4 CAPSULE ORAL at 17:16

## 2024-09-11 LAB
ALBUMIN SERPL-MCNC: 3.3 G/DL (ref 3.5–5.2)
ANION GAP SERPL CALCULATED.3IONS-SCNC: 12.4 MMOL/L (ref 5–15)
BASOPHILS # BLD AUTO: 0.03 10*3/MM3 (ref 0–0.2)
BASOPHILS NFR BLD AUTO: 0.8 % (ref 0–1.5)
BUN SERPL-MCNC: 24 MG/DL (ref 8–23)
BUN/CREAT SERPL: 6 (ref 7–25)
CALCIUM SPEC-SCNC: 8.3 MG/DL (ref 8.6–10.5)
CHLORIDE SERPL-SCNC: 97 MMOL/L (ref 98–107)
CO2 SERPL-SCNC: 26.6 MMOL/L (ref 22–29)
CREAT SERPL-MCNC: 4 MG/DL (ref 0.76–1.27)
DEPRECATED RDW RBC AUTO: 58.9 FL (ref 37–54)
EGFRCR SERPLBLD CKD-EPI 2021: 15.2 ML/MIN/1.73
EOSINOPHIL # BLD AUTO: 0.04 10*3/MM3 (ref 0–0.4)
EOSINOPHIL NFR BLD AUTO: 1 % (ref 0.3–6.2)
ERYTHROCYTE [DISTWIDTH] IN BLOOD BY AUTOMATED COUNT: 16 % (ref 12.3–15.4)
GLUCOSE BLDC GLUCOMTR-MCNC: 104 MG/DL (ref 70–130)
GLUCOSE BLDC GLUCOMTR-MCNC: 76 MG/DL (ref 70–130)
GLUCOSE BLDC GLUCOMTR-MCNC: 86 MG/DL (ref 70–130)
GLUCOSE BLDC GLUCOMTR-MCNC: 97 MG/DL (ref 70–130)
GLUCOSE SERPL-MCNC: 91 MG/DL (ref 65–99)
HCT VFR BLD AUTO: 28.4 % (ref 37.5–51)
HGB BLD-MCNC: 8.9 G/DL (ref 13–17.7)
IMM GRANULOCYTES # BLD AUTO: 0.01 10*3/MM3 (ref 0–0.05)
IMM GRANULOCYTES NFR BLD AUTO: 0.3 % (ref 0–0.5)
LYMPHOCYTES # BLD AUTO: 0.76 10*3/MM3 (ref 0.7–3.1)
LYMPHOCYTES NFR BLD AUTO: 19.4 % (ref 19.6–45.3)
MCH RBC QN AUTO: 31.6 PG (ref 26.6–33)
MCHC RBC AUTO-ENTMCNC: 31.3 G/DL (ref 31.5–35.7)
MCV RBC AUTO: 100.7 FL (ref 79–97)
MONOCYTES # BLD AUTO: 0.34 10*3/MM3 (ref 0.1–0.9)
MONOCYTES NFR BLD AUTO: 8.7 % (ref 5–12)
NEUTROPHILS NFR BLD AUTO: 2.74 10*3/MM3 (ref 1.7–7)
NEUTROPHILS NFR BLD AUTO: 69.8 % (ref 42.7–76)
NRBC BLD AUTO-RTO: 0 /100 WBC (ref 0–0.2)
PHOSPHATE SERPL-MCNC: 3.2 MG/DL (ref 2.5–4.5)
PLATELET # BLD AUTO: 146 10*3/MM3 (ref 140–450)
PMV BLD AUTO: 11.2 FL (ref 6–12)
POTASSIUM SERPL-SCNC: 4.3 MMOL/L (ref 3.5–5.2)
RBC # BLD AUTO: 2.82 10*6/MM3 (ref 4.14–5.8)
SODIUM SERPL-SCNC: 136 MMOL/L (ref 136–145)
WBC NRBC COR # BLD AUTO: 3.92 10*3/MM3 (ref 3.4–10.8)

## 2024-09-11 PROCEDURE — 99232 SBSQ HOSP IP/OBS MODERATE 35: CPT | Performed by: NURSE PRACTITIONER

## 2024-09-11 PROCEDURE — 85025 COMPLETE CBC W/AUTO DIFF WBC: CPT | Performed by: INTERNAL MEDICINE

## 2024-09-11 PROCEDURE — 97116 GAIT TRAINING THERAPY: CPT

## 2024-09-11 PROCEDURE — 80069 RENAL FUNCTION PANEL: CPT | Performed by: INTERNAL MEDICINE

## 2024-09-11 PROCEDURE — 97110 THERAPEUTIC EXERCISES: CPT

## 2024-09-11 PROCEDURE — 82948 REAGENT STRIP/BLOOD GLUCOSE: CPT

## 2024-09-11 PROCEDURE — 97535 SELF CARE MNGMENT TRAINING: CPT

## 2024-09-11 RX ORDER — MANNITOL 250 MG/ML
12.5 INJECTION, SOLUTION INTRAVENOUS AS NEEDED
Status: CANCELLED | OUTPATIENT
Start: 2024-09-12 | End: 2024-09-12

## 2024-09-11 RX ADMIN — ANTACID TABLETS 2 TABLET: 500 TABLET, CHEWABLE ORAL at 05:27

## 2024-09-11 RX ADMIN — Medication 10 ML: at 09:22

## 2024-09-11 RX ADMIN — TAMSULOSIN HYDROCHLORIDE 0.4 MG: 0.4 CAPSULE ORAL at 09:22

## 2024-09-11 RX ADMIN — Medication 10 ML: at 20:19

## 2024-09-11 RX ADMIN — ASPIRIN 81 MG: 81 TABLET, CHEWABLE ORAL at 09:22

## 2024-09-11 RX ADMIN — PANTOPRAZOLE SODIUM 40 MG: 40 TABLET, DELAYED RELEASE ORAL at 05:26

## 2024-09-11 RX ADMIN — PANTOPRAZOLE SODIUM 40 MG: 40 TABLET, DELAYED RELEASE ORAL at 17:20

## 2024-09-12 LAB
ANION GAP SERPL CALCULATED.3IONS-SCNC: 15 MMOL/L (ref 5–15)
BASOPHILS # BLD AUTO: 0.05 10*3/MM3 (ref 0–0.2)
BASOPHILS NFR BLD AUTO: 1.3 % (ref 0–1.5)
BUN SERPL-MCNC: 32 MG/DL (ref 8–23)
BUN/CREAT SERPL: 6.3 (ref 7–25)
CALCIUM SPEC-SCNC: 8.3 MG/DL (ref 8.6–10.5)
CHLORIDE SERPL-SCNC: 97 MMOL/L (ref 98–107)
CO2 SERPL-SCNC: 24 MMOL/L (ref 22–29)
CREAT SERPL-MCNC: 5.09 MG/DL (ref 0.76–1.27)
CYTO UR: NORMAL
DEPRECATED RDW RBC AUTO: 57 FL (ref 37–54)
EGFRCR SERPLBLD CKD-EPI 2021: 11.3 ML/MIN/1.73
EOSINOPHIL # BLD AUTO: 0.07 10*3/MM3 (ref 0–0.4)
EOSINOPHIL NFR BLD AUTO: 1.8 % (ref 0.3–6.2)
ERYTHROCYTE [DISTWIDTH] IN BLOOD BY AUTOMATED COUNT: 15.8 % (ref 12.3–15.4)
FOLATE SERPL-MCNC: 5.25 NG/ML (ref 4.78–24.2)
GLUCOSE BLDC GLUCOMTR-MCNC: 100 MG/DL (ref 70–130)
GLUCOSE BLDC GLUCOMTR-MCNC: 103 MG/DL (ref 70–130)
GLUCOSE BLDC GLUCOMTR-MCNC: 134 MG/DL (ref 70–130)
GLUCOSE BLDC GLUCOMTR-MCNC: 150 MG/DL (ref 70–130)
GLUCOSE SERPL-MCNC: 92 MG/DL (ref 65–99)
HCT VFR BLD AUTO: 27.3 % (ref 37.5–51)
HGB BLD-MCNC: 8.5 G/DL (ref 13–17.7)
IMM GRANULOCYTES # BLD AUTO: 0.02 10*3/MM3 (ref 0–0.05)
IMM GRANULOCYTES NFR BLD AUTO: 0.5 % (ref 0–0.5)
LAB AP CASE REPORT: NORMAL
LAB AP DIAGNOSIS COMMENT: NORMAL
LYMPHOCYTES # BLD AUTO: 0.85 10*3/MM3 (ref 0.7–3.1)
LYMPHOCYTES NFR BLD AUTO: 21.3 % (ref 19.6–45.3)
MCH RBC QN AUTO: 30.9 PG (ref 26.6–33)
MCHC RBC AUTO-ENTMCNC: 31.1 G/DL (ref 31.5–35.7)
MCV RBC AUTO: 99.3 FL (ref 79–97)
MONOCYTES # BLD AUTO: 0.44 10*3/MM3 (ref 0.1–0.9)
MONOCYTES NFR BLD AUTO: 11 % (ref 5–12)
NEUTROPHILS NFR BLD AUTO: 2.56 10*3/MM3 (ref 1.7–7)
NEUTROPHILS NFR BLD AUTO: 64.1 % (ref 42.7–76)
NRBC BLD AUTO-RTO: 0 /100 WBC (ref 0–0.2)
PATH REPORT.FINAL DX SPEC: NORMAL
PATH REPORT.GROSS SPEC: NORMAL
PLATELET # BLD AUTO: 145 10*3/MM3 (ref 140–450)
PMV BLD AUTO: 11.6 FL (ref 6–12)
POTASSIUM SERPL-SCNC: 4.5 MMOL/L (ref 3.5–5.2)
RBC # BLD AUTO: 2.75 10*6/MM3 (ref 4.14–5.8)
SODIUM SERPL-SCNC: 136 MMOL/L (ref 136–145)
VIT B12 BLD-MCNC: 1654 PG/ML (ref 211–946)
WBC NRBC COR # BLD AUTO: 3.99 10*3/MM3 (ref 3.4–10.8)

## 2024-09-12 PROCEDURE — 85025 COMPLETE CBC W/AUTO DIFF WBC: CPT | Performed by: INTERNAL MEDICINE

## 2024-09-12 PROCEDURE — 99232 SBSQ HOSP IP/OBS MODERATE 35: CPT

## 2024-09-12 PROCEDURE — 86231 EMA EACH IG CLASS: CPT | Performed by: NURSE PRACTITIONER

## 2024-09-12 PROCEDURE — 82784 ASSAY IGA/IGD/IGG/IGM EACH: CPT | Performed by: NURSE PRACTITIONER

## 2024-09-12 PROCEDURE — 86364 TISS TRNSGLTMNASE EA IG CLAS: CPT | Performed by: NURSE PRACTITIONER

## 2024-09-12 PROCEDURE — 97116 GAIT TRAINING THERAPY: CPT

## 2024-09-12 PROCEDURE — 82948 REAGENT STRIP/BLOOD GLUCOSE: CPT

## 2024-09-12 PROCEDURE — 82746 ASSAY OF FOLIC ACID SERUM: CPT | Performed by: STUDENT IN AN ORGANIZED HEALTH CARE EDUCATION/TRAINING PROGRAM

## 2024-09-12 PROCEDURE — 80048 BASIC METABOLIC PNL TOTAL CA: CPT | Performed by: STUDENT IN AN ORGANIZED HEALTH CARE EDUCATION/TRAINING PROGRAM

## 2024-09-12 PROCEDURE — 82607 VITAMIN B-12: CPT | Performed by: STUDENT IN AN ORGANIZED HEALTH CARE EDUCATION/TRAINING PROGRAM

## 2024-09-12 RX ADMIN — ASPIRIN 81 MG: 81 TABLET, CHEWABLE ORAL at 08:06

## 2024-09-12 RX ADMIN — Medication 10 ML: at 21:14

## 2024-09-12 RX ADMIN — PANTOPRAZOLE SODIUM 40 MG: 40 TABLET, DELAYED RELEASE ORAL at 16:53

## 2024-09-12 RX ADMIN — PANTOPRAZOLE SODIUM 40 MG: 40 TABLET, DELAYED RELEASE ORAL at 05:53

## 2024-09-12 RX ADMIN — Medication 10 ML: at 08:07

## 2024-09-12 RX ADMIN — TAMSULOSIN HYDROCHLORIDE 0.4 MG: 0.4 CAPSULE ORAL at 08:07

## 2024-09-13 LAB
ALBUMIN SERPL-MCNC: 3 G/DL (ref 3.5–5.2)
ANION GAP SERPL CALCULATED.3IONS-SCNC: 11.8 MMOL/L (ref 5–15)
BASOPHILS # BLD AUTO: 0.03 10*3/MM3 (ref 0–0.2)
BASOPHILS NFR BLD AUTO: 0.8 % (ref 0–1.5)
BUN SERPL-MCNC: 21 MG/DL (ref 8–23)
BUN/CREAT SERPL: 5.6 (ref 7–25)
CALCIUM SPEC-SCNC: 8 MG/DL (ref 8.6–10.5)
CHLORIDE SERPL-SCNC: 103 MMOL/L (ref 98–107)
CO2 SERPL-SCNC: 22.2 MMOL/L (ref 22–29)
CREAT SERPL-MCNC: 3.75 MG/DL (ref 0.76–1.27)
DEPRECATED RDW RBC AUTO: 54.5 FL (ref 37–54)
EGFRCR SERPLBLD CKD-EPI 2021: 16.4 ML/MIN/1.73
EOSINOPHIL # BLD AUTO: 0.03 10*3/MM3 (ref 0–0.4)
EOSINOPHIL NFR BLD AUTO: 0.8 % (ref 0.3–6.2)
ERYTHROCYTE [DISTWIDTH] IN BLOOD BY AUTOMATED COUNT: 15.4 % (ref 12.3–15.4)
GLUCOSE BLDC GLUCOMTR-MCNC: 113 MG/DL (ref 70–130)
GLUCOSE BLDC GLUCOMTR-MCNC: 127 MG/DL (ref 70–130)
GLUCOSE BLDC GLUCOMTR-MCNC: 181 MG/DL (ref 70–130)
GLUCOSE BLDC GLUCOMTR-MCNC: 290 MG/DL (ref 70–130)
GLUCOSE SERPL-MCNC: 108 MG/DL (ref 65–99)
HCT VFR BLD AUTO: 23.6 % (ref 37.5–51)
HCT VFR BLD AUTO: 24.8 % (ref 37.5–51)
HGB BLD-MCNC: 7.5 G/DL (ref 13–17.7)
HGB BLD-MCNC: 8 G/DL (ref 13–17.7)
IMM GRANULOCYTES # BLD AUTO: 0.01 10*3/MM3 (ref 0–0.05)
IMM GRANULOCYTES NFR BLD AUTO: 0.3 % (ref 0–0.5)
LYMPHOCYTES # BLD AUTO: 0.73 10*3/MM3 (ref 0.7–3.1)
LYMPHOCYTES NFR BLD AUTO: 19.7 % (ref 19.6–45.3)
MCH RBC QN AUTO: 31.3 PG (ref 26.6–33)
MCHC RBC AUTO-ENTMCNC: 31.8 G/DL (ref 31.5–35.7)
MCV RBC AUTO: 98.3 FL (ref 79–97)
MONOCYTES # BLD AUTO: 0.52 10*3/MM3 (ref 0.1–0.9)
MONOCYTES NFR BLD AUTO: 14 % (ref 5–12)
NEUTROPHILS NFR BLD AUTO: 2.39 10*3/MM3 (ref 1.7–7)
NEUTROPHILS NFR BLD AUTO: 64.4 % (ref 42.7–76)
NRBC BLD AUTO-RTO: 0 /100 WBC (ref 0–0.2)
PHOSPHATE SERPL-MCNC: 2.7 MG/DL (ref 2.5–4.5)
PLATELET # BLD AUTO: 129 10*3/MM3 (ref 140–450)
PMV BLD AUTO: 11.7 FL (ref 6–12)
POTASSIUM SERPL-SCNC: 4.1 MMOL/L (ref 3.5–5.2)
RBC # BLD AUTO: 2.4 10*6/MM3 (ref 4.14–5.8)
SODIUM SERPL-SCNC: 137 MMOL/L (ref 136–145)
WBC NRBC COR # BLD AUTO: 3.71 10*3/MM3 (ref 3.4–10.8)

## 2024-09-13 PROCEDURE — 85025 COMPLETE CBC W/AUTO DIFF WBC: CPT | Performed by: INTERNAL MEDICINE

## 2024-09-13 PROCEDURE — 97535 SELF CARE MNGMENT TRAINING: CPT

## 2024-09-13 PROCEDURE — 80069 RENAL FUNCTION PANEL: CPT | Performed by: INTERNAL MEDICINE

## 2024-09-13 PROCEDURE — 85014 HEMATOCRIT: CPT | Performed by: STUDENT IN AN ORGANIZED HEALTH CARE EDUCATION/TRAINING PROGRAM

## 2024-09-13 PROCEDURE — 85018 HEMOGLOBIN: CPT | Performed by: STUDENT IN AN ORGANIZED HEALTH CARE EDUCATION/TRAINING PROGRAM

## 2024-09-13 PROCEDURE — 82948 REAGENT STRIP/BLOOD GLUCOSE: CPT

## 2024-09-13 RX ORDER — MANNITOL 250 MG/ML
12.5 INJECTION, SOLUTION INTRAVENOUS AS NEEDED
Status: CANCELLED | OUTPATIENT
Start: 2024-09-14 | End: 2024-09-14

## 2024-09-13 RX ORDER — HYDROXYZINE HYDROCHLORIDE 25 MG/1
25 TABLET, FILM COATED ORAL 3 TIMES DAILY PRN
Status: DISCONTINUED | OUTPATIENT
Start: 2024-09-13 | End: 2024-09-17 | Stop reason: HOSPADM

## 2024-09-13 RX ADMIN — PANTOPRAZOLE SODIUM 40 MG: 40 TABLET, DELAYED RELEASE ORAL at 07:07

## 2024-09-13 RX ADMIN — TAMSULOSIN HYDROCHLORIDE 0.4 MG: 0.4 CAPSULE ORAL at 08:11

## 2024-09-13 RX ADMIN — PANTOPRAZOLE SODIUM 40 MG: 40 TABLET, DELAYED RELEASE ORAL at 17:19

## 2024-09-13 RX ADMIN — Medication 10 ML: at 20:14

## 2024-09-13 RX ADMIN — HYDROXYZINE HYDROCHLORIDE 25 MG: 25 TABLET ORAL at 18:20

## 2024-09-13 RX ADMIN — ASPIRIN 81 MG: 81 TABLET, CHEWABLE ORAL at 08:11

## 2024-09-13 RX ADMIN — Medication 10 ML: at 08:11

## 2024-09-14 LAB
ALBUMIN SERPL-MCNC: 3 G/DL (ref 3.5–5.2)
ANION GAP SERPL CALCULATED.3IONS-SCNC: 12.1 MMOL/L (ref 5–15)
BASOPHILS # BLD AUTO: 0.05 10*3/MM3 (ref 0–0.2)
BASOPHILS NFR BLD AUTO: 1.2 % (ref 0–1.5)
BUN SERPL-MCNC: 37 MG/DL (ref 8–23)
BUN/CREAT SERPL: 7.7 (ref 7–25)
CALCIUM SPEC-SCNC: 8 MG/DL (ref 8.6–10.5)
CHLORIDE SERPL-SCNC: 101 MMOL/L (ref 98–107)
CO2 SERPL-SCNC: 21.9 MMOL/L (ref 22–29)
CREAT SERPL-MCNC: 4.82 MG/DL (ref 0.76–1.27)
DEPRECATED RDW RBC AUTO: 53.5 FL (ref 37–54)
EGFRCR SERPLBLD CKD-EPI 2021: 12.1 ML/MIN/1.73
ENDOMYSIUM IGA SER QL: NEGATIVE
EOSINOPHIL # BLD AUTO: 0.06 10*3/MM3 (ref 0–0.4)
EOSINOPHIL NFR BLD AUTO: 1.4 % (ref 0.3–6.2)
ERYTHROCYTE [DISTWIDTH] IN BLOOD BY AUTOMATED COUNT: 15.3 % (ref 12.3–15.4)
GLUCOSE BLDC GLUCOMTR-MCNC: 110 MG/DL (ref 70–130)
GLUCOSE BLDC GLUCOMTR-MCNC: 78 MG/DL (ref 70–130)
GLUCOSE BLDC GLUCOMTR-MCNC: 90 MG/DL (ref 70–130)
GLUCOSE BLDC GLUCOMTR-MCNC: 91 MG/DL (ref 70–130)
GLUCOSE SERPL-MCNC: 84 MG/DL (ref 65–99)
HCT VFR BLD AUTO: 24.8 % (ref 37.5–51)
HGB BLD-MCNC: 7.9 G/DL (ref 13–17.7)
IGA SERPL-MCNC: 554 MG/DL (ref 61–437)
IMM GRANULOCYTES # BLD AUTO: 0.02 10*3/MM3 (ref 0–0.05)
IMM GRANULOCYTES NFR BLD AUTO: 0.5 % (ref 0–0.5)
LYMPHOCYTES # BLD AUTO: 0.85 10*3/MM3 (ref 0.7–3.1)
LYMPHOCYTES NFR BLD AUTO: 19.9 % (ref 19.6–45.3)
MCH RBC QN AUTO: 31 PG (ref 26.6–33)
MCHC RBC AUTO-ENTMCNC: 31.9 G/DL (ref 31.5–35.7)
MCV RBC AUTO: 97.3 FL (ref 79–97)
MONOCYTES # BLD AUTO: 0.59 10*3/MM3 (ref 0.1–0.9)
MONOCYTES NFR BLD AUTO: 13.8 % (ref 5–12)
NEUTROPHILS NFR BLD AUTO: 2.7 10*3/MM3 (ref 1.7–7)
NEUTROPHILS NFR BLD AUTO: 63.2 % (ref 42.7–76)
NRBC BLD AUTO-RTO: 0 /100 WBC (ref 0–0.2)
PHOSPHATE SERPL-MCNC: 2.7 MG/DL (ref 2.5–4.5)
PLATELET # BLD AUTO: 137 10*3/MM3 (ref 140–450)
PMV BLD AUTO: 11.7 FL (ref 6–12)
POTASSIUM SERPL-SCNC: 4.8 MMOL/L (ref 3.5–5.2)
RBC # BLD AUTO: 2.55 10*6/MM3 (ref 4.14–5.8)
SODIUM SERPL-SCNC: 135 MMOL/L (ref 136–145)
TTG IGA SER-ACNC: <2 U/ML (ref 0–3)
WBC NRBC COR # BLD AUTO: 4.27 10*3/MM3 (ref 3.4–10.8)

## 2024-09-14 PROCEDURE — 82948 REAGENT STRIP/BLOOD GLUCOSE: CPT

## 2024-09-14 PROCEDURE — 85025 COMPLETE CBC W/AUTO DIFF WBC: CPT | Performed by: INTERNAL MEDICINE

## 2024-09-14 PROCEDURE — 97530 THERAPEUTIC ACTIVITIES: CPT

## 2024-09-14 PROCEDURE — 80069 RENAL FUNCTION PANEL: CPT | Performed by: INTERNAL MEDICINE

## 2024-09-14 RX ADMIN — ASPIRIN 81 MG: 81 TABLET, CHEWABLE ORAL at 13:05

## 2024-09-14 RX ADMIN — TAMSULOSIN HYDROCHLORIDE 0.4 MG: 0.4 CAPSULE ORAL at 13:04

## 2024-09-14 RX ADMIN — PANTOPRAZOLE SODIUM 40 MG: 40 TABLET, DELAYED RELEASE ORAL at 06:41

## 2024-09-14 RX ADMIN — PANTOPRAZOLE SODIUM 40 MG: 40 TABLET, DELAYED RELEASE ORAL at 17:12

## 2024-09-14 RX ADMIN — Medication 10 ML: at 20:29

## 2024-09-14 RX ADMIN — HYDROXYZINE HYDROCHLORIDE 25 MG: 25 TABLET ORAL at 02:20

## 2024-09-15 LAB
DEPRECATED RDW RBC AUTO: 55.4 FL (ref 37–54)
ERYTHROCYTE [DISTWIDTH] IN BLOOD BY AUTOMATED COUNT: 15.4 % (ref 12.3–15.4)
GLUCOSE BLDC GLUCOMTR-MCNC: 90 MG/DL (ref 70–130)
GLUCOSE BLDC GLUCOMTR-MCNC: 94 MG/DL (ref 70–130)
GLUCOSE BLDC GLUCOMTR-MCNC: 96 MG/DL (ref 70–130)
GLUCOSE BLDC GLUCOMTR-MCNC: 97 MG/DL (ref 70–130)
HCT VFR BLD AUTO: 25.5 % (ref 37.5–51)
HGB BLD-MCNC: 8.1 G/DL (ref 13–17.7)
MCH RBC QN AUTO: 31.5 PG (ref 26.6–33)
MCHC RBC AUTO-ENTMCNC: 31.8 G/DL (ref 31.5–35.7)
MCV RBC AUTO: 99.2 FL (ref 79–97)
PLATELET # BLD AUTO: 131 10*3/MM3 (ref 140–450)
PMV BLD AUTO: 11.6 FL (ref 6–12)
RBC # BLD AUTO: 2.57 10*6/MM3 (ref 4.14–5.8)
WBC NRBC COR # BLD AUTO: 4.35 10*3/MM3 (ref 3.4–10.8)

## 2024-09-15 PROCEDURE — 82948 REAGENT STRIP/BLOOD GLUCOSE: CPT

## 2024-09-15 PROCEDURE — 85027 COMPLETE CBC AUTOMATED: CPT | Performed by: INTERNAL MEDICINE

## 2024-09-15 RX ADMIN — ASPIRIN 81 MG: 81 TABLET, CHEWABLE ORAL at 08:08

## 2024-09-15 RX ADMIN — PANTOPRAZOLE SODIUM 40 MG: 40 TABLET, DELAYED RELEASE ORAL at 16:59

## 2024-09-15 RX ADMIN — TAMSULOSIN HYDROCHLORIDE 0.4 MG: 0.4 CAPSULE ORAL at 08:08

## 2024-09-15 RX ADMIN — Medication 10 ML: at 08:08

## 2024-09-15 RX ADMIN — PANTOPRAZOLE SODIUM 40 MG: 40 TABLET, DELAYED RELEASE ORAL at 06:50

## 2024-09-15 RX ADMIN — Medication 10 ML: at 21:55

## 2024-09-16 LAB
GLUCOSE BLDC GLUCOMTR-MCNC: 100 MG/DL (ref 70–130)
GLUCOSE BLDC GLUCOMTR-MCNC: 106 MG/DL (ref 70–130)
GLUCOSE BLDC GLUCOMTR-MCNC: 109 MG/DL (ref 70–130)
GLUCOSE BLDC GLUCOMTR-MCNC: 110 MG/DL (ref 70–130)

## 2024-09-16 PROCEDURE — 97530 THERAPEUTIC ACTIVITIES: CPT

## 2024-09-16 PROCEDURE — 82948 REAGENT STRIP/BLOOD GLUCOSE: CPT

## 2024-09-16 RX ORDER — HYDROXYZINE HYDROCHLORIDE 25 MG/1
25 TABLET, FILM COATED ORAL 3 TIMES DAILY PRN
Start: 2024-09-16 | End: 2024-09-17

## 2024-09-16 RX ORDER — PANTOPRAZOLE SODIUM 40 MG/1
40 TABLET, DELAYED RELEASE ORAL
Start: 2024-09-16 | End: 2024-09-17

## 2024-09-16 RX ORDER — ACETAMINOPHEN 325 MG/1
650 TABLET ORAL EVERY 6 HOURS PRN
Start: 2024-09-16

## 2024-09-16 RX ORDER — HYDROXYZINE HYDROCHLORIDE 25 MG/1
25 TABLET, FILM COATED ORAL ONCE
Status: COMPLETED | OUTPATIENT
Start: 2024-09-16 | End: 2024-09-16

## 2024-09-16 RX ADMIN — Medication 10 ML: at 20:34

## 2024-09-16 RX ADMIN — ASPIRIN 81 MG: 81 TABLET, CHEWABLE ORAL at 08:42

## 2024-09-16 RX ADMIN — Medication 10 ML: at 08:42

## 2024-09-16 RX ADMIN — PANTOPRAZOLE SODIUM 40 MG: 40 TABLET, DELAYED RELEASE ORAL at 17:37

## 2024-09-16 RX ADMIN — TAMSULOSIN HYDROCHLORIDE 0.4 MG: 0.4 CAPSULE ORAL at 08:42

## 2024-09-16 RX ADMIN — PANTOPRAZOLE SODIUM 40 MG: 40 TABLET, DELAYED RELEASE ORAL at 06:39

## 2024-09-16 RX ADMIN — HYDROXYZINE HYDROCHLORIDE 25 MG: 25 TABLET ORAL at 04:05

## 2024-09-17 ENCOUNTER — READMISSION MANAGEMENT (OUTPATIENT)
Dept: CALL CENTER | Facility: HOSPITAL | Age: 74
End: 2024-09-17
Payer: MEDICARE

## 2024-09-17 VITALS
TEMPERATURE: 97.1 F | SYSTOLIC BLOOD PRESSURE: 118 MMHG | HEIGHT: 70 IN | BODY MASS INDEX: 25.22 KG/M2 | DIASTOLIC BLOOD PRESSURE: 58 MMHG | HEART RATE: 60 BPM | RESPIRATION RATE: 16 BRPM | WEIGHT: 176.15 LBS | OXYGEN SATURATION: 98 %

## 2024-09-17 LAB — GLUCOSE BLDC GLUCOMTR-MCNC: 115 MG/DL (ref 70–130)

## 2024-09-17 PROCEDURE — 82948 REAGENT STRIP/BLOOD GLUCOSE: CPT

## 2024-09-17 RX ORDER — HYDROXYZINE HYDROCHLORIDE 25 MG/1
25 TABLET, FILM COATED ORAL 3 TIMES DAILY PRN
Qty: 10 TABLET | Refills: 0 | Status: SHIPPED | OUTPATIENT
Start: 2024-09-17

## 2024-09-17 RX ORDER — PANTOPRAZOLE SODIUM 40 MG/1
40 TABLET, DELAYED RELEASE ORAL
Qty: 60 TABLET | Refills: 0 | Status: SHIPPED | OUTPATIENT
Start: 2024-09-17

## 2024-09-17 RX ADMIN — HYDROXYZINE HYDROCHLORIDE 25 MG: 25 TABLET ORAL at 13:47

## 2024-09-17 RX ADMIN — ASPIRIN 81 MG: 81 TABLET, CHEWABLE ORAL at 13:45

## 2024-09-17 RX ADMIN — HYDROXYZINE HYDROCHLORIDE 25 MG: 25 TABLET ORAL at 04:33

## 2024-09-17 RX ADMIN — PANTOPRAZOLE SODIUM 40 MG: 40 TABLET, DELAYED RELEASE ORAL at 06:47

## 2024-09-17 RX ADMIN — TAMSULOSIN HYDROCHLORIDE 0.4 MG: 0.4 CAPSULE ORAL at 13:45

## 2024-09-18 ENCOUNTER — TRANSITIONAL CARE MANAGEMENT TELEPHONE ENCOUNTER (OUTPATIENT)
Dept: CALL CENTER | Facility: HOSPITAL | Age: 74
End: 2024-09-18
Payer: MEDICARE

## 2024-09-19 ENCOUNTER — TRANSITIONAL CARE MANAGEMENT TELEPHONE ENCOUNTER (OUTPATIENT)
Dept: CALL CENTER | Facility: HOSPITAL | Age: 74
End: 2024-09-19
Payer: MEDICARE

## 2024-10-07 ENCOUNTER — APPOINTMENT (OUTPATIENT)
Dept: GENERAL RADIOLOGY | Facility: HOSPITAL | Age: 74
End: 2024-10-07
Payer: MEDICARE

## 2024-10-07 ENCOUNTER — HOSPITAL ENCOUNTER (EMERGENCY)
Facility: HOSPITAL | Age: 74
Discharge: HOME | End: 2024-10-07
Attending: EMERGENCY MEDICINE | Admitting: EMERGENCY MEDICINE
Payer: MEDICARE

## 2024-10-07 VITALS
TEMPERATURE: 97.6 F | DIASTOLIC BLOOD PRESSURE: 74 MMHG | RESPIRATION RATE: 16 BRPM | SYSTOLIC BLOOD PRESSURE: 128 MMHG | OXYGEN SATURATION: 96 % | HEART RATE: 68 BPM

## 2024-10-07 DIAGNOSIS — N18.6 CHRONIC KIDNEY DISEASE ON CHRONIC DIALYSIS: ICD-10-CM

## 2024-10-07 DIAGNOSIS — Z99.2 CHRONIC KIDNEY DISEASE ON CHRONIC DIALYSIS: ICD-10-CM

## 2024-10-07 DIAGNOSIS — R53.1 WEAKNESS: ICD-10-CM

## 2024-10-07 DIAGNOSIS — R53.81 MALAISE: Primary | ICD-10-CM

## 2024-10-07 LAB
ALBUMIN SERPL-MCNC: 3.5 G/DL (ref 3.5–5.2)
ALBUMIN/GLOB SERPL: 1.2 G/DL
ALP SERPL-CCNC: 239 U/L (ref 39–117)
ALT SERPL W P-5'-P-CCNC: 12 U/L (ref 1–41)
ANION GAP SERPL CALCULATED.3IONS-SCNC: 15 MMOL/L (ref 5–15)
AST SERPL-CCNC: 26 U/L (ref 1–40)
BASOPHILS # BLD AUTO: 0.09 10*3/MM3 (ref 0–0.2)
BASOPHILS NFR BLD AUTO: 2.5 % (ref 0–1.5)
BILIRUB SERPL-MCNC: 1 MG/DL (ref 0–1.2)
BUN SERPL-MCNC: 60 MG/DL (ref 8–23)
BUN/CREAT SERPL: 7.9 (ref 7–25)
CALCIUM SPEC-SCNC: 8.6 MG/DL (ref 8.6–10.5)
CHLORIDE SERPL-SCNC: 103 MMOL/L (ref 98–107)
CO2 SERPL-SCNC: 30 MMOL/L (ref 22–29)
CREAT SERPL-MCNC: 7.62 MG/DL (ref 0.76–1.27)
DEPRECATED RDW RBC AUTO: 57.5 FL (ref 37–54)
EGFRCR SERPLBLD CKD-EPI 2021: 7 ML/MIN/1.73
EOSINOPHIL # BLD AUTO: 0.17 10*3/MM3 (ref 0–0.4)
EOSINOPHIL NFR BLD AUTO: 4.7 % (ref 0.3–6.2)
ERYTHROCYTE [DISTWIDTH] IN BLOOD BY AUTOMATED COUNT: 15.3 % (ref 12.3–15.4)
GLOBULIN UR ELPH-MCNC: 3 GM/DL
GLUCOSE SERPL-MCNC: 93 MG/DL (ref 65–99)
HCT VFR BLD AUTO: 28.9 % (ref 37.5–51)
HGB BLD-MCNC: 8.5 G/DL (ref 13–17.7)
IMM GRANULOCYTES # BLD AUTO: 0.02 10*3/MM3 (ref 0–0.05)
IMM GRANULOCYTES NFR BLD AUTO: 0.6 % (ref 0–0.5)
LYMPHOCYTES # BLD AUTO: 0.78 10*3/MM3 (ref 0.7–3.1)
LYMPHOCYTES NFR BLD AUTO: 21.6 % (ref 19.6–45.3)
MCH RBC QN AUTO: 30.2 PG (ref 26.6–33)
MCHC RBC AUTO-ENTMCNC: 29.4 G/DL (ref 31.5–35.7)
MCV RBC AUTO: 102.8 FL (ref 79–97)
MONOCYTES # BLD AUTO: 0.44 10*3/MM3 (ref 0.1–0.9)
MONOCYTES NFR BLD AUTO: 12.2 % (ref 5–12)
NEUTROPHILS NFR BLD AUTO: 2.11 10*3/MM3 (ref 1.7–7)
NEUTROPHILS NFR BLD AUTO: 58.4 % (ref 42.7–76)
NRBC BLD AUTO-RTO: 0 /100 WBC (ref 0–0.2)
PLATELET # BLD AUTO: 99 10*3/MM3 (ref 140–450)
PMV BLD AUTO: 11.3 FL (ref 6–12)
POTASSIUM SERPL-SCNC: 4.3 MMOL/L (ref 3.5–5.2)
PROT SERPL-MCNC: 6.5 G/DL (ref 6–8.5)
RBC # BLD AUTO: 2.81 10*6/MM3 (ref 4.14–5.8)
SODIUM SERPL-SCNC: 148 MMOL/L (ref 136–145)
WBC NRBC COR # BLD AUTO: 3.61 10*3/MM3 (ref 3.4–10.8)

## 2024-10-07 PROCEDURE — 85025 COMPLETE CBC W/AUTO DIFF WBC: CPT | Performed by: PHYSICIAN ASSISTANT

## 2024-10-07 PROCEDURE — 99283 EMERGENCY DEPT VISIT LOW MDM: CPT

## 2024-10-07 PROCEDURE — 80053 COMPREHEN METABOLIC PANEL: CPT | Performed by: PHYSICIAN ASSISTANT

## 2024-10-07 PROCEDURE — 71045 X-RAY EXAM CHEST 1 VIEW: CPT

## 2024-10-07 RX ORDER — SODIUM CHLORIDE 0.9 % (FLUSH) 0.9 %
10 SYRINGE (ML) INJECTION AS NEEDED
Status: DISCONTINUED | OUTPATIENT
Start: 2024-10-07 | End: 2024-10-07 | Stop reason: HOSPADM

## 2024-10-07 NOTE — ED PROVIDER NOTES
EMERGENCY DEPARTMENT MD ATTESTATION NOTE    Room Number:  04/04  PCP: Shara Talley APRN  Independent Historians: Patient    HPI:  A complete HPI/ROS/PMH/PSH/SH/FH are unobtainable due to: None    Chronic or social conditions impacting patient care (Social Determinants of Health): None      Context: Mohamud Jarrett is a 74 y.o. male with a medical history of end-stage renal disease on dialysis, hypertension, arthritis, CAD and hyperlipidemia who presents to the ED c/o acute fatigue and discomfort in his arm.  Patient's last dialysis run was on Friday.  He denies any chest pain or difficulties breathing.  Denies fevers or cough or cold or flulike symptoms.      Review of prior external notes (non-ED) -and- Review of prior external test results outside of this encounter: I reviewed the duplex venous ultrasound study of the left upper extremity from September 10, 2024.  It was normal without any evidence of DVT.    Prescription drug monitoring program review: DINA reviewed by Jani Soler MD         PHYSICAL EXAM    I have reviewed the triage vital signs and nursing notes.    ED Triage Vitals   Temp Heart Rate Resp BP SpO2   10/07/24 1058 10/07/24 1058 10/07/24 1058 10/07/24 1104 10/07/24 1058   97.6 °F (36.4 °C) 78 16 136/72 100 %      Temp src Heart Rate Source Patient Position BP Location FiO2 (%)   -- -- -- -- --              Physical Exam  GENERAL: alert, no acute distress, nontoxic.  SKIN: Warm, dry, no erythema or induration  HENT: Normocephalic, atraumatic  EYES: no scleral icterus, pupils equally round and reactive  CV: regular rhythm, regular rate  RESPIRATORY: normal effort, lungs clear  ABDOMEN: soft, nontender, nondistended  MUSCULOSKELETAL: no deformity.  Fistula with thrill is palpable in the right upper extremity.  NEURO: alert, moves all extremities, follows commands      MEDICATIONS GIVEN IN ER  Medications - No data to display        ORDERS PLACED DURING THIS VISIT:  Orders Placed This  Encounter   Procedures    XR Chest 1 View    Comprehensive Metabolic Panel    CBC Auto Differential    CBC & Differential         PROCEDURES  Procedures        PROGRESS, DATA ANALYSIS, CONSULTS, AND MEDICAL DECISION MAKING  All labs have been independently interpreted by me.  All radiology studies have been reviewed by me. All EKG's have been independently viewed and interpreted by me.  Discussion below represents my analysis of pertinent findings related to patient's condition, differential diagnosis, treatment plan and final disposition.    Differential diagnosis includes but is not limited to viral illness, hypoglycemia, dehydration, electrolyte abnormality.    Clinical Scores:                   ED Course as of 10/07/24 2002   Mon Oct 07, 2024   1116 Patient presents with generalized malaise, right arm pain.  Patient is a hemodialysis patient.  He did not go to his session today.  He states that he feels weak.  His biggest complaint seems to be aggravation with the staff at the dialysis center who he believes is causing trouble with his fistula.  On exam he does have a palpable thrill.  He appears euvolemic.  No active bleeding.  We will check basic labs and reevaluate. [EE]   1134 Chest x-ray independently interpreted myself shows cardiomegaly without acute infiltrate. [EE]   1156 WBC: 3.61 [EE]   1156 Hemoglobin(!): 8.5 [EE]   1304 Potassium: 4.3 [EE]   1312 Patient has a relatively benign and stable workup.  He has stable vitals.  I encouraged him that he needs to make sure that he goes to dialysis.  If he has any issues he will need to follow-up with his nephrologist.  We will discharge from the ER. [EE]      ED Course User Index  [EE] Mina Wolf PA       MDM:   I independently interpreted the Chest X-ray and my findings are: No Pneumothorax, No Effusion, No Infiltrate    I have reviewed the labs from today's ED visit.  Patient has end-stage renal disease and stable, chronic anemia.  He is afebrile and  "nontoxic-appearing.  There does not appear to be any acute cardiac or infectious or metabolic emergency condition present right now.  I think he is stable for discharge home but of course, he needs to follow-up for his scheduled dialysis sessions as instructed.  We had a long conversation with him about this recommendation.  Will review with him the usual \"return to ER \"instructions prior to discharge.      COMPLEXITY OF CARE  Admission was considered but after careful review of the patient's presentation, physical examination, diagnostic results, and response to treatment the patient may be safely discharged with outpatient follow-up.    Please note that portions of this document were completed with a voice recognition program.    Note Disclaimer: At [facility], we believe that sharing information builds trust and better relationships. You are receiving this note because you recently visited [facility]. It is possible you will see health information before a provider has talked with you about it. This kind of information can be easy to misunderstand. To help you fully understand what it means for your health, we urge you to discuss this note with your provider.       Jani Soler MD  10/07/24 2002    "

## 2024-10-07 NOTE — DISCHARGE INSTRUCTIONS
It is very important for you not to skip dialysis sessions.  Call your dialysis center today to see if you can be dialyzed tomorrow.    Return to ER for any worsening symptoms

## 2024-10-07 NOTE — ED NOTES
Patient to ER via car from home for arm pain and swelling at fistula site  Reports bleeding a lot on Saturday    Patient reports missing treatment  this morning

## 2024-10-07 NOTE — ED PROVIDER NOTES
EMERGENCY DEPARTMENT ENCOUNTER    Room Number:  04/04  Date of encounter:  10/7/2024  PCP: Shara Talley APRN  Historian: Patient  Chronic or social conditions impacting care (social determinants of health): None    HPI:  Chief Complaint: Arm pain, weakness  A complete HPI/ROS/PMH/PSH/SH/FH are unobtainable due to: Nothing    Context: Mohamud Jarrett is a 74 y.o. male with a history of chronic kidney disease on hemodialysis, hypertension, who presents to the ED c/o acute generalized weakness, fatigue, arm pain.  Patient goes to dialysis Monday, Wednesday, Friday.  He did not go to his session today.  He states that he is aggravated with the nursing staff there who he believes is rough and causes his right arm to bleed.  In addition he states he has felt weak at times.  He denies any specific chest pain, shortness of breath.  No active bleeding from the arm today.  Patient follows with Dr. Sinclair, in nephrology.    Review of prior external notes (non-ED):   I reviewed an admission from 9/3/2024 through 9/17/2024.  Patient admitted for metabolic encephalopathy after missing dialysis.    Review of prior external test results outside of this encounter:  I reviewed a renal function panel from 9/14/2024.  Creatinine 4.82, potassium 4.8    PAST MEDICAL HISTORY  Active Ambulatory Problems     Diagnosis Date Noted    Essential (primary) hypertension 03/13/2014    Hyperlipidemia 03/13/2014    Chronic kidney disease, stage 4 (severe) 05/23/2022    Anemia in chronic kidney disease 05/23/2022    Coronary artery disease 05/23/2022    Coagulation defect, unspecified 02/25/2023    Dependence on renal dialysis 02/25/2023    Mitral valve regurgitation 12/05/2022    Stented coronary artery 12/12/2022    Pure hypercholesterolemia 12/12/2022    Left inguinal hernia 04/21/2023    H/O peanut allergy 06/14/2023    Weakness generalized 06/14/2023    CHF (congestive heart failure) 07/26/2023    Secondary hyperparathyroidism of renal  origin 02/02/2024    History of gout 03/27/2024    End stage renal disease 07/01/2024    Presence of coronary angioplasty implant and graft 06/28/2024    Depression 07/09/2024    Generalized weakness 07/10/2024    Abnormal EKG 07/11/2024    ESRD (end stage renal disease) 07/12/2024    Itchy scalp 07/25/2024    Acute metabolic encephalopathy 09/04/2024    GI bleed 09/04/2024    Lactic acidosis 09/04/2024    Metabolic encephalopathy 09/04/2024    Severe malnutrition 09/04/2024     Resolved Ambulatory Problems     Diagnosis Date Noted    Screen for colon cancer 12/03/2018    Neuritis of lower extremity, right 12/19/2019    Anemia of chronic renal failure 04/12/2021    Gout 07/06/2021    Seasonal allergies 03/13/2014    Stage 3b chronic kidney disease 06/30/2017    Cough 07/06/2021    Rheumatoid factor positive 07/06/2021    Bilateral leg pain 11/05/2021    Edema 05/23/2022    Proteinuria 05/23/2022    Vitamin D deficiency 05/23/2022    Well adult exam 08/30/2022    Need for COVID-19 vaccine 08/30/2022    Neck pain 08/30/2022    Acute on chronic congestive heart failure 02/07/2023    Anaphylactic shock, unspecified, initial encounter 02/25/2023    Chronic systolic (congestive) heart failure 02/25/2023    Gout due to renal impairment, unspecified site 02/25/2023    Iron deficiency anemia 02/25/2023    Need for COVID-19 vaccine 06/01/2023    Myalgia 06/01/2023    Contusion of right arm 07/26/2023    Hematoma 09/06/2023    Medicare annual wellness visit, subsequent 09/06/2023    Dizziness 02/23/2024    Personal history of fall 02/23/2024    Impacted cerumen of right ear 03/27/2024    Hyperkalemia 07/01/2024    Edema 07/09/2024    Elevated troponin 07/09/2024    Chest pain, atypical 07/11/2024     Past Medical History:   Diagnosis Date    A-V fistula     Antiplatelet or antithrombotic long-term use     Arthritis     CAD (coronary artery disease)     Chest pain     Dialysis patient     Heart attack     Hypertension      Kidney disease          PAST SURGICAL HISTORY  Past Surgical History:   Procedure Laterality Date    ARTERIOVENOUS FISTULA Right     CARDIAC CATHETERIZATION      CARPAL TUNNEL RELEASE      CATARACT EXTRACTION W/ INTRAOCULAR LENS IMPLANT      COLONOSCOPY N/A 2006    COLONOSCOPY N/A 12/14/2018    Procedure: COLONOSCOPY TO CECUM WITH COLD BIOPSY POLYPECTOMY;  Surgeon: Elliott Harding MD;  Location:  [facility];  Service: General    COLONOSCOPY N/A 9/9/2024    Procedure: COLONOSCOPY with cold snare polypectomies;  Surgeon: Alvarado Irvin MD;  Location:  [facility];  Service: Gastroenterology;  Laterality: N/A;  pre- anemia  post- polyps    CORONARY ANGIOPLASTY WITH STENT PLACEMENT  11/09/2022    CORONARY ARTERY BYPASS GRAFT N/A 06/20/2003    ENDOSCOPY N/A 9/9/2024    Procedure: ESOPHAGOGASTRODUODENOSCOPY with biopsies;  Surgeon: Alvarado Irvin MD;  Location:  [facility];  Service: Gastroenterology;  Laterality: N/A;  pre- anemia  post- gastritis    INGUINAL HERNIA REPAIR Right 06/04/2014    Open incarcerated inguinal hernia repair-Dr. Elliott Harding    INGUINAL HERNIA REPAIR Left 4/26/2023    Procedure: OPEN LEFT INGUINAL HERNIA REPAIR;  Surgeon: Elliott Harding MD;  Location: [facility] OR;  Service: General;  Laterality: Left;    LUMBAR DISC SURGERY N/A 1990, 1992    L4-L5, X2         FAMILY HISTORY  Family History   Problem Relation Age of Onset    Heart disease Mother     Heart disease Father     Malig Hyperthermia Neg Hx          SOCIAL HISTORY  Social History     Socioeconomic History    Marital status:    Tobacco Use    Smoking status: Never    Smokeless tobacco: Never   Vaping Use    Vaping status: Never Used   Substance and Sexual Activity    Alcohol use: Yes     Comment: occassionally    Drug use: No    Sexual activity: Defer         ALLERGIES  Peanut (diagnostic), Peanut butter flavor, and Simvastatin        REVIEW OF SYSTEMS  All systems reviewed and negative except for  those discussed in HPI.       PHYSICAL EXAM    I have reviewed the triage vital signs and nursing notes.    ED Triage Vitals   Temp Heart Rate Resp BP SpO2   10/07/24 1058 10/07/24 1058 10/07/24 1058 10/07/24 1104 10/07/24 1058   97.6 °F (36.4 °C) 78 16 136/72 100 %      Temp src Heart Rate Source Patient Position BP Location FiO2 (%)   -- -- -- -- --              Physical Exam  GENERAL:Alert, oriented, well-appearing, not distressed  HENT: head atraumatic, no nuchal rigidity  EYES: no scleral icterus, EOMI  CV: regular rhythm, regular rate, no murmur  RESPIRATORY: normal effort, CTA  ABDOMEN: soft, nontender  MUSCULOSKELETAL: Fistula in the right upper extremity.  Palpable thrill present.  No surrounding erythema or tenderness.  No bleeding.  NEURO: alert, moves all extremities, follows commands  SKIN: warm, dry        LAB RESULTS  Recent Results (from the past 24 hour(s))   Comprehensive Metabolic Panel    Collection Time: 10/07/24 11:39 AM    Specimen: Arm, Left; Blood   Result Value Ref Range    Glucose 93 65 - 99 mg/dL    BUN 60 (H) 8 - 23 mg/dL    Creatinine 7.62 (H) 0.76 - 1.27 mg/dL    Sodium 148 (H) 136 - 145 mmol/L    Potassium 4.3 3.5 - 5.2 mmol/L    Chloride 103 98 - 107 mmol/L    CO2 30.0 (H) 22.0 - 29.0 mmol/L    Calcium 8.6 8.6 - 10.5 mg/dL    Total Protein 6.5 6.0 - 8.5 g/dL    Albumin 3.5 3.5 - 5.2 g/dL    ALT (SGPT) 12 1 - 41 U/L    AST (SGOT) 26 1 - 40 U/L    Alkaline Phosphatase 239 (H) 39 - 117 U/L    Total Bilirubin 1.0 0.0 - 1.2 mg/dL    Globulin 3.0 gm/dL    A/G Ratio 1.2 g/dL    BUN/Creatinine Ratio 7.9 7.0 - 25.0    Anion Gap 15.0 5.0 - 15.0 mmol/L    eGFR 7.0 (L) >60.0 mL/min/1.73   CBC Auto Differential    Collection Time: 10/07/24 11:39 AM    Specimen: Arm, Left; Blood   Result Value Ref Range    WBC 3.61 3.40 - 10.80 10*3/mm3    RBC 2.81 (L) 4.14 - 5.80 10*6/mm3    Hemoglobin 8.5 (L) 13.0 - 17.7 g/dL    Hematocrit 28.9 (L) 37.5 - 51.0 %    .8 (H) 79.0 - 97.0 fL    MCH 30.2 26.6 -  33.0 pg    MCHC 29.4 (L) 31.5 - 35.7 g/dL    RDW 15.3 12.3 - 15.4 %    RDW-SD 57.5 (H) 37.0 - 54.0 fl    MPV 11.3 6.0 - 12.0 fL    Platelets 99 (L) 140 - 450 10*3/mm3    Neutrophil % 58.4 42.7 - 76.0 %    Lymphocyte % 21.6 19.6 - 45.3 %    Monocyte % 12.2 (H) 5.0 - 12.0 %    Eosinophil % 4.7 0.3 - 6.2 %    Basophil % 2.5 (H) 0.0 - 1.5 %    Immature Grans % 0.6 (H) 0.0 - 0.5 %    Neutrophils, Absolute 2.11 1.70 - 7.00 10*3/mm3    Lymphocytes, Absolute 0.78 0.70 - 3.10 10*3/mm3    Monocytes, Absolute 0.44 0.10 - 0.90 10*3/mm3    Eosinophils, Absolute 0.17 0.00 - 0.40 10*3/mm3    Basophils, Absolute 0.09 0.00 - 0.20 10*3/mm3    Immature Grans, Absolute 0.02 0.00 - 0.05 10*3/mm3    nRBC 0.0 0.0 - 0.2 /100 WBC       Ordered the above labs and independently reviewed the results.        RADIOLOGY  XR Chest 1 View    Result Date: 10/7/2024  XR CHEST 1 VW-10/7/2024  HISTORY: Shortness of breath.  Heart size is at the upper limits of normal. Lungs appear free of acute infiltrates. There is minimal blunting of the right costophrenic sulcus which could be related to minimal pleural fluid. Sternotomy wires are seen.      1. Borderline cardiomegaly. 2. Lungs appear clear. 3. Blunting of the right costophrenic sulcus could be related to small right pleural effusion.   This report was finalized on 10/7/2024 11:34 AM by Dr. Cullen Jay M.D on Workstation: [id number]       I ordered the above noted radiological studies. Reviewed by me and discussed with radiologist.  See dictation for official radiology interpretation.      MEDICATIONS GIVEN IN ER    Medications - No data to display        ADDITIONAL ORDERS CONSIDERED BUT NOT ORDERED:  Admission was considered but after careful review of the patient's presentation, physical examination, diagnostic results, and response to treatment the patient may be safely discharged with outpatient follow-up.       PROGRESS, DATA ANALYSIS, CONSULTS, AND MEDICAL DECISION MAKING    All labs have  been independently interpreted by myself.  All radiology studies have been independently interpreted by myself and discussed with radiologist dictating the report.   EKG's independently interpreted by myself.  Discussion below represents my analysis of pertinent findings related to patient's condition, differential diagnosis, treatment plan and final disposition.    I have discussed case with Dr. Soler, emergency room physician.  He has performed his own bedside examination and agrees with treatment plan.    ED Course as of 10/07/24 1622   Mon Oct 07, 2024   1116 Patient presents with generalized malaise, right arm pain.  Patient is a hemodialysis patient.  He did not go to his session today.  He states that he feels weak.  His biggest complaint seems to be aggravation with the staff at the dialysis center who he believes is causing trouble with his fistula.  On exam he does have a palpable thrill.  He appears euvolemic.  No active bleeding.  We will check basic labs and reevaluate. [EE]   1134 Chest x-ray independently interpreted myself shows cardiomegaly without acute infiltrate. [EE]   1156 WBC: 3.61 [EE]   1156 Hemoglobin(!): 8.5 [EE]   1304 Potassium: 4.3 [EE]   1312 Patient has a relatively benign and stable workup.  He has stable vitals.  I encouraged him that he needs to make sure that he goes to dialysis.  If he has any issues he will need to follow-up with his nephrologist.  We will discharge from the ER. [EE]      ED Course User Index  [EE] Mina Wolf PA       AS OF 16:22 EDT VITALS:    BP - 128/74  HR - 68  TEMP - 97.6 °F (36.4 °C)  O2 SATS - 96%        DIAGNOSIS  Final diagnoses:   Malaise   Chronic kidney disease on chronic dialysis   Weakness         DISPOSITION  Discharged    Admission was considered but after careful review of the patient's presentation, physical examination, diagnostic results, and response to treatment the patient may be safely discharged with outpatient follow-up.          Dictated utilizing Mina Quan PA  10/07/24 [id number]

## 2024-10-21 ENCOUNTER — HOSPITAL ENCOUNTER (OUTPATIENT)
Facility: HOSPITAL | Age: 74
Discharge: HOME | End: 2024-10-22
Attending: STUDENT IN AN ORGANIZED HEALTH CARE EDUCATION/TRAINING PROGRAM | Admitting: HOSPITALIST
Payer: MEDICARE

## 2024-10-21 ENCOUNTER — NURSE TRIAGE (OUTPATIENT)
Dept: CALL CENTER | Facility: HOSPITAL | Age: 74
End: 2024-10-21
Payer: MEDICARE

## 2024-10-21 ENCOUNTER — APPOINTMENT (OUTPATIENT)
Dept: GENERAL RADIOLOGY | Facility: HOSPITAL | Age: 74
End: 2024-10-21
Payer: MEDICARE

## 2024-10-21 DIAGNOSIS — R06.02 SHORTNESS OF BREATH: ICD-10-CM

## 2024-10-21 DIAGNOSIS — M79.89 LEG SWELLING: Primary | ICD-10-CM

## 2024-10-21 DIAGNOSIS — M79.89 ARM SWELLING: ICD-10-CM

## 2024-10-21 LAB
ALBUMIN SERPL-MCNC: 3.3 G/DL (ref 3.5–5.2)
ALBUMIN/GLOB SERPL: 1 G/DL
ALP SERPL-CCNC: 267 U/L (ref 39–117)
ALT SERPL W P-5'-P-CCNC: 9 U/L (ref 1–41)
ANION GAP SERPL CALCULATED.3IONS-SCNC: 9.8 MMOL/L (ref 5–15)
AST SERPL-CCNC: 19 U/L (ref 1–40)
BASOPHILS # BLD AUTO: 0.09 10*3/MM3 (ref 0–0.2)
BASOPHILS NFR BLD AUTO: 2.9 % (ref 0–1.5)
BILIRUB SERPL-MCNC: 1 MG/DL (ref 0–1.2)
BUN SERPL-MCNC: 53 MG/DL (ref 8–23)
BUN/CREAT SERPL: 9.9 (ref 7–25)
CALCIUM SPEC-SCNC: 8.6 MG/DL (ref 8.6–10.5)
CHLORIDE SERPL-SCNC: 98 MMOL/L (ref 98–107)
CO2 SERPL-SCNC: 32.2 MMOL/L (ref 22–29)
CREAT SERPL-MCNC: 5.37 MG/DL (ref 0.76–1.27)
DEPRECATED RDW RBC AUTO: 53.1 FL (ref 37–54)
EGFRCR SERPLBLD CKD-EPI 2021: 10.6 ML/MIN/1.73
EOSINOPHIL # BLD AUTO: 0.18 10*3/MM3 (ref 0–0.4)
EOSINOPHIL NFR BLD AUTO: 5.8 % (ref 0.3–6.2)
ERYTHROCYTE [DISTWIDTH] IN BLOOD BY AUTOMATED COUNT: 15.5 % (ref 12.3–15.4)
GLOBULIN UR ELPH-MCNC: 3.3 GM/DL
GLUCOSE SERPL-MCNC: 94 MG/DL (ref 65–99)
HCT VFR BLD AUTO: 27.5 % (ref 37.5–51)
HGB BLD-MCNC: 9 G/DL (ref 13–17.7)
HOLD SPECIMEN: NORMAL
HOLD SPECIMEN: NORMAL
IMM GRANULOCYTES # BLD AUTO: 0 10*3/MM3 (ref 0–0.05)
IMM GRANULOCYTES NFR BLD AUTO: 0 % (ref 0–0.5)
LYMPHOCYTES # BLD AUTO: 0.72 10*3/MM3 (ref 0.7–3.1)
LYMPHOCYTES NFR BLD AUTO: 23.3 % (ref 19.6–45.3)
MCH RBC QN AUTO: 31.6 PG (ref 26.6–33)
MCHC RBC AUTO-ENTMCNC: 32.7 G/DL (ref 31.5–35.7)
MCV RBC AUTO: 96.5 FL (ref 79–97)
MONOCYTES # BLD AUTO: 0.54 10*3/MM3 (ref 0.1–0.9)
MONOCYTES NFR BLD AUTO: 17.5 % (ref 5–12)
NEUTROPHILS NFR BLD AUTO: 1.56 10*3/MM3 (ref 1.7–7)
NEUTROPHILS NFR BLD AUTO: 50.5 % (ref 42.7–76)
NRBC BLD AUTO-RTO: 0 /100 WBC (ref 0–0.2)
NT-PROBNP SERPL-MCNC: ABNORMAL PG/ML (ref 0–900)
PLATELET # BLD AUTO: 143 10*3/MM3 (ref 140–450)
PMV BLD AUTO: 10.8 FL (ref 6–12)
POTASSIUM SERPL-SCNC: 3.4 MMOL/L (ref 3.5–5.2)
PROT SERPL-MCNC: 6.6 G/DL (ref 6–8.5)
RBC # BLD AUTO: 2.85 10*6/MM3 (ref 4.14–5.8)
SODIUM SERPL-SCNC: 140 MMOL/L (ref 136–145)
TROPONIN T SERPL HS-MCNC: 219 NG/L
WBC NRBC COR # BLD AUTO: 3.09 10*3/MM3 (ref 3.4–10.8)
WHOLE BLOOD HOLD COAG: NORMAL
WHOLE BLOOD HOLD SPECIMEN: NORMAL

## 2024-10-21 PROCEDURE — 36415 COLL VENOUS BLD VENIPUNCTURE: CPT

## 2024-10-21 PROCEDURE — 83880 ASSAY OF NATRIURETIC PEPTIDE: CPT | Performed by: NURSE PRACTITIONER

## 2024-10-21 PROCEDURE — 99285 EMERGENCY DEPT VISIT HI MDM: CPT

## 2024-10-21 PROCEDURE — 85025 COMPLETE CBC W/AUTO DIFF WBC: CPT | Performed by: NURSE PRACTITIONER

## 2024-10-21 PROCEDURE — 93005 ELECTROCARDIOGRAM TRACING: CPT | Performed by: NURSE PRACTITIONER

## 2024-10-21 PROCEDURE — 84484 ASSAY OF TROPONIN QUANT: CPT | Performed by: NURSE PRACTITIONER

## 2024-10-21 PROCEDURE — 80053 COMPREHEN METABOLIC PANEL: CPT | Performed by: NURSE PRACTITIONER

## 2024-10-21 PROCEDURE — 71045 X-RAY EXAM CHEST 1 VIEW: CPT

## 2024-10-21 RX ORDER — SODIUM CHLORIDE 0.9 % (FLUSH) 0.9 %
10 SYRINGE (ML) INJECTION AS NEEDED
Status: DISCONTINUED | OUTPATIENT
Start: 2024-10-21 | End: 2024-10-22 | Stop reason: HOSPADM

## 2024-10-22 ENCOUNTER — READMISSION MANAGEMENT (OUTPATIENT)
Dept: CALL CENTER | Facility: HOSPITAL | Age: 74
End: 2024-10-22
Payer: MEDICARE

## 2024-10-22 ENCOUNTER — APPOINTMENT (OUTPATIENT)
Dept: CARDIOLOGY | Facility: HOSPITAL | Age: 74
End: 2024-10-22
Payer: MEDICARE

## 2024-10-22 ENCOUNTER — APPOINTMENT (OUTPATIENT)
Dept: GENERAL RADIOLOGY | Facility: HOSPITAL | Age: 74
End: 2024-10-22
Payer: MEDICARE

## 2024-10-22 VITALS
HEART RATE: 70 BPM | DIASTOLIC BLOOD PRESSURE: 73 MMHG | SYSTOLIC BLOOD PRESSURE: 130 MMHG | TEMPERATURE: 97.3 F | RESPIRATION RATE: 16 BRPM | HEIGHT: 70 IN | OXYGEN SATURATION: 100 % | WEIGHT: 181.3 LBS | BODY MASS INDEX: 25.96 KG/M2

## 2024-10-22 PROBLEM — R79.89 ELEVATED TROPONIN: Status: ACTIVE | Noted: 2024-10-22

## 2024-10-22 PROBLEM — M79.89 LEG SWELLING: Status: ACTIVE | Noted: 2024-10-22

## 2024-10-22 LAB
ANION GAP SERPL CALCULATED.3IONS-SCNC: 12.5 MMOL/L (ref 5–15)
BH CV UPPER VENOUS LEFT INTERNAL JUGULAR AUGMENT: NORMAL
BH CV UPPER VENOUS LEFT INTERNAL JUGULAR COMPRESS: NORMAL
BH CV UPPER VENOUS LEFT INTERNAL JUGULAR PHASIC: NORMAL
BH CV UPPER VENOUS LEFT INTERNAL JUGULAR SPONT: NORMAL
BH CV UPPER VENOUS LEFT SUBCLAVIAN AUGMENT: NORMAL
BH CV UPPER VENOUS LEFT SUBCLAVIAN COMPRESS: NORMAL
BH CV UPPER VENOUS LEFT SUBCLAVIAN PHASIC: NORMAL
BH CV UPPER VENOUS LEFT SUBCLAVIAN SPONT: NORMAL
BH CV UPPER VENOUS RIGHT AXILLARY AUGMENT: NORMAL
BH CV UPPER VENOUS RIGHT AXILLARY COMPRESS: NORMAL
BH CV UPPER VENOUS RIGHT AXILLARY PHASIC: NORMAL
BH CV UPPER VENOUS RIGHT AXILLARY SPONT: NORMAL
BH CV UPPER VENOUS RIGHT BASILIC FOREARM COMPRESS: NORMAL
BH CV UPPER VENOUS RIGHT BASILIC UPPER COMPRESS: NORMAL
BH CV UPPER VENOUS RIGHT BRACHIAL COMPRESS: NORMAL
BH CV UPPER VENOUS RIGHT CEPHALIC FOREARM COMPRESS: NORMAL
BH CV UPPER VENOUS RIGHT INTERNAL JUGULAR AUGMENT: NORMAL
BH CV UPPER VENOUS RIGHT INTERNAL JUGULAR COMPRESS: NORMAL
BH CV UPPER VENOUS RIGHT INTERNAL JUGULAR PHASIC: NORMAL
BH CV UPPER VENOUS RIGHT INTERNAL JUGULAR SPONT: NORMAL
BH CV UPPER VENOUS RIGHT RADIAL COMPRESS: NORMAL
BH CV UPPER VENOUS RIGHT SUBCLAVIAN AUGMENT: NORMAL
BH CV UPPER VENOUS RIGHT SUBCLAVIAN COMPRESS: NORMAL
BH CV UPPER VENOUS RIGHT SUBCLAVIAN PHASIC: NORMAL
BH CV UPPER VENOUS RIGHT SUBCLAVIAN SPONT: NORMAL
BH CV UPPER VENOUS RIGHT ULNAR COMPRESS: NORMAL
BUN SERPL-MCNC: 59 MG/DL (ref 8–23)
BUN/CREAT SERPL: 10.8 (ref 7–25)
CALCIUM SPEC-SCNC: 8.2 MG/DL (ref 8.6–10.5)
CHLORIDE SERPL-SCNC: 101 MMOL/L (ref 98–107)
CO2 SERPL-SCNC: 27.5 MMOL/L (ref 22–29)
CREAT SERPL-MCNC: 5.44 MG/DL (ref 0.76–1.27)
DEPRECATED RDW RBC AUTO: 56.6 FL (ref 37–54)
EGFRCR SERPLBLD CKD-EPI 2021: 10.5 ML/MIN/1.73
ERYTHROCYTE [DISTWIDTH] IN BLOOD BY AUTOMATED COUNT: 15.7 % (ref 12.3–15.4)
GEN 5 2HR TROPONIN T REFLEX: 200 NG/L
GLUCOSE SERPL-MCNC: 100 MG/DL (ref 65–99)
HCT VFR BLD AUTO: 29.4 % (ref 37.5–51)
HGB BLD-MCNC: 8.9 G/DL (ref 13–17.7)
MCH RBC QN AUTO: 30 PG (ref 26.6–33)
MCHC RBC AUTO-ENTMCNC: 30.3 G/DL (ref 31.5–35.7)
MCV RBC AUTO: 99 FL (ref 79–97)
PLATELET # BLD AUTO: 137 10*3/MM3 (ref 140–450)
PMV BLD AUTO: 10.7 FL (ref 6–12)
POTASSIUM SERPL-SCNC: 3.5 MMOL/L (ref 3.5–5.2)
QT INTERVAL: 542 MS
QTC INTERVAL: 594 MS
RBC # BLD AUTO: 2.97 10*6/MM3 (ref 4.14–5.8)
SODIUM SERPL-SCNC: 141 MMOL/L (ref 136–145)
TROPONIN T DELTA: -19 NG/L
WBC NRBC COR # BLD AUTO: 3.36 10*3/MM3 (ref 3.4–10.8)

## 2024-10-22 PROCEDURE — A9270 NON-COVERED ITEM OR SERVICE: HCPCS | Performed by: NURSE PRACTITIONER

## 2024-10-22 PROCEDURE — 90662 IIV NO PRSV INCREASED AG IM: CPT | Performed by: STUDENT IN AN ORGANIZED HEALTH CARE EDUCATION/TRAINING PROGRAM

## 2024-10-22 PROCEDURE — 63710000001 ASPIRIN 81 MG CHEWABLE TABLET: Performed by: NURSE PRACTITIONER

## 2024-10-22 PROCEDURE — G0008 ADMIN INFLUENZA VIRUS VAC: HCPCS | Performed by: STUDENT IN AN ORGANIZED HEALTH CARE EDUCATION/TRAINING PROGRAM

## 2024-10-22 PROCEDURE — G0378 HOSPITAL OBSERVATION PER HR: HCPCS

## 2024-10-22 PROCEDURE — 25010000002 INFLUENZA VAC SPLIT HIGH-DOSE 0.5 ML SUSPENSION PREFILLED SYRINGE: Performed by: STUDENT IN AN ORGANIZED HEALTH CARE EDUCATION/TRAINING PROGRAM

## 2024-10-22 PROCEDURE — 93971 EXTREMITY STUDY: CPT | Performed by: SURGERY

## 2024-10-22 PROCEDURE — 36415 COLL VENOUS BLD VENIPUNCTURE: CPT

## 2024-10-22 PROCEDURE — 85027 COMPLETE CBC AUTOMATED: CPT

## 2024-10-22 PROCEDURE — G0257 UNSCHED DIALYSIS ESRD PT HOS: HCPCS

## 2024-10-22 PROCEDURE — 80048 BASIC METABOLIC PNL TOTAL CA: CPT

## 2024-10-22 PROCEDURE — 63710000001 TAMSULOSIN 0.4 MG CAPSULE: Performed by: NURSE PRACTITIONER

## 2024-10-22 PROCEDURE — 84484 ASSAY OF TROPONIN QUANT: CPT | Performed by: NURSE PRACTITIONER

## 2024-10-22 PROCEDURE — 93971 EXTREMITY STUDY: CPT

## 2024-10-22 PROCEDURE — 63710000001 RANOLAZINE 500 MG TABLET SUSTAINED-RELEASE 12 HOUR: Performed by: NURSE PRACTITIONER

## 2024-10-22 PROCEDURE — 63710000001 PANTOPRAZOLE 40 MG TABLET DELAYED-RELEASE: Performed by: NURSE PRACTITIONER

## 2024-10-22 RX ORDER — ALBUMIN (HUMAN) 12.5 G/50ML
12.5 SOLUTION INTRAVENOUS AS NEEDED
Status: CANCELLED | OUTPATIENT
Start: 2024-10-22 | End: 2024-10-23

## 2024-10-22 RX ORDER — BISACODYL 5 MG/1
5 TABLET, DELAYED RELEASE ORAL DAILY PRN
Status: DISCONTINUED | OUTPATIENT
Start: 2024-10-22 | End: 2024-10-22 | Stop reason: HOSPADM

## 2024-10-22 RX ORDER — ACETAMINOPHEN 325 MG/1
650 TABLET ORAL EVERY 4 HOURS PRN
Status: DISCONTINUED | OUTPATIENT
Start: 2024-10-22 | End: 2024-10-22 | Stop reason: HOSPADM

## 2024-10-22 RX ORDER — NITROGLYCERIN 0.4 MG/1
0.4 TABLET SUBLINGUAL
Status: DISCONTINUED | OUTPATIENT
Start: 2024-10-22 | End: 2024-10-22 | Stop reason: HOSPADM

## 2024-10-22 RX ORDER — ASPIRIN 81 MG/1
81 TABLET, CHEWABLE ORAL DAILY
Status: DISCONTINUED | OUTPATIENT
Start: 2024-10-22 | End: 2024-10-22 | Stop reason: HOSPADM

## 2024-10-22 RX ORDER — POLYETHYLENE GLYCOL 3350 17 G/17G
17 POWDER, FOR SOLUTION ORAL DAILY PRN
Status: DISCONTINUED | OUTPATIENT
Start: 2024-10-22 | End: 2024-10-22 | Stop reason: HOSPADM

## 2024-10-22 RX ORDER — AMOXICILLIN 250 MG
2 CAPSULE ORAL 2 TIMES DAILY PRN
Status: DISCONTINUED | OUTPATIENT
Start: 2024-10-22 | End: 2024-10-22 | Stop reason: HOSPADM

## 2024-10-22 RX ORDER — BISACODYL 10 MG
10 SUPPOSITORY, RECTAL RECTAL DAILY PRN
Status: DISCONTINUED | OUTPATIENT
Start: 2024-10-22 | End: 2024-10-22 | Stop reason: HOSPADM

## 2024-10-22 RX ORDER — HYDROXYZINE HYDROCHLORIDE 25 MG/1
25 TABLET, FILM COATED ORAL 3 TIMES DAILY PRN
Status: DISCONTINUED | OUTPATIENT
Start: 2024-10-22 | End: 2024-10-22 | Stop reason: HOSPADM

## 2024-10-22 RX ORDER — RANOLAZINE 500 MG/1
500 TABLET, EXTENDED RELEASE ORAL 2 TIMES DAILY
Status: DISCONTINUED | OUTPATIENT
Start: 2024-10-22 | End: 2024-10-22 | Stop reason: HOSPADM

## 2024-10-22 RX ORDER — TAMSULOSIN HYDROCHLORIDE 0.4 MG/1
0.4 CAPSULE ORAL DAILY
Status: DISCONTINUED | OUTPATIENT
Start: 2024-10-22 | End: 2024-10-22 | Stop reason: HOSPADM

## 2024-10-22 RX ORDER — ONDANSETRON 4 MG/1
4 TABLET, ORALLY DISINTEGRATING ORAL EVERY 6 HOURS PRN
Status: DISCONTINUED | OUTPATIENT
Start: 2024-10-22 | End: 2024-10-22 | Stop reason: HOSPADM

## 2024-10-22 RX ORDER — PANTOPRAZOLE SODIUM 40 MG/1
40 TABLET, DELAYED RELEASE ORAL
Status: DISCONTINUED | OUTPATIENT
Start: 2024-10-22 | End: 2024-10-22 | Stop reason: HOSPADM

## 2024-10-22 RX ORDER — ONDANSETRON 2 MG/ML
4 INJECTION INTRAMUSCULAR; INTRAVENOUS EVERY 6 HOURS PRN
Status: DISCONTINUED | OUTPATIENT
Start: 2024-10-22 | End: 2024-10-22 | Stop reason: HOSPADM

## 2024-10-22 RX ADMIN — RANOLAZINE 500 MG: 500 TABLET, FILM COATED, EXTENDED RELEASE ORAL at 12:59

## 2024-10-22 RX ADMIN — TAMSULOSIN HYDROCHLORIDE 0.4 MG: 0.4 CAPSULE ORAL at 13:01

## 2024-10-22 RX ADMIN — ASPIRIN 81 MG: 81 TABLET, CHEWABLE ORAL at 12:58

## 2024-10-22 RX ADMIN — INFLUENZA A VIRUS A/VICTORIA/4897/2022 IVR-238 (H1N1) ANTIGEN (FORMALDEHYDE INACTIVATED), INFLUENZA A VIRUS A/CALIFORNIA/122/2022 SAN-022 (H3N2) ANTIGEN (FORMALDEHYDE INACTIVATED), AND INFLUENZA B VIRUS B/MICHIGAN/01/2021 ANTIGEN (FORMALDEHYDE INACTIVATED) 0.5 ML: 60; 60; 60 INJECTION, SUSPENSION INTRAMUSCULAR at 18:08

## 2024-10-22 RX ADMIN — PANTOPRAZOLE SODIUM 40 MG: 40 TABLET, DELAYED RELEASE ORAL at 12:59

## 2024-10-22 NOTE — ED PROVIDER NOTES
" EMERGENCY DEPARTMENT ENCOUNTER  Room Number:  08/08  PCP: Shara Talley APRN  Independent Historians: Patient      HPI:  Chief Complaint: had concerns including Edema.     A complete HPI/ROS/PMH/PSH/SH/FH are unobtainable due to: None    Chronic or social conditions impacting patient care (Social Determinants of Health): None      Context: Mohamud Jarrett is a 74 y.o. male who presents to the emergency department with complaints of bilateral lower extremity swelling and right arm swelling.  Patient also verbalizes complaints of associated shortness of breath.  Patient advises he went to dialysis today and when he got home he noticed swelling to both of his legs.  Patient advises he has had right arm swelling since he had some bleeding from his dialysis fistula earlier this month and the emergency department in [place] to put a \"piece of cotton in it\".  Patient denies fever, chills, headache, lightheadedness, dizziness, numbness, tingling, unilateral extremity weakness, syncope, chest pain, abdominal pain, nausea, vomiting, diarrhea, dysuria, hematuria, or other complaints.    Review of prior external notes (non-ED) -and- Review of prior external test results outside of this encounter:   October 5, 2024: Emergency department visit at [facility].  Patient was seen for bleeding from his dialysis fistula.  Provider note reveals mild pressure with Dermabond and Surgicel to stop the bleeding.      PAST MEDICAL HISTORY  Active Ambulatory Problems     Diagnosis Date Noted    Essential (primary) hypertension 03/13/2014    Hyperlipidemia 03/13/2014    Chronic kidney disease, stage 4 (severe) 05/23/2022    Anemia in chronic kidney disease 05/23/2022    Coronary artery disease 05/23/2022    Coagulation defect, unspecified 02/25/2023    Dependence on renal dialysis 02/25/2023    Mitral valve regurgitation 12/05/2022    Stented coronary artery 12/12/2022    Pure hypercholesterolemia 12/12/2022    Left " inguinal hernia 04/21/2023    H/O peanut allergy 06/14/2023    Weakness generalized 06/14/2023    CHF (congestive heart failure) 07/26/2023    Secondary hyperparathyroidism of renal origin 02/02/2024    History of gout 03/27/2024    End stage renal disease 07/01/2024    Presence of coronary angioplasty implant and graft 06/28/2024    Depression 07/09/2024    Generalized weakness 07/10/2024    Abnormal EKG 07/11/2024    ESRD (end stage renal disease) 07/12/2024    Itchy scalp 07/25/2024    Acute metabolic encephalopathy 09/04/2024    GI bleed 09/04/2024    Lactic acidosis 09/04/2024    Metabolic encephalopathy 09/04/2024    Severe malnutrition 09/04/2024     Resolved Ambulatory Problems     Diagnosis Date Noted    Screen for colon cancer 12/03/2018    Neuritis of lower extremity, right 12/19/2019    Anemia of chronic renal failure 04/12/2021    Gout 07/06/2021    Seasonal allergies 03/13/2014    Stage 3b chronic kidney disease 06/30/2017    Cough 07/06/2021    Rheumatoid factor positive 07/06/2021    Bilateral leg pain 11/05/2021    Edema 05/23/2022    Proteinuria 05/23/2022    Vitamin D deficiency 05/23/2022    Well adult exam 08/30/2022    Need for COVID-19 vaccine 08/30/2022    Neck pain 08/30/2022    Acute on chronic congestive heart failure 02/07/2023    Anaphylactic shock, unspecified, initial encounter 02/25/2023    Chronic systolic (congestive) heart failure 02/25/2023    Gout due to renal impairment, unspecified site 02/25/2023    Iron deficiency anemia 02/25/2023    Need for COVID-19 vaccine 06/01/2023    Myalgia 06/01/2023    Contusion of right arm 07/26/2023    Hematoma 09/06/2023    Medicare annual wellness visit, subsequent 09/06/2023    Dizziness 02/23/2024    Personal history of fall 02/23/2024    Impacted cerumen of right ear 03/27/2024    Hyperkalemia 07/01/2024    Edema 07/09/2024    Elevated troponin 07/09/2024    Chest pain, atypical 07/11/2024     Past Medical History:   Diagnosis Date    A-V  fistula     Antiplatelet or antithrombotic long-term use     Arthritis     CAD (coronary artery disease)     Chest pain     Dialysis patient     Heart attack     Hypertension     Kidney disease          PAST SURGICAL HISTORY  Past Surgical History:   Procedure Laterality Date    ARTERIOVENOUS FISTULA Right     CARDIAC CATHETERIZATION      CARPAL TUNNEL RELEASE      CATARACT EXTRACTION W/ INTRAOCULAR LENS IMPLANT      COLONOSCOPY N/A 2006    COLONOSCOPY N/A 12/14/2018    Procedure: COLONOSCOPY TO CECUM WITH COLD BIOPSY POLYPECTOMY;  Surgeon: Elliott Harding MD;  Location[facility];  Service: General    COLONOSCOPY N/A 9/9/2024    Procedure: COLONOSCOPY with cold snare polypectomies;  Surgeon: Alvarado Irvin MD;  Location: [facility];  Service: Gastroenterology;  Laterality: N/A;  pre- anemia  post- polyps    CORONARY ANGIOPLASTY WITH STENT PLACEMENT  11/09/2022    CORONARY ARTERY BYPASS GRAFT N/A 06/20/2003    ENDOSCOPY N/A 9/9/2024    Procedure: ESOPHAGOGASTRODUODENOSCOPY with biopsies;  Surgeon: Alvarado Irvin MD;  Location: [facility];  Service: Gastroenterology;  Laterality: N/A;  pre- anemia  post- gastritis    INGUINAL HERNIA REPAIR Right 06/04/2014    Open incarcerated inguinal hernia repair-Dr. Elliott Harding    INGUINAL HERNIA REPAIR Left 4/26/2023    Procedure: OPEN LEFT INGUINAL HERNIA REPAIR;  Surgeon: Elliott Harding MD;  Location: [facility];  Service: General;  Laterality: Left;    LUMBAR DISC SURGERY N/A 1990, 1992    L4-L5, X2         FAMILY HISTORY  Family History   Problem Relation Age of Onset    Heart disease Mother     Heart disease Father     Malig Hyperthermia Neg Hx          SOCIAL HISTORY  Social History     Socioeconomic History    Marital status:    Tobacco Use    Smoking status: Never    Smokeless tobacco: Never   Vaping Use    Vaping status: Never Used   Substance and Sexual Activity    Alcohol use: Yes     Comment: occassionally    Drug use: No     Sexual activity: Defer         ALLERGIES  Peanut (diagnostic), Peanut butter flavor, and Simvastatin      REVIEW OF SYSTEMS  Included in HPI  All systems reviewed and negative except for those discussed in HPI.      PHYSICAL EXAM    I have reviewed the triage vital signs and nursing notes.    ED Triage Vitals [10/21/24 2159]   Temp Heart Rate Resp BP SpO2   98.9 °F (37.2 °C) 82 16 147/66 99 %      Temp src Heart Rate Source Patient Position BP Location FiO2 (%)   Tympanic -- -- -- --       GENERAL: not distressed  HENT: nares patent  Head/neck/ face are symmetric without gross deformity or swelling  EYES: no scleral icterus  CV: regular rhythm, regular rate with intact distal pulses  RESPIRATORY: normal effort and no respiratory distress  ABDOMEN: soft and nontender  MUSCULOSKELETAL: no deformity  Swelling to right forearm and hand-mild  +2 bilateral lower extremity edema  NEURO: alert and appropriate, moves all extremities, follows commands  SKIN: warm, dry  No signs of cellulitis to right arm or bilateral lower extremtities    Vital signs and nursing notes reviewed.      LAB RESULTS  Recent Results (from the past 24 hours)   ECG 12 Lead Dyspnea    Collection Time: 10/21/24 10:26 PM   Result Value Ref Range    QT Interval 542 ms    QTC Interval 594 ms   Comprehensive Metabolic Panel    Collection Time: 10/21/24 10:42 PM    Specimen: Blood   Result Value Ref Range    Glucose 94 65 - 99 mg/dL    BUN 53 (H) 8 - 23 mg/dL    Creatinine 5.37 (H) 0.76 - 1.27 mg/dL    Sodium 140 136 - 145 mmol/L    Potassium 3.4 (L) 3.5 - 5.2 mmol/L    Chloride 98 98 - 107 mmol/L    CO2 32.2 (H) 22.0 - 29.0 mmol/L    Calcium 8.6 8.6 - 10.5 mg/dL    Total Protein 6.6 6.0 - 8.5 g/dL    Albumin 3.3 (L) 3.5 - 5.2 g/dL    ALT (SGPT) 9 1 - 41 U/L    AST (SGOT) 19 1 - 40 U/L    Alkaline Phosphatase 267 (H) 39 - 117 U/L    Total Bilirubin 1.0 0.0 - 1.2 mg/dL    Globulin 3.3 gm/dL    A/G Ratio 1.0 g/dL    BUN/Creatinine Ratio 9.9 7.0 - 25.0     Anion Gap 9.8 5.0 - 15.0 mmol/L    eGFR 10.6 (L) >60.0 mL/min/1.73   BNP    Collection Time: 10/21/24 10:42 PM    Specimen: Blood   Result Value Ref Range    proBNP >70,000.0 (H) 0.0 - 900.0 pg/mL   Single High Sensitivity Troponin T    Collection Time: 10/21/24 10:42 PM    Specimen: Blood   Result Value Ref Range    HS Troponin T 219 (C) <22 ng/L   Green Top (Gel)    Collection Time: 10/21/24 10:42 PM   Result Value Ref Range    Extra Tube Hold for add-ons.    Lavender Top    Collection Time: 10/21/24 10:42 PM   Result Value Ref Range    Extra Tube hold for add-on    Gold Top - SST    Collection Time: 10/21/24 10:42 PM   Result Value Ref Range    Extra Tube Hold for add-ons.    Light Blue Top    Collection Time: 10/21/24 10:42 PM   Result Value Ref Range    Extra Tube Hold for add-ons.    CBC Auto Differential    Collection Time: 10/21/24 10:42 PM    Specimen: Blood   Result Value Ref Range    WBC 3.09 (L) 3.40 - 10.80 10*3/mm3    RBC 2.85 (L) 4.14 - 5.80 10*6/mm3    Hemoglobin 9.0 (L) 13.0 - 17.7 g/dL    Hematocrit 27.5 (L) 37.5 - 51.0 %    MCV 96.5 79.0 - 97.0 fL    MCH 31.6 26.6 - 33.0 pg    MCHC 32.7 31.5 - 35.7 g/dL    RDW 15.5 (H) 12.3 - 15.4 %    RDW-SD 53.1 37.0 - 54.0 fl    MPV 10.8 6.0 - 12.0 fL    Platelets 143 140 - 450 10*3/mm3    Neutrophil % 50.5 42.7 - 76.0 %    Lymphocyte % 23.3 19.6 - 45.3 %    Monocyte % 17.5 (H) 5.0 - 12.0 %    Eosinophil % 5.8 0.3 - 6.2 %    Basophil % 2.9 (H) 0.0 - 1.5 %    Immature Grans % 0.0 0.0 - 0.5 %    Neutrophils, Absolute 1.56 (L) 1.70 - 7.00 10*3/mm3    Lymphocytes, Absolute 0.72 0.70 - 3.10 10*3/mm3    Monocytes, Absolute 0.54 0.10 - 0.90 10*3/mm3    Eosinophils, Absolute 0.18 0.00 - 0.40 10*3/mm3    Basophils, Absolute 0.09 0.00 - 0.20 10*3/mm3    Immature Grans, Absolute 0.00 0.00 - 0.05 10*3/mm3    nRBC 0.0 0.0 - 0.2 /100 WBC         RADIOLOGY  XR Chest 1 View    Result Date: 10/21/2024  XR CHEST 1 VW-  HISTORY: 74-year-old male with shortness of breath and lower  extremity swelling. Started after dialysis earlier today.  FINDINGS: No evidence for pneumonia or CHF. Right costophrenic angle is blunted, unchanged since prior radiograph 10/7/2024. Follow-up is suggested with 2 views of the chest.  This report was finalized on 10/21/2024 11:03 PM by Dr. Winnie Orosco M.D on Workstation: [id number]         MEDICATIONS GIVEN IN ER  Medications   sodium chloride 0.9 % flush 10 mL (has no administration in time range)           OUTPATIENT MEDICATION MANAGEMENT:  Current Facility-Administered Medications Ordered in Epic   Medication Dose Route Frequency Provider Last Rate Last Admin    sodium chloride 0.9 % flush 10 mL  10 mL Intravenous PRN Michelle Reddy APRN         Current Outpatient Medications Ordered in Epic   Medication Sig Dispense Refill    aspirin 81 MG chewable tablet Chew 1 tablet Daily.      hydrOXYzine (ATARAX) 25 MG tablet Take 1 tablet by mouth 3 (Three) Times a Day As Needed for Itching. 10 tablet 0    ranolazine (RANEXA) 500 MG 12 hr tablet Take 1 tablet by mouth 2 (Two) Times a Day for 30 days. Take dos 60 tablet 0    tamsulosin (FLOMAX) 0.4 MG capsule 24 hr capsule Take 1 capsule by mouth Daily.      acetaminophen (TYLENOL) 325 MG tablet Take 2 tablets by mouth Every 6 (Six) Hours As Needed for Mild Pain.      Methoxy PEG-Epoetin Beta (MIRCERA IJ) 75 mcg Every 28 (Twenty-Eight) Days.      pantoprazole (PROTONIX) 40 MG EC tablet Take 1 tablet by mouth 2 (Two) Times a Day Before Meals. 60 tablet 0         PROGRESS, DATA ANALYSIS, CONSULTS, AND MEDICAL DECISION MAKING  ORDERS PLACED DURING THIS VISIT:  Orders Placed This Encounter   Procedures    XR Chest 1 View    [place] Draw    Comprehensive Metabolic Panel    BNP    Single High Sensitivity Troponin T    CBC Auto Differential    High Sensitivity Troponin T 2Hr    Continuous Pulse Oximetry    Vital Signs    LHA (on-call MD unless specified) Details    Oxygen Therapy- Nasal Cannula; Titrate 1-6 LPM Per SpO2; 90 -  95%    ECG 12 Lead Dyspnea    Insert Peripheral IV    Initiate Observation Status    CBC & Differential    Green Top (Gel)    Lavender Top    Gold Top - SST    Light Blue Top       All labs have been independently interpreted by me.  All radiology studies have been reviewed by me. All EKG's have been independently viewed by me.  Discussion below represents my analysis of pertinent findings related to patient's condition, differential diagnosis, treatment plan and final disposition.    Differential diagnosis includes but is not limited to:   Fluid overload, pneumonia, electrolyte imbalance, etc.    ED Course/Progress Notes/MDM:  ED Course as of 10/24/24 0650   Mon Oct 21, 2024   2242 I discussed this case with Dr. Donald. [AR]   2311 XR Chest 1 View  I viewed x-ray image, sternotomy wires, no focal infiltrate [DN]   2325 HS Troponin T(!!): 219 [DN]   2325 BUN(!): 53 [DN]   2325 Creatinine(!): 5.37 [DN]   2341 proBNP(!): >70,000.0 [DN]   Tue Oct 22, 2024   0055 Patient reassessed.  Discussed ED findings, differential diagnosis, and the need for admission for evaluation/treatment.  They are agreeable to admission and all questions were answered.     [AR]   0132 Phone call with Renetta, APC with [facility].  Discussed the patient, relevant history, exam, diagnostics, ED findings/progress, and concerns. They agree to admit the patient to Dr. Bruce. Care assumed by the admitting physician at this time.     [AR]      ED Course User Index  [AR] Michelle Reddy APRN  [DN] René Harris MD           AS OF 01:32 EDT VITALS:    BP - 134/70  HR - 93  TEMP - 98.9 °F (37.2 °C) (Tympanic)  O2 SATS - 96%      COMPLEXITY OF CARE  The patient requires admission.        DIAGNOSIS  Final diagnoses:   Leg swelling   Arm swelling   Shortness of breath         DISPOSITION  ED Disposition       ED Disposition   Decision to Admit    Condition   --    Comment   Level of Care: Telemetry [5]   Diagnosis: Leg swelling [608786]   Admitting  Physician: LANE EDWARDS [[id number]]   Attending Physician: LANE EDWARDS [[id number]]                        Please note that portions of this document were completed with a voice recognition program.    Note Disclaimer: At [facility], we believe that sharing information builds trust and better relationships. You are receiving this note because you recently visited [facility]. It is possible you will see health information before a provider has talked with you about it. This kind of information can be easy to misunderstand. To help you fully understand what it means for your health, we urge you to discuss this note with your provider.     Michelle Reddy, POLI  10/24/24 0626

## 2024-10-22 NOTE — DISCHARGE SUMMARY
Patient Name: Mohamud Jarrett  : 1950  MRN: [id number]    Date of Admission: 10/21/2024  Date of Discharge:  10/22/2024  Primary Care Physician: Shara Talley APRN      Chief Complaint:   Edema      Discharge Diagnoses     Active Hospital Problems    Diagnosis  POA    **Leg swelling [M79.89]  Yes    Elevated troponin [R79.89]  Unknown    End stage renal disease [N18.6]  Yes    Anemia in chronic kidney disease [N18.9, D63.1]  Yes    Coronary artery disease [I25.10]  Yes    Essential (primary) hypertension [I10]  Yes      Resolved Hospital Problems   No resolved problems to display.        Hospital Course     Mr. Jarrett is a 74 y.o. male with a history of ESRD on HD, CAD, CHF, gout and HTN who [facility] to [facility] complaining of lower extremity swelling and shortness of breath.  Please see the admitting history and physical for further details.  He was found to have volume overload and was admitted to the hospital for further evaluation and treatment.  He was seen by nephrology who ordered ultrafiltration that was performed this morning.  Patient did not get the full 4 L removed due to complaints of cramping.  2.5 L were removed.  Patient cleared for discharge by nephrology.  Due to asymmetrical swelling in his right upper extremity a venous Doppler study was ordered.  Results pending at time of this dictation.  If negative plan for DC and follow-up with his normal dialysis again tomorrow where he will need adjustment in his dry weight.    If abnormality identified on RUE Doppler will hold discharge and make addendum to this report.      Day of Discharge     Subjective:  Seen following dialysis.  Cramping he was having during his dialysis has resolved.  He feels well enough to go home.  He knows to follow-up with dialysis center tomorrow.    Physical Exam:  Temp:  [97.1 °F (36.2 °C)-98.9 °F (37.2 °C)] 97.1 °F (36.2 °C)  Heart Rate:  [66-93] 86  Resp:  [16-17] 16  BP:  (101-159)/(54-73) 101/54  Body mass index is 26.01 kg/m².  Physical Exam  Vitals and nursing note reviewed.   Constitutional:       Appearance: Normal appearance.   Cardiovascular:      Rate and Rhythm: Normal rate.   Pulmonary:      Effort: Pulmonary effort is normal.      Breath sounds: No rales.   Musculoskeletal:         General: Swelling (RUE) present.   Neurological:      Mental Status: He is alert. Mental status is at baseline.   Psychiatric:         Mood and Affect: Mood normal.         Consultants     Consult Orders (all) (From admission, onward)       Start     Ordered    10/22/24 0145  Inpatient Nephrology Consult  Once        Specialty:  Nephrology  Provider:  Enzo Farley MD    10/22/24 0146            Procedures     Imaging Results (All)       Procedure Component Value Units Date/Time    XR Chest 1 View [[id number]] Collected: 10/21/24 2302     Updated: 10/21/24 2306    Narrative:      XR CHEST 1 VW-     HISTORY: 74-year-old male with shortness of breath and lower extremity  swelling. Started after dialysis earlier today.     FINDINGS: No evidence for pneumonia or CHF. Right costophrenic angle is  blunted, unchanged since prior radiograph 10/7/2024. Follow-up is  suggested with 2 views of the chest.     This report was finalized on 10/21/2024 11:03 PM by Dr. Winnie Orosco M.D  on Workstation: [id number]        Pertinent Labs     Results from last 7 days   Lab Units 10/22/24  0531 10/21/24  2242   WBC 10*3/mm3 3.36* 3.09*   HEMOGLOBIN g/dL 8.9* 9.0*   PLATELETS 10*3/mm3 137* 143     Results from last 7 days   Lab Units 10/22/24  0531 10/21/24  2242   SODIUM mmol/L 141 140   POTASSIUM mmol/L 3.5 3.4*   CHLORIDE mmol/L 101 98   CO2 mmol/L 27.5 32.2*   BUN mg/dL 59* 53*   CREATININE mg/dL 5.44* 5.37*   GLUCOSE mg/dL 100* 94   EGFR mL/min/1.73 10.5* 10.6*     Results from last 7 days   Lab Units 10/21/24  2242   ALBUMIN g/dL 3.3*   BILIRUBIN mg/dL 1.0   ALK PHOS U/L 267*   AST (SGOT) U/L 19   ALT  "(SGPT) U/L 9     Results from last 7 days   Lab Units 10/22/24  0531 10/21/24  2242   CALCIUM mg/dL 8.2* 8.6   ALBUMIN g/dL  --  3.3*       Results from last 7 days   Lab Units 10/22/24  0054 10/21/24  2242   HSTROP T ng/L 200* 219*   PROBNP pg/mL  --  >70,000.0*           Invalid input(s): \"LDLCALC\"          Test Results Pending at Discharge     Pending Results       None          RUE Doppler    Discharge Details        Discharge Medications        Continue These Medications        Instructions Start Date   acetaminophen 325 MG tablet  Commonly known as: TYLENOL   650 mg, Oral, Every 6 Hours PRN      aspirin 81 MG chewable tablet   81 mg, Daily      hydrOXYzine 25 MG tablet  Commonly known as: ATARAX   25 mg, Oral, 3 Times Daily PRN      MIRCERA IJ   75 mcg, Every 28 Days      pantoprazole 40 MG EC tablet  Commonly known as: PROTONIX   40 mg, Oral, 2 Times Daily Before Meals      ranolazine 500 MG 12 hr tablet  Commonly known as: RANEXA   500 mg, Oral, 2 Times Daily, Take dos      tamsulosin 0.4 MG capsule 24 hr capsule  Commonly known as: FLOMAX   1 capsule, Daily               Allergies   Allergen Reactions    Peanut (Diagnostic) Shortness Of Breath and Rash    Peanut Butter Flavor Shortness Of Breath and Rash    Simvastatin Myalgia     Other reaction(s): muscle cramps       Discharge Disposition:  Home or Self Care      Discharge Diet:  Diet Order   Procedures    Diet: Cardiac; Healthy Heart (2-3 Na+); Fluid Consistency: Thin (IDDSI 0)       Discharge Activity:   Activity Instructions       Activity as Tolerated              CODE STATUS:    Code Status and Medical Interventions: CPR (Attempt to Resuscitate); Full   Ordered at: 10/22/24 0147     Code Status (Patient has no pulse and is not breathing):    CPR (Attempt to Resuscitate)     Medical Interventions (Patient has pulse or is breathing):    Full       No future appointments.  Additional Instructions for the Follow-ups that You Need to Schedule       " Discharge Follow-up with PCP   As directed       Currently Documented PCP:    Shara Talley APRN    PCP Phone Number:    [phone]     Follow Up Details: 1 to 2 weeks (or sooner if problems)               Follow-up Information       Shara Talley APRN .    Specialties: Nurse Practitioner, Family Medicine  Why: 1 to 2 weeks (or sooner if problems)  Contact information:  [address] [facility] [address]  [facility] [address]  [phone]                             Additional Instructions for the Follow-ups that You Need to Schedule       Discharge Follow-up with PCP   As directed       Currently Documented PCP:    Shara Talley APRN    PCP Phone Number:    [phone]     Follow Up Details: 1 to 2 weeks (or sooner if problems)            Time Spent on Discharge:  Greater than 30 minutes      Matthew Navas MD  [facility]  10/22/24  13:22 EDT

## 2024-10-22 NOTE — TELEPHONE ENCOUNTER
"Caller advises that after returning home from dialysis today that his feet, legs, and arm with Fistula in it were all swollen to double their normal size. Describes as pitting edema. Caller denies ever having this happen before but also voices c/o of mild SOA. Advised call to go to ED to be evaluated, caller seems hesitant to go and continues to speculate that they did something wrong in dialysis today. I advised caller that it is possible that he is in CHF or a new medical development may have occurred and continued to stress importance of being seen in ED. Caller still seems hesitant and seems fixated on dialysis from earlier in day but did state that he would go to ED. Advised caller that if need be he can call 911 and have an ambulance come take him. Verbalizes understanding.         Reason for Disposition   Difficulty breathing at rest    Additional Information   Negative: SEVERE difficulty breathing (e.g., struggling for each breath, speaks in single words)   Negative: Looks like a broken bone or dislocated joint (e.g., crooked or deformed)   Negative: Sounds like a life-threatening emergency to the triager   Negative: Chest pain   Negative: Followed a leg injury   Negative: [1] Followed an insect bite AND [2] localized swelling (e.g., small area of puffy or swollen skin)   Negative: Swelling of one ankle joint   Negative: Swelling of knee is main symptom   Negative: Pregnant   Negative: Postpartum (from 0 to 6 weeks after delivery)   Negative: [1] Heart failure suspected (e.g., ankle swelling, shortness of breath, weight gain) AND [2] recently in hospital for heart failure    Answer Assessment - Initial Assessment Questions  1. ONSET: \"When did the swelling start?\" (e.g., minutes, hours, days)      After arriving home from dialysis this am.   2. LOCATION: \"What part of the leg is swollen?\"  \"Are both legs swollen or just one leg?\"      Both legs and arm  3. SEVERITY: \"How bad is the swelling?\" (e.g., " "localized; mild, moderate, severe)    - Localized: Small area of swelling localized to one leg.    - MILD pedal edema: Swelling limited to foot and ankle, pitting edema < 1/4 inch (6 mm) deep, rest and elevation eliminate most or all swelling.    - MODERATE edema: Swelling of lower leg to knee, pitting edema > 1/4 inch (6 mm) deep, rest and elevation only partially reduce swelling.    - SEVERE edema: Swelling extends above knee, facial or hand swelling present.       Moderate  4. REDNESS: \"Does the swelling look red or infected?\"      Denies  5. PAIN: \"Is the swelling painful to touch?\" If Yes, ask: \"How painful is it?\"   (Scale 1-10; mild, moderate or severe)      Denies  6. FEVER: \"Do you have a fever?\" If Yes, ask: \"What is it, how was it measured, and when did it start?\"       Denies  7. CAUSE: \"What do you think is causing the leg swelling?\"      Pt. Thinks that they did something wrong in dialysis.   8. MEDICAL HISTORY: \"Do you have a history of blood clots (e.g., DVT), cancer, heart failure, kidney disease, or liver failure?\"      ESRD, MI, Denies CHF  9. RECURRENT SYMPTOM: \"Have you had leg swelling before?\" If Yes, ask: \"When was the last time?\" \"What happened that time?\"      No denies   10. OTHER SYMPTOMS: \"Do you have any other symptoms?\" (e.g., chest pain, difficulty breathing)        SOB  11. PREGNANCY: \"Is there any chance you are pregnant?\" \"When was your last menstrual period?\"        Na    Protocols used: Leg Swelling and Edema-ADULT-AH    "

## 2024-10-22 NOTE — ED TRIAGE NOTES
To ER via EMS from home.  C/o new swelling to bilat lower extremities.  PT also has swelling in rt arm. Pt is ESRD on dialysis and had dialysis today.      Pt states a slight increase in SOA tonight.

## 2024-10-22 NOTE — DISCHARGE PLACEMENT REQUEST
"Mohamud Quach (74 y.o. Male)       Date of Birth   1950    Social Security Number       Address   [address]  [facility] [address] [address]    Home Phone   [phone]    MRN   [id number]       [facility]    Marital Status                               Admission Date   10/21/24    Admission Type   Emergency    Admitting Provider   Matthew Navas MD    Attending Provider   Matthew Navas MD    Department, Room/Bed   [address], [address]/1       Discharge Date       Discharge Disposition   Home or Self Care    Discharge Destination                                 Attending Provider: Matthew Navas MD    Allergies: Peanut (Diagnostic), Peanut Butter Flavor, Simvastatin    Isolation: None   Infection: None   Code Status: CPR    Ht: 177.8 cm (70\")   Wt: 82.2 kg (181 lb 4.8 oz)    Admission Cmt: None   Principal Problem: Leg swelling [M79.89]                   Active Insurance as of 10/21/2024       Primary Coverage       Payor Plan Insurance Group Employer/Plan Group    ANTH MEDICARE REPLACEMENT [facility] MEDICARE ADVANTAGE [id number]       Payor Plan Address Payor Plan Phone Number Payor Plan Fax Number Effective Dates    [address] [phone]  1/1/2021 - None Entered    [facility] [address]         Subscriber Name Subscriber Birth Date Member ID       MOHAMUD QUACH 1950 [id number]                     Emergency Contacts        (Rel.) Home Phone Work Phone Mobile Phone    Ilana Quach (Daughter) -- -- [phone]                "

## 2024-10-22 NOTE — ED NOTES
".Nursing report ED to floor  Mohamud Jarrett  74 y.o.  male    HPI :  HPI  Stated Reason for Visit: To ER via EMS from home.  C/o new swelling to bilat lower extremities.  PT also has swelling in rt arm. Pt is ESRD on dialysis and had dialysis today.      Pt states a slight increase in SOA tonight.  History Obtained From: patient, EMS    Chief Complaint  Chief Complaint   Patient presents with    Edema       Admitting doctor:   Lanie Bruce DO    Admitting diagnosis:   The primary encounter diagnosis was Leg swelling. Diagnoses of Arm swelling and Shortness of breath were also pertinent to this visit.    Code status:   Current Code Status       Date Active Code Status Order ID Comments User Context       10/22/2024 0147 CPR (Attempt to Resuscitate) [id number]  Renetta Mendenhall APRN ED        Question Answer    Code Status (Patient has no pulse and is not breathing) CPR (Attempt to Resuscitate)    Medical Interventions (Patient has pulse or is breathing) Full                    Allergies:   Peanut (diagnostic), Peanut butter flavor, and Simvastatin    Isolation:   No active isolations    Intake and Output  No intake or output data in the 24 hours ending 10/22/24 0153    Weight:       10/21/24  2159   Weight: 86 kg (189 lb 9.5 oz)       Most recent vitals:   Vitals:    10/21/24 2159 10/22/24 0101 10/22/24 0102   BP: 147/66 134/70    Pulse: 82  93   Resp: 16 17    Temp: 98.9 °F (37.2 °C)     TempSrc: Tympanic     SpO2: 99%  96%   Weight: 86 kg (189 lb 9.5 oz)     Height: 177.8 cm (70\")         Active LDAs/IV Access:   Lines, Drains & Airways       Active LDAs       Name Placement date Placement time Site Days    Peripheral IV 10/21/24 2242 Anterior;Left Forearm 10/21/24  2242  Forearm  less than 1                    Labs (abnormal labs have a star):   Labs Reviewed   COMPREHENSIVE METABOLIC PANEL - Abnormal; Notable for the following components:       Result Value    BUN 53 (*)     Creatinine 5.37 (*)     " Potassium 3.4 (*)     CO2 32.2 (*)     Albumin 3.3 (*)     Alkaline Phosphatase 267 (*)     eGFR 10.6 (*)     All other components within normal limits    Narrative:     GFR Normal >60  Chronic Kidney Disease <60  Kidney Failure <15    The GFR formula is only valid for adults with stable renal function between ages 18 and 70.   BNP (IN-HOUSE) - Abnormal; Notable for the following components:    proBNP >70,000.0 (*)     All other components within normal limits    Narrative:     This assay is used as an aid in the diagnosis of individuals suspected of having heart failure. It can be used as an aid in the diagnosis of acute decompensated heart failure (ADHF) in patients presenting with signs and symptoms of ADHF to the emergency department (ED). In addition, NT-proBNP of <300 pg/mL indicates ADHF is not likely.    Age Range Result Interpretation  NT-proBNP Concentration (pg/mL:      <50             Positive            >450                   Gray                 300-450                    Negative             <300    50-75           Positive            >900                  Gray                300-900                  Negative            <300      >75             Positive            >1800                  Gray                300-1800                  Negative            <300   SINGLE HS TROPONIN T - Abnormal; Notable for the following components:    HS Troponin T 219 (*)     All other components within normal limits    Narrative:     High Sensitive Troponin T Reference Range:  <14.0 ng/L- Negative Female for AMI  <22.0 ng/L- Negative Male for AMI  >=14 - Abnormal Female indicating possible myocardial injury.  >=22 - Abnormal Male indicating possible myocardial injury.   Clinicians would have to utilize clinical acumen, EKG, Troponin, and serial changes to determine if it is an Acute Myocardial Infarction or myocardial injury due to an underlying chronic condition.        CBC WITH AUTO DIFFERENTIAL - Abnormal; Notable  for the following components:    WBC 3.09 (*)     RBC 2.85 (*)     Hemoglobin 9.0 (*)     Hematocrit 27.5 (*)     RDW 15.5 (*)     Monocyte % 17.5 (*)     Basophil % 2.9 (*)     Neutrophils, Absolute 1.56 (*)     All other components within normal limits   HIGH SENSITIVITIY TROPONIN T 2HR - Abnormal; Notable for the following components:    HS Troponin T 200 (*)     Troponin T Delta -19 (*)     All other components within normal limits    Narrative:     High Sensitive Troponin T Reference Range:  <14.0 ng/L- Negative Female for AMI  <22.0 ng/L- Negative Male for AMI  >=14 - Abnormal Female indicating possible myocardial injury.  >=22 - Abnormal Male indicating possible myocardial injury.   Clinicians would have to utilize clinical acumen, EKG, Troponin, and serial changes to determine if it is an Acute Myocardial Infarction or myocardial injury due to an underlying chronic condition.        RAINBOW DRAW    Narrative:     The following orders were created for panel order Venus Draw.  Procedure                               Abnormality         Status                     ---------                               -----------         ------                     Green Top (Gel)[[id number]]                                  Final result               Lavender Top[[id number]]                                     Final result               Gold Top - SST[[id number]]                                   Final result               Light Blue Top[[id number]]                                   Final result                 Please view results for these tests on the individual orders.   BASIC METABOLIC PANEL   CBC (NO DIFF)   CBC AND DIFFERENTIAL    Narrative:     The following orders were created for panel order CBC & Differential.  Procedure                               Abnormality         Status                     ---------                               -----------         ------                     CBC Auto Differential[[id number]]         Abnormal            Final result                 Please view results for these tests on the individual orders.   GREEN TOP   LAVENDER TOP   GOLD TOP - SST   LIGHT BLUE TOP       EKG:   ECG 12 Lead Dyspnea   Preliminary Result   HEART RATE=72  bpm   RR Wpkiaddi=881  ms   ME Yjescrgu=768  ms   P Horizontal Axis=6  deg   P Front Axis=-23  deg   QRSD Pkxxcahw=306  ms   QT Xhyivfxe=457  ms   WTcP=141  ms   QRS Axis=21  deg   T Wave Axis=131  deg   - ABNORMAL ECG -   Sinus rhythm   Abnormal R-wave progression, early transition   Borderline repolarization abnormality   Prolonged QT interval   Date and Time of Study:2024-10-21 22:26:07          Meds given in ED:   Medications   sodium chloride 0.9 % flush 10 mL (has no administration in time range)   nitroglycerin (NITROSTAT) SL tablet 0.4 mg (has no administration in time range)   acetaminophen (TYLENOL) tablet 650 mg (has no administration in time range)   sennosides-docusate (PERICOLACE) 8.6-50 MG per tablet 2 tablet (has no administration in time range)     And   polyethylene glycol (MIRALAX) packet 17 g (has no administration in time range)     And   bisacodyl (DULCOLAX) EC tablet 5 mg (has no administration in time range)     And   bisacodyl (DULCOLAX) suppository 10 mg (has no administration in time range)   ondansetron ODT (ZOFRAN-ODT) disintegrating tablet 4 mg (has no administration in time range)     Or   ondansetron (ZOFRAN) injection 4 mg (has no administration in time range)       Imaging results:  No radiology results for the last day    Ambulatory status:   - up as tolerated    Social issues:   Social History     Socioeconomic History    Marital status:    Tobacco Use    Smoking status: Never    Smokeless tobacco: Never   Vaping Use    Vaping status: Never Used   Substance and Sexual Activity    Alcohol use: Yes     Comment: occassionally    Drug use: No    Sexual activity: Defer       Peripheral Neurovascular  Peripheral Neurovascular  (Adult)  Peripheral Neurovascular WDL: WDL  Additional Documentation: Edema (Group)  Edema  Edema: ankle, right, ankle, left  Ankle, Left Edema: 3+ (Moderate)  Ankle, Right Edema: 3+ (Moderate)    Neuro Cognitive  Neuro Cognitive (Adult)  Cognitive/Neuro/Behavioral WDL: WDL    Learning  Learning Assessment  Learning Readiness and Ability: no barriers identified  Education Provided  Person Taught: patient  Teaching Method: verbal instruction  Teaching Focus: symptom/problem overview, diagnostic test  Education Outcome Evaluation: verbalizes understanding    Respiratory  Respiratory  Airway WDL: .WDL except  Additional Documentation: Breath Sounds (Group)  Respiratory WDL  Respiratory WDL: .WDL except, rhythm/pattern  Rhythm/Pattern, Respiratory: shortness of breath  Breath Sounds  All Lung Fields Breath Sounds: All Fields  All Lung Fields Breath Sounds: Anterior:, Posterior:, Lateral:, diminished    Abdominal Pain       Pain Assessments  Pain (Adult)  (0-10) Pain Rating: Rest: 0    NIH Stroke Scale       Leilani Alvarado RN  10/22/24 01:53 EDT

## 2024-10-22 NOTE — PROGRESS NOTES
Discharge Planning Assessment  [facility]     Patient Name: Mohamud Jarrett  MRN: [id number]  Today's Date: 10/22/2024    Admit Date: 10/21/2024    Plan: Return home - staying with his sister   Discharge Needs Assessment       Row Name 10/22/24 1544       Living Environment    People in Home sibling(s)    Name(s) of People in Home Sister    Current Living Arrangements apartment    Potentially Unsafe Housing Conditions none    Primary Care Provided by self    Provides Primary Care For no one, unable/limited ability to care for self    Family Caregiver if Needed child(renee), adult    Family Caregiver Names Daughter Ilaan Jarrett [phone] is emergency contact    Quality of Family Relationships helpful    Able to Return to Prior Arrangements yes       Resource/Environmental Concerns    Resource/Environmental Concerns none    Transportation Concerns rides, unreliable from others       Transition Planning    Patient/Family Anticipated Services at Transition none    Transportation Anticipated family or friend will provide;public transportation  Unsure - he is trying to find someone to come get him       Discharge Needs Assessment    Readmission Within the Last 30 Days no previous admission in last 30 days    Equipment Currently Used at Home walker, rolling;shower chair    Concerns to be Addressed denies needs/concerns at this time    Anticipated Changes Related to Illness none    Equipment Needed After Discharge none    Provided Post Acute Provider List? N/A    N/A Provider List Comment Current with [facility]                    Discharge Plan       Row Name 10/22/24 9452       Plan    Plan Comments Spoke with patient at bedside.  He is staying with his sister.  He states he has applications in to live alone, but does not have a place at present.  He uses a walker, has a shower chair.  He states he planned to go to SNF in the past but then was not accepted (insurance did not approve).  PCP is Shara ASHTON and  pharmacy is [facility] in [place].  Patient has been driving himself to [facility] for HD, states his sister is not helping with transportation. He states [facility]  was going to follow him but his living arrangments were not stable.  he is agreeable for [facility] HH to follow - spoke with Florencia.   He plans to return to his sister's at [place].  He has been unable to find transportation home.  Given voucher for Z-trip.  Carlos BOTELLO      Row Name 10/22/24 8216       Plan    Plan Return home - staying with his sister    Patient/Family in Agreement with Plan yes                  Continued Care and Services - Admitted Since 10/21/2024       Dialysis/Infusion       Service Provider Request Status Services Address Phone Fax Patient Preferred    [facility] Accepted -- [address] [place] [address], [facility] [address] [address] [phone] --              Home Medical Care       Service Provider Request Status Services Address Phone Fax Patient Preferred    [facility] [address] [facility] [address], [facility] [id number] [phone] --                  Selected Continued Care - Prior Encounters Includes continued care and service providers with selected services from prior encounters from 7/23/2024 to 10/22/2024      Discharged on 9/17/2024 Admission date: 9/3/2024 - Discharge disposition: Home-Health Care Svc      Dialysis/Infusion       Service Provider Services Address Phone Fax Patient Preferred    [facility] [address] [place] [address], [facility] [address] [address] [phone] --              Home Medical Care       Service Provider Services Address Phone Fax Patient Preferred    [facility] [address], [address], [facility] [address] [phone] --                          Expected Discharge Date and Time        Expected Discharge Date Expected Discharge Time    Oct 22, 2024            Demographic Summary       Row Name 10/22/24 1543       General Information    Admission Type observation    Arrived From home    Referral Source admission list    Reason for Consult discharge planning    Preferred Language English                   Functional Status       Row Name 10/22/24 1543       Functional Status    Usual Activity Tolerance moderate    Current Activity Tolerance fair       Functional Status, IADL    Medications independent    Meal Preparation independent;assistive person  Lives with his sister    Housekeeping assistive person    Laundry assistive person    Shopping independent       Mental Status    General Appearance WDL WDL       Mental Status Summary    Recent Changes in Mental Status/Cognitive Functioning no changes                                 Becky S. [place], RN

## 2024-10-22 NOTE — NURSING NOTE
isolated ultrafiltration treatment only. patient with significant cramping, unable to lay still. treatment stopped, blood returned. cramping resolved. 2.7 L net loss. dr. maynard paged to notify. avf needles removed x 2. hemostasis achieved.

## 2024-10-22 NOTE — CONSULTS
[facility] Consult Note      Patient Name: Mohamud Jarrett  : 1950  MRN: [id number]  Primary Care Physician:  Shara Talley APRN  Referring Physician: René Harris MD  Date of admission: 10/21/2024    Subjective     Reason for Consult:  ESRD     HPI:   Mohamud Jarrett is a 74 y.o. male with ESRD on HD MWF via RUE ([facility], Dr Sinclair follows), CAD/CABG, ICM/CHF (EF 25 to 30% on echo last month) who presented yesterday with worsening BLE swelling and mild dyspnea.  On room air currently.  CXR shows no central congestion.  He's unsure how much fluid removed, what interdialytic weight gain was over weekend, etc.  Denies excessive fluid intake recently.  K 3.4 post dialysis and bicarb 32 with BUN/Cr 53/5.3.  Albumin is low 3.3. Normotensive 120s to 140s systolic thus far.  He does not take norvasc.  Home meds limited in scope: asa 81, PPI, ranexa, flomax    Review of Systems:   14 point review of systems is otherwise negative except for mentioned above on HPI    Personal History     Past Medical History:   Diagnosis Date    A-V fistula     right arm    Anemia of chronic renal failure 2021    Antiplatelet or antithrombotic long-term use     plavix    Arthritis     Bilateral leg pain 2021    CAD (coronary artery disease)     h/o CABG  and stents 2022, Dr Shirley follows    Chest pain     saw cardiology 4/10/23, pt states better if he takes his medications    Dialysis patient     Tues, Thursday, Saturday, has chest wall catheter currently    Gout     Heart attack     Hyperlipidemia     Hypertension     Kidney disease     stage 4, Dr Ornelas follows    Neck pain 2022    Neuritis of lower extremity, right 2019    Proteinuria 2022    Rheumatoid factor positive 2021    Seasonal allergies 2014    Vitamin D deficiency 2022       Past Surgical History:   Procedure Laterality Date    ARTERIOVENOUS FISTULA Right     CARDIAC  CATHETERIZATION      CARPAL TUNNEL RELEASE      CATARACT EXTRACTION W/ INTRAOCULAR LENS IMPLANT      COLONOSCOPY N/A 2006    COLONOSCOPY N/A 12/14/2018    Procedure: COLONOSCOPY TO CECUM WITH COLD BIOPSY POLYPECTOMY;  Surgeon: Elliott Harding MD;  Location:  [facility];  Service: General    COLONOSCOPY N/A 9/9/2024    Procedure: COLONOSCOPY with cold snare polypectomies;  Surgeon: Alvarado Irvin MD;  Location:  [facility];  Service: Gastroenterology;  Laterality: N/A;  pre- anemia  post- polyps    CORONARY ANGIOPLASTY WITH STENT PLACEMENT  11/09/2022    CORONARY ARTERY BYPASS GRAFT N/A 06/20/2003    ENDOSCOPY N/A 9/9/2024    Procedure: ESOPHAGOGASTRODUODENOSCOPY with biopsies;  Surgeon: Alvarado Irvin MD;  [facility];  Service: Gastroenterology;  Laterality: N/A;  pre- anemia  post- gastritis    INGUINAL HERNIA REPAIR Right 06/04/2014    Open incarcerated inguinal hernia repair-Dr. Elliott Harding    INGUINAL HERNIA REPAIR Left 4/26/2023    Procedure: OPEN LEFT INGUINAL HERNIA REPAIR;  Surgeon: Elliott Harding MD;  Location: [facility] OR;  Service: General;  Laterality: Left;    LUMBAR DISC SURGERY N/A 1990, 1992    L4-L5, X2       Family History: family history includes Heart disease in his father and mother.    Social History:  reports that he has never smoked. He has never used smokeless tobacco. He reports current alcohol use. He reports that he does not use drugs.    Home Medications:  Prior to Admission medications    Medication Sig Start Date End Date Taking? Authorizing Provider   aspirin 81 MG chewable tablet Chew 1 tablet Daily.   Yes Provider, MD Arash   hydrOXYzine (ATARAX) 25 MG tablet Take 1 tablet by mouth 3 (Three) Times a Day As Needed for Itching. 9/17/24  Yes Arnoldo Craig MD   pantoprazole (PROTONIX) 40 MG EC tablet Take 1 tablet by mouth 2 (Two) Times a Day Before Meals. 9/17/24  Yes Arnoldo Craig MD   ranolazine (RANEXA) 500 MG 12 hr  tablet Take 1 tablet by mouth 2 (Two) Times a Day for 30 days. Take dos 7/12/24 10/21/24 Yes Kevyn Cantrell MD   tamsulosin (FLOMAX) 0.4 MG capsule 24 hr capsule Take 1 capsule by mouth Daily.   Yes ProviderArash MD   acetaminophen (TYLENOL) 325 MG tablet Take 2 tablets by mouth Every 6 (Six) Hours As Needed for Mild Pain. 9/16/24   Arnoldo Craig MD   Methoxy PEG-Epoetin Beta (MIRCERA IJ) 75 mcg Every 28 (Twenty-Eight) Days. 6/3/24 6/2/25  Provider, MD Arash       Allergies:  Allergies   Allergen Reactions    Peanut (Diagnostic) Shortness Of Breath and Rash    Peanut Butter Flavor Shortness Of Breath and Rash    Simvastatin Myalgia     Other reaction(s): muscle cramps       Objective     Vitals:   Temp:  [98.1 °F (36.7 °C)-98.9 °F (37.2 °C)] 98.1 °F (36.7 °C)  Heart Rate:  [66-93] 66  Resp:  [16-17] 16  BP: (126-147)/(60-72) 131/60  No intake or output data in the 24 hours ending 10/22/24 0611    Physical Exam:    General Appearance: alert, comfortable on RA   Skin: warm and dry  HEENT: oral mucosa normal, nonicteric sclera  Neck: supple, no JVD  Lungs: CTA bilat no rales  Heart: RRR, normal S1 and S2  Abdomen: soft, nontender, nondistended  : no palpable bladder  Extremities: 1+ BLE edema RUE AVF +T/B  Neuro: normal speech and mental status     Scheduled Meds:        IV Meds:        Results Reviewed:   I have personally reviewed the results from the time of this admission to 10/22/2024 06:11 EDT     Lab Results   Component Value Date    GLUCOSE 94 10/21/2024    CALCIUM 8.6 10/21/2024     10/21/2024    K 3.4 (L) 10/21/2024    CO2 32.2 (H) 10/21/2024    CL 98 10/21/2024    BUN 53 (H) 10/21/2024    CREATININE 5.37 (H) 10/21/2024    BCR 9.9 10/21/2024    ANIONGAP 9.8 10/21/2024      Lab Results   Component Value Date    MG 2.6 (H) 09/03/2024    PHOS 2.7 09/14/2024    ALBUMIN 3.3 (L) 10/21/2024           Assessment / Plan     ASSESSMENT:  ESRD - HD MWF.  Had full treatment yesterday in  [place].  I'll call clinic this AM for further info re: dry weight, interdialytic weight gain, etc.  K bit low 3.4 last night post dialysis - no need to replace.  Access is RUE AVF  Vol overload - has periph edema but no central oralia on CXR and minimal dyspnea.  Would benefit from extra UF treatment today  HFrEF, recent echo noted, EF 25 to 30%  Anemia of CKD, hgb 9, on long acting NELL at dialysis  GERD, on PPI  Hx CAD s/p CABG     PLAN:  UF only today, remove 4L as tolerated  HD tomorrow  Will call his dialysis unit this AM for further information  No objection to discharge after treatment today    Thank you for involving us in the care of Mohamud Jarrett.  Please feel free to call with any questions.    Mohamud Perez MD  10/22/24  06:11 EDT    [facility]  [phone]

## 2024-10-23 ENCOUNTER — TRANSITIONAL CARE MANAGEMENT TELEPHONE ENCOUNTER (OUTPATIENT)
Dept: CALL CENTER | Facility: HOSPITAL | Age: 74
End: 2024-10-23
Payer: MEDICARE

## 2024-10-23 ENCOUNTER — OFFICE VISIT (OUTPATIENT)
Dept: FAMILY MEDICINE CLINIC | Age: 74
End: 2024-10-23
Payer: MEDICARE

## 2024-10-23 ENCOUNTER — TELEPHONE (OUTPATIENT)
Dept: FAMILY MEDICINE CLINIC | Age: 74
End: 2024-10-23

## 2024-10-23 VITALS
TEMPERATURE: 97.5 F | HEIGHT: 70 IN | WEIGHT: 185.2 LBS | HEART RATE: 80 BPM | DIASTOLIC BLOOD PRESSURE: 49 MMHG | SYSTOLIC BLOOD PRESSURE: 148 MMHG | BODY MASS INDEX: 26.51 KG/M2 | OXYGEN SATURATION: 100 %

## 2024-10-23 DIAGNOSIS — I10 ESSENTIAL (PRIMARY) HYPERTENSION: ICD-10-CM

## 2024-10-23 DIAGNOSIS — R14.0 ABDOMINAL BLOATING: ICD-10-CM

## 2024-10-23 DIAGNOSIS — E87.70 HYPERVOLEMIA, UNSPECIFIED HYPERVOLEMIA TYPE: Primary | ICD-10-CM

## 2024-10-23 PROCEDURE — 3078F DIAST BP <80 MM HG: CPT | Performed by: FAMILY MEDICINE

## 2024-10-23 PROCEDURE — 99495 TRANSJ CARE MGMT MOD F2F 14D: CPT | Performed by: FAMILY MEDICINE

## 2024-10-23 PROCEDURE — 3077F SYST BP >= 140 MM HG: CPT | Performed by: FAMILY MEDICINE

## 2024-10-23 PROCEDURE — 1125F AMNT PAIN NOTED PAIN PRSNT: CPT | Performed by: FAMILY MEDICINE

## 2024-10-23 PROCEDURE — 1160F RVW MEDS BY RX/DR IN RCRD: CPT | Performed by: FAMILY MEDICINE

## 2024-10-23 PROCEDURE — 1159F MED LIST DOCD IN RCRD: CPT | Performed by: FAMILY MEDICINE

## 2024-10-23 PROCEDURE — 1111F DSCHRG MED/CURRENT MED MERGE: CPT | Performed by: FAMILY MEDICINE

## 2024-10-23 NOTE — OUTREACH NOTE
Call Center TCM Note      Flowsheet Row Responses   [facility] patient discharged from? [place]   Does the patient have one of the following disease processes/diagnoses(primary or secondary)? Other   TCM attempt successful? No   Unsuccessful attempts Attempt 1   Call Status Left message            Ann Mckeon RN    10/23/2024, 08:29 EDT

## 2024-10-23 NOTE — ASSESSMENT & PLAN NOTE
NONSPECIFIC, POSSIBLE GASTROPARESIS.  LABS REVIEWED.  WILL OBTAIN AN U/S AND HAVE HIM F/U WITH HIS PCP.

## 2024-10-23 NOTE — PROGRESS NOTES
Transitional Care Follow Up Visit  Subjective     Mohamud Jarrett is a 74 y.o. male who presents for a transitional care management visit.    Within 48 business hours after discharge our office contacted him via telephone to coordinate his care and needs.      I reviewed and discussed the details of that call along with the discharge summary, hospital problems, inpatient lab results, inpatient diagnostic studies, and consultation reports with Mohamud.     Current outpatient and discharge medications have been reconciled for the patient.  Reviewed by: Leo Red MD          10/22/2024     8:00 PM   Date of TCM Phone Call   [facility]   Date of Admission 10/21/2024   Date of Discharge 10/22/2024   Discharge Disposition Home or Self Care     Risk for Readmission (LACE) Score: 9 (10/22/2024  6:00 AM)      History of Present Illness  --TOLERATING BLOOD PRESSURE MEDICATION WITHOUT APPARENT SIDE EFFECTS  --HISTORY OF CHF AND END STAGE RENAL DISEASE ON DIALYSIS.  ADMITTED WITH HYPERVOLEMIA.  IMPROVED AFTER DIALYSIS.  PLAN IS TO F/U WITH NEPHROLOGY AND CARDIOLOGY SOON, FEELS AT BASELINE NOW  --FOR THE PAST MONTH HE HAS NOTED SOME ABDOMINAL AND EARLY SAIETY WHEN EATING.  SOME SOA WITH THE BLOATING AS WELL.  FEELS S/W BETTER AFTER A B.M.  RECENT COLONOSCOPY SHOWED SOME POLYPS AND RECENT EGD SHOWED SOME GASTRITIS.  OCCASIONAL DYSPHAGIA.  CT OF ABDOMEN AND PELVIS FROM LAST SPRING SHOWED SOME MILD ASCITES AND ABDOMINAL WALL EDEMA.       Course During Hospital Stay:  IMPROVED      The following portions of the patient's history were reviewed and updated as appropriate: allergies, current medications, past family history, past medical history, past social history, past surgical history, and problem list.    Review of Systems   Constitutional:  Negative for activity change, appetite change, chills, fatigue and fever.   HENT:  Negative for congestion, ear pain and sneezing.    Eyes:  Negative for discharge.  "  Respiratory:  Negative for cough and shortness of breath.    Cardiovascular:  Negative for chest pain, palpitations and leg swelling.   Gastrointestinal:  Negative for abdominal pain, diarrhea, nausea and vomiting.   Genitourinary:  Negative for dysuria and hematuria.   Musculoskeletal:  Negative for arthralgias and myalgias.   Neurological:  Negative for headaches.   Psychiatric/Behavioral:  Negative for dysphoric mood.        Objective   /49 (BP Location: Left arm, Patient Position: Sitting)   Pulse 80   Temp 97.5 °F (36.4 °C) (Oral)   Ht 177.8 cm (70\")   Wt 84 kg (185 lb 3.2 oz)   SpO2 100%   BMI 26.57 kg/m²   Physical Exam  Vitals and nursing note reviewed.   Constitutional:       General: He is not in acute distress.     Appearance: Normal appearance.   Cardiovascular:      Rate and Rhythm: Normal rate and regular rhythm.      Heart sounds: Normal heart sounds. No murmur heard.  Pulmonary:      Effort: Pulmonary effort is normal.      Breath sounds: Normal breath sounds.   Abdominal:      General: Bowel sounds are normal. There is no distension.      Palpations: Abdomen is soft. There is no mass.      Tenderness: There is abdominal tenderness. There is no guarding or rebound.      Comments: ABDOMEN IS PROTUBERANT BUT NOT TIGHT.  MILD DIFFUSE TENDER NESS WITHOUT G R.  THERE IS A VERTICAL SURGICAL SCAR BELOW THE UMBILICUS ALTHOUGH HE DENIES PRIOR ABDOMINAL SURGERY.     Musculoskeletal:      Cervical back: Neck supple.      Right lower leg: No edema.      Left lower leg: No edema.   Lymphadenopathy:      Cervical: No cervical adenopathy.   Neurological:      General: No focal deficit present.      Mental Status: He is alert.      Cranial Nerves: No cranial nerve deficit.      Coordination: Coordination normal.      Gait: Gait normal.   Psychiatric:         Mood and Affect: Mood normal.         Behavior: Behavior normal.         Assessment & Plan   Diagnoses and all orders for this visit:    1. " Hypervolemia, unspecified hypervolemia type (Primary)  Comments:  DISCHARGE SUMMARY REVIEWED.  IMPROVED CURRENTLY.  F/U WITH NEPHROLOGY AND CARDIOLOGY AS PLANNED    2. Abdominal bloating  Assessment & Plan:  NONSPECIFIC, POSSIBLE GASTROPARESIS.  LABS REVIEWED.  WILL OBTAIN AN U/S AND HAVE HIM F/U WITH HIS PCP.      Orders:  -     US Abdomen Complete; Future    3. Essential (primary) hypertension  Assessment & Plan:  Hypertension is stable and controlled  Continue current treatment regimen.  Blood pressure will be reassessed in 6 months.

## 2024-10-23 NOTE — PROGRESS NOTES
Case Management Discharge Note      Final Note: DC'd home 10/22    Provided Post Acute Provider List?: N/A  N/A Provider List Comment: Current with [facility]    Selected Continued Care - Discharged on 10/22/2024 Admission date: 10/21/2024 - Discharge disposition: Home or Self Care                  Selected Continued Care - Prior Encounters Includes continued care and service providers with selected services from prior encounters from 7/23/2024 to 10/22/2024      Discharged on 9/17/2024 Admission date: 9/3/2024 - Discharge disposition: Home-Health Care Svc      Dialysis/Infusion       Service Provider Services Address Phone Fax Patient Preferred    [facility] [address] [place] [address], [address], [facility] [address] [phone] --              Home Medical Care       Service Provider Services Address Phone Fax Patient Preferred    [facility] [address] [place] [address], [address], [facility] [id number] [phone] --                          Transportation Services  Taxi: [employer]    Final Discharge Disposition Code: 01 - home or self-care

## 2024-10-23 NOTE — OUTREACH NOTE
Call Center TCM Note      Flowsheet Row Responses   [facility] patient discharged from? [place]   Does the patient have one of the following disease processes/diagnoses(primary or secondary)? Other   TCM attempt successful? Yes   General alerts for this patient HD MWF   Discharge diagnosis Leg swelling   Comments Pt completed TCM FU appt . No TCM call needed   Does the patient have an appointment with their PCP within 7-14 days of discharge? Yes   TCM call completed? Yes            Ann Mckeon RN    10/23/2024, 15:43 EDT

## 2024-10-23 NOTE — TELEPHONE ENCOUNTER
Pt saw Dr. Red today and says he needs refills on Pantoprazole 40mg bid, Ranexa 500mg bid and Tamsulosin .4mg  to [facility].  Dr. Red advised Melissa to send to you.  He has f/u w/you on 11/5/24.

## 2024-10-23 NOTE — TELEPHONE ENCOUNTER
Pt admitted on 10/21/2024,  Discharged on 10/22/2024    Was seen for lower extremity swelling and shortness of breath, scheduled with Dr. Red for 10/23/2024.

## 2024-10-23 NOTE — OUTREACH NOTE
Prep Survey      Flowsheet Row Responses   [facility] patient discharged from? [place]   Is LACE score < 7 ? No   Eligibility [facility]   Date of Admission 10/21/24   Date of Discharge 10/22/24   Discharge Disposition Home or Self Care   Discharge diagnosis Leg swelling   Does the patient have one of the following disease processes/diagnoses(primary or secondary)? Other   Does the patient have Home health ordered? Yes   What is the Home health agency?  [facility]   General alerts for this patient HD MWF   Prep survey completed? Yes            ANDREW ENRIQUEZ - Registered Nurse

## 2024-10-24 NOTE — TELEPHONE ENCOUNTER
I have not prescribed any of these medications for him in some time.  I am deferring to his specialists due to his fragile kidney function

## 2024-10-24 NOTE — TELEPHONE ENCOUNTER
Spoke to Jose at [facility] and pt is due for dialysis tomorrow.  Dr. Sinclair will see pt while he is there and she will leave a note on the chart to have him fill the meds.

## 2024-10-25 ENCOUNTER — READMISSION MANAGEMENT (OUTPATIENT)
Dept: CALL CENTER | Facility: HOSPITAL | Age: 74
End: 2024-10-25
Payer: MEDICARE

## 2024-10-25 PROBLEM — R18.8 ASCITES: Status: ACTIVE | Noted: 2024-10-25

## 2024-10-25 PROBLEM — E87.70 VOLUME OVERLOAD: Status: ACTIVE | Noted: 2024-10-25

## 2024-10-25 NOTE — ED PROVIDER NOTES
EMERGENCY DEPARTMENT ENCOUNTER  Room Number:  09/09  PCP: Shara Talley APRN  Independent Historians: Patient and EMS      HPI:  Chief Complaint: had concerns including Urinary Retention.  And dyspnea    A complete HPI/ROS/PMH/PSH/SH/FH are unobtainable due to: None    Chronic or social conditions impacting patient care (Social Determinants of Health): None      Context: Mohamud Jarrett is a 74 y.o. male with a medical history of hypertension, CAD, gout and end-stage renal disease on dialysis who presents to the ED c/o acute dyspnea and difficulties urinating.  Patient says that he typically urinates every day despite being on dialysis.  However he has not been able to empty his bladder since yesterday.  Today he is having a lot of discomfort in the lower abdomen and feeling full and tender throughout the bladder area.  This is causing him to feel very short of breath.  He was supposed to go to dialysis at 9:00 this morning but missed his session because he was feeling so bad.  He denies any fevers.  Denies any cough or flulike symptoms.  Denies vomiting.  He says his last dialysis session was on Wednesday.      Review of prior external notes (non-ED) -and- Review of prior external test results outside of this encounter: I independently reviewed the hospitalist discharge summary from October 22, 2024.  Patient was admitted at that time for lower extremity swelling and shortness of breath.  Was seen by nephrology and had 2.5 l removed and ultrafiltration.    Prescription drug monitoring program review: DINA reviewed by Elier Ulloa MD, Jani Soler MD       PAST MEDICAL HISTORY  Active Ambulatory Problems     Diagnosis Date Noted    Essential (primary) hypertension 03/13/2014    Hyperlipidemia 03/13/2014    Chronic kidney disease, stage 4 (severe) 05/23/2022    Anemia in chronic kidney disease 05/23/2022    Coronary artery disease 05/23/2022    Coagulation defect, unspecified 02/25/2023    Dependence on  renal dialysis 02/25/2023    Mitral valve regurgitation 12/05/2022    Stented coronary artery 12/12/2022    Pure hypercholesterolemia 12/12/2022    Left inguinal hernia 04/21/2023    H/O peanut allergy 06/14/2023    Weakness generalized 06/14/2023    CHF (congestive heart failure) 07/26/2023    Secondary hyperparathyroidism of renal origin 02/02/2024    History of gout 03/27/2024    End stage renal disease 07/01/2024    Presence of coronary angioplasty implant and graft 06/28/2024    Depression 07/09/2024    Generalized weakness 07/10/2024    Abnormal EKG 07/11/2024    ESRD (end stage renal disease) 07/12/2024    Itchy scalp 07/25/2024    Acute metabolic encephalopathy 09/04/2024    GI bleed 09/04/2024    Lactic acidosis 09/04/2024    Metabolic encephalopathy 09/04/2024    Severe malnutrition 09/04/2024    Leg swelling 10/22/2024    Elevated troponin 10/22/2024    Abdominal bloating 10/23/2024     Resolved Ambulatory Problems     Diagnosis Date Noted    Screen for colon cancer 12/03/2018    Neuritis of lower extremity, right 12/19/2019    Anemia of chronic renal failure 04/12/2021    Gout 07/06/2021    Seasonal allergies 03/13/2014    Stage 3b chronic kidney disease 06/30/2017    Cough 07/06/2021    Rheumatoid factor positive 07/06/2021    Bilateral leg pain 11/05/2021    Edema 05/23/2022    Proteinuria 05/23/2022    Vitamin D deficiency 05/23/2022    Well adult exam 08/30/2022    Need for COVID-19 vaccine 08/30/2022    Neck pain 08/30/2022    Acute on chronic congestive heart failure 02/07/2023    Anaphylactic shock, unspecified, initial encounter 02/25/2023    Chronic systolic (congestive) heart failure 02/25/2023    Gout due to renal impairment, unspecified site 02/25/2023    Iron deficiency anemia 02/25/2023    Need for COVID-19 vaccine 06/01/2023    Myalgia 06/01/2023    Contusion of right arm 07/26/2023    Hematoma 09/06/2023    Medicare annual wellness visit, subsequent 09/06/2023    Dizziness 02/23/2024     Personal history of fall 02/23/2024    Impacted cerumen of right ear 03/27/2024    Hyperkalemia 07/01/2024    Edema 07/09/2024    Elevated troponin 07/09/2024    Chest pain, atypical 07/11/2024     Past Medical History:   Diagnosis Date    A-V fistula     Antiplatelet or antithrombotic long-term use     Arthritis     CAD (coronary artery disease)     Chest pain     Dialysis patient     Heart attack     Hypertension     Kidney disease          PAST SURGICAL HISTORY  Past Surgical History:   Procedure Laterality Date    ARTERIOVENOUS FISTULA Right     CARDIAC CATHETERIZATION      CARPAL TUNNEL RELEASE      CATARACT EXTRACTION W/ INTRAOCULAR LENS IMPLANT      COLONOSCOPY N/A 2006    COLONOSCOPY N/A 12/14/2018    Procedure: COLONOSCOPY TO CECUM WITH COLD BIOPSY POLYPECTOMY;  Surgeon: Elliott Harding MD;  Location:  [facility];  Service: General    COLONOSCOPY N/A 9/9/2024    Procedure: COLONOSCOPY with cold snare polypectomies;  Surgeon: Alvarado Irvin MD;  Location:  [facility];  Service: Gastroenterology;  Laterality: N/A;  pre- anemia  post- polyps    CORONARY ANGIOPLASTY WITH STENT PLACEMENT  11/09/2022    CORONARY ARTERY BYPASS GRAFT N/A 06/20/2003    ENDOSCOPY N/A 9/9/2024    Procedure: ESOPHAGOGASTRODUODENOSCOPY with biopsies;  Surgeon: Alvarado Irvin MD;  Location:  [facility];  Service: Gastroenterology;  Laterality: N/A;  pre- anemia  post- gastritis    INGUINAL HERNIA REPAIR Right 06/04/2014    Open incarcerated inguinal hernia repair-Dr. Elliott Harding    INGUINAL HERNIA REPAIR Left 4/26/2023    Procedure: OPEN LEFT INGUINAL HERNIA REPAIR;  Surgeon: Elliott Harding MD;  Location: [facility] OR;  Service: General;  Laterality: Left;    LUMBAR DISC SURGERY N/A 1990, 1992    L4-L5, X2         FAMILY HISTORY  Family History   Problem Relation Age of Onset    Heart disease Mother     Heart disease Father     Malig Hyperthermia Neg Hx          SOCIAL HISTORY  Social History      Socioeconomic History    Marital status:    Tobacco Use    Smoking status: Never    Smokeless tobacco: Never   Vaping Use    Vaping status: Never Used   Substance and Sexual Activity    Alcohol use: Yes     Comment: occassionally    Drug use: No    Sexual activity: Defer         ALLERGIES  Peanut (diagnostic), Peanut butter flavor, and Simvastatin      REVIEW OF SYSTEMS  Review of Systems  Included in HPI  All systems reviewed and negative except for those discussed in HPI.      PHYSICAL EXAM    I have reviewed the triage vital signs and nursing notes.    ED Triage Vitals [10/25/24 0952]   Temp Heart Rate Resp BP SpO2   98.1 °F (36.7 °C) 70 16 117/74 99 %      Temp src Heart Rate Source Patient Position BP Location FiO2 (%)   Tympanic Monitor Sitting Left arm --       Physical Exam  GENERAL: alert, no acute distress  SKIN: Warm, dry, mild pallor  HENT: Normocephalic, atraumatic  EYES: no scleral icterus, normal conjunctivae  CV: regular rhythm, regular rate, normal perfusion  RESPIRATORY: normal effort, diminished at the bilateral bases with few faint crackles.  ABDOMEN: soft, mild distention and tenderness especially in the suprapubic area.  No peritoneal features elicited.  MUSCULOSKELETAL: no deformity, bilateral edema noted to the lower extremities symmetrically  NEURO: alert, moves all extremities, follows commands      LAB RESULTS  Recent Results (from the past 24 hours)   CBC Auto Differential    Collection Time: 10/25/24 10:09 AM    Specimen: Blood   Result Value Ref Range    WBC 4.55 3.40 - 10.80 10*3/mm3    RBC 3.24 (L) 4.14 - 5.80 10*6/mm3    Hemoglobin 10.1 (L) 13.0 - 17.7 g/dL    Hematocrit 31.9 (L) 37.5 - 51.0 %    MCV 98.5 (H) 79.0 - 97.0 fL    MCH 31.2 26.6 - 33.0 pg    MCHC 31.7 31.5 - 35.7 g/dL    RDW 15.8 (H) 12.3 - 15.4 %    RDW-SD 56.4 (H) 37.0 - 54.0 fl    MPV 12.0 6.0 - 12.0 fL    Platelets 200 140 - 450 10*3/mm3    nRBC 0.0 0.0 - 0.2 /100 WBC   Manual Differential    Collection  Time: 10/25/24 10:09 AM    Specimen: Blood   Result Value Ref Range    Neutrophil % 72.4 42.7 - 76.0 %    Lymphocyte % 20.4 19.6 - 45.3 %    Monocyte % 3.1 (L) 5.0 - 12.0 %    Eosinophil % 2.0 0.3 - 6.2 %    Basophil % 2.0 (H) 0.0 - 1.5 %    Neutrophils Absolute 3.29 1.70 - 7.00 10*3/mm3    Lymphocytes Absolute 0.93 0.70 - 3.10 10*3/mm3    Monocytes Absolute 0.14 0.10 - 0.90 10*3/mm3    Eosinophils Absolute 0.09 0.00 - 0.40 10*3/mm3    Basophils Absolute 0.09 0.00 - 0.20 10*3/mm3    Target Cells Mod/2+ None Seen    WBC Morphology Normal Normal    Platelet Morphology Normal Normal   ECG 12 Lead Dyspnea    Collection Time: 10/25/24 10:12 AM   Result Value Ref Range    QT Interval 466 ms    QTC Interval 489 ms   Urinalysis With Culture If Indicated - Indwelling Urethral Catheter    Collection Time: 10/25/24 11:32 AM    Specimen: Indwelling Urethral Catheter; Urine   Result Value Ref Range    Color, UA Yellow Yellow, Straw    Appearance, UA Clear Clear    pH, UA 8.5 (H) 5.0 - 8.0    Specific Gravity, UA 1.015 1.005 - 1.030    Glucose, UA Negative Negative    Ketones, UA Negative Negative    Bilirubin, UA Negative Negative    Blood, UA Negative Negative    Protein, UA >=300 mg/dL (3+) (A) Negative    Leuk Esterase, UA Negative Negative    Nitrite, UA Negative Negative    Urobilinogen, UA 1.0 E.U./dL 0.2 - 1.0 E.U./dL   Urinalysis, Microscopic Only - Indwelling Urethral Catheter    Collection Time: 10/25/24 11:32 AM    Specimen: Indwelling Urethral Catheter; Urine   Result Value Ref Range    RBC, UA 0-2 None Seen, 0-2 /HPF    WBC, UA None Seen None Seen, 0-2 /HPF    Bacteria, UA None Seen None Seen /HPF    Squamous Epithelial Cells, UA None Seen None Seen, 0-2 /HPF    Hyaline Casts, UA None Seen None Seen /LPF    Methodology Manual Light Microscopy    Comprehensive Metabolic Panel    Collection Time: 10/25/24 12:52 PM    Specimen: Blood   Result Value Ref Range    Glucose 86 65 - 99 mg/dL    BUN 95 (H) 8 - 23 mg/dL     Creatinine 8.33 (H) 0.76 - 1.27 mg/dL    Sodium 148 (H) 136 - 145 mmol/L    Potassium 4.7 3.5 - 5.2 mmol/L    Chloride 101 98 - 107 mmol/L    CO2 28.1 22.0 - 29.0 mmol/L    Calcium 9.0 8.6 - 10.5 mg/dL    Total Protein 7.1 6.0 - 8.5 g/dL    Albumin 3.4 (L) 3.5 - 5.2 g/dL    ALT (SGPT) 7 1 - 41 U/L    AST (SGOT) 15 1 - 40 U/L    Alkaline Phosphatase 271 (H) 39 - 117 U/L    Total Bilirubin 1.2 0.0 - 1.2 mg/dL    Globulin 3.7 gm/dL    A/G Ratio 0.9 g/dL    BUN/Creatinine Ratio 11.4 7.0 - 25.0    Anion Gap 18.9 (H) 5.0 - 15.0 mmol/L    eGFR 6.3 (L) >60.0 mL/min/1.73         RADIOLOGY  CT Abdomen Pelvis Without Contrast    Result Date: 10/25/2024  CT ABDOMEN AND PELVIS WITHOUT IV CONTRAST  HISTORY: 74-year-old male with lower abdominal pain. Dialysis patient. Patient states that he typically voids even though he is on dialysis but is unable to empty his bladder today.  TECHNIQUE: Radiation dose reduction techniques were utilized, including automated exposure control and exposure modulation based on body size. 3 mm images were obtained through the abdomen and pelvis without the administration of IV contrast. No priors for comparison.  FINDINGS: 1. There is a moderately large volume of free fluid throughout the abdomen and pelvis. There is 3rd spacing of fluid throughout the body wall, anasarca, and there is a small right pleural effusion.  2. Urinary bladder is collapsed with Joya in place. Kidneys appear moderately atrophied. There is no hydronephrosis. A few small right renal cysts are noted.  3. Noncontrasted liver, gallbladder, pancreas, spleen, and adrenals appear unremarkable. There is a paucity of formed stool within the colon and there are air-fluid levels in small and large bowel. Characterization of the bowel is limited in the absence of IV contrast and presence of ascites. No definitive colonic thickening is seen to suggest colitis. There is no bowel obstruction. There are moderately extensive abdominal aortic  atherosclerotic changes without aneurysmal dilatation.  4. In addition to pulmonary vascular congestion and likely mild edema, there is a small airspace consolidation at the dependent aspect of the left lung base and pneumonia cannot be excluded.      XR Chest 1 View    Result Date: 10/25/2024  XR CHEST 1 VW-  HISTORY: Male who is 74 years-old, dyspnea  TECHNIQUE: Frontal view of the chest  COMPARISON: 10/21/2024  FINDINGS: The heart is enlarged. Pulmonary vasculature is unremarkable. Sternotomy wires are present. Aorta is calcified. Minimal atelectasis or infiltrate at the right lateral costophrenic angle. Minimal blunting of the right lateral costophrenic angle is again apparent that could be evidence of minimal pleural effusion. No pneumothorax. Old granulomatous disease is evident. No acute osseous process.      No significant change.  This report was finalized on 10/25/2024 10:37 AM by Dr. Mp Page M.D on Workstation: [id number]         MEDICATIONS GIVEN IN ER  Medications - No data to display      ORDERS PLACED DURING THIS VISIT:  Orders Placed This Encounter   Procedures    XR Chest 1 View    CT Abdomen Pelvis Without Contrast    Comprehensive Metabolic Panel    CBC Auto Differential    Urinalysis With Culture If Indicated - Urine, Catheter    Manual Differential    Urinalysis, Microscopic Only - Urine, Clean Catch    Bladder scan    [facility] (on-call MD unless specified) Details    Nephrology (on -call MD unless specified)    ECG 12 Lead Dyspnea    Initiate Observation Status    CBC & Differential         OUTPATIENT MEDICATION MANAGEMENT:  No current Epic-ordered facility-administered medications on file.     Current Outpatient Medications Ordered in Epic   Medication Sig Dispense Refill    acetaminophen (TYLENOL) 325 MG tablet Take 2 tablets by mouth Every 6 (Six) Hours As Needed for Mild Pain.      aspirin 81 MG chewable tablet Chew 1 tablet Daily.      hydrOXYzine (ATARAX) 25 MG tablet Take 1 tablet by  mouth 3 (Three) Times a Day As Needed for Itching. 10 tablet 0    Methoxy PEG-Epoetin Beta (MIRCERA IJ) 75 mcg Every 28 (Twenty-Eight) Days.      pantoprazole (PROTONIX) 40 MG EC tablet Take 1 tablet by mouth 2 (Two) Times a Day Before Meals. 60 tablet 0    ranolazine (RANEXA) 500 MG 12 hr tablet Take 1 tablet by mouth 2 (Two) Times a Day for 30 days. Take dos 60 tablet 0    tamsulosin (FLOMAX) 0.4 MG capsule 24 hr capsule Take 1 capsule by mouth Daily.           PROCEDURES  Procedures            PROGRESS, DATA ANALYSIS, CONSULTS, AND MEDICAL DECISION MAKING  All labs have been independently interpreted by me.  All radiology studies have been reviewed by me. All EKG's have been independently viewed and interpreted by me.  Discussion below represents my analysis of pertinent findings related to patient's condition, differential diagnosis, treatment plan and final disposition.    Differential diagnosis includes but is not limited to volume overload, BPH, UTI, pneumonia, pleural effusions, congestive heart failure.    Clinical Scores:                   ED Course as of 10/25/24 1455   Fri Oct 25, 2024   1034 EKG         EKG time/Interp time: 1012/1023  Rhythm/Rate: Sinus rhythm, 66 bpm  P waves and LA: Present, 184 ms, normal interval  QRS, axis: 100 ms, narrow complex, normal axis  ST and T waves: No ST segment elevations are present.  Independently interpreted by me contemporaneously with treatment   [JYOTI]   1034 Hemoglobin(!): 10.1 [JYOTI]   1035 Hematocrit(!): 31.9 [JYOTI]   1257 I independently interpreted the Chest X-ray and my findings are: No Pneumothorax, No Effusion, No Infiltrate   [JYOTI]   1330 Urinalysis looks normal without any sign of UTI. [JYOTI]   1450 I discussed with Dr. Ulloa from [facility] about this patient.  He agrees to admit him to the hospitalist service for further medical management today. [JYOTI]   1450 I discussed with Dr. Neal from nephrology about the patient.  He agrees to consult as well. [JYOTI]   1454 I  independently interpreted the abdomen CT study and my findings are: Moderate ascites present.  No free air, no small bowel obstruction pattern   [JYOTI]   1455 BUN(!): 95 [JYOTI]   1455 Creatinine(!): 8.33 [JYOTI]   1455 Sodium(!): 148 [JYOTI]      ED Course User Index  [JYOTI] Jani Soler MD             AS OF 14:55 EDT VITALS:    BP - 150/79  HR - 72  TEMP - 98.1 °F (36.7 °C) (Tympanic)  O2 SATS - 98%    COMPLEXITY OF CARE  The patient requires admission.      DIAGNOSIS  Final diagnoses:   Other ascites   Pleural effusion   End stage renal disease on dialysis   Hypernatremia         DISPOSITION  ED Disposition       ED Disposition   Decision to Admit    Condition   --    Comment   Level of Care: Telemetry [5]   Diagnosis: Ascites [[id number]]   Admitting Physician: GAMA MCKEON [[id number]]   Attending Physician: GAMA MCKEON [[id number]]   Is patient appropriate for Inpatient Observation Unit?: No [0]                  Please note that portions of this document were completed with a voice recognition program.    Note Disclaimer: At [facility], we believe that sharing information builds trust and better relationships. You are receiving this note because you recently visited [facility]. It is possible you will see health information before a provider has talked with you about it. This kind of information can be easy to misunderstand. To help you fully understand what it means for your health, we urge you to discuss this note with your provider.         Jani Soler MD  10/25/24 3373

## 2024-10-25 NOTE — ED NOTES
Nursing report ED to floor  Mohamud Jarrett  74 y.o.  male    HPI :  HPI  Stated Reason for Visit: urinary retention    Chief Complaint  Chief Complaint   Patient presents with    Urinary Retention       Admitting doctor:   Elier Ulloa MD    Admitting diagnosis:   The primary encounter diagnosis was Other ascites. Diagnoses of Pleural effusion, End stage renal disease on dialysis, and Hypernatremia were also pertinent to this visit.    Code status:   Current Code Status       Date Active Code Status Order ID Comments User Context       Prior            Allergies:   Peanut (diagnostic), Peanut butter flavor, and Simvastatin    Isolation:   No active isolations    Intake and Output  No intake or output data in the 24 hours ending 10/25/24 1502    Weight:   There were no vitals filed for this visit.    Most recent vitals:   Vitals:    10/25/24 1135 10/25/24 1246 10/25/24 1416 10/25/24 1443   BP: 145/77 148/84 150/79    BP Location:       Patient Position:       Pulse: 65 65 66 72   Resp:       Temp:       TempSrc:       SpO2: 96% 97% 96% 98%       Active LDAs/IV Access:   Lines, Drains & Airways       Active LDAs       Name Placement date Placement time Site Days    Urethral Catheter Coude 20 Fr. 10/25/24  1132  -- less than 1                    Labs (abnormal labs have a star):   Labs Reviewed   COMPREHENSIVE METABOLIC PANEL - Abnormal; Notable for the following components:       Result Value    BUN 95 (*)     Creatinine 8.33 (*)     Sodium 148 (*)     Albumin 3.4 (*)     Alkaline Phosphatase 271 (*)     Anion Gap 18.9 (*)     eGFR 6.3 (*)     All other components within normal limits    Narrative:     GFR Normal >60  Chronic Kidney Disease <60  Kidney Failure <15    The GFR formula is only valid for adults with stable renal function between ages 18 and 70.   CBC WITH AUTO DIFFERENTIAL - Abnormal; Notable for the following components:    RBC 3.24 (*)     Hemoglobin 10.1 (*)     Hematocrit 31.9 (*)     MCV 98.5 (*)      RDW 15.8 (*)     RDW-SD 56.4 (*)     All other components within normal limits   URINALYSIS W/ CULTURE IF INDICATED - Abnormal; Notable for the following components:    pH, UA 8.5 (*)     Protein, UA >=300 mg/dL (3+) (*)     All other components within normal limits    Narrative:     In absence of clinical symptoms, the presence of pyuria, bacteria, and/or nitrites on the urinalysis result does not correlate with infection.   MANUAL DIFFERENTIAL - Abnormal; Notable for the following components:    Monocyte % 3.1 (*)     Basophil % 2.0 (*)     All other components within normal limits   URINALYSIS, MICROSCOPIC ONLY   CBC AND DIFFERENTIAL    Narrative:     The following orders were created for panel order CBC & Differential.  Procedure                               Abnormality         Status                     ---------                               -----------         ------                     CBC Auto Differential[[id number]]        Abnormal            Final result                 Please view results for these tests on the individual orders.       EKG:   ECG 12 Lead Dyspnea   Preliminary Result   HEART RATE=66  bpm   RR Pwdtvaus=491  ms   KS Nubgpswr=392  ms   P Horizontal Axis=51  deg   P Front Axis=-24  deg   QRSD Bsvbrvql=177  ms   QT Dqfcvuqd=498  ms   ONrI=487  ms   QRS Axis=4  deg   T Wave Axis=138  deg   - ABNORMAL ECG -   Sinus rhythm   Ventricular premature complex   Nonspecific T abnormalities, lateral leads   Borderline prolonged QT interval   Date and Time of Study:2024-10-25 10:12:29          Meds given in ED:   Medications - No data to display    Imaging results:  XR Chest 1 View    Result Date: 10/25/2024  No significant change.  This report was finalized on 10/25/2024 10:37 AM by Dr. Mp Page M.D on Workstation: [email or web address]       Ambulatory status:   - assist Xs 1    Social issues:   Social History     Socioeconomic History    Marital status:    Tobacco Use    Smoking status: Never     Smokeless tobacco: Never   Vaping Use    Vaping status: Never Used   Substance and Sexual Activity    Alcohol use: Yes     Comment: occassionally    Drug use: No    Sexual activity: Defer       Peripheral Neurovascular  Peripheral Neurovascular (Adult)  Peripheral Neurovascular WDL: .WDL except  Additional Documentation: Edema (Group)  Edema  Edema: dependent  Dependent Edema: 1+ (Trace)    Neuro Cognitive  Neuro Cognitive (Adult)  Cognitive/Neuro/Behavioral WDL: WDL, level of consciousness, orientation  Level of Consciousness: Alert  Orientation: oriented x 4    Learning  Learning Assessment  Learning Readiness and Ability: no barriers identified  Education Provided  Person Taught: patient  Teaching Method: verbal instruction  Teaching Focus: symptom/problem overview, risk factors/triggers, diagnostic test, medical device/equipment use, medication administration  Education Outcome Evaluation: acceptance expressed, verbalizes understanding    Respiratory  Respiratory WDL  Respiratory WDL: .WDL except, all  Rhythm/Pattern, Respiratory: shortness of breath, labored    Abdominal Pain       Pain Assessments  Pain (Adult)  (0-10) Pain Rating: Rest: 4  (0-10) Pain Rating: Activity: 4    NIH Stroke Scale       Nieves Bernstein RN  10/25/24 15:02 EDT

## 2024-10-25 NOTE — LETTER
November 3, 2024      [address]  [address] [facility] [address]  [phone]          Patient: Mohamud Jarrett               To Whom It May Concern:    Cecy Gardner visited with her grandfather Mr. Jarrett at [facility] on 10/31, 11/1, 11/3    Please excuse  Cecy Gardner  from work 10/31, 11/1, 11/3.        Sincerely,       Diony Riddle RN

## 2024-10-25 NOTE — DISCHARGE PLACEMENT REQUEST
"Mohamud Quach (74 y.o. Male)       Date of Birth   1950    Social Security Number       Address   [address] [place] [address]    Home Phone   [phone]    MRN   [id number]       [facility]    Marital Status                               Admission Date   10/25/24    Admission Type   Emergency    Admitting Provider   Elier Ulloa MD    Attending Provider   Elier Ulloa MD    Department, Room/Bed   [address], [address]       Discharge Date       Discharge Disposition       Discharge Destination                                 Attending Provider: Elier Ulloa MD    Allergies: Peanut (Diagnostic), Peanut Butter Flavor, Simvastatin    Isolation: None   Infection: None   Code Status: Prior    Ht: 177.8 cm (70\")   Wt: 84 kg (185 lb 3.2 oz)    Admission Cmt: None   Principal Problem: Ascites [R18.8]                   Active Insurance as of 10/25/2024       Primary Coverage       Payor Plan Insurance Group Employer/Plan Group    ANTHEM MEDICARE REPLACEMENT ANTHEM MEDICARE ADVANTAGE [id number]       Payor Plan Address Payor Plan Phone Number Payor Plan Fax Number Effective Dates    [address] [phone]  1/1/2021 - None Entered    [facility] [address]         Subscriber Name Subscriber Birth Date Member ID       MOHAMUD QUACH 1950 [id number]                     Emergency Contacts        (Rel.) Home Phone Work Phone Mobile Phone    Ilana Quach (Daughter) -- -- [phone]                "

## 2024-10-25 NOTE — CASE MANAGEMENT/SOCIAL WORK
Case Management Readmission Assessment Note    Case Management Readmission Assessment (all recorded)       Readmission Interview       Row Name 10/25/24 1646             Readmission Indications    Is the patient and/or family able to complete the readmission assessment questions? Yes      Is this hospitalization related to the prior hospital diagnosis? Yes        Row Name 10/25/24 1646             Recommendation for rehospitalization    Did you speak with your physician prior to coming to the hospital No        Row Name 10/25/24 1646             Follow-up Appointments    Do you have a PCP? Yes      Did you have an appointment with PCP after your hospitalization? Yes      When was your appointment scheduled? 10/23/24      Did you go to appointment? Yes      Did you have an appointment with a Specialist? No      Are you current with the Pulmonary Clinic? No      Are you current with the CHF Clinic? No        Row Name 10/25/24 1646             Medications    Did you have newly prescribed medications at discharge? No      Are you taking all of you prescribed medications? Yes      What pharmacy was used to fill prescription(s)? [facility]      Were medications picked up? Yes        Row Name 10/25/24 1646             Discharge Instructions    Did you understand your discharge instructions? Yes      Did your family/caregiver hear your instructions? No        Row Name 10/25/24 1646             Index discharge location/services    Where did you go upon discharge? Home with services      What services were arranged at discharge? Home Health      Home Health Service used? [facility]      Have you had a Home Health visit since discharge? No        Row Name 10/25/24 1646             Discharge Readiness    On a scale of 1-5 (5 being well prepared), how ready were you for discharge 4      Recommendation based on interview Other (comment)        Row Name 10/25/24 1613             Advance Directives (For Healthcare)    Pre-existing  "AND/MOST/POLST Order No      Advance Directive Status Patient does not have advance directive      Have you reviewed your Advance Directive and is it valid for this stay? Not applicable      Literature Provided on Advance Directives No      Patient Requests Assistance on Advance Directives Patient Declined        Row Name 10/25/24 3803             Readmission Assessment Final Comments    Final Comments Pt has had multiple ER visits as well as hospital admissions in the past 6 months. Pt is unhappy with his living situation, which is causing him anxiety. Pt is working on securing a low income apartment in [place], but he has several past due bills that he must pay before he can be considered for his apartment. Pt stated that his daughter moved to [place] to help care for him, but \"she has not done anything\" Pt stated that he drives self to his dialysis clinic, but he does miss some of his appointments. Pt has [facility] ordered, but the home health agency, according to the pt, has not been in yet to see him.                    "

## 2024-10-25 NOTE — CONSULTS
"  [facility] Consult Note      Patient Name: Mohamud Jarrett  : 1950  MRN: [id number]  Primary Care Physician:  Shara Talley APRN  Referring Physician: No ref. provider found  Date of admission: 10/25/2024    Subjective     Reason for Consult: ESRD on HD    HPI:   Mohamud Jarrett is a 74 y.o. male with a ESRD on HD (MWF, via R AV fistula, at [facility], followed by Dr. Sinclair of our group), CAD/prior MI s/p CABG, cardiomyopathy/CHF, and recent fluid overload requiring iUF (10/21 to 10/22), presented to the hospital today for progressively worsening URIBE and low urine output x 2 days.    Last HD was Wednesday on 10/22, no issues besides cramping.  Today, Na 148, SCr 8.33, BUN 95.  CT abdomen/pelvis showed ascites, anasarca, small R pleural effusion, and pulmonary vascular congestion/edema with possible PNA.    Denies chest pain, N/V; + D x few days  + URIBE, + orthopnea  + Noted increased BLE edema lately  Denies drinking excessive fluids, \"only 7 to 8 ounces\" per day  Denies eating salt-rich food  Has been on dialysis for 2 years, still makes urine  Unable to empty bladder since yesterday with lower abdomen tenderness  Per my partner's HD treatment note, patient has been not compliant with dialysis, often cuts time short, had missed a treatment in the past 2 months    Review of Systems:   14 point review of systems is otherwise negative except for mentioned above on HPI    Personal History     Past Medical History:   Diagnosis Date    A-V fistula     right arm    Anemia of chronic renal failure 2021    Antiplatelet or antithrombotic long-term use     plavix    Arthritis     Bilateral leg pain 2021    CAD (coronary artery disease)     h/o CABG  and stents 2022, Dr Shirley follows    Chest pain     saw cardiology 4/10/23, pt states better if he takes his medications    Dialysis patient     Tues, Thursday, Saturday, has chest wall catheter currently    Gout     " Heart attack     Hyperlipidemia     Hypertension     Kidney disease     stage 4, Dr Ornelas follows    Neck pain 08/30/2022    Neuritis of lower extremity, right 12/19/2019    Proteinuria 05/23/2022    Rheumatoid factor positive 07/06/2021    Seasonal allergies 03/13/2014    Vitamin D deficiency 05/23/2022       Past Surgical History:   Procedure Laterality Date    ARTERIOVENOUS FISTULA Right     CARDIAC CATHETERIZATION      CARPAL TUNNEL RELEASE      CATARACT EXTRACTION W/ INTRAOCULAR LENS IMPLANT      COLONOSCOPY N/A 2006    COLONOSCOPY N/A 12/14/2018    Procedure: COLONOSCOPY TO CECUM WITH COLD BIOPSY POLYPECTOMY;  Surgeon: Elliott Harding MD;  Location:  [facility];  Service: General    COLONOSCOPY N/A 9/9/2024    Procedure: COLONOSCOPY with cold snare polypectomies;  Surgeon: Alvarado Irvin MD;  [facility];  Service: Gastroenterology;  Laterality: N/A;  pre- anemia  post- polyps    CORONARY ANGIOPLASTY WITH STENT PLACEMENT  11/09/2022    CORONARY ARTERY BYPASS GRAFT N/A 06/20/2003    ENDOSCOPY N/A 9/9/2024    Procedure: ESOPHAGOGASTRODUODENOSCOPY with biopsies;  Surgeon: Alvarado Irvin MD;  [facility];  Service: Gastroenterology;  Laterality: N/A;  pre- anemia  post- gastritis    INGUINAL HERNIA REPAIR Right 06/04/2014    Open incarcerated inguinal hernia repair-Dr. Elliott Harding    INGUINAL HERNIA REPAIR Left 4/26/2023    Procedure: OPEN LEFT INGUINAL HERNIA REPAIR;  Surgeon: Elliott Harding MD;  Location: [facility];  Service: General;  Laterality: Left;    LUMBAR DISC SURGERY N/A 1990, 1992    L4-L5, X2       Family History: family history includes Heart disease in his father and mother.    Social History:  reports that he has never smoked. He has never used smokeless tobacco. He reports current alcohol use. He reports that he does not use drugs.    Home Medications:  Prior to Admission medications    Medication Sig Start Date End Date Taking? Authorizing  Provider   acetaminophen (TYLENOL) 325 MG tablet Take 2 tablets by mouth Every 6 (Six) Hours As Needed for Mild Pain. 9/16/24   Arnoldo Craig MD   aspirin 81 MG chewable tablet Chew 1 tablet Daily.    ProviderArash MD   hydrOXYzine (ATARAX) 25 MG tablet Take 1 tablet by mouth 3 (Three) Times a Day As Needed for Itching. 9/17/24   Arnolod Craig MD   Methoxy PEG-Epoetin Beta (MIRCERA IJ) 75 mcg Every 28 (Twenty-Eight) Days. 6/3/24 6/2/25  Arash Elkins MD   pantoprazole (PROTONIX) 40 MG EC tablet Take 1 tablet by mouth 2 (Two) Times a Day Before Meals. 9/17/24   Arnoldo Craig MD   ranolazine (RANEXA) 500 MG 12 hr tablet Take 1 tablet by mouth 2 (Two) Times a Day for 30 days. Take dos 7/12/24 10/21/24  Kevyn Cantrell MD   tamsulosin (FLOMAX) 0.4 MG capsule 24 hr capsule Take 1 capsule by mouth Daily.    ProviderArash MD       Allergies:  Allergies   Allergen Reactions    Peanut (Diagnostic) Shortness Of Breath and Rash    Peanut Butter Flavor Shortness Of Breath and Rash    Simvastatin Myalgia     Other reaction(s): muscle cramps       Objective     Vitals:   Temp:  [98.1 °F (36.7 °C)] 98.1 °F (36.7 °C)  Heart Rate:  [65-72] 72  Resp:  [16] 16  BP: (117-150)/(74-84) 150/79  No intake or output data in the 24 hours ending 10/25/24 1514    Physical Exam:   Constitutional: Awake, chronically ill, NAD  HEENT: Sclera anicteric, no conjunctival injection, periorbital edema  Neck: Supple,  no JVD, no carotid bruit  Respiratory: Few bibasilar crackles, nonlabored on RA  Cardiovascular: RRR, no murmurs, no rubs   Gastrointestinal: BS +, soft, tender, +distended  : No palpable bladder  Musculoskeletal: 2+BLE edema extending to thighs, no clubbing or cyanosis  Psychiatric: Flat affect, cooperative, oriented  Neurologic: moving all extremities, normal speech, no asterixis  Skin: Warm and dry       Scheduled Meds:       IV Meds:   No current facility-administered medications for  this encounter.      Results Reviewed:   I have personally reviewed the results from the time of this admission to 10/25/2024 15:14 EDT     Lab Results   Component Value Date    GLUCOSE 86 10/25/2024    CALCIUM 9.0 10/25/2024     (H) 10/25/2024    K 4.7 10/25/2024    CO2 28.1 10/25/2024     10/25/2024    BUN 95 (H) 10/25/2024    CREATININE 8.33 (H) 10/25/2024    BCR 11.4 10/25/2024    ANIONGAP 18.9 (H) 10/25/2024      Lab Results   Component Value Date    MG 2.6 (H) 09/03/2024    PHOS 2.7 09/14/2024    ALBUMIN 3.4 (L) 10/25/2024           Assessment / Plan       Ascites      ASSESSMENT:  ESRD on HD, MWF, via R AV fistula, last HD was on 10/23.  Electrolytes within acceptable range except hyponatremia; normal potassium; patient has significant volume excess by imaging and exam  Fluid overload/ascites/anasarca, due to none compliance with HD, has pleural effusions with central congestion   CHF, most recent echo showed EF 25 to 30%  Hypertension with CKD, BP mildly elevated, in association with fluid excess  Anemia of CKD, on long-acting NELL at dialysis  History of CAD/prior MI s/p CABG, on baby aspirin  BPH, on Flomax at home    PLAN:  HD today to remove waste and volume  iUF tomorrow to remove additional volume  Discussed importance of fluid restriction at home; add 1200 mL fluid restriction here  Surveillance labs    Thank you for involving us in the care of Mohamud Jarrett.  Please feel free to call with any questions.    Álvaro Neal MD  10/25/24  15:14 EDT    [facility]  [phone]      Please note that portions of this note were completed with a voice recognition program.

## 2024-10-25 NOTE — CASE MANAGEMENT/SOCIAL WORK
Discharge Planning Assessment  [facility]     Patient Name: Mohamud Jarrett  MRN: [id number]  Today's Date: 10/25/2024    Admit Date: 10/25/2024    Plan: Home, may need assistance with transportation   Discharge Needs Assessment       Row Name 10/25/24 1633       Living Environment    People in Home sibling(s)    Name(s) of People in Home Jeannette    Current Living Arrangements apartment    Potentially Unsafe Housing Conditions none    In the past 12 months has the electric, gas, oil, or water company threatened to shut off services in your home? No    Primary Care Provided by self    Provides Primary Care For no one    Family Caregiver if Needed none    Quality of Family Relationships unable to assess    Able to Return to Prior Arrangements yes       Resource/Environmental Concerns    Resource/Environmental Concerns none    Transportation Concerns rides, unreliable from others       Transportation Needs    In the past 12 months, has lack of transportation kept you from medical appointments or from getting medications? no    In the past 12 months, has lack of transportation kept you from meetings, work, or from getting things needed for daily living? No       Food Insecurity    Within the past 12 months, you worried that your food would run out before you got the money to buy more. Never true    Within the past 12 months, the food you bought just didn't last and you didn't have money to get more. Never true       Transition Planning    Patient/Family Anticipates Transition to home    Patient/Family Anticipated Services at Transition none       Discharge Needs Assessment    Current Outpatient/Agency/Support Group outpatient hemodialysis  Current with [facility]    Equipment Currently Used at Home walker, rolling    Concerns to be Addressed no discharge needs identified;denies needs/concerns at this time    Do you want help finding or keeping work or a job? I do not need or want help    Do you want help  with school or training? For example, starting or completing job training or getting a high school diploma, GED or equivalent No    Anticipated Changes Related to Illness none    Equipment Needed After Discharge none    Outpatient/Agency/Support Group Needs outpatient hemodialysis                   Discharge Plan       Row Name 10/25/24 4401       Plan    Plan Home, may need assistance with transportation    Patient/Family in Agreement with Plan yes    Provided Post Acute Provider List? N/A    Provided Post Acute Provider Quality & Resource List? N/A    Plan Comments Met with pt at bedside. Introduced self and explained role of . Face sheet verified, PCP is Shara Talley. Pt stated that he has difficulty at times, paying for his medications, and [facility]. Pt lives with his sister, Jenanette, and stated that she could not assist with any care needs should they arise. Pt stated that they have a difficult relationship and he is currently seeking other, low income housing in [place]. Pt is independent in ADL's, stated that he fatigues easily. Pt uses a walker for mobility. Pt has had home health in the past, but he cannot recall which agency he used. He stated that he has not been to SNF/Rehab and he does not anticipate requiring those services upon discharge. Pt drives self to his [facility], [facility], however he stated that he may need assistance with transportation home when discharged. Explained that CCP would follow to assess for discharge needs.                  Continued Care and Services - Admitted Since 10/25/2024    No active coordination exists for this encounter.       Selected Continued Care - Prior Encounters Includes continued care and service providers with selected services from prior encounters from 7/27/2024 to 10/25/2024      Discharged on 9/17/2024 Admission date: 9/3/2024 - Discharge disposition: Home-Health Care c       Dialysis/Infusion       Service Provider Services Address Phone Fax Patient Preferred    [facility] [address], [address], [facility] [place] [id number] [phone] --              Home Medical Care       Service Provider Services Address Phone Fax Patient Preferred    [facility] [address], [address], [place] [address] [phone] --                          Expected Discharge Date and Time       Expected Discharge Date Expected Discharge Time    Oct 28, 2024            Demographic Summary    No documentation.                  Functional Status    No documentation.                  Psychosocial    No documentation.                  Abuse/Neglect    No documentation.                  Legal    No documentation.                  Substance Abuse    No documentation.                  Patient Forms    No documentation.                     Flavia Baldwin RN

## 2024-10-25 NOTE — H&P
Internal medicine history and physical  INTERNAL MEDICINE   [facility]       Patient Identification:  Name: Mohamud Jarrett  Age: 74 y.o.  Sex: male  :  1950  MRN: [id number]                   Primary Care Physician: Shara Talley APRN                               Date of admission:10/25/2024    Chief Complaint: Came to the hospital with multitude of complaints including abdominal discomfort, worsening shortness of breath and unable to urinate since he left the hospital on 10/22/2024.    History of Present Illness:   Patient is not the best of the historian and after significant interaction with him and review of records I was able to gather following:  Patient is a 74-year-old male who has complicated past medical history including history of end-stage renal disease on hemodialysis via right arm AV fistula, history of coronary artery disease status post coronary artery bypass grafting, hypertension, gout and congestive heart failure with reduced ejection fraction, while hospitalized from 10/21/2024 through 10/22/2024 when he presented with increasing swelling of his legs and shortness of breath developed fairly acutely.  Patient still makes urine despite the fact that he has end-stage renal disease.  Patient was evaluated by nephrology service and was noted to be in volume overload and underwent ultrafiltration with plans to remove liter of fluids.  Patient did not complete his plan ultrafiltration session and after the removal of only 2-1/2 L he started complaining of cramping.  Patient was otherwise feeling very well and breathing was somewhat better.  He did have some issues without arm swelling that his AV fistula is and venous Doppler was performed which did not show any acute DVT.    According to the patient after leaving the hospital he did okay for couple days but did not undergo his dialysis and today he had significant abdominal tightness, worsening shortness of breath with  minimal activity including orthopnea and increasing swelling of the legs as well as not producing much urine since he left the hospital.  Evaluation in the emergency room included CT scan of the abdomen pelvis which confirmed volume overload manifested as pulmonary vascular congestion, small airspace consolidation in the dependent aspect of the left lung and moderately large amount of free fluid throughout abdomen and pelvis as well as third spacing of fluids throughout the body wall and small right pleural effusion.  Patient had catheter placed resulting in removal of 100-150 cc of urine.  Catheter was not placed.  Urinalysis essentially unremarkable except for mild proteinuria.  Patient volume issue was discussed with nephrology service and ultrafiltration is being planned and patient is being admitted for further care.    Patient denies any fever or chills nausea vomiting diarrhea or decrease in appetite.      Past Medical History:  Past Medical History:   Diagnosis Date    A-V fistula     right arm    Anemia of chronic renal failure 04/12/2021    Antiplatelet or antithrombotic long-term use     plavix    Arthritis     Bilateral leg pain 11/05/2021    CAD (coronary artery disease)     h/o CABG 2003 and stents 11/2022, Dr Shirley follows    Chest pain     saw cardiology 4/10/23, pt states better if he takes his medications    Dialysis patient     Tues, Thursday, Saturday, has chest wall catheter currently    Gout     Heart attack     Hyperlipidemia     Hypertension     Kidney disease     stage 4, Dr Ornelas follows    Neck pain 08/30/2022    Neuritis of lower extremity, right 12/19/2019    Proteinuria 05/23/2022    Rheumatoid factor positive 07/06/2021    Seasonal allergies 03/13/2014    Vitamin D deficiency 05/23/2022     Past Surgical History:  Past Surgical History:   Procedure Laterality Date    ARTERIOVENOUS FISTULA Right     CARDIAC CATHETERIZATION      CARPAL TUNNEL RELEASE      CATARACT EXTRACTION W/  INTRAOCULAR LENS IMPLANT      COLONOSCOPY N/A 2006    COLONOSCOPY N/A 12/14/2018    Procedure: COLONOSCOPY TO CECUM WITH COLD BIOPSY POLYPECTOMY;  Surgeon: Elliott Harding MD;  Location:  [facility];  Service: General    COLONOSCOPY N/A 9/9/2024    Procedure: COLONOSCOPY with cold snare polypectomies;  Surgeon: Alvarado Irvin MD;  Location:  [facility];  Service: Gastroenterology;  Laterality: N/A;  pre- anemia  post- polyps    CORONARY ANGIOPLASTY WITH STENT PLACEMENT  11/09/2022    CORONARY ARTERY BYPASS GRAFT N/A 06/20/2003    ENDOSCOPY N/A 9/9/2024    Procedure: ESOPHAGOGASTRODUODENOSCOPY with biopsies;  Surgeon: Alvarado Irvin MD;  Location:  [facility];  Service: Gastroenterology;  Laterality: N/A;  pre- anemia  post- gastritis    INGUINAL HERNIA REPAIR Right 06/04/2014    Open incarcerated inguinal hernia repair-Dr. Elliott Harding    INGUINAL HERNIA REPAIR Left 4/26/2023    Procedure: OPEN LEFT INGUINAL HERNIA REPAIR;  Surgeon: Elliott Harding MD;  Location: [facility] OR;  Service: General;  Laterality: Left;    LUMBAR DISC SURGERY N/A 1990, 1992    L4-L5, X2      Home Meds:  Medications Prior to Admission   Medication Sig Dispense Refill Last Dose/Taking    acetaminophen (TYLENOL) 325 MG tablet Take 2 tablets by mouth Every 6 (Six) Hours As Needed for Mild Pain.   10/24/2024 Morning    aspirin 81 MG chewable tablet Chew 1 tablet Daily.   10/24/2024 Morning    hydrOXYzine (ATARAX) 25 MG tablet Take 1 tablet by mouth 3 (Three) Times a Day As Needed for Itching. 10 tablet 0 10/24/2024 Morning    Methoxy PEG-Epoetin Beta (MIRCERA IJ) 75 mcg Every 28 (Twenty-Eight) Days.   10/24/2024 Morning    pantoprazole (PROTONIX) 40 MG EC tablet Take 1 tablet by mouth 2 (Two) Times a Day Before Meals. 60 tablet 0 10/24/2024 Morning    tamsulosin (FLOMAX) 0.4 MG capsule 24 hr capsule Take 1 capsule by mouth Daily.   10/24/2024 Morning    ranolazine (RANEXA) 500 MG 12 hr tablet Take 1 tablet by  mouth 2 (Two) Times a Day for 30 days. Take dos 60 tablet 0      Current Meds:   No current facility-administered medications for this encounter.  Allergies:  Allergies   Allergen Reactions    Peanut (Diagnostic) Shortness Of Breath and Rash    Peanut Butter Flavor Shortness Of Breath and Rash    Simvastatin Myalgia     Other reaction(s): muscle cramps     Social History:   Social History     Tobacco Use    Smoking status: Never    Smokeless tobacco: Never   Substance Use Topics    Alcohol use: Yes     Comment: occassionally      Family History:  Family History   Problem Relation Age of Onset    Heart disease Mother     Heart disease Father     Malig Hyperthermia Neg Hx           Review of Systems  See history of present illness and past medical history.    As described in history presenting illness.    Vitals:   /61   Pulse 67   Temp 97.3 °F (36.3 °C)   Resp 16   SpO2 100%   I/O:   Intake/Output Summary (Last 24 hours) at 10/25/2024 1857  Last data filed at 10/25/2024 1526  Gross per 24 hour   Intake --   Output 200 ml   Net -200 ml     Exam:  Patient is examined using the personal protective equipment as per guidelines from infection control for this particular patient as enacted.  Hand washing was performed before and after patient interaction.  General Appearance:  Alert cooperative male who is eating his dinner and does not appear to be in any acute distress.   Head:    Normocephalic, without obvious abnormality, atraumatic   Eyes:  Pale conjunctiva   Ears:    Normal external ear canals, both ears   Nose:   Nares normal, septum midline, mucosa normal, no drainage    or sinus tenderness   Throat:   Lips, tongue, gums normal; oral mucosa pink and moist   Neck: Supple   Back:     Symmetric, no curvature, ROM normal, no CVA tenderness   Lungs:   Decreased breath sounds at the bases right greater than left   Chest Wall:    No tenderness or deformity    Heart:  S1-S2 regular   Abdomen:   Soft, no guarding  rigidity or rebound noted   Extremities: Bilateral lower extremity edema, right arm AV fistula functional with thrill   Pulses:   Pulses palpable in all extremities; symmetric all extremities   Skin: Body wall edema noted   Neurologic: Alert and oriented x 3       Data Review:      I reviewed the patient's new clinical results.  Results from last 7 days   Lab Units 10/25/24  1009 10/22/24  0531 10/21/24  2242   WBC 10*3/mm3 4.55 3.36* 3.09*   HEMOGLOBIN g/dL 10.1* 8.9* 9.0*   PLATELETS 10*3/mm3 200 137* 143     Results from last 7 days   Lab Units 10/25/24  1252 10/22/24  0531 10/21/24  2242   SODIUM mmol/L 148* 141 140   POTASSIUM mmol/L 4.7 3.5 3.4*   CHLORIDE mmol/L 101 101 98   CO2 mmol/L 28.1 27.5 32.2*   BUN mg/dL 95* 59* 53*   CREATININE mg/dL 8.33* 5.44* 5.37*   CALCIUM mg/dL 9.0 8.2* 8.6   GLUCOSE mg/dL 86 100* 94     XR Chest 1 View    Result Date: 10/25/2024  No significant change.  This report was finalized on 10/25/2024 10:37 AM by Dr. Mp Page M.D on Workstation: [id number]     ECG 12 Lead Dyspnea   Final Result   HEART RATE=66  bpm   RR Pznnnybq=554  ms   TX Fhgojsfg=387  ms   P Horizontal Axis=51  deg   P Front Axis=-24  deg   QRSD Jlygwfih=008  ms   QT Jtrqpjyv=258  ms   CNtC=615  ms   QRS Axis=4  deg   T Wave Axis=138  deg   - ABNORMAL ECG -   Sinus rhythm   Ventricular premature complex   Nonspecific  T abnormalities, lateral leads   Borderline  prolonged QT interval   No change from previous tracing   Electronically Signed By: Demar Mendieta ([facility]) 2024-10-25 15:56:40   Date and Time of Study:2024-10-25 10:12:29      Telemetry Scan   Final Result      Telemetry Scan   Final Result      Telemetry Scan   Final Result        Microbiology Results (last 10 days)       ** No results found for the last 240 hours. **          Brief Urine Lab Results  (Last result in the past 365 days)        Color   Clarity   Blood   Leuk Est   Nitrite   Protein   CREAT   Urine HCG        10/25/24 1132  Yellow   Clear   Negative   Negative   Negative   >=300 mg/dL (3+)                   Assessment:  Active Hospital Problems    Diagnosis  POA    **Ascites [R18.8]  Yes    Volume overload [E87.70]  Unknown    ESRD (end stage renal disease) [N18.6]  Yes    Dependence on renal dialysis [Z99.2]  Not Applicable    Anemia in chronic kidney disease [N18.9, D63.1]  Yes    Essential (primary) hypertension [I10]  Yes       Medical decision making/care plan: See admitting orders  Generalized anasarca/volume overload with body wall edema, ascites and pleural effusion-multifactorial including underlying congestive heart failure with reduced ejection fraction-last echocardiogram performed on 9/4/2024 showing severely depressed left ventricular systolic function with ejection fraction 26 to 30% and grade 2 diastolic dysfunction-complicated by end-stage renal disease-plan is to admit the patient nephrology service have been consulted and dialysis arrangement is being made.  Monitor his blood pressure and replace his electrolytes to avoid dialysis associated electrolytes shift causing intolerance manifesting as generalized cramps.  Anemia of chronic kidney disease with hemoglobin improving in the last 3 days from 8.9-10.1-likely intravascular volume depletion with extravascular volume overload manifesting as body wall edema ascites and pleural effusion-monitor his hemoglobin and continue his home regimen of erythropoietin as directed by nephrology service.  End-stage renal disease on hemodialysis with variable compliance-continue his dialysis schedule and frequency based on his need to resolve volume overload issue as per nephrology service.  Hypertension-continue his antihypertensive regimen while he is receiving dialysis.  Coronary artery disease-continue his aspirin.  Benign prostatic hyperplasia-continue with Flomax while watching his blood pressure and urinary output.  Elier Ulloa MD   10/25/2024  18:57 EDT    Parts of this  note may be an electronic transcription/translation of spoken language to printed text using the Dragon dictation system.

## 2024-10-25 NOTE — DISCHARGE PLACEMENT REQUEST
"Mohamud Quach (74 y.o. Male)       Date of Birth   1950    Social Security Number       Address   [address] [place] [address]    Home Phone   [phone]    MRN   [id number]       [facility]    Marital Status                               Admission Date   10/21/24    Admission Type   Emergency    Admitting Provider   Matthew Navas MD    Attending Provider       Department, Room/Bed   [address], [address]/1       Discharge Date   10/22/2024    Discharge Disposition   Home or Self Care    Discharge Destination                                 Attending Provider: (none)   Allergies: Peanut (Diagnostic), Peanut Butter Flavor, Simvastatin    Isolation: None   Infection: None   Code Status: Prior    Ht: 177.8 cm (70\")   Wt: 82.2 kg (181 lb 4.8 oz)    Admission Cmt: None   Principal Problem: Leg swelling [M79.89]                   Active Insurance as of 10/21/2024       Primary Coverage       Payor Plan Insurance Group Employer/Plan Group    ANTHEM MEDICARE REPLACEMENT ANTHEM MEDICARE ADVANTAGE [id number]       Payor Plan Address Payor Plan Phone Number Payor Plan Fax Number Effective Dates    [address] [phone]  1/1/2021 - None Entered    [facility] [id number]         Subscriber Name Subscriber Birth Date Member ID       MOHAMUD QUACH 1950 [id number]                     Emergency Contacts        (Rel.) Home Phone Work Phone Mobile Phone    Ilana Quach (Daughter) -- -- [phone]                "

## 2024-10-25 NOTE — PLAN OF CARE
Goal Outcome Evaluation:  Plan of Care Reviewed With: patient   Alert and orientedx 4. VVS. On schedule for dialysis this evening. Patient stated he has not eaten since yesterday and received dinner tray. Abdomen distended and taut. No c/o of pain. Safety maintained.

## 2024-10-26 NOTE — PROGRESS NOTES
[facility] Progress Note      Patient Name: Mohamud Jarrett  : 1950  MRN: [id number]  Primary Care Physician:  Shara Talley APRN  Date of admission: 10/25/2024    Subjective     Interval History:   Terminated Rx early yesterday, and he limited fluid removal  Seen and examined on iUF; 3 L being targeted  Feels ok; denies SOB    Review of Systems:   As noted above    Objective     Vitals:   Temp:  [97.2 °F (36.2 °C)-97.9 °F (36.6 °C)] 97.8 °F (36.6 °C)  Heart Rate:  [65-80] 70  Resp:  [16-18] 18  BP: (114-171)/(61-84) 171/70    Intake/Output Summary (Last 24 hours) at 10/26/2024 1025  Last data filed at 10/26/2024 0414  Gross per 24 hour   Intake 720 ml   Output 1893 ml   Net -1173 ml       Physical Exam:    Constitutional: Awake, chronically ill, NAD  HEENT: Sclera anicteric, no conjunctival injection, periorbital edema  Neck: Supple,  no JVD, no carotid bruit  Respiratory: Few bibasilar crackles, nonlabored on RA  Cardiovascular: RRR, no murmurs, no rubs   Gastrointestinal: BS +, soft, tender, +distended; wall edema  : No palpable bladder  Musculoskeletal: 2+BLE edema extending to thighs, no clubbing or cyanosis  Psychiatric: Flat affect, cooperative, oriented  Neurologic: moving all extremities, normal speech, no asterixis  Skin: Warm and dry       Scheduled Meds:     aspirin, 81 mg, Oral, Daily  pantoprazole, 40 mg, Oral, BID AC  sodium chloride, 10 mL, Intravenous, Q12H  tamsulosin, 0.4 mg, Oral, Daily      IV Meds:        Results Reviewed:   I have personally reviewed the results from the time of this admission to 10/26/2024 10:25 EDT     Results from last 7 days   Lab Units 10/26/24  0513 10/25/24  1252 10/22/24  0531 10/21/24  2242   SODIUM mmol/L 141 148* 141 140   POTASSIUM mmol/L 3.9 4.7 3.5 3.4*   CHLORIDE mmol/L 101 101 101 98   CO2 mmol/L 25.7 28.1 27.5 32.2*   BUN mg/dL 63* 95* 59* 53*   CREATININE mg/dL 5.91* 8.33* 5.44* 5.37*   CALCIUM mg/dL 8.5* 9.0 8.2*  8.6   BILIRUBIN mg/dL 1.0 1.2  --  1.0   ALK PHOS U/L 231* 271*  --  267*   ALT (SGPT) U/L <5 7  --  9   AST (SGOT) U/L 16 15  --  19   GLUCOSE mg/dL 86 86 100* 94       Estimated Creatinine Clearance: 13.4 mL/min (A) (by C-G formula based on SCr of 5.91 mg/dL (H)).    Results from last 7 days   Lab Units 10/26/24  0513   PHOSPHORUS mg/dL 4.5             Results from last 7 days   Lab Units 10/26/24  0512 10/25/24  1009 10/22/24  0531 10/21/24  2242   WBC 10*3/mm3 3.46 4.55 3.36* 3.09*   HEMOGLOBIN g/dL 9.4* 10.1* 8.9* 9.0*   PLATELETS 10*3/mm3 147 200 137* 143             Assessment / Plan     ASSESSMENT:  ESRD on HD, MWF, via R AV fistula. Had limited HD yesterday with only 1 liter removed at pt's insistence.  Electrolytes stable; patient has significant volume overload  Fluid overload/ascites/anasarca, due to noncompliance with HD; has pleural effusions with central congestion.  Poor insight    CHF, most recent echo showed EF 25 to 30%  Hypertension with CKD, exacerbated by fluid excess  Anemia of CKD, on long-acting NELL at dialysis  History of CAD/prior MI s/p CABG, on baby aspirin  BPH, on Flomax at home    PLAN:   iUF today and again tomorrow  Again explained rationale of fluid removal on HD and need for fluid restriction as his responsibility    Thank you for involving us in the care of Mohamud Jarrett.  Please feel free to call with any questions.    Álvaro Neal MD  10/26/24  10:25 EDT    [facility]  [phone]    Please note that portions of this note were completed with a voice recognition program.

## 2024-10-26 NOTE — PLAN OF CARE
Goal Outcome Evaluation:  Plan of Care Reviewed With: patient           Outcome Evaluation: VSS. A/O x4. On Room Air through shift. Up with standby assist. Urinal within reach. HD completed with 1L fluid removed. PRN Tylenol given for cramping during HD. PRN Atarax given for c/o itchiness. On 1200 cc fluid restriction. Pt had 7 runs of ectopy at 0100H but was asymptomatic, strips were saved. Pt appeared to have slept some and had no c/o pain or discomfort through remaining of shift. Safety maintained. Will CTM.

## 2024-10-26 NOTE — THERAPY EVALUATION
Acute Care - Speech Language Pathology   Swallow Initial Evaluation [facility]     Patient Name: Mohamud Jarrett  : 1950  MRN: [id number]  Today's Date: 10/26/2024               Admit Date: 10/25/2024    Visit Dx:     ICD-10-CM ICD-9-CM   1. Other ascites  R18.8 789.59   2. Pleural effusion  J90 511.9   3. End stage renal disease on dialysis  N18.6 585.6    Z99.2 V45.11   4. Hypernatremia  E87.0 276.0     Patient Active Problem List   Diagnosis    Essential (primary) hypertension    Hyperlipidemia    Chronic kidney disease, stage 4 (severe)    Anemia in chronic kidney disease    Coronary artery disease    Coagulation defect, unspecified    Dependence on renal dialysis    Mitral valve regurgitation    Stented coronary artery    Pure hypercholesterolemia    Left inguinal hernia    H/O peanut allergy    Weakness generalized    CHF (congestive heart failure)    Secondary hyperparathyroidism of renal origin    History of gout    End stage renal disease    Presence of coronary angioplasty implant and graft    Depression    Generalized weakness    Abnormal EKG    ESRD (end stage renal disease)    Itchy scalp    Acute metabolic encephalopathy    GI bleed    Lactic acidosis    Metabolic encephalopathy    Severe malnutrition    Leg swelling    Elevated troponin    Abdominal bloating    Ascites    Volume overload     Past Medical History:   Diagnosis Date    A-V fistula     right arm    Anemia of chronic renal failure 2021    Antiplatelet or antithrombotic long-term use     plavix    Arthritis     Bilateral leg pain 2021    CAD (coronary artery disease)     h/o CABG  and stents 2022, Dr Shirley follows    Chest pain     saw cardiology 4/10/23, pt states better if he takes his medications    Dialysis patient     Tues, Thursday, Saturday, has chest wall catheter currently    Gout     Heart attack     Hyperlipidemia     Hypertension     Kidney disease     stage 4, Dr Ornelas follows    Neck pain  08/30/2022    Neuritis of lower extremity, right 12/19/2019    Proteinuria 05/23/2022    Rheumatoid factor positive 07/06/2021    Seasonal allergies 03/13/2014    Vitamin D deficiency 05/23/2022     Past Surgical History:   Procedure Laterality Date    ARTERIOVENOUS FISTULA Right     CARDIAC CATHETERIZATION      CARPAL TUNNEL RELEASE      CATARACT EXTRACTION W/ INTRAOCULAR LENS IMPLANT      COLONOSCOPY N/A 2006    COLONOSCOPY N/A 12/14/2018    Procedure: COLONOSCOPY TO CECUM WITH COLD BIOPSY POLYPECTOMY;  Surgeon: Elliott Harding MD;  Location:  [facility];  Service: General    COLONOSCOPY N/A 9/9/2024    Procedure: COLONOSCOPY with cold snare polypectomies;  Surgeon: Alvarado Irvin MD;  Location:  [facility];  Service: Gastroenterology;  Laterality: N/A;  pre- anemia  post- polyps    CORONARY ANGIOPLASTY WITH STENT PLACEMENT  11/09/2022    CORONARY ARTERY BYPASS GRAFT N/A 06/20/2003    ENDOSCOPY N/A 9/9/2024    Procedure: ESOPHAGOGASTRODUODENOSCOPY with biopsies;  Surgeon: Alvarado Irvin MD;  Location:  [facility];  Service: Gastroenterology;  Laterality: N/A;  pre- anemia  post- gastritis    INGUINAL HERNIA REPAIR Right 06/04/2014    Open incarcerated inguinal hernia repair-Dr. Elliott Harding    INGUINAL HERNIA REPAIR Left 4/26/2023    Procedure: OPEN LEFT INGUINAL HERNIA REPAIR;  Surgeon: Elliott Harding MD;  Location: [facility] OR;  Service: General;  Laterality: Left;    LUMBAR DISC SURGERY N/A 1990, 1992    L4-L5, X2       SLP Recommendation and Plan  SLP Swallowing Diagnosis: functional pharyngeal phase (10/26/24 1527)  SLP Diet Recommendation: soft to chew textures, regular textures, thin liquids (10/26/24 1527)  Recommended Precautions and Strategies: upright posture during/after eating, small bites of food and sips of liquid (10/26/24 1527)  SLP Rec. for Method of Medication Administration: meds whole, with thin liquids, as tolerated (10/26/24 1527)     Monitor for Signs of  Aspiration: yes, notify SLP if any concerns (10/26/24 1527)  Recommended Diagnostics: No further SLP services recommended (10/26/24 1527)  Swallow Criteria for Skilled Therapeutic Interventions Met: no problems identified which require skilled intervention (10/26/24 1527)  Anticipated Discharge Disposition (SLP): unknown (10/26/24 1527)  Rehab Potential/Prognosis, Swallowing: good, to achieve stated therapy goals (10/26/24 1527)  Therapy Frequency (Swallow): evaluation only (10/26/24 1527)     Oral Care Recommendations: Oral Care before breakfast, after meals and PRN (10/26/24 1527)                                        Progress: improving  Outcome Evaluation: Pt seen for clincial swallow evaluation per md request. Pt reporting no issues with swallowing at this time. Mild difficulty with mastication of regular solids due to missing teeth, which patient monitors independently. Rec soft to chew diet with thin liquids, meds with thin liquids as tolerated, encourage frequent oral care. ST to sign off. please re-consult if any negative changes.      SWALLOW EVALUATION (Last 72 Hours)       SLP Adult Swallow Evaluation       Row Name 10/26/24 1527                   Rehab Evaluation    Document Type evaluation  -JT        Subjective Information no complaints  -JT        Patient Observations alert;cooperative;agree to therapy  -JT        Patient/Family/Caregiver Comments/Observations pt upright in recliner  -JT        Patient Effort fair  -JT        Comment minimal participation  -JT        Symptoms Noted During/After Treatment none  -JT           General Information    Patient Profile Reviewed yes  -JT        Pertinent History Of Current Problem Pt adm w/Dyspnea, Ascites, Volume overload, dependence on dialysis, HTN. PMH includes HTN, MVR, L inguinal hernia, CHF, Gi bleed, RA, Heart Attack.  -JT        Current Method of Nutrition regular textures;thin liquids  -JT        Precautions/Limitations, Hearing WFL;for purposes  of eval  -JT        Prior Level of Function-Communication WFL  -JT        Prior Level of Function-Swallowing soft to chew;thin liquids  -JT        Plans/Goals Discussed with patient;agreed upon  -JT        Barriers to Rehab none identified  -JT        Patient's Goals for Discharge patient did not state  -JT           Pain    Pretreatment Pain Rating 0/10 - no pain  -JT        Posttreatment Pain Rating 0/10 - no pain  -JT           Oral Motor Structure and Function    Dentition Assessment missing teeth  missing all uppers; back left and right lowers  -JT        Secretion Management WNL/WFL  -JT        Mucosal Quality moist, healthy  -JT        Volitional Swallow WFL  -JT        Volitional Cough weak  -JT           Oral Musculature and Cranial Nerve Assessment    Oral Motor General Assessment WFL  -JT           General Eating/Swallowing Observations    Respiratory Support Currently in Use room air  -JT        Eating/Swallowing Skills self-fed  -JT        Positioning During Eating upright in chair  -JT        Utensils Used cup;straw  -JT        Consistencies Trialed regular textures;thin liquids  -JT           Respiratory    Respiratory Status WFL  -JT           Clinical Swallow Eval    Clinical Swallow Evaluation Summary Pt seen for clinical swallow evaluation with functional swallow. Pt missing several teeth which prevents him from eating hard/chewy foods which he independently monitors. Pt reports no issues with eating/swallowing at this time. Tolerated consecutive straw sips of thin liquids. Refused soft food trials but reported no issues with soft lunch items. slightly prolonged mastication with regular crackers noted, but he was able to chew adequately and clear any residue with liquid wash independently. Feel pt is safe for current diet and will monitor independently. [facility], [facility] liquids and meds with thin as tolerated.  -JT           SLP Evaluation Clinical Impression    SLP Swallowing Diagnosis  functional pharyngeal phase  -JT        Functional Impact no impact on function  -JT        Rehab Potential/Prognosis, Swallowing good, to achieve stated therapy goals  -JT        Swallow Criteria for Skilled Therapeutic Interventions Met no problems identified which require skilled intervention  -JT           Recommendations    Therapy Frequency (Swallow) evaluation only  -JT        SLP Diet Recommendation soft to chew textures;regular textures;thin liquids  -JT        Recommended Diagnostics No further SLP services recommended  -JT        Recommended Precautions and Strategies upright posture during/after eating;small bites of food and sips of liquid  -JT        Oral Care Recommendations Oral Care before breakfast, after meals and PRN  -JT        SLP Rec. for Method of Medication Administration meds whole;with thin liquids;as tolerated  -JT        Monitor for Signs of Aspiration yes;notify SLP if any concerns  -JT        Anticipated Discharge Disposition (SLP) unknown  -JT                  User Key  (r) = Recorded By, (t) = Taken By, (c) = Cosigned By      Initials Name Effective Dates    Serena Jeffery SLP 01/05/24 -                     EDUCATION  The patient has been educated in the following areas:   Dysphagia (Swallowing Impairment).                Time Calculation:    Time Calculation- SLP       Row Name 10/26/24 1543 10/26/24 1542          Time Calculation- SLP    SLP Start Time 1500  -JT 0830  -JT     SLP Stop Time 1530  -JT 0900  -JT     SLP Time Calculation (min) 30 min  -JT 30 min  -JT     SLP Received On -- 10/26/24  -JT        Untimed Charges    42691-NM Eval Oral Pharyng Swallow Minutes 60  -JT --        Total Minutes    Untimed Charges Total Minutes 60  -JT --      Total Minutes 60  -JT --               User Key  (r) = Recorded By, (t) = Taken By, (c) = Cosigned By      Initials Name Provider Type    Serena Jeffery, SLP Speech and Language Pathologist                    Therapy  Charges for Today       Code Description Service Date Service Provider Modifiers Qty    [id number]  ST EVAL ORAL PHARYNG SWALLOW 4 10/26/2024 Serena Louise, SLP GN 1                 Serena Louise, SLP  10/26/2024

## 2024-10-26 NOTE — NURSING NOTE
"Pt dialyzed for 2.5 of 4 prescribed hours. Pt was educated in depth about the risks associated with frequently shortening HD tx and chronic noncompliance. Pt verbalizes an understanding that this is a life-shortening decision. Pt verbalizes he \"just can't handle the cramping\" associated with HD. Net fluid removal was 1L. Dr. Neal aware of shortened tx.  "

## 2024-10-26 NOTE — PLAN OF CARE
Goal Outcome Evaluation:  Plan of Care Reviewed With: patient         Patient had 3 L of fluid removed today. Some edema still noted in extremities. He is alert and oriented.   VSS. He will see cardiology in the a.m.    He walks stand by with walker and is continent

## 2024-10-26 NOTE — OUTREACH NOTE
Medical Week 2 Survey      Flowsheet Row Responses   [facility] patient discharged from? [facility]   Does the patient have one of the following disease processes/diagnoses(primary or secondary)? Other   Week 2 attempt successful? No   Unsuccessful attempts Attempt 1   Revoke Readmitted            ANDREW ENRIQUEZ - Registered Nurse

## 2024-10-26 NOTE — PLAN OF CARE
Problem: Adult Inpatient Plan of Care  Goal: Plan of Care Review  Outcome: Progressing  Flowsheets (Taken 10/26/2024 0262)  Progress: improving  Outcome Evaluation: Pt seen for clincial swallow evaluation per md request. Pt reporting no issues with swallowing at this time. Mild difficulty with mastication of regular solids due to missing teeth, which patient monitors independently. Rec soft to chew diet with thin liquids, meds with thin liquids as tolerated, encourage frequent oral care. ST to sign off. please re-consult if any negative changes.  Plan of Care Reviewed With: patient   Goal Outcome Evaluation:  Plan of Care Reviewed With: patient        Progress: improving  Outcome Evaluation: Pt seen for clincial swallow evaluation per md request. Pt reporting no issues with swallowing at this time. Mild difficulty with mastication of regular solids due to missing teeth, which patient monitors independently. Rec soft to chew diet with thin liquids, meds with thin liquids as tolerated, encourage frequent oral care. ST to sign off. please re-consult if any negative changes.    Anticipated Discharge Disposition (SLP): unknown          SLP Swallowing Diagnosis: functional pharyngeal phase (10/26/24 9473)

## 2024-10-26 NOTE — NURSING NOTE
Dialysis complete, removed only 2 liters related to cramping, no other complications noted, pre and post vitals are  in epic.

## 2024-10-26 NOTE — PLAN OF CARE
Goal Outcome Evaluation:  Plan of Care Reviewed With: patient        Progress: no change  Outcome Evaluation: Pt is 75 yo male admitted with difficulty urinating. He has dialysis 3 days/week and states he has good days and bad days. He has a few [facility] home and uses RW at baseline. He took several small steps to chair today with min A x 1 for bed mobility and STS and CGA for ambulation short distance to chair. Pt fatigues quickly and LE weakness inc through transfer. Pt is appropriate for continued skilled PT services during inpatient stay. Recommend home with  vs. [facility] at d/c. Pt states he lives with siblings.    Anticipated Discharge Disposition (PT): home with home health, skilled nursing facility

## 2024-10-26 NOTE — PROGRESS NOTES
Name: Mohamud Jarrett ADMIT: 10/25/2024   : 1950  PCP: Shara Talley APRN    MRN: [id number] LOS: 0 days   AGE/SEX: 74 y.o. male  ROOM: [facility]     Subjective   Subjective   Patient is seen at bedside, no new complaints.       Objective   Objective   Vital Signs  Temp:  [97.2 °F (36.2 °C)-97.9 °F (36.6 °C)] 97.5 °F (36.4 °C)  Heart Rate:  [57-80] 57  Resp:  [16-18] 18  BP: (114-171)/(50-78) 139/50  SpO2:  [98 %-100 %] 100 %  on   ;   Device (Oxygen Therapy): room air  There is no height or weight on file to calculate BMI.  Physical Exam  General, awake and alert.  Appears sick on chronic basis  Head and ENT, normocephalic and atraumatic.  Lungs, symmetric expansion, equal air entry bilaterally.  Heart, regular rate and rhythm.  Abdomen, soft and nontender.  Extremities, no clubbing or cyanosis.  Bilateral lower extremity edema  Neuro, no focal deficits.  Skin: Warm and no rash.  Psych, normal mood and affect.  Musculoskeletal, joint examination is grossly normal.      Results Review     I reviewed the patient's new clinical results.  Results from last 7 days   Lab Units 10/26/24  0512 10/25/24  1009 10/22/24  0531 10/21/24  2242   WBC 10*3/mm3 3.46 4.55 3.36* 3.09*   HEMOGLOBIN g/dL 9.4* 10.1* 8.9* 9.0*   PLATELETS 10*3/mm3 147 200 137* 143     Results from last 7 days   Lab Units 10/26/24  0513 10/25/24  1252 10/22/24  0531 10/21/24  2242   SODIUM mmol/L 141 148* 141 140   POTASSIUM mmol/L 3.9 4.7 3.5 3.4*   CHLORIDE mmol/L 101 101 101 98   CO2 mmol/L 25.7 28.1 27.5 32.2*   BUN mg/dL 63* 95* 59* 53*   CREATININE mg/dL 5.91* 8.33* 5.44* 5.37*   GLUCOSE mg/dL 86 86 100* 94   EGFR mL/min/1.73 9.5* 6.3* 10.5* 10.6*     Results from last 7 days   Lab Units 10/26/24  0513 10/25/24  1252 10/21/24  2242   ALBUMIN g/dL 2.9* 3.4* 3.3*   BILIRUBIN mg/dL 1.0 1.2 1.0   ALK PHOS U/L 231* 271* 267*   AST (SGOT) U/L 16 15 19   ALT (SGPT) U/L <5 7 9     Results from last 7 days   Lab Units 10/26/24  05  "10/25/24  1252 10/22/24  0531 10/21/24  2242   CALCIUM mg/dL 8.5* 9.0 8.2* 8.6   ALBUMIN g/dL 2.9* 3.4*  --  3.3*   PHOSPHORUS mg/dL 4.5  --   --   --        No results found for: \"HGBA1C\", \"POCGLU\"    XR Chest 1 View    Result Date: 10/25/2024  No significant change.  This report was finalized on 10/25/2024 10:37 AM by Dr. Mp Page M.D on Workstation: [email or web address]       I have personally reviewed all medications:  Scheduled Medications  aspirin, 81 mg, Oral, Daily  pantoprazole, 40 mg, Oral, BID AC  sodium chloride, 10 mL, Intravenous, Q12H  tamsulosin, 0.4 mg, Oral, Daily    Infusions   Diet  Diet: Renal; Low Potassium, Low Sodium (2-3g); Texture: Soft to Chew (NDD 3); Soft to Chew: Ground Meat; Fluid Consistency: Thin (IDDSI 0)    I have personally reviewed:  [x]  Laboratory   [x]  Microbiology   [x]  Radiology   [x]  EKG/Telemetry  [x]  Cardiology/Vascular   []  Pathology    []  Records       Assessment/Plan     Active Hospital Problems    Diagnosis  POA    **Ascites [R18.8]  Yes    Volume overload [E87.70]  Unknown    ESRD (end stage renal disease) [N18.6]  Yes    Dependence on renal dialysis [Z99.2]  Not Applicable    Anemia in chronic kidney disease [N18.9, D63.1]  Yes    Essential (primary) hypertension [I10]  Yes      Resolved Hospital Problems   No resolved problems to display.       74 y.o. male admitted with Ascites.    Assessment and plan  1.  Acute on chronic diastolic and systolic congestive heart failure, patient showing signs of volume overload, patient does have underlying end-stage renal disease with dependence on hemodialysis, nephrology and cardiology evaluation requested.  Follow management recommendations.    2.  Anemia of chronic disease, monitor hemoglobin trend, recheck labs.    3.  Hypertension, continue home medications.    4.  Coronary artery disease, BPH, continue home medications.    5.  CODE STATUS is full code.  Further plans based on hospital course.    Luis Lion, " MD [facility]  10/26/24  16:47 EDT

## 2024-10-26 NOTE — THERAPY EVALUATION
Patient Name: Mohamud Jarrett  : 1950    MRN: [id number]                              Today's Date: 10/26/2024       Admit Date: 10/25/2024    Visit Dx:     ICD-10-CM ICD-9-CM   1. Other ascites  R18.8 789.59   2. Pleural effusion  J90 511.9   3. End stage renal disease on dialysis  N18.6 585.6    Z99.2 V45.11   4. Hypernatremia  E87.0 276.0     Patient Active Problem List   Diagnosis    Essential (primary) hypertension    Hyperlipidemia    Chronic kidney disease, stage 4 (severe)    Anemia in chronic kidney disease    Coronary artery disease    Coagulation defect, unspecified    Dependence on renal dialysis    Mitral valve regurgitation    Stented coronary artery    Pure hypercholesterolemia    Left inguinal hernia    H/O peanut allergy    Weakness generalized    CHF (congestive heart failure)    Secondary hyperparathyroidism of renal origin    History of gout    End stage renal disease    Presence of coronary angioplasty implant and graft    Depression    Generalized weakness    Abnormal EKG    ESRD (end stage renal disease)    Itchy scalp    Acute metabolic encephalopathy    GI bleed    Lactic acidosis    Metabolic encephalopathy    Severe malnutrition    Leg swelling    Elevated troponin    Abdominal bloating    Ascites    Volume overload     Past Medical History:   Diagnosis Date    A-V fistula     right arm    Anemia of chronic renal failure 2021    Antiplatelet or antithrombotic long-term use     plavix    Arthritis     Bilateral leg pain 2021    CAD (coronary artery disease)     h/o CABG  and stents 2022, Dr Shirley follows    Chest pain     saw cardiology 4/10/23, pt states better if he takes his medications    Dialysis patient     Tues, Thursday, Saturday, has chest wall catheter currently    Gout     Heart attack     Hyperlipidemia     Hypertension     Kidney disease     stage 4, Dr Ornelas follows    Neck pain 2022    Neuritis of lower extremity, right 2019     Proteinuria 05/23/2022    Rheumatoid factor positive 07/06/2021    Seasonal allergies 03/13/2014    Vitamin D deficiency 05/23/2022     Past Surgical History:   Procedure Laterality Date    ARTERIOVENOUS FISTULA Right     CARDIAC CATHETERIZATION      CARPAL TUNNEL RELEASE      CATARACT EXTRACTION W/ INTRAOCULAR LENS IMPLANT      COLONOSCOPY N/A 2006    COLONOSCOPY N/A 12/14/2018    Procedure: COLONOSCOPY TO CECUM WITH COLD BIOPSY POLYPECTOMY;  Surgeon: Elliott Harding MD;  Location: [facility];  Service: General    COLONOSCOPY N/A 9/9/2024    Procedure: COLONOSCOPY with cold snare polypectomies;  Surgeon: Alvarado Irvin MD;  Location:  [facility];  Service: Gastroenterology;  Laterality: N/A;  pre- anemia  post- polyps    CORONARY ANGIOPLASTY WITH STENT PLACEMENT  11/09/2022    CORONARY ARTERY BYPASS GRAFT N/A 06/20/2003    ENDOSCOPY N/A 9/9/2024    Procedure: ESOPHAGOGASTRODUODENOSCOPY with biopsies;  Surgeon: Alvarado Irvin MD;  Location:  [facility];  Service: Gastroenterology;  Laterality: N/A;  pre- anemia  post- gastritis    INGUINAL HERNIA REPAIR Right 06/04/2014    Open incarcerated inguinal hernia repair-Dr. Elliott Harding    INGUINAL HERNIA REPAIR Left 4/26/2023    Procedure: OPEN LEFT INGUINAL HERNIA REPAIR;  Surgeon: Elliott Harding MD;  Location: [facility];  Service: General;  Laterality: Left;    LUMBAR DISC SURGERY N/A 1990, 1992    L4-L5, X2      General Information       Row Name 10/26/24 5352          Physical Therapy Time and Intention    Document Type evaluation  -LB     Mode of Treatment physical therapy  -LB       Row Name 10/26/24 4291          General Information    Patient Profile Reviewed yes  -LB     Prior Level of Function independent:  pt states someone is with him all the time at home to help  -LB     Existing Precautions/Restrictions fall  -LB     Barriers to Rehab none identified  -LB       Row Name 10/26/24 1504          Living Environment    People  in Home sibling(s)  -LB       Row Name 10/26/24 1507          Home Main Entrance    Number of Stairs, Main Entrance three  -LB       Row Name 10/26/24 1507          Stairs Within Home, Primary    Number of Stairs, Within Home, Primary none  -LB       Row Name 10/26/24 1507          Cognition    Orientation Status (Cognition) oriented to;person;place  -LB       Row Name 10/26/24 1507          Safety Issues/Impairments Affecting Functional Mobility    Safety Issues Affecting Function (Mobility) other (see comments)  generalized weakness  -LB     Impairments Affecting Function (Mobility) strength;endurance/activity tolerance  -LB     Comment, Safety Issues/Impairments (Mobility) gait belt and non-skid socks donned throughout  -LB               User Key  (r) = Recorded By, (t) = Taken By, (c) = Cosigned By      Initials Name Provider Type    Nieves Noble PT Physical Therapist                   Mobility       Row Name 10/26/24 1509          Bed Mobility    Bed Mobility bed mobility (all) activities  -LB     All Activities, [place] (Bed Mobility) supervision  -LB     Assistive Device (Bed Mobility) bed rails;head of bed elevated  -LB       Row Name 10/26/24 1509          Bed-Chair Transfer    Bed-Chair [place] (Transfers) minimum assist (75% patient effort);1 person assist  -LB     Assistive Device (Bed-Chair Transfers) walker, front-wheeled  -LB       Row Name 10/26/24 1509          Sit-Stand Transfer    Sit-Stand [place] (Transfers) minimum assist (75% patient effort);1 person assist  -LB     Assistive Device (Sit-Stand Transfers) walker, front-wheeled  -LB       Row Name 10/26/24 1509          Gait/Stairs (Locomotion)    [place] Level (Gait) contact guard;1 person assist  -LB     Assistive Device (Gait) walker, front-wheeled  -LB     Distance in Feet (Gait) 5  -LB     Deviations/Abnormal Patterns (Gait) base of support, wide;brittany decreased;stride length decreased  -LB               User Key   (r) = Recorded By, (t) = Taken By, (c) = Cosigned By      Initials Name Provider Type    LB Nieves Bray, PT Physical Therapist                   Obj/Interventions       Row Name 10/26/24 1510          Range of Motion Comprehensive    Comment, General Range of Motion BLE WFL  -LB       Row Name 10/26/24 1510          Strength Comprehensive (MMT)    Comment, General Manual Muscle Testing (MMT) Assessment BLE grossly 4-/5  -LB       Row Name 10/26/24 1510          Motor Skills    Therapeutic Exercise --  B ankle pumps, heel slides, glute set x 10 supine  -LB               User Key  (r) = Recorded By, (t) = Taken By, (c) = Cosigned By      Initials Name Provider Type    LB Nieves Bray, PT Physical Therapist                   Goals/Plan       Row Name 10/26/24 1513          Bed Mobility Goal 1 (PT)    Activity/Assistive Device (Bed Mobility Goal 1, PT) bed mobility activities, all  -LB     [place] Level/Cues Needed (Bed Mobility Goal 1, PT) minimum assist (75% or more patient effort)  -LB     Time Frame (Bed Mobility Goal 1, PT) 1 week  -LB       Row Name 10/26/24 1513          Transfer Goal 1 (PT)    Activity/Assistive Device (Transfer Goal 1, PT) transfers, all  -LB     [place] Level/Cues Needed (Transfer Goal 1, PT) minimum assist (75% or more patient effort)  -LB     Time Frame (Transfer Goal 1, PT) 1 week  -LB       Row Name 10/26/24 1513          Gait Training Goal 1 (PT)    Activity/Assistive Device (Gait Training Goal 1, PT) gait (walking locomotion)  -LB     [place] Level (Gait Training Goal 1, PT) contact guard required  -LB     Distance (Gait Training Goal 1, PT) 50'  -LB     Time Frame (Gait Training Goal 1, PT) 1 week  -LB               User Key  (r) = Recorded By, (t) = Taken By, (c) = Cosigned By      Initials Name Provider Type    LB Nieves Bray PT Physical Therapist                   Clinical Impression       Row Name 10/26/24 1510          Pain    Pretreatment Pain Rating 3/10  -LB      Posttreatment Pain Rating 3/10  -LB     Pain Location back  -LB     Pain Side/Orientation posterior;bilateral  -LB       Row Name 10/26/24 1510          Plan of Care Review    Plan of Care Reviewed With patient  -LB     Progress no change  -LB     Outcome Evaluation Pt is 73 yo male admitted with difficulty urinating. He has dialysis 3 days/week and states he has good days and bad days. He has a few [facility] home and uses RW at baseline. He took several small steps to chair today with min A x 1 for bed mobility and STS and CGA for ambulation short distance to chair. Pt fatigues quickly and LE weakness inc through transfer. Pt is appropriate for continued skilled PT services during inpatient stay. Recommend home with HH vs. [facility] at d/c. Pt states he lives with siblings.  -LB       Row Name 10/26/24 1510          Therapy Assessment/Plan (PT)    Patient/Family Therapy Goals Statement (PT) home with HH vs. [facility]  -LB     Rehab Potential (PT) good  -LB     Criteria for Skilled Interventions Met (PT) yes  -LB     Therapy Frequency (PT) 6 times/wk  -LB       Row Name 10/26/24 1510          Vital Signs    O2 Delivery Pre Treatment room air  -LB     O2 Delivery Intra Treatment room air  -LB     O2 Delivery Post Treatment room air  -LB     Pre Patient Position Supine  -LB     Intra Patient Position Standing  -LB     Post Patient Position Sitting  -LB       Row Name 10/26/24 1510          Positioning and Restraints    Pre-Treatment Position in bed  -LB     Post Treatment Position chair  -LB     In Chair reclined;sitting;call light within reach;exit alarm on  -LB               User Key  (r) = Recorded By, (t) = Taken By, (c) = Cosigned By      Initials Name Provider Type    Nieves Noble, PT Physical Therapist                   Outcome Measures       Row Name 10/26/24 1514 10/26/24 1400       How much help from another person do you currently need...    Turning from your back to your side while in flat bed without using  bedrails? 4  -LB 4  -JA    Moving from lying on back to sitting on the side of a flat bed without bedrails? 4  -LB 4  -JA    Moving to and from a bed to a chair (including a wheelchair)? 3  -LB 4  -JA    Standing up from a chair using your arms (e.g., wheelchair, bedside chair)? 3  -LB 4  -JA    Climbing 3-5 steps with a railing? 3  -LB 4  -JA    To walk in hospital room? 3  -LB 4  -JA    AM-PAC 6 Clicks Score (PT) 20  -LB 24  -JA    Highest Level of Mobility Goal 6 --> Walk 10 steps or more  -LB 8 --> Walked 250 feet or more  -JA      Row Name 10/26/24 1514          Functional Assessment    Outcome Measure Options AM-PAC 6 Clicks Basic Mobility (PT)  -               User Key  (r) = Recorded By, (t) = Taken By, (c) = Cosigned By      Initials Name Provider Type    LB Nieves Bray, FABIANA Physical Therapist    Serena Felix, RN Registered Nurse                                 Physical Therapy Education       Title: PT OT SLP Therapies (Done)       Topic: Physical Therapy (Done)       Point: Mobility training (Done)       Learning Progress Summary            Patient Acceptance, E,TB, VU,DU by  at 10/26/2024 1514                      Point: Home exercise program (Done)       Learning Progress Summary            Patient Acceptance, E,TB, VU,DU by  at 10/26/2024 1514                      Point: Body mechanics (Done)       Learning Progress Summary            Patient Acceptance, E,TB, VU,DU by  at 10/26/2024 1514                      Point: Precautions (Done)       Learning Progress Summary            Patient Acceptance, E,TB, VU,DU by  at 10/26/2024 1514                                      User Key       Initials Effective Dates Name Provider Type Discipline     08/09/20 -  Nieves Bray, PT Physical Therapist PT                  PT Recommendation and Plan     Progress: no change  Outcome Evaluation: Pt is 73 yo male admitted with difficulty urinating. He has dialysis 3 days/week and states he has good  days and bad days. He has a few [facility] home and uses RW at baseline. He took several small steps to chair today with min A x 1 for bed mobility and STS and CGA for ambulation short distance to chair. Pt fatigues quickly and LE weakness inc through transfer. Pt is appropriate for continued skilled PT services during inpatient stay. Recommend home with HH vs. [facility] at d/c. Pt states he lives with siblings.     Time Calculation:   PT Evaluation Complexity  History, PT Evaluation Complexity: 1-2 personal factors and/or comorbidities  Examination of Body Systems (PT Eval Complexity): total of 3 or more elements  Clinical Presentation (PT Evaluation Complexity): evolving  Clinical Decision Making (PT Evaluation Complexity): moderate complexity  Overall Complexity (PT Evaluation Complexity): moderate complexity     PT Charges       Row Name 10/26/24 1515             Time Calculation    Start Time 1430  -LB      Stop Time 1445  -LB      Time Calculation (min) 15 min  -LB      PT Received On 10/26/24  -LB      PT - Next Appointment 10/28/24  -LB      PT Goal Re-Cert Due Date 11/02/24  -LB         Time Calculation- PT    Total Timed Code Minutes- PT 10 minute(s)  -LB                User Key  (r) = Recorded By, (t) = Taken By, (c) = Cosigned By      Initials Name Provider Type    LB Nieves Bray, PT Physical Therapist                  Therapy Charges for Today       Code Description Service Date Service Provider Modifiers Qty    84175301472 HC PT EVAL MOD COMPLEXITY 1 10/26/2024 Nieves Bray, PT GP 1    39918650118 HC PT THER PROC EA 15 MIN 10/26/2024 Nieves Bray, PT GP 1            PT G-Codes  Outcome Measure Options: AM-PAC 6 Clicks Basic Mobility (PT)  AM-PAC 6 Clicks Score (PT): 20  PT Discharge Summary  Anticipated Discharge Disposition (PT): home with home health, skilled nursing facility    Nieves Bray PT  10/26/2024

## 2024-10-27 NOTE — CONSULTS
[facility]  Consult Note                                                                              10/27/2024  No ref. provider found    Patient Identification:  Mohamud Jarrett:   74 y.o.  male  1950     Date of Admission:10/25/2024    CC:  Congestive heart failure, cardiomyopathy coronary artery disease, pulmonary hypertension    History of Present Illness:  Patient is a 74-year-old gentleman with history of hypertension, hyperlipidemia, end-stage renal disease (on dialysis), anemia followed by Dr. Shantanu mar for coronary artery disease with history of CABG (2003) and PCI (11/2023).  He was admitted to [facility] on September 2024 with altered mental status and hyperkalemia after missing dialysis.  He was anemic with a hemoglobin of 6.8 received 1 unit of blood.  He was noted to have an elevated troponin ([id number]).  Echocardiogram showed an ejection fraction 25 to 30% grade 2 diastolic function moderate reduced RV function, biatrial enlargement indeterminate saline study with moderate tricuspid regurgitation RV systolic pressure 54 mmHg.  No further cardiac intervention was pursued.  EGD showed gastritis and multiple polyps were removed.  He was placed on PPI, aspirin was resumed Plavix was held.  He was admitted on 10/21/2024 with lower extremity edema with volume overload and ultrafiltration was pursued.  He had asymmetric swelling right upper extremity the venous Doppler ultrasound was negative for DVT.    He is now admitted on 10/25/2024 after missing days of dialysis with abdominal tightness shortness of breath orthopnea edema.  Hemodialysis was pursued with ultrafiltration t the second day.  We are asked to see patient for volume overload and hypertension.    Since admission blood pressure slightly elevated.  EKG with sinus rhythm PVCs PCP recent troponin improved to 200.  Not goal-directed therapy for heart failure on admission and only on aspirin.  Not sure if this is due to compliance  or other medical reason.    He is currently sitting at bedside mostly complaining of leg cramps that are constant problem for him.  He states he has used amlodipine and Toprol on metoprolol in the past with itching.  But he states he also has itching much of the time.  He denies any rash or oral edema either agent.  He admits his blood pressure at home at times is high as well.    Past Medical History:  Past Medical History:   Diagnosis Date    A-V fistula     right arm    Anemia of chronic renal failure 04/12/2021    Antiplatelet or antithrombotic long-term use     plavix    Arthritis     Bilateral leg pain 11/05/2021    CAD (coronary artery disease)     h/o CABG 2003 and stents 11/2022, Dr Shirley follows    Chest pain     saw cardiology 4/10/23, pt states better if he takes his medications    Dialysis patient     Tues, Thursday, Saturday, has chest wall catheter currently    Gout     Heart attack     Hyperlipidemia     Hypertension     Kidney disease     stage 4, Dr Ornelas follows    Neck pain 08/30/2022    Neuritis of lower extremity, right 12/19/2019    Proteinuria 05/23/2022    Rheumatoid factor positive 07/06/2021    Seasonal allergies 03/13/2014    Vitamin D deficiency 05/23/2022       Past Surgical History:  Past Surgical History:   Procedure Laterality Date    ARTERIOVENOUS FISTULA Right     CARDIAC CATHETERIZATION      CARPAL TUNNEL RELEASE      CATARACT EXTRACTION W/ INTRAOCULAR LENS IMPLANT      COLONOSCOPY N/A 2006    COLONOSCOPY N/A 12/14/2018    Procedure: COLONOSCOPY TO CECUM WITH COLD BIOPSY POLYPECTOMY;  Surgeon: Elliott Harding MD;  Location: [facility] ENDOSCOPY;  Service: General    COLONOSCOPY N/A 9/9/2024    Procedure: COLONOSCOPY with cold snare polypectomies;  Surgeon: Alvarado Irvin MD;  Location: [facility] ENDOSCOPY;  Service: Gastroenterology;  Laterality: N/A;  pre- anemia  post- polyps    CORONARY ANGIOPLASTY WITH STENT PLACEMENT  11/09/2022    CORONARY ARTERY BYPASS GRAFT N/A  06/20/2003    ENDOSCOPY N/A 9/9/2024    Procedure: ESOPHAGOGASTRODUODENOSCOPY with biopsies;  Surgeon: Alvarado Irvin MD;  Location: [facility] ENDOSCOPY;  Service: Gastroenterology;  Laterality: N/A;  pre- anemia  post- gastritis    INGUINAL HERNIA REPAIR Right 06/04/2014    Open incarcerated inguinal hernia repair-Dr. Elliott Harding    INGUINAL HERNIA REPAIR Left 4/26/2023    Procedure: OPEN LEFT INGUINAL HERNIA REPAIR;  Surgeon: Elliott Harding MD;  Location:  [facility] OR;  Service: General;  Laterality: Left;    LUMBAR DISC SURGERY N/A 1990, 1992    L4-L5, X2       Allergies:  Allergies   Allergen Reactions    Peanut (Diagnostic) Shortness Of Breath and Rash    Peanut Butter Flavor Shortness Of Breath and Rash    Simvastatin Myalgia     Other reaction(s): muscle cramps       Home Meds:  Medications Prior to Admission   Medication Sig Dispense Refill Last Dose/Taking    acetaminophen (TYLENOL) 325 MG tablet Take 2 tablets by mouth Every 6 (Six) Hours As Needed for Mild Pain.   10/24/2024 Morning    aspirin 81 MG chewable tablet Chew 1 tablet Daily.   10/24/2024 Morning    hydrOXYzine (ATARAX) 25 MG tablet Take 1 tablet by mouth 3 (Three) Times a Day As Needed for Itching. 10 tablet 0 10/24/2024 Morning    Methoxy PEG-Epoetin Beta (MIRCERA IJ) 75 mcg Every 28 (Twenty-Eight) Days.   10/24/2024 Morning    pantoprazole (PROTONIX) 40 MG EC tablet Take 1 tablet by mouth 2 (Two) Times a Day Before Meals. 60 tablet 0 10/24/2024 Morning    tamsulosin (FLOMAX) 0.4 MG capsule 24 hr capsule Take 1 capsule by mouth Daily.   10/24/2024 Morning    ranolazine (RANEXA) 500 MG 12 hr tablet Take 1 tablet by mouth 2 (Two) Times a Day for 30 days. Take dos 60 tablet 0        Current Meds  Scheduled Meds:aspirin, 81 mg, Oral, Daily  pantoprazole, 40 mg, Oral, BID AC  sodium chloride, 10 mL, Intravenous, Q12H  tamsulosin, 0.4 mg, Oral, Daily        Social History:   Social History     Socioeconomic History    Marital status:     Tobacco Use    Smoking status: Never    Smokeless tobacco: Never   Vaping Use    Vaping status: Never Used   Substance and Sexual Activity    Alcohol use: Yes     Comment: occassionally    Drug use: No    Sexual activity: Defer       Family History:  Family History   Problem Relation Age of Onset    Heart disease Mother     Heart disease Father     Malig Hyperthermia Neg Hx        REVIEW OF SYSTEMS:   CONSTITUTIONAL: No weight loss  HEENT: Eyes: No visual loss, blurred vision, double vision or yellow sclerae. Ears, Nose, Throat: No hearing loss, sneezing, congestion, runny nose or sore throat.   SKIN: No rash or itching.     RESPIRATORY: No hemoptysis, cough or sputum.   GASTROINTESTINAL: No anorexia, nausea, vomiting or diarrhea. No abdominal pain, bright red blood per rectum or melena.  GENITOURINARY: No burning on urination, hematuria or increased frequency.  NEUROLOGICAL: No headache, dizziness, syncope, paralysis, ataxia, numbness or tingling in the extremities. No change in bowel or bladder control.   MUSCULOSKELETAL: No muscle, back pain, joint pain or stiffness.   HEMATOLOGIC: No anemia, bleeding or bruising.   LYMPHATICS: No enlarged nodes. No history of splenectomy.   PSYCHIATRIC: No history of depression, anxiety, hallucinations.   ENDOCRINOLOGIC: No reports of sweating, cold or heat intolerance. No polyuria or polydipsia.     Physical Exam    /64   Pulse 65   Temp 97.1 °F (36.2 °C)   Resp 18   SpO2 100%     General Appearance Elderly gentleman in no acute distress   Head Normocephalic, without abnormality, atraumatic   Ears Ears appear intact with no abnormalities noted   Throat No oral lesions, no thrush, oral mucosa moist   Neck No adenopathy, supple, trachea midline, no thyromegaly, no carotid bruit, JVP elevated   Back No skin lesions, erythema or scars, no tenderness to percussion or palpation,range of motion is normal   Lungs Bibasilar rales still present, respirations regular, even and  unlabored   Heart Regular rhythm and normal rate, normal S1 and S2, no murmur, no gallop, no rub, no click   Chest wall No abnormalities observed   Abdomen Normal bowel sounds, no masses, no hepatomegaly,    Extremities Moves all extremities well, 1+ edema remedies bilaterally, no cyanosis, no redness   Pulses Pulses palpable and equal bilaterally. Normal radial, carotid, femoral, dorsalis pedis and posterior tibial pulses bilaterally.   Skin No bleeding, bruising or rash   Psychiatric Alert and oriented x 3, normal mood and affect     Results from last 7 days   Lab Units 10/27/24  0440 10/26/24  0513   SODIUM mmol/L 141 141   POTASSIUM mmol/L 4.3 3.9   CHLORIDE mmol/L 100 101   CO2 mmol/L 22.0 25.7   BUN mg/dL 73* 63*   CREATININE mg/dL 7.07* 5.91*   CALCIUM mg/dL 8.5* 8.5*   BILIRUBIN mg/dL  --  1.0   ALK PHOS U/L  --  231*   ALT (SGPT) U/L  --  <5   AST (SGOT) U/L  --  16   GLUCOSE mg/dL 134* 86     Results from last 7 days   Lab Units 10/22/24  0054 10/21/24  2242   HSTROP T ng/L 200* 219*     Results from last 7 days   Lab Units 10/27/24  0440 10/26/24  0512 10/25/24  1009   WBC 10*3/mm3 3.69 3.46 4.55   HEMOGLOBIN g/dL 9.7* 9.4* 10.1*   HEMATOCRIT % 30.2* 30.0* 31.9*   PLATELETS 10*3/mm3 141 147 200     Results from last 7 days   Lab Units 10/21/24  2242   PROBNP pg/mL >70,000.0*     I personally viewed and interpreted the patient's EKG/Telemetry data  I have reviewed HPI and ROS above.    Assessment and Plan  1.  Cardiomyopathy likely due to both ischemic and nonischemic.  Records not completely available but appears he has had CABG in the distant past and PCI possibly November 2023.  He has chronic fluid overload and troponin this admission which is elevated is actually lower than the most recent admission.  He has not had any chest pain symptoms.  Last visit with even higher troponins showed an ejection fraction 25 to 30% grade 2 diastolic function and no significant valve regurgitation other than moderate  tricuspid regurgitation with RVSP 54 mmHg.  Unable to locate results of prior echoes at [facility].  At this point we will try to not hopefully maximize regimen.  Add Lopressor 12.5 mg p.o. twice daily.  If he tolerates this, switch to low-dose Toprol with more cardiovascular benefit than Lopressor  2.  Elevated troponin, as above  3.  End-stage renal disease, on dialysis  4.  Hypertension.  As above  5.  Anemia, hemoglobin stable  6.  Gastritis  7.  Recent admission for anemia.  Hemoglobin stable  8.  Colonic polyps    Mini Tamez  10/27/2024  15:19 EDT    60min spent in reviewing records, discussion and examination of the patient and discussion with other members of the patient's medical team.     Dictated utilizing Dragon dictation

## 2024-10-27 NOTE — PROGRESS NOTES
Name: Mohamud Jarrett ADMIT: 10/25/2024   : 1950  PCP: Shara Talley APRN    MRN: [id number] LOS: 0 days   AGE/SEX: 74 y.o. male  ROOM: [facility]     Subjective   Subjective   Patient is seen at bedside, no new complaints.       Objective   Objective   Vital Signs  Temp:  [97.1 °F (36.2 °C)-98.2 °F (36.8 °C)] 97.1 °F (36.2 °C)  Heart Rate:  [58-75] 65  Resp:  [16-18] 18  BP: ()/(61-76) 155/64  SpO2:  [100 %] 100 %  on   ;   Device (Oxygen Therapy): room air  There is no height or weight on file to calculate BMI.  Physical Exam  General, awake and alert.  Appears sick on chronic basis  Head and ENT, normocephalic and atraumatic.  Lungs, symmetric expansion, equal air entry bilaterally.  Heart, regular rate and rhythm.  Abdomen, soft and nontender.  Extremities, no clubbing or cyanosis.  Bilateral lower extremity edema  Neuro, no focal deficits.  Skin: Warm and no rash.  Psych, normal mood and affect.  Musculoskeletal, joint examination is grossly normal.    Copied text material from yesterday's note has been reviewed for appropriate changes and remains accurate as of 10/27/24.      Results Review     I reviewed the patient's new clinical results.  Results from last 7 days   Lab Units 10/27/24  0440 10/26/24  0512 10/25/24  1009 10/22/24  0531   WBC 10*3/mm3 3.69 3.46 4.55 3.36*   HEMOGLOBIN g/dL 9.7* 9.4* 10.1* 8.9*   PLATELETS 10*3/mm3 141 147 200 137*     Results from last 7 days   Lab Units 10/27/24  0440 10/26/24  0513 10/25/24  1252 10/22/24  0531   SODIUM mmol/L 141 141 148* 141   POTASSIUM mmol/L 4.3 3.9 4.7 3.5   CHLORIDE mmol/L 100 101 101 101   CO2 mmol/L 22.0 25.7 28.1 27.5   BUN mg/dL 73* 63* 95* 59*   CREATININE mg/dL 7.07* 5.91* 8.33* 5.44*   GLUCOSE mg/dL 134* 86 86 100*   EGFR mL/min/1.73 7.6* 9.5* 6.3* 10.5*     Results from last 7 days   Lab Units 10/27/24  0440 10/26/24  0513 10/25/24  1252 10/21/24  2242   ALBUMIN g/dL 3.0* 2.9* 3.4* 3.3*   BILIRUBIN mg/dL  --  1.0 1.2 1.0   ALK  "PHOS U/L  --  231* 271* 267*   AST (SGOT) U/L  --  16 15 19   ALT (SGPT) U/L  --  <5 7 9     Results from last 7 days   Lab Units 10/27/24  0440 10/26/24  0513 10/25/24  1252 10/22/24  0531 10/21/24  2242   CALCIUM mg/dL 8.5* 8.5* 9.0 8.2* 8.6   ALBUMIN g/dL 3.0* 2.9* 3.4*  --  3.3*   PHOSPHORUS mg/dL 5.2* 4.5  --   --   --        No results found for: \"HGBA1C\", \"POCGLU\"    No radiology results for the last day    I have personally reviewed all medications:  Scheduled Medications  aspirin, 81 mg, Oral, Daily  pantoprazole, 40 mg, Oral, BID AC  sodium chloride, 10 mL, Intravenous, Q12H  tamsulosin, 0.4 mg, Oral, Daily    Infusions   Diet  Diet: Renal; Low Potassium, Low Sodium (2-3g); Texture: Soft to Chew (NDD 3); Soft to Chew: Ground Meat; Fluid Consistency: Thin (IDDSI 0)    I have personally reviewed:  [x]  Laboratory   [x]  Microbiology   [x]  Radiology   [x]  EKG/Telemetry  [x]  Cardiology/Vascular   []  Pathology    []  Records       Assessment/Plan     Active Hospital Problems    Diagnosis  POA    **Ascites [R18.8]  Yes    Volume overload [E87.70]  Unknown    ESRD (end stage renal disease) [N18.6]  Yes    Dependence on renal dialysis [Z99.2]  Not Applicable    Anemia in chronic kidney disease [N18.9, D63.1]  Yes    Essential (primary) hypertension [I10]  Yes      Resolved Hospital Problems   No resolved problems to display.       74 y.o. male admitted with Ascites.    Assessment and plan  1.  Acute on chronic diastolic and systolic congestive heart failure, patient showing signs of volume overload, patient does have underlying end-stage renal disease with dependence on hemodialysis, nephrology and cardiology evaluation requested.  Follow management recommendations.     2.  Anemia of chronic disease, monitor hemoglobin trend, recheck labs.     3.  Hypertension, continue home medications.     4.  Coronary artery disease, BPH, continue home medications.     5.  CODE STATUS is full code.  Further plans based on " hospital course.      Luis Lion MD  [facility]  10/27/24  15:55 EDT

## 2024-10-27 NOTE — PROGRESS NOTES
[facility] Progress Note      Patient Name: Mohamud Jarrett  : 1950  MRN: [id number]  Primary Care Physician:  Shara Talley APRN  Date of admission: 10/25/2024    Subjective     Interval History:   Seen and examined on iUF  Once again he insists on limiting fluid removal and decreasing Rx time   Feels ok; denies SOB; feels less puffy    Review of Systems:   As noted above    Objective     Vitals:   Temp:  [97.5 °F (36.4 °C)-98.2 °F (36.8 °C)] 97.5 °F (36.4 °C)  Heart Rate:  [57-75] 75  Resp:  [18] 18  BP: ()/(50-69) 87/69    Intake/Output Summary (Last 24 hours) at 10/27/2024 1136  Last data filed at 10/27/2024 0045  Gross per 24 hour   Intake 520 ml   Output 2000 ml   Net -1480 ml       Physical Exam:    Constitutional: Awake, chronically ill, NAD  HEENT: Sclera anicteric, no conjunctival injection, less periorbital edema  Neck: Supple,  no JVD, no carotid bruit  Respiratory: Few bibasilar crackles, nonlabored on RA  Cardiovascular: RRR, no murmurs, no rubs   Gastrointestinal: BS +, soft, tender, +distended; +wall edema  : No palpable bladder  Musculoskeletal: 1+BLE edema extending to thighs, no clubbing or cyanosis  Psychiatric: Flat affect, cooperative, oriented  Neurologic: moving all extremities, normal speech, no asterixis  Skin: Warm and dry       Scheduled Meds:     aspirin, 81 mg, Oral, Daily  pantoprazole, 40 mg, Oral, BID AC  sodium chloride, 10 mL, Intravenous, Q12H  tamsulosin, 0.4 mg, Oral, Daily      IV Meds:        Results Reviewed:   I have personally reviewed the results from the time of this admission to 10/27/2024 11:36 EDT     Results from last 7 days   Lab Units 10/27/24  0440 10/26/24  0513 10/25/24  1252 10/22/24  0531 10/21/24  2242   SODIUM mmol/L 141 141 148*   < > 140   POTASSIUM mmol/L 4.3 3.9 4.7   < > 3.4*   CHLORIDE mmol/L 100 101 101   < > 98   CO2 mmol/L 22.0 25.7 28.1   < > 32.2*   BUN mg/dL 73* 63* 95*   < > 53*   CREATININE mg/dL  7.07* 5.91* 8.33*   < > 5.37*   CALCIUM mg/dL 8.5* 8.5* 9.0   < > 8.6   BILIRUBIN mg/dL  --  1.0 1.2  --  1.0   ALK PHOS U/L  --  231* 271*  --  267*   ALT (SGPT) U/L  --  <5 7  --  9   AST (SGOT) U/L  --  16 15  --  19   GLUCOSE mg/dL 134* 86 86   < > 94    < > = values in this interval not displayed.       Estimated Creatinine Clearance: 11.2 mL/min (A) (by C-G formula based on SCr of 7.07 mg/dL (H)).    Results from last 7 days   Lab Units 10/27/24  0440 10/26/24  0513   PHOSPHORUS mg/dL 5.2* 4.5             Results from last 7 days   Lab Units 10/27/24  0440 10/26/24  0512 10/25/24  1009 10/22/24  0531 10/21/24  2242   WBC 10*3/mm3 3.69 3.46 4.55 3.36* 3.09*   HEMOGLOBIN g/dL 9.7* 9.4* 10.1* 8.9* 9.0*   PLATELETS 10*3/mm3 141 147 200 137* 143             Assessment / Plan     ASSESSMENT:  ESRD on HD, MWF, via R AV fistula. iUF underway now, but treatment again limited by patient's insistence on minimal fluid removal and minimal treatment time.  Electrolytes stable; patient has significant volume overload  Fluid overload/ascites/anasarca, due to noncompliance with HD; has pleural effusions with central congestion.  Poor insight    CHF, most recent echo showed EF 25 to 30%  Hypertension with CKD, exacerbated by fluid excess  Anemia of CKD, on long-acting NELL at dialysis  History of CAD/prior MI s/p CABG, on baby aspirin  BPH, on Flomax at home    PLAN:  HD again tomorrow  Again explained rationale of fluid removal on HD and need for fluid restriction as his responsibility  Discharge anytime from renal view    Thank you for involving us in the care of Mohamud Jarrett.  Please feel free to call with any questions.    Álvaro Neal MD  10/27/24  11:36 EDT    [facility]  [phone]    Please note that portions of this note were completed with a voice recognition program.

## 2024-10-27 NOTE — PROGRESS NOTES
[facility] Clinical Pharmacy Services: Med list review for itching and leg cramps     Mohamud Jarrett has a pharmacy consult to review med list for potential reasons for itching and leg cramps per  Dr. Tamez's request.       Relevant clinical data and objective history reviewed:  74 y.o. male      Estimated Creatinine Clearance: 11.2 mL/min (A) (by C-G formula based on SCr of 7.07 mg/dL (H)).      is allergic to peanut (diagnostic), peanut butter flavor, and simvastatin.    No current facility-administered medications on file prior to encounter.     Current Outpatient Medications on File Prior to Encounter   Medication Sig Dispense Refill    acetaminophen (TYLENOL) 325 MG tablet Take 2 tablets by mouth Every 6 (Six) Hours As Needed for Mild Pain.      aspirin 81 MG chewable tablet Chew 1 tablet Daily.      hydrOXYzine (ATARAX) 25 MG tablet Take 1 tablet by mouth 3 (Three) Times a Day As Needed for Itching. 10 tablet 0    Methoxy PEG-Epoetin Beta (MIRCERA IJ) 75 mcg Every 28 (Twenty-Eight) Days.      pantoprazole (PROTONIX) 40 MG EC tablet Take 1 tablet by mouth 2 (Two) Times a Day Before Meals. 60 tablet 0    tamsulosin (FLOMAX) 0.4 MG capsule 24 hr capsule Take 1 capsule by mouth Daily.      ranolazine (RANEXA) 500 MG 12 hr tablet Take 1 tablet by mouth 2 (Two) Times a Day for 30 days. Take dos 60 tablet 0      Assessment/Plan    Pantoprazole, tamsulosin and ranolazine are on his home med list and they all carry post marketing observed pruritus however the reported risk is very low  (<2%).    Pt did have atorvastatin filled in May 2024, unclear if still taking it and this could be a potential source of leg cramps (hx with simvastatin causing this per allergy list)    The patient does have ESRD on HD  in which it is reported he is non compliant with HD sessions, and this can be a major risk factor for associated pruritus as well    Karlos Ramos [facility]  Clinical Pharmacist

## 2024-10-27 NOTE — PLAN OF CARE
Goal Outcome Evaluation:  Plan of Care Reviewed With: patient           Outcome Evaluation: VSS. A/O x4. On RA through shift. Up to toilet with standby assist. Urinal within reach. Pt consumed 100% of snack requested. Pt slept mostly through shift at would wake up at times and sit on the side of the bed. Pt did not c/o any pain or discomfort through shift. Pt had runs of ectopy at 0450H, strips were saved. Safety maintained. Will CTM.

## 2024-10-27 NOTE — PLAN OF CARE
Goal Outcome Evaluation:              Outcome Evaluation: pt maintained on RA. Went down to complete dialysis this shift. Pt up on side of bed to eat meals. Pt expresses no needs at this time. call light within reach.

## 2024-10-28 PROBLEM — E78.00 PURE HYPERCHOLESTEROLEMIA: Status: RESOLVED | Noted: 2022-12-12 | Resolved: 2024-10-28

## 2024-10-28 PROBLEM — E87.20 LACTIC ACIDOSIS: Status: RESOLVED | Noted: 2024-09-04 | Resolved: 2024-10-28

## 2024-10-28 PROBLEM — K92.2 GI BLEED: Status: RESOLVED | Noted: 2024-09-04 | Resolved: 2024-10-28

## 2024-10-28 PROBLEM — G93.41 METABOLIC ENCEPHALOPATHY: Status: RESOLVED | Noted: 2024-09-04 | Resolved: 2024-10-28

## 2024-10-28 PROBLEM — M79.89 LEG SWELLING: Status: RESOLVED | Noted: 2024-10-22 | Resolved: 2024-10-28

## 2024-10-28 PROBLEM — R14.0 ABDOMINAL BLOATING: Status: RESOLVED | Noted: 2024-10-23 | Resolved: 2024-10-28

## 2024-10-28 PROBLEM — Z95.5 STENTED CORONARY ARTERY: Status: RESOLVED | Noted: 2022-12-12 | Resolved: 2024-10-28

## 2024-10-28 PROBLEM — R53.1 WEAKNESS GENERALIZED: Status: RESOLVED | Noted: 2023-06-14 | Resolved: 2024-10-28

## 2024-10-28 PROBLEM — N18.6 END STAGE RENAL DISEASE: Status: RESOLVED | Noted: 2024-07-01 | Resolved: 2024-10-28

## 2024-10-28 PROBLEM — L29.9 ITCHY SCALP: Status: RESOLVED | Noted: 2024-07-25 | Resolved: 2024-10-28

## 2024-10-28 PROBLEM — G93.41 ACUTE METABOLIC ENCEPHALOPATHY: Status: RESOLVED | Noted: 2024-09-04 | Resolved: 2024-10-28

## 2024-10-28 PROBLEM — E43 SEVERE MALNUTRITION: Status: RESOLVED | Noted: 2024-09-04 | Resolved: 2024-10-28

## 2024-10-28 PROBLEM — N18.4 CHRONIC KIDNEY DISEASE, STAGE 4 (SEVERE): Status: RESOLVED | Noted: 2022-05-23 | Resolved: 2024-10-28

## 2024-10-28 PROBLEM — D68.9 COAGULATION DEFECT, UNSPECIFIED: Status: RESOLVED | Noted: 2023-02-25 | Resolved: 2024-10-28

## 2024-10-28 PROBLEM — Z95.5 PRESENCE OF CORONARY ANGIOPLASTY IMPLANT AND GRAFT: Status: RESOLVED | Noted: 2024-06-28 | Resolved: 2024-10-28

## 2024-10-28 PROBLEM — R94.31 ABNORMAL EKG: Status: RESOLVED | Noted: 2024-07-11 | Resolved: 2024-10-28

## 2024-10-28 PROBLEM — R53.1 GENERALIZED WEAKNESS: Status: RESOLVED | Noted: 2024-07-10 | Resolved: 2024-10-28

## 2024-10-28 PROBLEM — R79.89 ELEVATED TROPONIN: Status: RESOLVED | Noted: 2024-10-22 | Resolved: 2024-10-28

## 2024-10-28 PROBLEM — I50.9 CHF (CONGESTIVE HEART FAILURE): Status: RESOLVED | Noted: 2023-07-26 | Resolved: 2024-10-28

## 2024-10-28 NOTE — PROGRESS NOTES
Name: Mohamud Jarrett ADMIT: 10/25/2024   : 1950  PCP: Shara Talley APRN    MRN: [id number] LOS: 0 days   AGE/SEX: 74 y.o. male  ROOM: [facility]     Subjective   Subjective   Patient is seen at bedside.       Objective   Objective   Vital Signs  Temp:  [97.5 °F (36.4 °C)-97.7 °F (36.5 °C)] 97.7 °F (36.5 °C)  Heart Rate:  [68-91] 68  Resp:  [18] 18  BP: (135-153)/(69-92) 135/92  SpO2:  [100 %] 100 %  on   ;   Device (Oxygen Therapy): room air  There is no height or weight on file to calculate BMI.  Physical Exam  General, awake and alert.  Appears sick on chronic basis  Head and ENT, normocephalic and atraumatic.  Lungs, symmetric expansion, equal air entry bilaterally.  Heart, regular rate and rhythm.  Abdomen, soft and nontender.  Extremities, no clubbing or cyanosis.  Bilateral lower extremity edema  Neuro, no focal deficits.  Skin: Warm and no rash.  Psych, normal mood and affect.  Musculoskeletal, joint examination is grossly normal.     Copied text material from yesterday's note has been reviewed for appropriate changes and remains accurate as of 10/28/24.       Results Review     I reviewed the patient's new clinical results.  Results from last 7 days   Lab Units 10/28/24  0602 10/27/24  0440 10/26/24  0512 10/25/24  1009   WBC 10*3/mm3 3.55 3.69 3.46 4.55   HEMOGLOBIN g/dL 10.2* 9.7* 9.4* 10.1*   PLATELETS 10*3/mm3 133* 141 147 200     Results from last 7 days   Lab Units 10/28/24  0602 10/27/24  0440 10/26/24  0513 10/25/24  1252   SODIUM mmol/L 139 141 141 148*   POTASSIUM mmol/L 4.7 4.3 3.9 4.7   CHLORIDE mmol/L 98 100 101 101   CO2 mmol/L 21.9* 22.0 25.7 28.1   BUN mg/dL 77* 73* 63* 95*   CREATININE mg/dL 7.26* 7.07* 5.91* 8.33*   GLUCOSE mg/dL 88 134* 86 86   EGFR mL/min/1.73 7.4* 7.6* 9.5* 6.3*     Results from last 7 days   Lab Units 10/28/24  0602 10/27/24  0440 10/26/24  0513 10/25/24  1252 10/21/24  2242   ALBUMIN g/dL 3.5 3.0* 2.9* 3.4* 3.3*   BILIRUBIN mg/dL 1.1  --  1.0 1.2 1.0   ALK  "PHOS U/L 281*  --  231* 271* 267*   AST (SGOT) U/L 17  --  16 15 19   ALT (SGPT) U/L 5  --  <5 7 9     Results from last 7 days   Lab Units 10/28/24  0602 10/27/24  0440 10/26/24  0513 10/25/24  1252   CALCIUM mg/dL 8.6 8.5* 8.5* 9.0   ALBUMIN g/dL 3.5 3.0* 2.9* 3.4*   PHOSPHORUS mg/dL  --  5.2* 4.5  --        No results found for: \"HGBA1C\", \"POCGLU\"    No radiology results for the last day    I have personally reviewed all medications:  Scheduled Medications  aspirin, 81 mg, Oral, Daily  metoprolol tartrate, 12.5 mg, Oral, Q12H  pantoprazole, 40 mg, Oral, BID AC  sodium chloride, 10 mL, Intravenous, Q12H  tamsulosin, 0.4 mg, Oral, Daily    Infusions   Diet  Diet: Renal; Low Potassium, Low Sodium (2-3g); Texture: Soft to Chew (NDD 3); Soft to Chew: Ground Meat; Fluid Consistency: Thin (IDDSI 0)    I have personally reviewed:  [x]  Laboratory   [x]  Microbiology   [x]  Radiology   [x]  EKG/Telemetry  [x]  Cardiology/Vascular   []  Pathology    []  Records       Assessment/Plan     Active Hospital Problems    Diagnosis  POA    **Ascites [R18.8]  Yes    Volume overload [E87.70]  Unknown    ESRD (end stage renal disease) [N18.6]  Yes    Dependence on renal dialysis [Z99.2]  Not Applicable    Anemia in chronic kidney disease [N18.9, D63.1]  Yes    Essential (primary) hypertension [I10]  Yes      Resolved Hospital Problems   No resolved problems to display.       74 y.o. male admitted with Ascites.    Assessment and plan  1.  Acute on chronic diastolic and systolic congestive heart failure, patient showing signs of volume overload, patient does have underlying end-stage renal disease with dependence on hemodialysis, nephrology and cardiology evaluation requested.  Follow management recommendations.  Patient is clinically declining, he is noncompliant.  Palliative care evaluation to discuss goals of care.     2.  Anemia of chronic disease, monitor hemoglobin trend, recheck labs.     3.  Hypertension, continue home " medications.     4.  Coronary artery disease, BPH, continue home medications.     5.  CODE STATUS is full code.  Further plans based on hospital course.         Luis Lion MD  [facility]  10/28/24  17:28 EDT

## 2024-10-28 NOTE — PLAN OF CARE
Goal Outcome Evaluation:  Plan of Care Reviewed With: patient        Progress: declining  Outcome Evaluation: Pt seen for PT tx this PM as plan for DC home this date, but nsg concern d/t pt requiring increased assist w/ mobility. Today, pt was was SBA to Vee for bed mobility and ModA for one STS to RW. During this stand pt demo anterior lean and bilateral knee buckle requiring Mod/MaxA to safely return to sitting EOB. Pt w/ slight agitation and either declined additional mobility/ther-ex or would not respond. Noted pt did require increased processing time, had delayed responses and was demoing tremors and muscle jerks in UEs and LEs. Pt was wanting to remain seated EOB at end of session, but this PT and nsg did not feel comfortable as pt was unsteady w/ his sitting balance. Encouraged pt to eat/drink (has not been eating/drinking according to pt and chart) and attempt dialysis as that could be contributing to his muscle jerks/tremors and fatigue. Would not rec pt to DC home this date as he would be unsafe and a falls risk, would be unable to ambulate into his home or perform 3 GRADY. Pt states sister does not or is not able to assist him. Rec SNF, although may be difficult to find d/t prior ins difficulties. SBAR w/ RN and CCP.    Anticipated Discharge Disposition (PT): skilled nursing facility

## 2024-10-28 NOTE — PLAN OF CARE
"Goal Outcome Evaluation:              Outcome Evaluation: pt refused dialysis treatment today. Pt states he is \"too tired and unable to go\" pt educated on importance of attending dialysis treatments. Pt will be discharged home.                             "

## 2024-10-28 NOTE — PLAN OF CARE
"Goal Outcome Evaluation:         Pt refused dialysis this shift. Was educated on importance of attending dialysis tx. Pt refuses and states \" he is okay to go home.\" Pt has been repeating himself multiple times when asked orientation questions. Pt gets stuck on previously asked question at times. Pt increasingly agitated. Pt needed Ax2 w/ walker and gait belt to stand and pivot to BSC. Pt has delayed response when given directions. Knees buckled multiple times upon ambulation and pt leans forward and needs to be reminded frequently to stand up straight and to look forward. Pt tremors have increased this shift and needs assistance with feeding and taking medications. Pt denies any pain and denies having any issues. Pt was able to talk to daughter on telephone this shift. Pt states he will be willing to go to dialysis tomorrow. Highly recommend to try to get placement in SNF vs rehab before returning home upon discharge. Pt expresses no further needs at this time.call light within reach.                              "

## 2024-10-28 NOTE — PROGRESS NOTES
[facility] Progress Note      Patient Name: Mohamud Jarrett  : 1950  MRN: [id number]  Primary Care Physician:  Shara Talley APRN  Date of admission: 10/25/2024    Subjective     Interval History:   He refused dialysis earlier today; states he is willing to have it tomorrow  Lethargic; cannot tell me the month  Dinner tray untouched    Review of Systems:   As noted above    Objective     Vitals:   Temp:  [97.5 °F (36.4 °C)-97.7 °F (36.5 °C)] 97.7 °F (36.5 °C)  Heart Rate:  [68-91] 68  Resp:  [18] 18  BP: (135-153)/(69-92) 135/92    Intake/Output Summary (Last 24 hours) at 10/28/2024 1619  Last data filed at 10/28/2024 1300  Gross per 24 hour   Intake 330 ml   Output --   Net 330 ml       Physical Exam:    Constitutional: Lethargic, chronically ill, NAD  HEENT: Sclera anicteric, no conjunctival injection, less periorbital edema  Neck: Supple,  no JVD, no carotid bruit  Respiratory: Few bibasilar crackles, nonlabored on RA  Cardiovascular: RRR, no murmurs, no rubs   Gastrointestinal: BS +, soft, tender, +distended; +wall edema  : No palpable bladder  Musculoskeletal: 2+BLE edema extending to thighs, +1 edema arms; no clubbing or cyanosis  Psychiatric: Flat affect, not oriented  Neurologic: moving all extremities, slow speech   Skin: Warm and dry       Scheduled Meds:     aspirin, 81 mg, Oral, Daily  metoprolol tartrate, 12.5 mg, Oral, Q12H  pantoprazole, 40 mg, Oral, BID AC  sodium chloride, 10 mL, Intravenous, Q12H  tamsulosin, 0.4 mg, Oral, Daily      IV Meds:        Results Reviewed:   I have personally reviewed the results from the time of this admission to 10/28/2024 16:19 EDT     Results from last 7 days   Lab Units 10/28/24  0602 10/27/24  0440 10/26/24  0513 10/25/24  1252   SODIUM mmol/L 139 141 141 148*   POTASSIUM mmol/L 4.7 4.3 3.9 4.7   CHLORIDE mmol/L 98 100 101 101   CO2 mmol/L 21.9* 22.0 25.7 28.1   BUN mg/dL 77* 73* 63* 95*   CREATININE mg/dL 7.26* 7.07*  5.91* 8.33*   CALCIUM mg/dL 8.6 8.5* 8.5* 9.0   BILIRUBIN mg/dL 1.1  --  1.0 1.2   ALK PHOS U/L 281*  --  231* 271*   ALT (SGPT) U/L 5  --  <5 7   AST (SGOT) U/L 17  --  16 15   GLUCOSE mg/dL 88 134* 86 86       Estimated Creatinine Clearance: 10.9 mL/min (A) (by C-G formula based on SCr of 7.26 mg/dL (H)).    Results from last 7 days   Lab Units 10/27/24  0440 10/26/24  0513   PHOSPHORUS mg/dL 5.2* 4.5             Results from last 7 days   Lab Units 10/28/24  0602 10/27/24  0440 10/26/24  0512 10/25/24  1009 10/22/24  0531   WBC 10*3/mm3 3.55 3.69 3.46 4.55 3.36*   HEMOGLOBIN g/dL 10.2* 9.7* 9.4* 10.1* 8.9*   PLATELETS 10*3/mm3 133* 141 147 200 137*             Assessment / Plan     ASSESSMENT:  ESRD on HD, MWF, via R AV fistula. iUF 10/27 but treatment again limited by patient's insistence on minimal fluid removal and minimal treatment time.  Electrolytes stable; patient has significant volume overload  Fluid overload/ascites/anasarca, due to noncompliance with HD; has pleural effusions with central congestion.  Poor insight    CHF, most recent echo showed EF 25 to 30%  Hypertension with CKD, exacerbated by fluid excess  Anemia of CKD, on long-acting NELL at dialysis  History of CAD/prior MI s/p CABG, on baby aspirin  BPH, on Flomax at home    PLAN:  HD tomorrow  Told patient that he has the option of discontinuing dialysis completely, understanding, though, that consequence is death  Dismal prognosis given patient's noncompliance    Thank you for involving us in the care of Mohamud Jarrett.  Please feel free to call with any questions.    Álvaro Neal MD  10/28/24  16:19 EDT    [facility]  [phone]    Please note that portions of this note were completed with a voice recognition program.

## 2024-10-28 NOTE — PLAN OF CARE
Goal Outcome Evaluation:  Plan of Care Reviewed With: patient           Outcome Evaluation: VSS. Alert and cooperative, but has remained quiet. On RA through shift. Urinal within reach. Pt slept in between care and would sit on the side of the bed and watch television at times. No c/o pain or discomfort through shift. For scheduled HD today. Safety maintained. Will CTM.

## 2024-10-28 NOTE — DISCHARGE SUMMARY
Date of Discharge:  10/28/2024    PCP: Shara Talley APRN    Discharge Diagnosis:   Active Hospital Problems    Diagnosis  POA    **Ascites [R18.8]  Yes    Volume overload [E87.70]  Unknown    ESRD (end stage renal disease) [N18.6]  Yes    Dependence on renal dialysis [Z99.2]  Not Applicable    Anemia in chronic kidney disease [N18.9, D63.1]  Yes    Essential (primary) hypertension [I10]  Yes      Resolved Hospital Problems   No resolved problems to display.          Consults       Date and Time Order Name Status Description    10/26/2024  4:49 PM Inpatient Cardiology Consult      10/25/2024  2:35 PM Nephrology (on -call MD unless specified) Completed     10/25/2024  2:34 PM [facility] (on-call MD unless specified) Details      10/22/2024  1:47 AM Inpatient Nephrology Consult Completed           Hospital Course  74 y.o. male with past medical history significant for end-stage renal disease, dependent on dialysis, had acute on chronic diastolic and systolic congestive heart failure, he showed signs of volume overload, patient does have end-stage renal disease with dependence on hemodialysis, he is noncompliant with a treatment regimen.  Patient has been cleared by nephrology for discharge today, patient will follow-up with PCP and nephrology on outpatient basis.  This patient has high risk of rehospitalization because of noncompliance.  I have seen and examined patient at bedside, total time spent on discharge management is more than 30 minutes.  Plan of care was discussed with the patient and he has verbalized understanding.      Temp:  [97.1 °F (36.2 °C)-97.5 °F (36.4 °C)] 97.5 °F (36.4 °C)  Heart Rate:  [65-91] 91  Resp:  [18] 18  BP: (145-155)/(64-82) 145/82  There is no height or weight on file to calculate BMI.    Physical Exam  General, awake and alert.  Head and ENT, normocephalic and atraumatic.  Lungs, symmetric expansion, equal air entry bilaterally.  Heart, regular rate and rhythm.  Abdomen, soft and  nontender.  Extremities, no clubbing or cyanosis.  Neuro, no focal deficits.  Skin: Warm and no rash.  Psych, normal mood and affect.  Musculoskeletal, joint examination is grossly normal.      Disposition: Home or Self Care       Discharge Medications        New Medications        Instructions Start Date   metoprolol tartrate 25 MG tablet  Commonly known as: LOPRESSOR   12.5 mg, Oral, Every 12 Hours Scheduled             Continue These Medications        Instructions Start Date   acetaminophen 325 MG tablet  Commonly known as: TYLENOL   650 mg, Oral, Every 6 Hours PRN      aspirin 81 MG chewable tablet   81 mg, Daily      hydrOXYzine 25 MG tablet  Commonly known as: ATARAX   25 mg, Oral, 3 Times Daily PRN      MIRCERA IJ   75 mcg, Every 28 Days      pantoprazole 40 MG EC tablet  Commonly known as: PROTONIX   40 mg, Oral, 2 Times Daily Before Meals      ranolazine 500 MG 12 hr tablet  Commonly known as: RANEXA   500 mg, Oral, 2 Times Daily, Take dos      tamsulosin 0.4 MG capsule 24 hr capsule  Commonly known as: FLOMAX   1 capsule, Daily                Additional Instructions for the Follow-ups that You Need to Schedule       Discharge Follow-up with PCP   As directed       Currently Documented PCP:    Shara Talley APRN    PCP Phone Number:    [phone]     Follow Up Details: Follow-up with PCP and nephrology in 7 to 10 days.               Follow-up Information       Shara Talley APRN .    Specialties: Nurse Practitioner, Family Medicine  Why: Follow-up with PCP and nephrology in 7 to 10 days.  Contact information:  [address] [place] [id number]  [phone]                            Future Appointments   Date Time Provider Department Center   11/6/2024  8:15 AM Shara Talley APRN [facility] JOSE ROBERTO Lion MD  [facility]  10/28/24    Discharge time spent greater than 30 minutes.

## 2024-10-28 NOTE — THERAPY TREATMENT NOTE
Patient Name: Mohamud Jarrett  : 1950    MRN: [id number]                              Today's Date: 10/28/2024       Admit Date: 10/25/2024    Visit Dx:     ICD-10-CM ICD-9-CM   1. Other ascites  R18.8 789.59   2. Pleural effusion  J90 511.9   3. End stage renal disease on dialysis  N18.6 585.6    Z99.2 V45.11   4. Hypernatremia  E87.0 276.0     Patient Active Problem List   Diagnosis    Essential (primary) hypertension    Hyperlipidemia    Anemia in chronic kidney disease    Coronary artery disease    Dependence on renal dialysis    Mitral valve regurgitation    Left inguinal hernia    H/O peanut allergy    Secondary hyperparathyroidism of renal origin    History of gout    Depression    ESRD (end stage renal disease)    Ascites    Volume overload     Past Medical History:   Diagnosis Date    A-V fistula     right arm    Anemia of chronic renal failure 2021    Antiplatelet or antithrombotic long-term use     plavix    Arthritis     Bilateral leg pain 2021    CAD (coronary artery disease)     h/o CABG  and stents 2022, Dr Shirley follows    Dialysis patient     Tues, Thursday, Saturday, has chest wall catheter currently    ESRD (end stage renal disease) 2024    GI bleed 2024    Gout     Hyperlipidemia     Hypertension     Neck pain 2022    Neuritis of lower extremity, right 2019    Proteinuria 2022    Rheumatoid factor positive 2021    Seasonal allergies 2014    Vitamin D deficiency 2022     Past Surgical History:   Procedure Laterality Date    ARTERIOVENOUS FISTULA Right     CARDIAC CATHETERIZATION      CARPAL TUNNEL RELEASE      CATARACT EXTRACTION W/ INTRAOCULAR LENS IMPLANT      COLONOSCOPY N/A     COLONOSCOPY N/A 2018    Procedure: COLONOSCOPY TO CECUM WITH COLD BIOPSY POLYPECTOMY;  Surgeon: Elliott Harding MD;  Location: [facility];  Service: General    COLONOSCOPY N/A 2024    Procedure: COLONOSCOPY with cold snare  polypectomies;  Surgeon: Alvarado Irvin MD;  Location:  [facility];  Service: Gastroenterology;  Laterality: N/A;  pre- anemia  post- polyps    CORONARY ANGIOPLASTY WITH STENT PLACEMENT  11/09/2022    CORONARY ARTERY BYPASS GRAFT N/A 06/20/2003    ENDOSCOPY N/A 9/9/2024    Procedure: ESOPHAGOGASTRODUODENOSCOPY with biopsies;  Surgeon: Alvarado Irvin MD;  Location:  [facility];  Service: Gastroenterology;  Laterality: N/A;  pre- anemia  post- gastritis    INGUINAL HERNIA REPAIR Right 06/04/2014    Open incarcerated inguinal hernia repair-Dr. Elliott Harding    INGUINAL HERNIA REPAIR Left 4/26/2023    Procedure: OPEN LEFT INGUINAL HERNIA REPAIR;  Surgeon: Elliott Harding MD;  Location: [facility] OR;  Service: General;  Laterality: Left;    LUMBAR DISC SURGERY N/A 1990, 1992    L4-L5, X2      General Information       Row Name 10/28/24 1511          Physical Therapy Time and Intention    Document Type therapy note (daily note)  -MG     Mode of Treatment individual therapy;physical therapy  -MG       Row Name 10/28/24 1511          General Information    Patient Profile Reviewed yes  -MG     Existing Precautions/Restrictions fall  -MG     Barriers to Rehab medically complex  -MG       Row Name 10/28/24 1511          Cognition    Orientation Status (Cognition) oriented to;person;place  Increased processing time needed. Delayed responses. Muscle jerks and tremors.  -MG       Row Name 10/28/24 1511          Safety Issues/Impairments Affecting Functional Mobility    Safety Issues Affecting Function (Mobility) ability to follow commands;awareness of need for assistance;insight into deficits/self-awareness;judgment;safety precaution awareness;sequencing abilities  -MG     Impairments Affecting Function (Mobility) strength;endurance/activity tolerance;balance;motor control  -MG     Comment, Safety Issues/Impairments (Mobility) Gait belt and non-skid socks donned.  -MG               User Key  (r) = Recorded  By, (t) = Taken By, (c) = Cosigned By      Initials Name Provider Type    MG Bell Peterson, PT Physical Therapist                   Mobility       Row Name 10/28/24 1512          Bed Mobility    Bed Mobility supine-sit;sit-supine  -MG     Supine-Sit [place] (Bed Mobility) minimum assist (75% patient effort);verbal cues  -MG     Sit-Supine [place] (Bed Mobility) standby assist;verbal cues  -MG     Assistive Device (Bed Mobility) bed rails;head of bed elevated  -MG     Comment, (Bed Mobility) Increased time to complete. Declined additional STS/mobility and did not want to return to supine. Required several minutes and education for pt to return to supine as he was not safe to remain seated EOB w/ his current sitting balance, muscle jerks and tremors. Slight agitation.  -MG       Row Name 10/28/24 1512          Sit-Stand Transfer    Sit-Stand [place] (Transfers) moderate assist (50% patient effort);verbal cues  -MG     Assistive Device (Sit-Stand Transfers) walker, front-wheeled  -MG     Comment, (Sit-Stand Transfer) x1 from elevated bed. When attempting to side step pts knee buckled w/ anterior lean requiring Mod/MaxA from this PT to safely lower pt back down to bed. When cued for additional stand to attempt side steps again, pt did not respond or look at this PT.  -MG               User Key  (r) = Recorded By, (t) = Taken By, (c) = Cosigned By      Initials Name Provider Type    MG Bell Peterson, PT Physical Therapist                   Obj/Interventions       Row Name 10/28/24 1514          Motor Skills    Therapeutic Exercise other (see comments)  Cues for ther-ex and would not respond.  -MG       Row Name 10/28/24 1511          Balance    Comment, Balance Sitting balance - SBA up to Vee d/t unsteadiness, jerks, tremors. Static standing w/ ModA and RW for <15s.  -MG               User Key  (r) = Recorded By, (t) = Taken By, (c) = Cosigned By      Initials Name Provider Type    Bell Mcdaniel, PT  Physical Therapist                   Goals/Plan    No documentation.                  Clinical Impression       Row Name 10/28/24 1523          Pain    Pretreatment Pain Rating 0/10 - no pain  -MG     Posttreatment Pain Rating 0/10 - no pain  -MG       Row Name 10/28/24 1520          Plan of Care Review    Plan of Care Reviewed With patient  -MG     Progress declining  -MG     Outcome Evaluation Pt seen for PT tx this PM as plan for DC home this date, but nsg concern d/t pt requiring increased assist w/ mobility. Today, pt was was SBA to Vee for bed mobility and ModA for one STS to RW. During this stand pt demo anterior lean and bilateral knee buckle requiring Mod/MaxA to safely return to sitting EOB. Pt w/ slight agitation and either declined additional mobility/ther-ex or would not respond. Noted pt did require increased processing time, had delayed responses and was demoing tremors and muscle jerks in UEs and LEs. Pt was wanting to remain seated EOB at end of session, but this PT and nsg did not feel comfortable as pt was unsteady w/ his sitting balance. Encouraged pt to eat/drink (has not been eating/drinking according to pt and chart) and attempt dialysis as that could be contributing to his muscle jerks/tremors and fatigue. Would not rec pt to DC home this date as he would be unsafe and a falls risk, would be unable to ambulate into his home or perform 3 GRADY. Pt states sister does not or is not able to assist him. Rec SNF, although may be difficult to find d/t prior ins difficulties. SBAR w/ RN and CCP.  -MG       Row Name 10/28/24 1520          Therapy Assessment/Plan (PT)    Rehab Potential (PT) fair  -MG     Criteria for Skilled Interventions Met (PT) yes  -MG     Therapy Frequency (PT) 6 times/wk  -MG       Row Name 10/28/24 152          Vital Signs    O2 Delivery Pre Treatment room air  -MG     O2 Delivery Intra Treatment room air  -MG     O2 Delivery Post Treatment room air  -MG     Pre Patient  Position Supine  -MG     Intra Patient Position Standing  -MG     Post Patient Position Supine  -MG       Row Name 10/28/24 1523          Positioning and Restraints    Pre-Treatment Position in bed  -MG     Post Treatment Position bed  -MG     In Bed notified nsg;supine;fowlers;call light within reach;encouraged to call for assist;exit alarm on;side rails up x3  -MG               User Key  (r) = Recorded By, (t) = Taken By, (c) = Cosigned By      Initials Name Provider Type    Bell Mcdaniel, PT Physical Therapist                   Outcome Measures       Row Name 10/28/24 1529 10/28/24 0834       How much help from another person do you currently need...    Turning from your back to your side while in flat bed without using bedrails? 4  -MG 4  -KS    Moving from lying on back to sitting on the side of a flat bed without bedrails? 3  -MG 4  -KS    Moving to and from a bed to a chair (including a wheelchair)? 2  -MG 3  -KS    Standing up from a chair using your arms (e.g., wheelchair, bedside chair)? 2  -MG 3  -KS    Climbing 3-5 steps with a railing? 1  -MG 3  -KS    To walk in hospital room? 1  -MG 3  -KS    AM-PAC 6 Clicks Score (PT) 13  -MG 20  -KS    Highest Level of Mobility Goal 4 --> Transfer to chair/commode  -MG 6 --> Walk 10 steps or more  -KS              User Key  (r) = Recorded By, (t) = Taken By, (c) = Cosigned By      Initials Name Provider Type    Bell Mcdaniel, PT Physical Therapist    KS Siegrist, Kaitlyn, RN Registered Nurse                                 Physical Therapy Education       Title: PT OT SLP Therapies (In Progress)       Topic: Physical Therapy (In Progress)       Point: Mobility training (In Progress)       Learning Progress Summary            Patient Acceptance, E, NR by MG at 10/28/2024 1530    Acceptance, E,TB, VU,DU by LB at 10/26/2024 1514                      Point: Home exercise program (In Progress)       Learning Progress Summary            Patient Acceptance, E, NR  by MG at 10/28/2024 1530    Acceptance, E,TB, VU,DU by LB at 10/26/2024 1514                      Point: Body mechanics (In Progress)       Learning Progress Summary            Patient Acceptance, E, NR by MG at 10/28/2024 1530    Acceptance, E,TB, VU,DU by LB at 10/26/2024 1514                      Point: Precautions (In Progress)       Learning Progress Summary            Patient Acceptance, E, NR by MG at 10/28/2024 1530    Acceptance, E,TB, VU,DU by LB at 10/26/2024 1514                                      User Key       Initials Effective Dates Name Provider Type Discipline    MG 05/24/22 -  Bell Peterson, PT Physical Therapist PT    LB 08/09/20 -  Nieves Bray, PT Physical Therapist PT                  PT Recommendation and Plan     Progress: declining  Outcome Evaluation: Pt seen for PT tx this PM as plan for DC home this date, but nsg concern d/t pt requiring increased assist w/ mobility. Today, pt was was SBA to Vee for bed mobility and ModA for one STS to RW. During this stand pt demo anterior lean and bilateral knee buckle requiring Mod/MaxA to safely return to sitting EOB. Pt w/ slight agitation and either declined additional mobility/ther-ex or would not respond. Noted pt did require increased processing time, had delayed responses and was demoing tremors and muscle jerks in UEs and LEs. Pt was wanting to remain seated EOB at end of session, but this PT and nsg did not feel comfortable as pt was unsteady w/ his sitting balance. Encouraged pt to eat/drink (has not been eating/drinking according to pt and chart) and attempt dialysis as that could be contributing to his muscle jerks/tremors and fatigue. Would not rec pt to DC home this date as he would be unsafe and a falls risk, would be unable to ambulate into his home or perform 3 GRADY. Pt states sister does not or is not able to assist him. Rec SNF, although may be difficult to find d/t prior ins difficulties. SBAR w/ RN and CCP.     Time  Calculation:         PT Charges       Row Name 10/28/24 1530             Time Calculation    Start Time 1345  -MG      Stop Time 1405  -MG      Time Calculation (min) 20 min  -MG      PT Received On 10/28/24  -MG      PT - Next Appointment 10/29/24  -MG                User Key  (r) = Recorded By, (t) = Taken By, (c) = Cosigned By      Initials Name Provider Type    MG Bell Peterson, PT Physical Therapist                  Therapy Charges for Today       Code Description Service Date Service Provider Modifiers Qty    [id number]  PT THERAPEUTIC ACT EA 15 MIN 10/28/2024 Bell Peterson, PT GP 1            PT G-Codes  Outcome Measure Options: AM-PAC 6 Clicks Basic Mobility (PT)  AM-PAC 6 Clicks Score (PT): 13  PT Discharge Summary  Anticipated Discharge Disposition (PT): skilled nursing facility    Bell Peterson, FABIANA  10/28/2024

## 2024-10-29 PROBLEM — G93.41 ACUTE METABOLIC ENCEPHALOPATHY: Status: ACTIVE | Noted: 2024-01-01

## 2024-10-29 NOTE — CONSULTS
Neurology Consult Note    Consult Date: 10/29/2024    Referring MD: No ref. provider found    Reason for Consult I have been asked to see the patient in neurological consultation to render advice and opinion regarding altered mental status    Mohamud Jarrett is a 74 y.o. male with past medical history of end-stage renal disease on dialysis, coronary artery disease status post CABG, hypertension, gout, congestive heart failure with reduced ejection fraction, initially presented to the ER on 10/25/2024 with chief complaints of shortness of breath and increased swelling in his legs, he was also recently admitted 10/21 to 10/22 for similar complaints.  Neurology has been consulted for altered mental status.  Neurology is following for hemodialysis.  There is a long documented history of medication noncompliance.    Patient today morning in the dialysis is not responding not following commands has persistent left gaze deviation eyes do not cross midline and he withdraws all 4 extremities.      Past Medical History:   Diagnosis Date    A-V fistula     right arm    Anemia of chronic renal failure 04/12/2021    Antiplatelet or antithrombotic long-term use     plavix    Arthritis     Bilateral leg pain 11/05/2021    CAD (coronary artery disease)     h/o CABG 2003 and stents 11/2022, Dr Shirley follows    Dialysis patient     Tues, Thursday, Saturday, has chest wall catheter currently    ESRD (end stage renal disease) 07/12/2024    GI bleed 09/04/2024    Gout     Hyperlipidemia     Hypertension     Neck pain 08/30/2022    Neuritis of lower extremity, right 12/19/2019    Proteinuria 05/23/2022    Rheumatoid factor positive 07/06/2021    Seasonal allergies 03/13/2014    Vitamin D deficiency 05/23/2022       Exam  /71 (BP Location: Left arm, Patient Position: Lying)   Pulse 63   Temp 97.8 °F (36.6 °C) (Oral)   Resp 15   SpO2 100%   Gen: NAD, vitals reviewed  MS: Lethargic drowsy not following commands persistent left  gaze deviation  Corneal pupillary reflex intact  Withdraws all 4 extremities to painful stimulation less on the right upper right lower compared to left upper left lower    DATA:    Lab Results   Component Value Date    GLUCOSE 90 10/29/2024    CALCIUM 8.9 10/29/2024     10/29/2024    K 5.3 (H) 10/29/2024    CO2 18.7 (L) 10/29/2024    CL 99 10/29/2024    BUN 81 (H) 10/29/2024    CREATININE 9.31 (H) 10/29/2024    BCR 8.7 10/29/2024    ANIONGAP 21.3 (H) 10/29/2024     Lab Results   Component Value Date    WBC 3.94 10/29/2024    HGB 10.6 (L) 10/29/2024    HCT 33.9 (L) 10/29/2024    MCV 97.7 (H) 10/29/2024     (L) 10/29/2024       Lab review:   Sodium 139  Creatinine 9.31  Blood glucose 90  Normal LFTs  Alkaline phosphatase 279    WBC 3.94  Hemoglobin 10.6 and platelets 128    Imaging review:   MRI brain done on 9/6/2024 acute diffusion restriction mild T2 flair changes as well as small vessel disease he does have diffuse cortical atrophy no SWI changes he has a left posterior fossa arachnoid cyst    Diagnoses:  Acute metabolic encephalopathy    End-stage renal disease on dialysis  Acute on chronic congestive heart failure  Hypertension  Coronary artery disease s/p CABG  Anemia of chronic disease    Pre-stroke MRS: 3  NIHSS: 0    Concern for acute ischemic stroke with large vessel occlusion versus postictal state and seizure  CTA head and neck along with CT perfusion stat ordered  If negative we might have to place him on EEG and will give him Ativan 1 mg one-time    We will continue to follow closely      MDM   Reviewed: Previous charts, nursing notes and vitals   Reviewed: Previous labs and CT scan    Interpretation: Labs and CT scan   Total time providing care is :30-74 minutes. This excluded time spent performing separately reportable procedures and services  Consults :Neurology/Stroke    Please note that portions of this note were completed with a voice recognition program.     Chandu Connelly  MD Da  Neuro Hospitalist /Vascular Neurology.

## 2024-10-29 NOTE — PLAN OF CARE
Goal Outcome Evaluation:              Outcome Evaluation: VSS patient slept most of shift. Woke up when called his name. Answered questioned that were asked. Patient was a little agitated because he did want to be bothered. He seems a little more lethargic this am. Maintain safety and continue to monitor.

## 2024-10-29 NOTE — PROGRESS NOTES
[facility] Progress Note      Patient Name: Mohamud Jarrett  : 1950  MRN: [id number]  Primary Care Physician:  Shara Talley APRN  Date of admission: 10/25/2024    Subjective     Interval History:   Follow-up ESRD.  Refused dialysis yesterday.  On presentation to dialysis today had altered mental status.  Dr. Roberson has examined the patient and ordered stat CT imaging of his brain.  Patient's eyes deviated to the left With downward gaze.  Not responsive to voice.    Review of Systems:   Unable to obtain.    Objective     Vitals:   Temp:  [97.5 °F (36.4 °C)-98.1 °F (36.7 °C)] 97.8 °F (36.6 °C)  Heart Rate:  [63-68] 63  Resp:  [15-18] 15  BP: (135-148)/(69-92) 139/71    Intake/Output Summary (Last 24 hours) at 10/29/2024 1107  Last data filed at 10/28/2024 2220  Gross per 24 hour   Intake 60 ml   Output --   Net 60 ml       Physical Exam:    General Appearance: Not responsive to voice.  Skin: warm and dry  HEENT: Left gaze downward deviation.  Neck: supple, no JVD  Lungs: Clear to auscultation bilaterally.  Heart: RRR, normal S1 and S2  Abdomen: soft, nontender, nondistended. +bs  : no palpable bladder  Extremities: 1+ upper and 2+ lower extremity edema.  Right upper extremity AV fistula.  Neuro: Does not respond to voice.  Does not follow commands.  Eyes deviated to the left with downward gaze.  Scheduled Meds:     aspirin, 81 mg, Oral, Daily  LORazepam, 1 mg, Intravenous, Once  metoprolol tartrate, 12.5 mg, Oral, Q12H  pantoprazole, 40 mg, Oral, BID AC  sodium chloride, 10 mL, Intravenous, Q12H  tamsulosin, 0.4 mg, Oral, Daily      IV Meds:        Results Reviewed:   I have personally reviewed the results from the time of this admission to 10/29/2024 11:07 EDT     Results from last 7 days   Lab Units 10/29/24  0442 10/28/24  0602 10/27/24  0440 10/26/24  0513   SODIUM mmol/L 139 139 141 141   POTASSIUM mmol/L 5.3* 4.7 4.3 3.9   CHLORIDE mmol/L 99 98 100 101   CO2 mmol/L  18.7* 21.9* 22.0 25.7   BUN mg/dL 81* 77* 73* 63*   CREATININE mg/dL 9.31* 7.26* 7.07* 5.91*   CALCIUM mg/dL 8.9 8.6 8.5* 8.5*   BILIRUBIN mg/dL 1.3* 1.1  --  1.0   ALK PHOS U/L 279* 281*  --  231*   ALT (SGPT) U/L 6 5  --  <5   AST (SGOT) U/L 15 17  --  16   GLUCOSE mg/dL 90 88 134* 86       Estimated Creatinine Clearance: 8.5 mL/min (A) (by C-G formula based on SCr of 9.31 mg/dL (H)).    Results from last 7 days   Lab Units 10/27/24  0440 10/26/24  0513   PHOSPHORUS mg/dL 5.2* 4.5             Results from last 7 days   Lab Units 10/29/24  0442 10/28/24  0602 10/27/24  0440 10/26/24  0512 10/25/24  1009   WBC 10*3/mm3 3.94 3.55 3.69 3.46 4.55   HEMOGLOBIN g/dL 10.6* 10.2* 9.7* 9.4* 10.1*   PLATELETS 10*3/mm3 128* 133* 141 147 200             Assessment / Plan     ASSESSMENT:  ESRD with noncompliance with dialysis.  Waste products up as expected since he refused dialysis yesterday.  Currently in dialysis but appears to have acute mental status changes so he is being taken for imaging.  Altered mental status.  Now with persistent left gaze deviation.  Neurology evaluating.  Concern for acute ischemic stroke.  CTA of the head and neck and CT perfusion study ordered.  Presentation also concerning for seizure which would not be unexpected given his waste product level.  Acute on chronic combined heart failure.  4.  Anemia CKD.  5.  Noncompliance complicating all medical care.  6.  Known coronary artery disease status post prior CABG.  PLAN:  Dialysis today if neurologic status allows.  2.  If he has had a large stroke, palliative care consult would be appropriate.  Thank you for involving us in the care of Mohamud Jarrett.  Please feel free to call with any questions.    Kelly Flores MD  10/29/24  11:07 EDT    [facility]  [phone]    Please note that portions of this note were completed with a voice recognition program.

## 2024-10-29 NOTE — CODE DOCUMENTATION
"Patient Name:  Mohamud Jarrett  YOB: 1950  MRN:  [id number]  Admit Date:  10/25/2024    Visit Diagnoses:     ICD-10-CM ICD-9-CM   1. Other ascites  R18.8 789.59   2. Pleural effusion  J90 511.9   3. End stage renal disease on dialysis  N18.6 585.6    Z99.2 V45.11   4. Hypernatremia  E87.0 276.0       Reason For Rapid: altered mental status, gaze deviation. Called by Dr. Roberson to take patient to Team D imaging. Eyes deviated to left, patient agitated, moving all extremities but not answering questions.     RN Communicated With: Dr. Roberson and primary RN Wood Ortega Outcome: Return to unit CT head negative. Followed up with Wood primary RN, let him know what happened, and asked that he repage nephrology post CT with contrast to ask about dialysis as patient only got 70 mins of original treatment.     Communication From Rapid Team: gave 1 mg of ativan in CT scanner. Primary RN aware    Most Recent Vital Signs  Temp:  [97.1 °F (36.2 °C)-98.1 °F (36.7 °C)] 97.5 °F (36.4 °C)  Heart Rate:  [63-66] 64  Resp:  [15-18] 17  BP: (124-148)/(61-80) 147/61  SpO2:  [100 %] 100 %  on   ;   Device (Oxygen Therapy): room air    Labs:      No results found for: \"POCGLU\"  No results found for: \"SITE\", \"ALLENTEST\", \"PHART\", \"GFI3UIV\", \"PO2ART\", \"YBO0FDW\", \"BASEEXCESS\", \"H8YKGXJP\", \"HGBBG\", \"HCTABG\", \"OXYHEMOGLOBI\", \"METHHGBN\", \"CARBOXYHGB\", \"CO2CT\", \"BAROMETRIC\", \"MODALITY\", \"FIO2\"  Results from last 7 days   Lab Units 10/29/24  0442 10/28/24  0602 10/27/24  0440 10/26/24  0512   WBC 10*3/mm3 3.94 3.55 3.69 3.46   HEMOGLOBIN g/dL 10.6* 10.2* 9.7* 9.4*   PLATELETS 10*3/mm3 128* 133* 141 147     Results from last 7 days   Lab Units 10/29/24  0442 10/28/24  0602 10/27/24  0440 10/26/24  0513   SODIUM mmol/L 139 139 141 141   POTASSIUM mmol/L 5.3* 4.7 4.3 3.9   CHLORIDE mmol/L 99 98 100 101   CO2 mmol/L 18.7* 21.9* 22.0 25.7   BUN mg/dL 81* 77* 73* 63*   CREATININE mg/dL 9.31* 7.26* 7.07* 5.91*   GLUCOSE mg/dL 90 " "88 134* 86   ALBUMIN g/dL 2.9* 3.5 3.0* 2.9*   BILIRUBIN mg/dL 1.3* 1.1  --  1.0   ALK PHOS U/L 279* 281*  --  231*   AST (SGOT) U/L 15 17  --  16   ALT (SGPT) U/L 6 5  --  <5   Estimated Creatinine Clearance: 8 mL/min (A) (by C-G formula based on SCr of 9.31 mg/dL (H)).          No results found for: \"STREPPNEUAG\", \"LEGANTIGENUR\"        NIH Stroke Scale:                                                         Please refer to full rapid documentation on summary page under Index / Code Timeline  "

## 2024-10-29 NOTE — PROGRESS NOTES
Nutrition Services    Patient Name:  Mohamud Jarrett  YOB: 1950  MRN: [id number]  Admit Date:  10/25/2024    Assessment Date:  10/29/24    Summary: 75 yo male adm with ascites, volume overload, ESRD - noncompliance with HD (refused HD on Monday, only 1/2 treatment on Saturday), Anemia, HTN, Acute on Chronic CHF    Noted AMS, concerns for stroke today      CLINICAL NUTRITION ASSESSMENT      Reason for Assessment MST score 2+     Diagnosis/Problem   As above,    Medical/Surgical History Past Medical History:   Diagnosis Date    A-V fistula     right arm    Anemia of chronic renal failure 04/12/2021    Antiplatelet or antithrombotic long-term use     plavix    Arthritis     Bilateral leg pain 11/05/2021    CAD (coronary artery disease)     h/o CABG 2003 and stents 11/2022, Dr Shirley follows    Dialysis patient     Tues, Thursday, Saturday, has chest wall catheter currently    ESRD (end stage renal disease) 07/12/2024    GI bleed 09/04/2024    Gout     Hyperlipidemia     Hypertension     Neck pain 08/30/2022    Neuritis of lower extremity, right 12/19/2019    Proteinuria 05/23/2022    Rheumatoid factor positive 07/06/2021    Seasonal allergies 03/13/2014    Vitamin D deficiency 05/23/2022       Past Surgical History:   Procedure Laterality Date    ARTERIOVENOUS FISTULA Right     CARDIAC CATHETERIZATION      CARPAL TUNNEL RELEASE      CATARACT EXTRACTION W/ INTRAOCULAR LENS IMPLANT      COLONOSCOPY N/A 2006    COLONOSCOPY N/A 12/14/2018    Procedure: COLONOSCOPY TO CECUM WITH COLD BIOPSY POLYPECTOMY;  Surgeon: Elliott Harding MD;  Location: [facility] ENDOSCOPY;  Service: General    COLONOSCOPY N/A 9/9/2024    Procedure: COLONOSCOPY with cold snare polypectomies;  Surgeon: Alvarado Irvin MD;  Location: [facility] ENDOSCOPY;  Service: Gastroenterology;  Laterality: N/A;  pre- anemia  post- polyps    CORONARY ANGIOPLASTY WITH STENT PLACEMENT  11/09/2022    CORONARY ARTERY BYPASS GRAFT N/A 06/20/2003     ENDOSCOPY N/A 9/9/2024    Procedure: ESOPHAGOGASTRODUODENOSCOPY with biopsies;  Surgeon: Alvarado Irvin MD;  Location: [facility] ENDOSCOPY;  Service: Gastroenterology;  Laterality: N/A;  pre- anemia  post- gastritis    INGUINAL HERNIA REPAIR Right 06/04/2014    Open incarcerated inguinal hernia repair-Dr. Elliott Harding    INGUINAL HERNIA REPAIR Left 4/26/2023    Procedure: OPEN LEFT INGUINAL HERNIA REPAIR;  Surgeon: Elliott Harding MD;  Location:  [facility] OR;  Service: General;  Laterality: Left;    LUMBAR DISC SURGERY N/A 1990, 1992    L4-L5, X2        Anthropometrics        Current Height  Current Weight  BMI kg/m2    Weight: 78.5 kg (173 lb 1 oz) (10/29/24 1100)  Body mass index is 24.83 kg/m².   Adjusted BMI (if applicable)    BMI Category Normal/Healthy (18.4 - 24.9)   Ideal Body Weight (IBW) 166# +/- 10%   Usual Body Weight (UBW) Hard to determine due to fluid accumulation   Weight Trend Loss, Gain   Weight History Wt Readings from Last 30 Encounters:   10/29/24 1100 78.5 kg (173 lb 1 oz)   10/23/24 1012 84 kg (185 lb 3.2 oz)   10/22/24 0354 82.2 kg (181 lb 4.8 oz)   10/21/24 2159 86 kg (189 lb 9.5 oz)   09/14/24 1234 79.9 kg (176 lb 2.4 oz)   09/06/24 1611 68.9 kg (152 lb)   09/05/24 1317 69.2 kg (152 lb 8.9 oz)   09/03/24 2319 75.9 kg (167 lb 5.3 oz)   09/03/24 2200 75.9 kg (167 lb 5.3 oz)   09/03/24 1632 78.4 kg (172 lb 13.5 oz)   08/25/24 1412 78 kg (171 lb 15.3 oz)   08/12/24 0042 78.2 kg (172 lb 6.4 oz)   08/02/24 1918 78.2 kg (172 lb 6.4 oz)   07/25/24 1305 80.4 kg (177 lb 3.2 oz)   07/11/24 0005 79.5 kg (175 lb 4.3 oz)   07/10/24 1756 76.2 kg (167 lb 15.9 oz)   07/09/24 1157 76.2 kg (168 lb)   07/03/24 0900 74 kg (163 lb 2.3 oz)   07/02/24 2012 79.6 kg (175 lb 7.8 oz)   07/02/24 0016 76.2 kg (167 lb 15.9 oz)   07/01/24 1408 78.1 kg (172 lb 2.9 oz)   03/27/24 0824 81 kg (178 lb 9.6 oz)   02/23/24 1138 80.7 kg (178 lb)   12/06/23 1007 83 kg (183 lb)   09/06/23 1408 86.2 kg (190 lb)   07/26/23 1058  "89.8 kg (198 lb)   06/19/23 1300 87.7 kg (193 lb 6.4 oz)   06/14/23 1341 84.8 kg (187 lb)   06/01/23 1551 83.6 kg (184 lb 3.2 oz)   04/26/23 1331 82.4 kg (181 lb 9.6 oz)   04/24/23 0831 83.3 kg (183 lb 12.1 oz)   04/21/23 1320 80.9 kg (178 lb 6.4 oz)   04/04/23 1003 80.7 kg (178 lb)   03/16/23 1044 80.8 kg (178 lb 3.2 oz)   11/22/22 1052 96.6 kg (213 lb)   10/20/22 1249 101 kg (223 lb)   10/04/22 1248 99.8 kg (220 lb)   09/23/22 1540 103 kg (227 lb)   09/22/22 1404 102 kg (225 lb)      --  Labs       Pertinent Labs    Results from last 7 days   Lab Units 10/29/24  0442 10/28/24  0602 10/27/24  0440 10/26/24  0513   SODIUM mmol/L 139 139 141 141   POTASSIUM mmol/L 5.3* 4.7 4.3 3.9   CHLORIDE mmol/L 99 98 100 101   CO2 mmol/L 18.7* 21.9* 22.0 25.7   BUN mg/dL 81* 77* 73* 63*   CREATININE mg/dL 9.31* 7.26* 7.07* 5.91*   CALCIUM mg/dL 8.9 8.6 8.5* 8.5*   BILIRUBIN mg/dL 1.3* 1.1  --  1.0   ALK PHOS U/L 279* 281*  --  231*   ALT (SGPT) U/L 6 5  --  <5   AST (SGOT) U/L 15 17  --  16   GLUCOSE mg/dL 90 88 134* 86     Results from last 7 days   Lab Units 10/29/24  0442 10/28/24  0602 10/27/24  0440   PHOSPHORUS mg/dL  --   --  5.2*   HEMOGLOBIN g/dL 10.6*   < > 9.7*   HEMATOCRIT % 33.9*   < > 30.2*   WBC 10*3/mm3 3.94   < > 3.69   ALBUMIN g/dL 2.9*   < > 3.0*    < > = values in this interval not displayed.     Results from last 7 days   Lab Units 10/29/24  0442 10/28/24  0602 10/27/24  0440 10/26/24  0512 10/25/24  1009   PLATELETS 10*3/mm3 128* 133* 141 147 200     COVID19   Date Value Ref Range Status   08/25/2024 Not Detected Not Detected - Ref. Range Final     No results found for: \"HGBA1C\"       Medications           Scheduled Medications aspirin, 81 mg, Oral, Daily  levETIRAcetam, 500 mg, Intravenous, Q12H  metoprolol tartrate, 12.5 mg, Oral, Q12H  pantoprazole, 40 mg, Oral, BID AC  sodium chloride, 10 mL, Intravenous, Q12H  tamsulosin, 0.4 mg, Oral, Daily       Infusions     PRN Medications   acetaminophen    " hydrOXYzine    nitroglycerin    ondansetron    sodium chloride     Physical Findings          General Findings appeared asleep, unresponsive   Oral/Mouth Cavity tooth or teeth missing   Edema  1+ (trace), 2+ (mild), 3+ (moderate)   Gastrointestinal abdominal distension   Skin  bruising   Tubes/Drains/Lines none   NFPE Unable to perform due to: fluid status with lack of HD   --  Current Nutrition Orders & Evaluation of Intake       Oral Nutrition     Food Allergies Peanuts   Current PO Diet NPO Diet NPO Type: Strict NPO   Supplement n/a   PO Evaluation     % PO Intake 0    Factors Affecting Intake: altered mental status   --  PES STATEMENT / NUTRITION DIAGNOSIS      Nutrition Dx Problem  Problem: Inadequate Oral Intake  Etiology: Factors Affecting Nutrition - level of consciousness, noncomplaince with dialysis    Signs/Symptoms: PO intake, Fluid, and Other (comment) LOC     NUTRITION INTERVENTION / PLAN OF CARE      Intervention Goal(s) Other: potentially establish PO or enteral nutrition in next few days ; noted possible palliative care         RD Intervention/Action Await initiation/advancement of PO diet, Await initiation of EN/PN, and Continue to monitor   --      Prescription/Orders:       PO Diet       Supplements       Enteral Nutrition       Parenteral Nutrition    New Prescription Ordered? No changes at this time   --      Monitor/Evaluation Progress and condition   Discharge Plan/Needs Pending clinical course   --    RD to follow per protocol.      Electronically signed by:  Alek Puckett RD  10/29/24 15:20 EDT

## 2024-10-29 NOTE — SIGNIFICANT NOTE
10/29/24 1427   OTHER   Discipline physical therapy assistant   Rehab Time/Intention   Session Not Performed unable to treat, medical status change  (PT:  10/30 re-eval; 10/29 Pt held per RN request. Pt became nonresponsive w/ CVA possible)   Recommendation   PT - Next Appointment 10/30/24

## 2024-10-29 NOTE — NURSING NOTE
HD treatment ended after 70 minutes per Dr. Roberson due to Altered Mental Status. Patient to have CT to rule out stroke. Dr. Flores at bedside post treatment. Post /79 HR 87 T 97.1. Verbal report given to primary RN.

## 2024-10-29 NOTE — PROGRESS NOTES
Name: Mohamud Jarrett ADMIT: 10/25/2024   : 1950  PCP: Shara Talley APRN    MRN: [id number] LOS: 0 days   AGE/SEX: 74 y.o. male  ROOM: [facility]     Subjective   Subjective   Patient is seen at bedside, has acute mental status changes.       Objective   Objective   Vital Signs  Temp:  [97.1 °F (36.2 °C)-98.1 °F (36.7 °C)] 97.5 °F (36.4 °C)  Heart Rate:  [63-66] 64  Resp:  [15-18] 17  BP: (124-148)/(61-80) 147/61  SpO2:  [100 %] 100 %  on   ;   Device (Oxygen Therapy): room air  Body mass index is 24.83 kg/m².  Physical Exam  General, altered mental status  Head and ENT, normocephalic and atraumatic.  Lungs, symmetric expansion, equal air entry bilaterally.  Heart, regular rate and rhythm.  Abdomen, soft and nontender.  Extremities, no clubbing or cyanosis.  Neuro, unable to fully evaluate  Skin: Warm and no rash.  Psych, unable to be evaluated  Musculoskeletal, joint examination is grossly normal.    Copied text material from yesterday's note has been reviewed for appropriate changes and remains accurate as of 10/29/24.          Results Review     I reviewed the patient's new clinical results.  Results from last 7 days   Lab Units 10/29/24  0442 10/28/24  0602 10/27/24  0440 10/26/24  0512   WBC 10*3/mm3 3.94 3.55 3.69 3.46   HEMOGLOBIN g/dL 10.6* 10.2* 9.7* 9.4*   PLATELETS 10*3/mm3 128* 133* 141 147     Results from last 7 days   Lab Units 10/29/24  0442 10/28/24  0602 10/27/24  0440 10/26/24  0513   SODIUM mmol/L 139 139 141 141   POTASSIUM mmol/L 5.3* 4.7 4.3 3.9   CHLORIDE mmol/L 99 98 100 101   CO2 mmol/L 18.7* 21.9* 22.0 25.7   BUN mg/dL 81* 77* 73* 63*   CREATININE mg/dL 9.31* 7.26* 7.07* 5.91*   GLUCOSE mg/dL 90 88 134* 86   EGFR mL/min/1.73 5.5* 7.4* 7.6* 9.5*     Results from last 7 days   Lab Units 10/29/24  0442 10/28/24  0602 10/27/24  0440 10/26/24  0513 10/25/24  1252   ALBUMIN g/dL 2.9* 3.5 3.0* 2.9* 3.4*   BILIRUBIN mg/dL 1.3* 1.1  --  1.0 1.2   ALK PHOS U/L 279* 281*  --  231* 271*  "  AST (SGOT) U/L 15 17  --  16 15   ALT (SGPT) U/L 6 5  --  <5 7     Results from last 7 days   Lab Units 10/29/24  0442 10/28/24  0602 10/27/24  0440 10/26/24  0513   CALCIUM mg/dL 8.9 8.6 8.5* 8.5*   ALBUMIN g/dL 2.9* 3.5 3.0* 2.9*   PHOSPHORUS mg/dL  --   --  5.2* 4.5       No results found for: \"HGBA1C\", \"POCGLU\"    CT Angiogram Head    Result Date: 10/29/2024   Linear hypodensity within the left aspect of the sandy measuring up to 2.1 x 0.5 cm in greatest axial dimensions could be beam-hardening artifact although an acute infarct cannot be excluded. Further characterization with MR imaging is suggested.  No evidence for large vessel occlusion. No significant abnormality seen on the Tmax greater than 6 seconds or CBF less than 30% maps.  Incidental intracranial and extracranial atherosclerotic changes as discussed in detail above.  Mildly prominent size lateral aortic lymph nodes, the largest of which measures up to 1.6 x 1.1 cm in greatest axial dimensions. Further characterization with a chest CT is suggested.  The findings of the noncontrast head CT were discussed with Dr. Salazar on 10/29/2024 at approximately 11:25 a.m. The findings of the CT angiogram and CT perfusion study were discussed with Dr. Salazar at approximately 11:45 a.m.  The findings of the superior mediastinum were discussed with Scarlet Machado RN, the nurse caring for this patient, on 10/29/2024 at approximately 12 p.m. She was asked to convey these findings and recommendations to the hospitalist caring for the patient.    Radiation dose reduction techniques were utilized, including automated exposure control and exposure modulation based on body size.   This report was finalized on 10/29/2024 1:47 PM by Dr. Giovanni Bangura M.D on Workstation: [id number]      CT Angiogram Neck    Result Date: 10/29/2024   Linear hypodensity within the left aspect of the sandy measuring up to 2.1 x 0.5 cm in greatest axial dimensions could be beam-hardening " artifact although an acute infarct cannot be excluded. Further characterization with MR imaging is suggested.  No evidence for large vessel occlusion. No significant abnormality seen on the Tmax greater than 6 seconds or CBF less than 30% maps.  Incidental intracranial and extracranial atherosclerotic changes as discussed in detail above.  Mildly prominent size lateral aortic lymph nodes, the largest of which measures up to 1.6 x 1.1 cm in greatest axial dimensions. Further characterization with a chest CT is suggested.  The findings of the noncontrast head CT were discussed with Dr. Salazar on 10/29/2024 at approximately 11:25 a.m. The findings of the CT angiogram and CT perfusion study were discussed with Dr. Salazar at approximately 11:45 a.m.  The findings of the superior mediastinum were discussed with Scarlet Machado RN, the nurse caring for this patient, on 10/29/2024 at approximately 12 p.m. She was asked to convey these findings and recommendations to the hospitalist caring for the patient.    Radiation dose reduction techniques were utilized, including automated exposure control and exposure modulation based on body size.   This report was finalized on 10/29/2024 1:47 PM by Dr. Giovanni Bangura M.D on Workstation: [id number]      CT CEREBRAL PERFUSION WITH & WITHOUT CONTRAST    Result Date: 10/29/2024   Linear hypodensity within the left aspect of the sandy measuring up to 2.1 x 0.5 cm in greatest axial dimensions could be beam-hardening artifact although an acute infarct cannot be excluded. Further characterization with MR imaging is suggested.  No evidence for large vessel occlusion. No significant abnormality seen on the Tmax greater than 6 seconds or CBF less than 30% maps.  Incidental intracranial and extracranial atherosclerotic changes as discussed in detail above.  Mildly prominent size lateral aortic lymph nodes, the largest of which measures up to 1.6 x 1.1 cm in greatest axial dimensions. Further  characterization with a chest CT is suggested.  The findings of the noncontrast head CT were discussed with Dr. Salazar on 10/29/2024 at approximately 11:25 a.m. The findings of the CT angiogram and CT perfusion study were discussed with Dr. Salazar at approximately 11:45 a.m.  The findings of the superior mediastinum were discussed with Scarlet Machado RN, the nurse caring for this patient, on 10/29/2024 at approximately 12 p.m. She was asked to convey these findings and recommendations to the hospitalist caring for the patient.    Radiation dose reduction techniques were utilized, including automated exposure control and exposure modulation based on body size.   This report was finalized on 10/29/2024 1:47 PM by Dr. Giovanni Bangura M.D on Workstation: [id number]       I have personally reviewed all medications:  Scheduled Medications  aspirin, 81 mg, Oral, Daily  levETIRAcetam, 500 mg, Intravenous, Q12H  metoprolol tartrate, 12.5 mg, Oral, Q12H  pantoprazole, 40 mg, Oral, BID AC  sodium chloride, 10 mL, Intravenous, Q12H  tamsulosin, 0.4 mg, Oral, Daily    Infusions   Diet  NPO Diet NPO Type: Strict NPO    I have personally reviewed:  [x]  Laboratory   [x]  Microbiology   [x]  Radiology   [x]  EKG/Telemetry  [x]  Cardiology/Vascular   []  Pathology    []  Records       Assessment/Plan     Active Hospital Problems    Diagnosis  POA    **Ascites [R18.8]  Yes    Volume overload [E87.70]  Unknown    ESRD (end stage renal disease) [N18.6]  Yes    Dependence on renal dialysis [Z99.2]  Not Applicable    Anemia in chronic kidney disease [N18.9, D63.1]  Yes    Essential (primary) hypertension [I10]  Yes      Resolved Hospital Problems   No resolved problems to display.       74 y.o. male admitted with Ascites.    Assessment and plan  1.  Acute on chronic diastolic and systolic congestive heart failure, patient showing signs of volume overload, patient does have underlying end-stage renal disease with dependence on  hemodialysis, nephrology and cardiology evaluation requested.  Follow management recommendations.  Patient is clinically declining, he is noncompliant.  Palliative care evaluation to discuss goals of care.  It appears he has had acute mental status changes, concerns for stroke, neurology on board, follow management recommendations.     2.  Anemia of chronic disease, monitor hemoglobin trend, recheck labs.     3.  Hypertension, continue home medications.     4.  Coronary artery disease, BPH, continue home medications.     5.  CODE STATUS is full code.  Further plans based on hospital course.       Luis Lion MD  [facility]  10/29/24  14:56 EDT

## 2024-10-30 NOTE — PLAN OF CARE
Goal Outcome Evaluation:  Plan of Care Reviewed With: patient        Progress: improving  Outcome Evaluation: pt is more alert this shift. pt is axox2-3, disoriented to situation and time. repeats his answers. does not follow commands but moves all extremities on his own. left eye deviation continues. EEG monitoring continues. pt completed HD. VSS. room air. q2h turns. 1 Bm overnight. see previous nursing note regarding ECG and troponins. pt denies chest pain/SOA.

## 2024-10-30 NOTE — NURSING NOTE
Troponin was 221. Notified POLI Sahni. Said to call cardiology to let them know about the ECG. Dr. Montgomery was then paged, said it was a benign ECG. No new orders.

## 2024-10-30 NOTE — PROGRESS NOTES
DOS: 10/30/2024  NAME: Mohamud Jarrett   : 1950  PCP: Shara Talley APRN  Chief Complaint   Patient presents with    Urinary Retention     Neurology    Subjective: Patient more alert, conversant, and without gaze deviation today.  Somewhat nonparticipatory in exam however.  No family at bedside. Pt seen in follow up today, however the problem is new to the examiner.      Objective:  Vital signs: /53 (BP Location: Right arm, Patient Position: Lying)   Pulse 54   Temp 97.8 °F (36.6 °C) (Oral)   Resp 18   Wt 74.4 kg (164 lb 0.4 oz)   SpO2 100%   BMI 23.53 kg/m²       GEN: NAD, vital signs reviewed  HEENT: Normocephalic, atraumatic   COR: RRR  Resp: Even and unlabored, clear to auscultation bilaterally  Extremities: Nonpitting edema right upper extremity    Neurological:   MS: Awake and alert, oriented to self, hospital.  States the month is November, not oriented to the year.  Conversational speech fluent.  No neglect.  CN: Visual fields grossly intact, as midline, no gaze deviation, PERRL, facial movements symmetric, tongue midline, no dysarthria  Motor: Moving all extremities spontaneously, perhaps moving left upper extremity more than the right upper extremity but would not participate in full motor testing  Sensory: Intact to tactile stimuli in all 4 extremities    Scheduled Meds:aspirin, 81 mg, Oral, Daily  levETIRAcetam, 500 mg, Intravenous, Q12H  metoprolol tartrate, 12.5 mg, Oral, Q12H  pantoprazole, 40 mg, Oral, BID AC  sodium chloride, 10 mL, Intravenous, Q12H  tamsulosin, 0.4 mg, Oral, Daily      Continuous Infusions:   PRN Meds:.  acetaminophen    hydrOXYzine    nitroglycerin    ondansetron    sodium chloride    Laboratory results:  Lab Results   Component Value Date    GLUCOSE 65 10/30/2024    CALCIUM 8.8 10/30/2024     10/30/2024    K 4.1 10/30/2024    CO2 20.6 (L) 10/30/2024    CL 99 10/30/2024    BUN 40 (H) 10/30/2024    CREATININE 4.90 (H) 10/30/2024    BCR 8.2 10/30/2024     ANIONGAP 19.4 (H) 10/30/2024     Lab Results   Component Value Date    WBC 4.48 10/30/2024    HGB 10.7 (L) 10/30/2024    HCT 34.1 (L) 10/30/2024    .1 (H) 10/30/2024     (L) 10/30/2024     Lab Results   Component Value Date    CHOL 125 07/09/2024    CHOL 144 08/30/2022    CHOL 281 (H) 03/03/2022     Lab Results   Component Value Date    HDL 43 07/09/2024    HDL 47 08/30/2022    HDL 41 03/03/2022     Lab Results   Component Value Date    LDL 64 07/09/2024    LDL 74 08/30/2022     (H) 03/03/2022     Lab Results   Component Value Date    TRIG 97 07/09/2024    TRIG 130 08/30/2022    TRIG 242 (H) 03/03/2022          Review and interpretation of imaging:  EEG    Result Date: 10/29/2024  Table formatting from the original result was not included. EEG Report          # Indication: Altered mental status History: 74-year-old male with end-stage renal disease on hemodialysis and congestive heart failure presenting with shortness of air and pedal edema.  While on dialysis he was poorly responsive and had left gaze preference.  Study includes time locked video Medical diagnoses: Hypertension, CAD, hyperlipidemia, rheumatoid factor positive, end-stage renal disease on hemodialysis, GI bleed Current Facility-Administered Medications Medication Dose Route Frequency Provider Last Rate Last Admin  acetaminophen (TYLENOL) tablet 650 mg  650 mg Oral Q6H PRN Elier Ulloa MD   650 mg at 10/25/24 2133  aspirin chewable tablet 81 mg  81 mg Oral Daily Elier Ulloa MD   81 mg at 10/28/24 0931  hydrOXYzine (ATARAX) tablet 25 mg  25 mg Oral TID PRN Elier Ulloa MD   25 mg at 10/28/24 0935  levETIRAcetam (KEPPRA) injection 500 mg  500 mg Intravenous Q12H Chandu Roberson MD      metoprolol tartrate (LOPRESSOR) tablet 12.5 mg  12.5 mg Oral Q12H Genesis Tamez MD   12.5 mg at 10/28/24 2216  nitroglycerin (NITROSTAT) SL tablet 0.4 mg  0.4 mg Sublingual Q5 Min PRN Elier Ulloa MD      ondansetron  (ZOFRAN) injection 4 mg  4 mg Intravenous Q6H PRN Elier Ulloa MD      pantoprazole (PROTONIX) EC tablet 40 mg  40 mg Oral BID AC Elier Ulloa MD   40 mg at 10/28/24 1724  sodium chloride 0.9 % flush 10 mL  10 mL Intravenous Q12H ArtemioElier carroll, MD   10 mL at 10/29/24 0814  sodium chloride 0.9 % flush 10 mL  10 mL Intravenous PRN Elier Ulloa MD      tamsulosin (FLOMAX) 24 hr capsule 0.4 mg  0.4 mg Oral Daily Elier Ulloa MD   0.4 mg at 10/28/24 0931 Time of study: 26 minutes 29 seconds Technical summary: The 10-20 system was used for electrode placement  Background: The background rhythm was composed of primarily 4 Hz diffusely slow theta activity.  Some slower delta activities are seen.  There were triphasic waves interspersed throughout the study.  Sleep: The study did not differentiate between wakefulness and sleep.  Hyperventilation: Not obtained  Photic stimulation: Photic stimulation was performed very/frequency showing no significant change in the background activity  EKG: Irregular sinus rhythm around 60.  Video: Some  movements were seen that appeared random not associated with epileptiform activity Impression: Abnormal EEG being moderately to severely diffusely slow consistent with a moderate to severe diffuse encephalopathy versus bihemispheric structural lesions.  Triphasic waves are consistent with at least moderate encephalopathy.  No epileptiform activities were seen. Dictated utilizing Dragon dictation.      CT Chest Without Contrast Diagnostic    Result Date: 10/29/2024  CT CHEST WITHOUT CONTRAST  HISTORY: Mediastinal lymphadenopathy  TECHNIQUE: Radiation dose reduction techniques were utilized, including automated exposure control and exposure modulation based on body size. CT scan of the chest without the administration of IV contrast was performed. Coronal and sagittal reformatted images obtained.  COMPARISON: CTA of the head and neck from earlier today, CT of the abdomen and pelvis 10/25/2024   FINDINGS: There are some prominent mediastinal lymph nodes present. A prevascular space node on image 37 measures about 8 mm in greatest short axis dimension. A subcarinal node measures about 1.5 cm in greatest short axis dimension. There are also some partially calcified mediastinal lymph nodes. The main pulmonary artery is enlarged which is nonspecific but can be seen with pulmonary arterial hypertension. There is been a prior CABG. Coronary artery calcifications are present. There is minimal pleural effusion on the right. There is some reticular nodular infiltrate at present in both lungs, left greater than right. The heart is enlarged. Limited imaging of the upper abdomen demonstrates upper abdominal ascites which is not significantly changed. No acute bony abnormality      1. There is reticular nodular infiltrate in the lungs, left greater than right suggesting interstitial edema or possibly infectious/inflammatory process. Follow-up to clearing is recommended 2. There is a minimal right pleural effusion 3. Prominent mediastinal lymph nodes are likely reactive given the findings in the lungs 4. Upper abdominal ascites again noted  Radiation dose reduction techniques were utilized, including automated exposure control and exposure modulation based on body size.   This report was finalized on 10/29/2024 4:40 PM by Dr. Luigi Mccloud M.D on Workstation: [id number]      CT Angiogram Head    Result Date: 10/29/2024  CT ANGIOGRAM OF THE HEAD AND NECK AND CT PERFUSION STUDY  CLINICAL HISTORY:  AMS left gaze deviation; R18.8-Other ascites; B07-Tuqllrt effusion, not elsewhere classified; N18.6-End stage renal disease; Z99.2-Dependence on renal dialysis; E87.0-Hyperosmolality and hypernatremia  TECHNIQUE:  A noncontrast head CT was performed with 3 mm axial images. Thereafter, a CT perfusion study was performed after the dynamic bolus of IV contrast. Standard perfusion maps were constructed with RAPID software. A CT  angiogram of the head and neck was then performed with 1 mm axial images. Sagittal, coronal, and 3-dimensional reconstructed images were obtained. Finally, a delayed postcontrast head CT was performed with 3 mm axial images.  FINDINGS:  NONCONTRAST HEAD CT: Linear appearing area of apparent hypodensity within the left aspect of the sandy measuring up to 2.1 x 0.5 cm in greatest axial dimensions that may be beam-hardening artifact although an acute infarct at the site cannot be excluded. Further characterization with MR imaging is suggested.  Changes of inflammatory paranasal sinus disease are incidentally noted most prominently involving the left maxillary sinus which is completely opacified. Additionally, the left maxillary sinus may be additionally atelectatic.  CT PERFUSION STUDY: No convincing abnormalities are seen on the CBF less than 30% or time to maximum enhancement greater than 6-second maps.  CTA NECK:   Aortic Arch: Classic configuration of the aortic arch with mild atherosclerotic changes. Subclavian Arteries: Mild atherosclerotic changes bilaterally. Vertebral Arteries: Poor visualization of the V1 segments bilaterally due to a combination of motion artifact and beam hardening artifact. The remaining cervical segments are unremarkable. CCA/ICA's: Atherosclerotic changes bilaterally without significant NASCET stenosis within either internal carotid.  Ancillary Neck Findings: Mildly prominent size lateral aortic lymph nodes with the largest measuring up to approximately 1.6 x 1.1 cm in greatest axial dimensions. Further evaluation with a chest CT is suggested.  CTA HEAD:  Anterior Circulation: Atherosclerotic calcifications within the carotid siphons resulting in mild to moderate degrees of luminal compromise. The middle and anterior cerebral arteries are unremarkable. Posterior Circulation: Moderate stenosis of the V4 segment of the left vertebral artery. Hypoplastic P1 segments with well-developed  bilateral posterior communicating arteries.  DELAYED POSTCONTRAST HEAD CT: No abnormal foci of contrast enhancement are noted within the brain parenchyma.       Linear hypodensity within the left aspect of the sandy measuring up to 2.1 x 0.5 cm in greatest axial dimensions could be beam-hardening artifact although an acute infarct cannot be excluded. Further characterization with MR imaging is suggested.  No evidence for large vessel occlusion. No significant abnormality seen on the Tmax greater than 6 seconds or CBF less than 30% maps.  Incidental intracranial and extracranial atherosclerotic changes as discussed in detail above.  Mildly prominent size lateral aortic lymph nodes, the largest of which measures up to 1.6 x 1.1 cm in greatest axial dimensions. Further characterization with a chest CT is suggested.  The findings of the noncontrast head CT were discussed with Dr. Salazar on 10/29/2024 at approximately 11:25 a.m. The findings of the CT angiogram and CT perfusion study were discussed with Dr. Salazar at approximately 11:45 a.m.  The findings of the superior mediastinum were discussed with Scarlet Machado RN, the nurse caring for this patient, on 10/29/2024 at approximately 12 p.m. She was asked to convey these findings and recommendations to the hospitalist caring for the patient.    Radiation dose reduction techniques were utilized, including automated exposure control and exposure modulation based on body size.   This report was finalized on 10/29/2024 1:47 PM by Dr. Giovanni Bangura M.D on Workstation: [id number]      CT Angiogram Neck    Result Date: 10/29/2024  CT ANGIOGRAM OF THE HEAD AND NECK AND CT PERFUSION STUDY  CLINICAL HISTORY:  AMS left gaze deviation; R18.8-Other ascites; T33-Podbilf effusion, not elsewhere classified; N18.6-End stage renal disease; Z99.2-Dependence on renal dialysis; E87.0-Hyperosmolality and hypernatremia  TECHNIQUE:  A noncontrast head CT was performed with 3 mm axial  images. Thereafter, a CT perfusion study was performed after the dynamic bolus of IV contrast. Standard perfusion maps were constructed with RAPID software. A CT angiogram of the head and neck was then performed with 1 mm axial images. Sagittal, coronal, and 3-dimensional reconstructed images were obtained. Finally, a delayed postcontrast head CT was performed with 3 mm axial images.  FINDINGS:  NONCONTRAST HEAD CT: Linear appearing area of apparent hypodensity within the left aspect of the sandy measuring up to 2.1 x 0.5 cm in greatest axial dimensions that may be beam-hardening artifact although an acute infarct at the site cannot be excluded. Further characterization with MR imaging is suggested.  Changes of inflammatory paranasal sinus disease are incidentally noted most prominently involving the left maxillary sinus which is completely opacified. Additionally, the left maxillary sinus may be additionally atelectatic.  CT PERFUSION STUDY: No convincing abnormalities are seen on the CBF less than 30% or time to maximum enhancement greater than 6-second maps.  CTA NECK:   Aortic Arch: Classic configuration of the aortic arch with mild atherosclerotic changes. Subclavian Arteries: Mild atherosclerotic changes bilaterally. Vertebral Arteries: Poor visualization of the V1 segments bilaterally due to a combination of motion artifact and beam hardening artifact. The remaining cervical segments are unremarkable. CCA/ICA's: Atherosclerotic changes bilaterally without significant NASCET stenosis within either internal carotid.  Ancillary Neck Findings: Mildly prominent size lateral aortic lymph nodes with the largest measuring up to approximately 1.6 x 1.1 cm in greatest axial dimensions. Further evaluation with a chest CT is suggested.  CTA HEAD:  Anterior Circulation: Atherosclerotic calcifications within the carotid siphons resulting in mild to moderate degrees of luminal compromise. The middle and anterior cerebral  arteries are unremarkable. Posterior Circulation: Moderate stenosis of the V4 segment of the left vertebral artery. Hypoplastic P1 segments with well-developed bilateral posterior communicating arteries.  DELAYED POSTCONTRAST HEAD CT: No abnormal foci of contrast enhancement are noted within the brain parenchyma.       Linear hypodensity within the left aspect of the sandy measuring up to 2.1 x 0.5 cm in greatest axial dimensions could be beam-hardening artifact although an acute infarct cannot be excluded. Further characterization with MR imaging is suggested.  No evidence for large vessel occlusion. No significant abnormality seen on the Tmax greater than 6 seconds or CBF less than 30% maps.  Incidental intracranial and extracranial atherosclerotic changes as discussed in detail above.  Mildly prominent size lateral aortic lymph nodes, the largest of which measures up to 1.6 x 1.1 cm in greatest axial dimensions. Further characterization with a chest CT is suggested.  The findings of the noncontrast head CT were discussed with Dr. Salazar on 10/29/2024 at approximately 11:25 a.m. The findings of the CT angiogram and CT perfusion study were discussed with Dr. Salazar at approximately 11:45 a.m.  The findings of the superior mediastinum were discussed with Scarlet Machado RN, the nurse caring for this patient, on 10/29/2024 at approximately 12 p.m. She was asked to convey these findings and recommendations to the hospitalist caring for the patient.    Radiation dose reduction techniques were utilized, including automated exposure control and exposure modulation based on body size.   This report was finalized on 10/29/2024 1:47 PM by Dr. Giovanni Bangura M.D on Workstation: [id number]      CT CEREBRAL PERFUSION WITH & WITHOUT CONTRAST    Result Date: 10/29/2024  CT ANGIOGRAM OF THE HEAD AND NECK AND CT PERFUSION STUDY  CLINICAL HISTORY:  AMS left gaze deviation; R18.8-Other ascites; E05-Xljmbsh effusion, not elsewhere  classified; N18.6-End stage renal disease; Z99.2-Dependence on renal dialysis; E87.0-Hyperosmolality and hypernatremia  TECHNIQUE:  A noncontrast head CT was performed with 3 mm axial images. Thereafter, a CT perfusion study was performed after the dynamic bolus of IV contrast. Standard perfusion maps were constructed with RAPID software. A CT angiogram of the head and neck was then performed with 1 mm axial images. Sagittal, coronal, and 3-dimensional reconstructed images were obtained. Finally, a delayed postcontrast head CT was performed with 3 mm axial images.  FINDINGS:  NONCONTRAST HEAD CT: Linear appearing area of apparent hypodensity within the left aspect of the sandy measuring up to 2.1 x 0.5 cm in greatest axial dimensions that may be beam-hardening artifact although an acute infarct at the site cannot be excluded. Further characterization with MR imaging is suggested.  Changes of inflammatory paranasal sinus disease are incidentally noted most prominently involving the left maxillary sinus which is completely opacified. Additionally, the left maxillary sinus may be additionally atelectatic.  CT PERFUSION STUDY: No convincing abnormalities are seen on the CBF less than 30% or time to maximum enhancement greater than 6-second maps.  CTA NECK:   Aortic Arch: Classic configuration of the aortic arch with mild atherosclerotic changes. Subclavian Arteries: Mild atherosclerotic changes bilaterally. Vertebral Arteries: Poor visualization of the V1 segments bilaterally due to a combination of motion artifact and beam hardening artifact. The remaining cervical segments are unremarkable. CCA/ICA's: Atherosclerotic changes bilaterally without significant NASCET stenosis within either internal carotid.  Ancillary Neck Findings: Mildly prominent size lateral aortic lymph nodes with the largest measuring up to approximately 1.6 x 1.1 cm in greatest axial dimensions. Further evaluation with a chest CT is suggested.  CTA  HEAD:  Anterior Circulation: Atherosclerotic calcifications within the carotid siphons resulting in mild to moderate degrees of luminal compromise. The middle and anterior cerebral arteries are unremarkable. Posterior Circulation: Moderate stenosis of the V4 segment of the left vertebral artery. Hypoplastic P1 segments with well-developed bilateral posterior communicating arteries.  DELAYED POSTCONTRAST HEAD CT: No abnormal foci of contrast enhancement are noted within the brain parenchyma.       Linear hypodensity within the left aspect of the sandy measuring up to 2.1 x 0.5 cm in greatest axial dimensions could be beam-hardening artifact although an acute infarct cannot be excluded. Further characterization with MR imaging is suggested.  No evidence for large vessel occlusion. No significant abnormality seen on the Tmax greater than 6 seconds or CBF less than 30% maps.  Incidental intracranial and extracranial atherosclerotic changes as discussed in detail above.  Mildly prominent size lateral aortic lymph nodes, the largest of which measures up to 1.6 x 1.1 cm in greatest axial dimensions. Further characterization with a chest CT is suggested.  The findings of the noncontrast head CT were discussed with Dr. Salazar on 10/29/2024 at approximately 11:25 a.m. The findings of the CT angiogram and CT perfusion study were discussed with Dr. Salazar at approximately 11:45 a.m.  The findings of the superior mediastinum were discussed with Scarlet Machado RN, the nurse caring for this patient, on 10/29/2024 at approximately 12 p.m. She was asked to convey these findings and recommendations to the hospitalist caring for the patient.    Radiation dose reduction techniques were utilized, including automated exposure control and exposure modulation based on body size.   This report was finalized on 10/29/2024 1:47 PM by Dr. Giovanni Bangura M.D on Workstation: [id number]       Impression:  74-year-old male with ESRD on HD  (MWF), CAD status post MI and CABG, cardiomyopathy/CHF and recent admission 10/21/2022 for volume overload.  Patient was readmitted 10/25 after while in dialysis he had significant abdominal tightness, worsening dyspnea on exertion and low urine output.  CT abdomen/pelvis showed ascites, anasarca, small right pleural effusion, and pulmonary vascular congestion/edema with possible pneumonia.  Neurology was consulted 10/29 for altered mental status with suspected seizure.  While in dialysis 10/29 patient became unresponsive and had left gaze deviation for which was given Ativan and started on Keppra.  There was initially concern he could be having an acute stroke so team D imaging completed without large vessel occlusion or abnormal perfusion.  Routine EEG showed moderate to severe diffuse slowing with triphasic waves waves, continuous EEG overnight read is pending.    Diagnosis:  ESRD on HD with noncompliance with dialysis  Episode of decreased responsiveness with left gaze deviation, suspect seizure    Plan:  Started on Keppra 500 mg twice daily, will add additional 500 mg dose to be given after dialysis.  Will follow-up on continuous EEG but if notes seizure activity neurology will sign off.  Of note palliative has been consulted for goals of care discussion. D/W Dr Roberson today.

## 2024-10-30 NOTE — PROGRESS NOTES
"Enter Query Response Below      Query Response: hypernatremia             If applicable, please update the problem list.   Patient: Mohamud Jarrett        : 1950  Account: [id number]           Admit Date:         How to Respond to this query:       a. Click New Note     b. Answer query within the yellow box.                c. Update the Problem List, if applicable.      If you have any questions about this query contact me at: [email or web address]      Dr. Neal,    74-year male patient with PMHX that includes hypertension, coronary artery disease status post CABG, cardiomyopathy, ESRD on hemodialysis, combined systolic/diastolic congestive heart failure admitted 10/25/24 with ascites, volume overload, acute on chronic diastolic/systolic congestive heart failure, non-compliance with hemodialysis. ED physician final diagnosis includes,   \" Hypernatremia\". Nephrology consult note states, \" Electrolytes within acceptable range except hyponatremia...\"   10/25/24 Sodium - 148. Treatment has included hemodialysis.     Please clarify conflicting documentation as:  > Hypernatremia  > Hyponatremia  > Other- specify__________    By submitting this query, we are merely seeking further clarification of documentation to accurately reflect all conditions that you are monitoring, evaluating, treating or that extend the hospitalization or utilize additional resources of care. Please utilize your independent clinical judgment when addressing the question(s) above.     This query and your response, once completed, will be entered into the legal medical record.    Sincerely,  Carole Bobo RN, CCDS  Clinical Documentation Integrity Program   [email or web address]   "

## 2024-10-30 NOTE — NURSING NOTE
Pt had possible irregular rhythm noted on the heart monitor. Ecg obtained, showed SR with probably inferior infarct, which was a change from last ECG. Notified A since cardiology signed off. New orders placed for a new cardiology consult and troponin.

## 2024-10-30 NOTE — PROGRESS NOTES
Continued Stay Note  [facility]     Patient Name: Mohamud Jarrett  MRN: [id number]  Today's Date: 10/30/2024    Admit Date: 10/25/2024    Plan: DCP pending pt's progress   Discharge Plan       Row Name 10/30/24 0727       Plan    Plan DCP pending pt's progress    Plan Comments CCP continues to follow pt's progress with tx team to work towards DCP for pt.                   Discharge Codes    No documentation.                 Expected Discharge Date and Time       Expected Discharge Date Expected Discharge Time    Oct 31, 2024               SARINA Smith

## 2024-10-30 NOTE — PLAN OF CARE
Goal Outcome Evaluation:              Outcome Evaluation: pt is much more alert this shift. at the beginging of the shift he seemed more disoriented. He was only oriented to self and place and would repeat himself frequently instead of answering the next question. pt able to answer questions more accordingly toward end of shift. Pt still struggles with follwing all comands but is able to follow some at times. pt and ptfamily have discussed code status with doctor caldwell. pt is to be transferred to [facility]. pt expresses no other needs at this time. call light with reach.

## 2024-10-30 NOTE — PROGRESS NOTES
[facility] Progress Note      Patient Name: Mohamud Jarrett  : 1950  MRN: [id number]  Primary Care Physician:  Shara Talley APRN  Date of admission: 10/25/2024    Subjective     Interval History:   Follow-up ESRD.  Altered mental status with left eye downward and lateral deviation of his gaze yesterday on dialysis for an hour and a half.  Discontinued and he went to CT scan.  No acute CVA noted.  EEG with metabolic encephalopathy.  Now on continuous EEG.  Neurology did start Keppra yesterday.  Today the patient completed dialysis very early this morning at 4 AM.  He keeps his eyes closed during the exam but does answer some questions.  Not oriented to time place or situation.    Review of Systems:   Unable to obtain.    Objective     Vitals:   Temp:  [97.1 °F (36.2 °C)-97.6 °F (36.4 °C)] 97.6 °F (36.4 °C)  Heart Rate:  [57-66] 57  Resp:  [17-20] 18  BP: (124-152)/(61-79) 143/67    Intake/Output Summary (Last 24 hours) at 10/30/2024 1005  Last data filed at 10/30/2024 0013  Gross per 24 hour   Intake 0 ml   Output 500 ml   Net -500 ml       Physical Exam:    General Appearance: Responsive to voice.  Does not open his eyes but does answer some yes/no questions.  Not oriented.  Skin: warm and dry  HEENT: Eyes not deviated this morning.  Neck: supple, no JVD  Lungs: Clear to auscultation bilaterally.  Heart: RRR, normal S1 and S2  Abdomen: soft, nontender, nondistended. +bs  : no palpable bladder  Extremities: 1+ upper and 2+ lower extremity edema.  Right upper extremity AV fistula.  Neuro: Yates to voice.  Answers one-word questions but ignores other questions.  Does not follow commands.  Eyes forward not deviated currently.  Scheduled Meds:     aspirin, 81 mg, Oral, Daily  levETIRAcetam, 500 mg, Intravenous, Q12H  metoprolol tartrate, 12.5 mg, Oral, Q12H  pantoprazole, 40 mg, Oral, BID AC  sodium chloride, 10 mL, Intravenous, Q12H  tamsulosin, 0.4 mg, Oral, Daily      IV  Meds:        Results Reviewed:   I have personally reviewed the results from the time of this admission to 10/30/2024 10:05 EDT     Results from last 7 days   Lab Units 10/30/24  0816 10/29/24  0442 10/28/24  0602   SODIUM mmol/L 139 139 139   POTASSIUM mmol/L 4.1 5.3* 4.7   CHLORIDE mmol/L 99 99 98   CO2 mmol/L 20.6* 18.7* 21.9*   BUN mg/dL 40* 81* 77*   CREATININE mg/dL 4.90* 9.31* 7.26*   CALCIUM mg/dL 8.8 8.9 8.6   BILIRUBIN mg/dL 1.6* 1.3* 1.1   ALK PHOS U/L 242* 279* 281*   ALT (SGPT) U/L 7 6 5   AST (SGOT) U/L 18 15 17   GLUCOSE mg/dL 65 90 88       Estimated Creatinine Clearance: 14.3 mL/min (A) (by C-G formula based on SCr of 4.9 mg/dL (H)).    Results from last 7 days   Lab Units 10/27/24  0440 10/26/24  0513   PHOSPHORUS mg/dL 5.2* 4.5             Results from last 7 days   Lab Units 10/30/24  0816 10/29/24  0442 10/28/24  0602 10/27/24  0440 10/26/24  0512   WBC 10*3/mm3 4.48 3.94 3.55 3.69 3.46   HEMOGLOBIN g/dL 10.7* 10.6* 10.2* 9.7* 9.4*   PLATELETS 10*3/mm3 123* 128* 133* 141 147             Assessment / Plan     ASSESSMENT:  ESRD with noncompliance with dialysis.  Waste products up as expected since he refused dialysis yesterday.  Currently in dialysis but appears to have acute mental status changes so he is being taken for imaging.  Altered mental status.  Left gaze downward deviation yesterday.  Neurology evaluation underway.  Neurology evaluating.  Concern for acute ischemic stroke.  CTA of the head and neck and CT perfusion study without evidence of acute stroke by my reading.  Presentation also concerning for seizure which would not be unexpected given his waste product level.  Now on IV Keppra.  Discussed with neurology today he has a EEG ongoing.  Acute on chronic combined heart failure.  Move volume with dialysis tomorrow.  4.  Anemia CKD.  5.  Noncompliance complicating all medical care.  6.  Known coronary artery disease status post prior CABG.  PLAN:  Plan dialysis tomorrow.  Palliative  care would be appropriate given his multiple medical problems and his noncompliance.  Thank you for involving us in the care of Mohamud Jarrett.  Please feel free to call with any questions.    Kelly Flores MD  10/30/24  10:05 EDT    [facility]  [phone]    Please note that portions of this note were completed with a voice recognition program.

## 2024-10-30 NOTE — PROGRESS NOTES
Name: Mohamud Jarrett ADMIT: 10/25/2024   : 1950  PCP: Shara Talley APRN    MRN: [id number] LOS: 1 days   AGE/SEX: 74 y.o. male  ROOM: [facility]     Subjective   Subjective   Patient is seen at bedside, no new complaints.       Objective   Objective   Vital Signs  Temp:  [97.3 °F (36.3 °C)-97.8 °F (36.6 °C)] 97.8 °F (36.6 °C)  Heart Rate:  [54-66] 54  Resp:  [18-20] 18  BP: (136-152)/(53-77) 136/53  SpO2:  [96 %-100 %] 100 %  on   ;   Device (Oxygen Therapy): room air  Body mass index is 23.53 kg/m².  Physical Exam  General, altered mental status  Head and ENT, normocephalic and atraumatic.  Lungs, symmetric expansion, equal air entry bilaterally.  Heart, regular rate and rhythm.  Abdomen, soft and nontender.  Extremities, no clubbing or cyanosis.  Neuro, unable to fully evaluate  Skin: Warm and no rash.  Psych, unable to be evaluated  Musculoskeletal, joint examination is grossly normal.     Copied text material from yesterday's note has been reviewed for appropriate changes and remains accurate as of 10/30/24.              Results Review     I reviewed the patient's new clinical results.  Results from last 7 days   Lab Units 10/30/24  0816 10/29/24  0442 10/28/24  0602 10/27/24  0440   WBC 10*3/mm3 4.48 3.94 3.55 3.69   HEMOGLOBIN g/dL 10.7* 10.6* 10.2* 9.7*   PLATELETS 10*3/mm3 123* 128* 133* 141     Results from last 7 days   Lab Units 10/30/24  0816 10/29/24  0442 10/28/24  0602 10/27/24  0440   SODIUM mmol/L 139 139 139 141   POTASSIUM mmol/L 4.1 5.3* 4.7 4.3   CHLORIDE mmol/L 99 99 98 100   CO2 mmol/L 20.6* 18.7* 21.9* 22.0   BUN mg/dL 40* 81* 77* 73*   CREATININE mg/dL 4.90* 9.31* 7.26* 7.07*   GLUCOSE mg/dL 65 90 88 134*   EGFR mL/min/1.73 11.9* 5.5* 7.4* 7.6*     Results from last 7 days   Lab Units 10/30/24  0816 10/29/24  0442 10/28/24  0602 10/27/24  0440 10/26/24  0513   ALBUMIN g/dL 2.9* 2.9* 3.5 3.0* 2.9*   BILIRUBIN mg/dL 1.6* 1.3* 1.1  --  1.0   ALK PHOS U/L 242* 279* 281*  --  231*  "  AST (SGOT) U/L 18 15 17  --  16   ALT (SGPT) U/L 7 6 5  --  <5     Results from last 7 days   Lab Units 10/30/24  0816 10/29/24  0442 10/28/24  0602 10/27/24  0440 10/26/24  0513   CALCIUM mg/dL 8.8 8.9 8.6 8.5* 8.5*   ALBUMIN g/dL 2.9* 2.9* 3.5 3.0* 2.9*   PHOSPHORUS mg/dL  --   --   --  5.2* 4.5       No results found for: \"HGBA1C\", \"POCGLU\"    CT Chest Without Contrast Diagnostic    Result Date: 10/29/2024  1. There is reticular nodular infiltrate in the lungs, left greater than right suggesting interstitial edema or possibly infectious/inflammatory process. Follow-up to clearing is recommended 2. There is a minimal right pleural effusion 3. Prominent mediastinal lymph nodes are likely reactive given the findings in the lungs 4. Upper abdominal ascites again noted  Radiation dose reduction techniques were utilized, including automated exposure control and exposure modulation based on body size.   This report was finalized on 10/29/2024 4:40 PM by Dr. Luigi Mccloud M.D on Workstation: [id number]      CT Angiogram Head    Result Date: 10/29/2024   Linear hypodensity within the left aspect of the sandy measuring up to 2.1 x 0.5 cm in greatest axial dimensions could be beam-hardening artifact although an acute infarct cannot be excluded. Further characterization with MR imaging is suggested.  No evidence for large vessel occlusion. No significant abnormality seen on the Tmax greater than 6 seconds or CBF less than 30% maps.  Incidental intracranial and extracranial atherosclerotic changes as discussed in detail above.  Mildly prominent size lateral aortic lymph nodes, the largest of which measures up to 1.6 x 1.1 cm in greatest axial dimensions. Further characterization with a chest CT is suggested.  The findings of the noncontrast head CT were discussed with Dr. Salazar on 10/29/2024 at approximately 11:25 a.m. The findings of the CT angiogram and CT perfusion study were discussed with Dr. Salazar at " approximately 11:45 a.m.  The findings of the superior mediastinum were discussed with Scarlet Machado RN, the nurse caring for this patient, on 10/29/2024 at approximately 12 p.m. She was asked to convey these findings and recommendations to the hospitalist caring for the patient.    Radiation dose reduction techniques were utilized, including automated exposure control and exposure modulation based on body size.   This report was finalized on 10/29/2024 1:47 PM by Dr. Giovanni Bangura M.D on Workstation: [id number]      CT Angiogram Neck    Result Date: 10/29/2024   Linear hypodensity within the left aspect of the sandy measuring up to 2.1 x 0.5 cm in greatest axial dimensions could be beam-hardening artifact although an acute infarct cannot be excluded. Further characterization with MR imaging is suggested.  No evidence for large vessel occlusion. No significant abnormality seen on the Tmax greater than 6 seconds or CBF less than 30% maps.  Incidental intracranial and extracranial atherosclerotic changes as discussed in detail above.  Mildly prominent size lateral aortic lymph nodes, the largest of which measures up to 1.6 x 1.1 cm in greatest axial dimensions. Further characterization with a chest CT is suggested.  The findings of the noncontrast head CT were discussed with Dr. Salazar on 10/29/2024 at approximately 11:25 a.m. The findings of the CT angiogram and CT perfusion study were discussed with Dr. Salazar at approximately 11:45 a.m.  The findings of the superior mediastinum were discussed with Scarlet Machado RN, the nurse caring for this patient, on 10/29/2024 at approximately 12 p.m. She was asked to convey these findings and recommendations to the hospitalist caring for the patient.    Radiation dose reduction techniques were utilized, including automated exposure control and exposure modulation based on body size.   This report was finalized on 10/29/2024 1:47 PM by Dr. Giovanni Bangura M.D on  Workstation: [id number]      CT CEREBRAL PERFUSION WITH & WITHOUT CONTRAST    Result Date: 10/29/2024   Linear hypodensity within the left aspect of the sandy measuring up to 2.1 x 0.5 cm in greatest axial dimensions could be beam-hardening artifact although an acute infarct cannot be excluded. Further characterization with MR imaging is suggested.  No evidence for large vessel occlusion. No significant abnormality seen on the Tmax greater than 6 seconds or CBF less than 30% maps.  Incidental intracranial and extracranial atherosclerotic changes as discussed in detail above.  Mildly prominent size lateral aortic lymph nodes, the largest of which measures up to 1.6 x 1.1 cm in greatest axial dimensions. Further characterization with a chest CT is suggested.  The findings of the noncontrast head CT were discussed with Dr. Salazar on 10/29/2024 at approximately 11:25 a.m. The findings of the CT angiogram and CT perfusion study were discussed with Dr. Salazar at approximately 11:45 a.m.  The findings of the superior mediastinum were discussed with Scarlet Machado RN, the nurse caring for this patient, on 10/29/2024 at approximately 12 p.m. She was asked to convey these findings and recommendations to the hospitalist caring for the patient.    Radiation dose reduction techniques were utilized, including automated exposure control and exposure modulation based on body size.   This report was finalized on 10/29/2024 1:47 PM by Dr. Giovanni Bangura M.D on Workstation: [id number]       I have personally reviewed all medications:  Scheduled Medications  aspirin, 81 mg, Oral, Daily  levETIRAcetam, 500 mg, Oral, BID  [START ON 10/31/2024] levETIRAcetam, 500 mg, Oral, Once per day on Tuesday Thursday Saturday  metoprolol tartrate, 12.5 mg, Oral, Q12H  pantoprazole, 40 mg, Oral, BID AC  tamsulosin, 0.4 mg, Oral, Daily    Infusions   Diet  Diet: Regular/House; Fluid Consistency: Thin (IDDSI 0)    I have personally reviewed:  [x]   Laboratory   [x]  Microbiology   [x]  Radiology   [x]  EKG/Telemetry  [x]  Cardiology/Vascular   []  Pathology    []  Records       Assessment/Plan     Active Hospital Problems    Diagnosis  POA    **Ascites [R18.8]  Yes    Acute metabolic encephalopathy [G93.41]  Yes    Volume overload [E87.70]  Unknown    ESRD (end stage renal disease) [N18.6]  Yes    Dependence on renal dialysis [Z99.2]  Not Applicable    Anemia in chronic kidney disease [N18.9, D63.1]  Yes    Essential (primary) hypertension [I10]  Yes      Resolved Hospital Problems   No resolved problems to display.       74 y.o. male admitted with Ascites.    Assessment and plan  1.  Acute on chronic diastolic and systolic congestive heart failure, patient showing signs of volume overload, patient does have underlying end-stage renal disease with dependence on hemodialysis, nephrology and cardiology evaluation requested.  Follow management recommendations.  Patient is clinically declining, he is noncompliant.  Palliative care evaluation to discuss goals of care.  It appears he has had acute mental status changes, concerns for stroke, neurology on board, follow management recommendations.     2.  Anemia of chronic disease, monitor hemoglobin trend, recheck labs.     3.  Hypertension, continue home medications.     4.  Coronary artery disease, BPH, continue home medications.     5.  CODE STATUS is full code.  Further plans based on hospital course.  Patient will be transitioned to palliative care service today.         Luis Lion MD  [facility]  10/30/24  17:26 EDT

## 2024-10-30 NOTE — CONSULTS
Purpose of the visit was to evaluate for: goals of care/advanced care planning and support for patient/family. Spoke with RN as well as patient and discussed palliative care.      Assessment:  Patient is 74 year old male with acute on chronic heart failure, volume overload, ascites, ESRD on HD. Neurology was consulted 10/29 for altered mental status, acute stroke ruled out, EEG ordered and patient started on Keppra. Patient lying in bed with eyes closed, no acute distress noted. He responds to my questions but is not oriented to situation or able to participate in goals of care conversion. He denies pain/discomfort. Mobility and nutrition limited by acute  and chronic illness. PPS 20%    Cultural and spiritual needs have been assessed. No needs identified at this time.     Recommendations/Plan: Dr. Singleton to call family and discuss goals of care. Palliative care team to follow for any needs. Discussed with AYAN Doss.

## 2024-10-30 NOTE — PLAN OF CARE
Goal Outcome Evaluation:   Vital signs stable.  Lack of response, lack of activity, somnolent.  Room air all shift.  Team D called while pt in dialysis today, stroke ruled out.  EEG ordered.  NPO.  Estimated discharge date: 2 - 3 days.

## 2024-10-30 NOTE — PLAN OF CARE
Goal Outcome Evaluation:  Plan of Care Reviewed With: patient        Progress: declining  Outcome Evaluation: Pt seen today for PT re-eval s/p team D yesterday and per neuro- Concern for acute ischemic stroke with large vessel occlusion versus postictal state and seizure. CT head results stated Linear hypodensity within the left aspect of the sandy measuring up to  2.1 x 0.5 cm in greatest axial dimensions could be beam-hardening  artifact although an acute infarct cannot be excluded. MRI not ordered at this time and discussed with primary RN. Pt has difficulty following commands this date, unable to read time on clock, unable to visually track R<>L, unable to read simple words on NIH paper in room, unable to identify objects, unable to state number of fingers held up by PT 0/4 accuracy. Pt reports he only wears readers at baseline. He has severe coordination deficits in BUE. Difficulty completing MMT this date as pt has difficulty following commands. He completed STS to rwx requiring Declan and then ambulated 15ft using rwx requiring min-modA. Assist from PT to move rwx and very narrow DARRICK. Pt is very unsteady on his feet. Discussed all finding with RN. Will progress as pt tolerates. PT rec SNF at [place].    Anticipated Discharge Disposition (PT): skilled nursing facility

## 2024-10-30 NOTE — THERAPY RE-EVALUATION
Patient Name: Mohamud Jarrett  : 1950    MRN: [id number]                              Today's Date: 10/30/2024       Admit Date: 10/25/2024    Visit Dx:     ICD-10-CM ICD-9-CM   1. Other ascites  R18.8 789.59   2. Pleural effusion  J90 511.9   3. End stage renal disease on dialysis  N18.6 585.6    Z99.2 V45.11   4. Hypernatremia  E87.0 276.0     Patient Active Problem List   Diagnosis    Essential (primary) hypertension    Hyperlipidemia    Anemia in chronic kidney disease    Coronary artery disease    Dependence on renal dialysis    Mitral valve regurgitation    Left inguinal hernia    H/O peanut allergy    Secondary hyperparathyroidism of renal origin    History of gout    Depression    ESRD (end stage renal disease)    Ascites    Volume overload    Acute metabolic encephalopathy     Past Medical History:   Diagnosis Date    A-V fistula     right arm    Anemia of chronic renal failure 2021    Antiplatelet or antithrombotic long-term use     plavix    Arthritis     Bilateral leg pain 2021    CAD (coronary artery disease)     h/o CABG  and stents 2022, Dr Shirley follows    Dialysis patient     Tues, Thursday, Saturday, has chest wall catheter currently    ESRD (end stage renal disease) 2024    GI bleed 2024    Gout     Hyperlipidemia     Hypertension     Neck pain 2022    Neuritis of lower extremity, right 2019    Proteinuria 2022    Rheumatoid factor positive 2021    Seasonal allergies 2014    Vitamin D deficiency 2022     Past Surgical History:   Procedure Laterality Date    ARTERIOVENOUS FISTULA Right     CARDIAC CATHETERIZATION      CARPAL TUNNEL RELEASE      CATARACT EXTRACTION W/ INTRAOCULAR LENS IMPLANT      COLONOSCOPY N/A     COLONOSCOPY N/A 2018    Procedure: COLONOSCOPY TO CECUM WITH COLD BIOPSY POLYPECTOMY;  Surgeon: Elliott Harding MD;  Location: [facility] ENDOSCOPY;  Service: General    COLONOSCOPY N/A 2024     Procedure: COLONOSCOPY with cold snare polypectomies;  Surgeon: Alvarado Irvin MD;  Location:  [facility];  Service: Gastroenterology;  Laterality: N/A;  pre- anemia  post- polyps    CORONARY ANGIOPLASTY WITH STENT PLACEMENT  11/09/2022    CORONARY ARTERY BYPASS GRAFT N/A 06/20/2003    ENDOSCOPY N/A 9/9/2024    Procedure: ESOPHAGOGASTRODUODENOSCOPY with biopsies;  Surgeon: Alvarado Irvin MD;  Location:  [facility];  Service: Gastroenterology;  Laterality: N/A;  pre- anemia  post- gastritis    INGUINAL HERNIA REPAIR Right 06/04/2014    Open incarcerated inguinal hernia repair-Dr. Elliott Harding    INGUINAL HERNIA REPAIR Left 4/26/2023    Procedure: OPEN LEFT INGUINAL HERNIA REPAIR;  Surgeon: Elliott Harding MD;  Location: [facility] OR;  Service: General;  Laterality: Left;    LUMBAR DISC SURGERY N/A 1990, 1992    L4-L5, X2      General Information       Row Name 10/30/24 1511          Physical Therapy Time and Intention    Document Type re-evaluation  -CB     Mode of Treatment individual therapy;physical therapy  -CB       Row Name 10/30/24 1511          General Information    Existing Precautions/Restrictions fall  -CB     Barriers to Rehab medically complex  -CB       Row Name 10/30/24 1511          Cognition    Orientation Status (Cognition) oriented to;person;place  pt with difficulty following commands  -CB       Row Name 10/30/24 1511          Safety Issues/Impairments Affecting Functional Mobility    Safety Issues Affecting Function (Mobility) ability to follow commands;awareness of need for assistance;insight into deficits/self-awareness;judgment;problem-solving;safety precaution awareness;safety precautions follow-through/compliance;positioning of assistive device;sequencing abilities  -CB     Impairments Affecting Function (Mobility) strength;endurance/activity tolerance;balance;motor control;cognition;coordination;visual/perceptual;postural/trunk control  -CB               User Key   (r) = Recorded By, (t) = Taken By, (c) = Cosigned By      Initials Name Provider Type    CB Nellie Zabala PT Physical Therapist                   Mobility       Row Name 10/30/24 1513          Bed Mobility    Bed Mobility supine-sit;sit-supine  -CB     Supine-Sit [place] (Bed Mobility) contact guard;verbal cues  -CB     Sit-Supine [place] (Bed Mobility) contact guard;verbal cues  -CB     Assistive Device (Bed Mobility) bed rails;head of bed elevated  -CB       Row Name 10/30/24 1513          Sit-Stand Transfer    Sit-Stand [place] (Transfers) minimum assist (75% patient effort);verbal cues;moderate assist (50% patient effort)  -CB     Assistive Device (Sit-Stand Transfers) walker, front-wheeled  -CB       Row Name 10/30/24 1513          Gait/Stairs (Locomotion)    [place] Level (Gait) minimum assist (75% patient effort);moderate assist (50% patient effort);verbal cues  -CB     Assistive Device (Gait) walker, front-wheeled  -CB     Distance in Feet (Gait) 15  -CB     Deviations/Abnormal Patterns (Gait) brittany decreased;stride length decreased;gait speed decreased;base of support, narrow  -CB     Bilateral Gait Deviations forward flexed posture;heel strike decreased  -CB     Comment, (Gait/Stairs) PT to assist in moving rwx and pt very unsteady  -CB               User Key  (r) = Recorded By, (t) = Taken By, (c) = Cosigned By      Initials Name Provider Type    CB Nellie Zabala PT Physical Therapist                   Obj/Interventions       Row Name 10/30/24 1515          Range of Motion Comprehensive    General Range of Motion bilateral lower extremity ROM WNL  -CB       Row Name 10/30/24 1515          Strength Comprehensive (MMT)    Comment, General Manual Muscle Testing (MMT) Assessment unable to follow commands well enough to perform MMT  -CB       Row Name 10/30/24 1515          Motor Skills    Motor Skills coordination  -CB     Coordination finger to nose;severe impairment;dysmetria  pt  unable to visually scan R<>L, unable to read numbers held up in front of him 0/4 accuracy.  -CB       Row Name 10/30/24 1515          Balance    Balance Assessment standing static balance;standing dynamic balance;sitting dynamic balance;sitting static balance  -CB     Static Sitting Balance contact guard  -CB     Dynamic Sitting Balance contact guard;minimal assist  -CB     Position, Sitting Balance sitting edge of bed  -CB     Static Standing Balance minimal assist;moderate assist;verbal cues  -CB     Dynamic Standing Balance minimal assist;moderate assist;verbal cues  -CB     Position/Device Used, Standing Balance supported;walker, front-wheeled  -CB     Balance Interventions sitting;standing;sit to stand;supported;static;dynamic;minimal challenge  -CB       Row Name 10/30/24 1515          Sensory Assessment (Somatosensory)    Sensory Assessment (Somatosensory) unable/difficult to assess  -CB               User Key  (r) = Recorded By, (t) = Taken By, (c) = Cosigned By      Initials Name Provider Type    CB Nellie Zabala, PT Physical Therapist                   Goals/Plan       Row Name 10/30/24 1522          Bed Mobility Goal 1 (PT)    Activity/Assistive Device (Bed Mobility Goal 1, PT) bed mobility activities, all  -CB     [place] Level/Cues Needed (Bed Mobility Goal 1, PT) standby assist  -CB     Time Frame (Bed Mobility Goal 1, PT) 1 week  -CB     Progress/Outcomes (Bed Mobility Goal 1, PT) goal revised this date  -CB       Row Name 10/30/24 1522          Transfer Goal 1 (PT)    Activity/Assistive Device (Transfer Goal 1, PT) transfers, all  -CB     [place] Level/Cues Needed (Transfer Goal 1, PT) contact guard required  -CB     Time Frame (Transfer Goal 1, PT) 1 week  -CB     Progress/Outcome (Transfer Goal 1, PT) goal ongoing;goal revised this date  -CB       Row Name 10/30/24 1522          Gait Training Goal 1 (PT)    Activity/Assistive Device (Gait Training Goal 1, PT) gait (walking locomotion)   -CB     [place] Level (Gait Training Goal 1, PT) contact guard required  -CB     Distance (Gait Training Goal 1, PT) 50'  -CB     Time Frame (Gait Training Goal 1, PT) 1 week  -CB     Progress/Outcome (Gait Training Goal 1, PT) goal ongoing  -CB       Row Name 10/30/24 1522          Therapy Assessment/Plan (PT)    Planned Therapy Interventions (PT) balance training;bed mobility training;gait training;home exercise program;neuromuscular re-education;strengthening;transfer training;patient/family education;postural re-education  -CB               User Key  (r) = Recorded By, (t) = Taken By, (c) = Cosigned By      Initials Name Provider Type    CB Nellie Zabala, PT Physical Therapist                   Clinical Impression       Row Name 10/30/24 1517          Pain    Pretreatment Pain Rating 0/10 - no pain  -CB     Posttreatment Pain Rating 0/10 - no pain  -CB       Row Name 10/30/24 1517          Plan of Care Review    Plan of Care Reviewed With patient  -CB     Progress declining  -CB     Outcome Evaluation Pt seen today for PT re-eval s/p team D yesterday and per neuro- Concern for acute ischemic stroke with large vessel occlusion versus postictal state and seizure. CT head results stated Linear hypodensity within the left aspect of the sandy measuring up to  2.1 x 0.5 cm in greatest axial dimensions could be beam-hardening  artifact although an acute infarct cannot be excluded. MRI not ordered at this time and discussed with primary RN. Pt has difficulty following commands this date, unable to read time on clock, unable to visually track R<>L, unable to read simple words on NIH paper in room, unable to identify objects, unable to state number of fingers held up by PT 0/4 accuracy. Pt reports he only wears readers at baseline. He has severe coordination deficits in BUE. Difficulty completing MMT this date as pt has difficulty following commands. He completed STS to rwx requiring Declan and then ambulated 15ft  using rwx requiring min-modA. Assist from PT to move rwx and very narrow DARRICK. Pt is very unsteady on his feet. Discussed all finding with RN. Will progress as pt tolerates. PT rec SNF at [place].  -CB       Row Name 10/30/24 1517          Therapy Assessment/Plan (PT)    Rehab Potential (PT) fair  -CB     Criteria for Skilled Interventions Met (PT) yes  -CB     Therapy Frequency (PT) 5 times/wk  -CB       Row Name 10/30/24 1517          Positioning and Restraints    Pre-Treatment Position in bed  -CB     Post Treatment Position bed  -CB     In Bed notified nsg;fowlers;call light within reach;encouraged to call for assist;exit alarm on;side rails up x3  -CB               User Key  (r) = Recorded By, (t) = Taken By, (c) = Cosigned By      Initials Name Provider Type    Nellie Stovall, PT Physical Therapist                   Outcome Measures       Row Name 10/30/24 1522 10/30/24 0840       How much help from another person do you currently need...    Turning from your back to your side while in flat bed without using bedrails? 3  -CB 2  -KS    Moving from lying on back to sitting on the side of a flat bed without bedrails? 3  -CB 1  -KS    Moving to and from a bed to a chair (including a wheelchair)? 3  -CB 1  -KS    Standing up from a chair using your arms (e.g., wheelchair, bedside chair)? 3  -CB 1  -KS    Climbing 3-5 steps with a railing? 1  -CB 1  -KS    To walk in hospital room? 2  -CB 1  -KS    AM-PAC 6 Clicks Score (PT) 15  -CB 7  -KS    Highest Level of Mobility Goal 4 --> Transfer to chair/commode  -CB 2 --> Bed activities/dependent transfer  -KS      Row Name 10/30/24 1522          Functional Assessment    Outcome Measure Options AM-PAC 6 Clicks Basic Mobility (PT)  -CB               User Key  (r) = Recorded By, (t) = Taken By, (c) = Cosigned By      Initials Name Provider Type    KS Siegrist, Kaitlyn, RN Registered Nurse    Nellie Stovall, PT Physical Therapist                                 Physical  Therapy Education       Title: PT OT SLP Therapies (In Progress)       Topic: Physical Therapy (In Progress)       Point: Mobility training (In Progress)       Learning Progress Summary            Patient Acceptance, E,TB, NL,NR by CB at 10/30/2024 1523    Acceptance, E, NR by MG at 10/28/2024 1530    Acceptance, E,TB, VU,DU by LB at 10/26/2024 1514                      Point: Home exercise program (In Progress)       Learning Progress Summary            Patient Acceptance, E, NR by MG at 10/28/2024 1530    Acceptance, E,TB, VU,DU by LB at 10/26/2024 1514                      Point: Body mechanics (In Progress)       Learning Progress Summary            Patient Acceptance, E,TB, NL,NR by CB at 10/30/2024 1523    Acceptance, E, NR by MG at 10/28/2024 1530    Acceptance, E,TB, VU,DU by LB at 10/26/2024 1514                      Point: Precautions (In Progress)       Learning Progress Summary            Patient Acceptance, E,TB, NL,NR by CB at 10/30/2024 1523    Acceptance, E, NR by MG at 10/28/2024 1530    Acceptance, E,TB, VU,DU by LB at 10/26/2024 1514                                      User Key       Initials Effective Dates Name Provider Type Discipline    MG 05/24/22 -  Bell Peterson, PT Physical Therapist PT    LB 08/09/20 -  Nieves Bray, PT Physical Therapist PT    CB 10/22/21 -  Nellie Zabala, PT Physical Therapist PT                  PT Recommendation and Plan  Planned Therapy Interventions (PT): balance training, bed mobility training, gait training, home exercise program, neuromuscular re-education, strengthening, transfer training, patient/family education, postural re-education  Progress: declining  Outcome Evaluation: Pt seen today for PT re-eval s/p team D yesterday and per neuro- Concern for acute ischemic stroke with large vessel occlusion versus postictal state and seizure. CT head results stated Linear hypodensity within the left aspect of the sandy measuring up to  2.1 x 0.5 cm in greatest  axial dimensions could be beam-hardening  artifact although an acute infarct cannot be excluded. MRI not ordered at this time and discussed with primary RN. Pt has difficulty following commands this date, unable to read time on clock, unable to visually track R<>L, unable to read simple words on [facility] paper in room, unable to identify objects, unable to state number of fingers held up by PT 0/4 accuracy. Pt reports he only wears readers at baseline. He has severe coordination deficits in BUE. Difficulty completing MMT this date as pt has difficulty following commands. He completed STS to rwx requiring Declan and then ambulated 15ft using rwx requiring min-modA. Assist from PT to move rwx and very narrow DARRICK. Pt is very unsteady on his feet. Discussed all finding with RN. Will progress as pt tolerates. PT rec SNF at [place].     Time Calculation:         PT Charges       Row Name 10/30/24 1524             Time Calculation    Start Time 1418  -CB      Stop Time 1442  -CB      Time Calculation (min) 24 min  -CB      PT Received On 10/30/24  -CB      PT - Next Appointment 10/31/24  -CB      PT Goal Re-Cert Due Date 11/06/24  -CB         Time Calculation- PT    Total Timed Code Minutes- PT 10 minute(s)  -CB         Timed Charges    80451 - PT Therapeutic Activity Minutes 10  -CB         Total Minutes    Timed Charges Total Minutes 10  -CB       Total Minutes 10  -CB                User Key  (r) = Recorded By, (t) = Taken By, (c) = Cosigned By      Initials Name Provider Type    CB Nellie Zabala, PT Physical Therapist                  Therapy Charges for Today       Code Description Service Date Service Provider Modifiers Qty    [id number]  PT THERAPEUTIC ACT EA 15 MIN 10/30/2024 Nellie Zabala, PT GP, KX 1    [id number]  PT RE-EVAL ESTABLISHED PLAN 2 10/30/2024 Nellie Zabala, PT GP, KX 1            PT G-Codes  Outcome Measure Options: AM-PAC 6 Clicks Basic Mobility (PT)  AM-PAC 6 Clicks Score (PT): 15  PT Discharge  Summary  Anticipated Discharge Disposition (PT): skilled nursing facility    Nellie Zabala, PT  10/30/2024

## 2024-10-31 NOTE — PLAN OF CARE
Goal Outcome Evaluation:           Progress: no change  Outcome Evaluation: Slept through the night after eating half of his evening meal. Denies pain or anxiety. Safety measures. Scheduled medications as ordered. Will continue palliative care.

## 2024-10-31 NOTE — PLAN OF CARE
Goal Outcome Evaluation:  Plan of Care Reviewed With: patient        Progress: no change  Outcome Evaluation: Pt eating some of his meals. Denies pain, anxiety, labored breathing. Takes pills whole with water, no difficulty swallowing. Alert and oriented. Per Katherine, Family canceled hospice meeting that was scheduled for today.

## 2024-10-31 NOTE — CASE MANAGEMENT/SOCIAL WORK
Continued Stay Note  [facility]     Patient Name: Mohamud Jarrett  MRN: [id number]  Today's Date: 10/31/2024    Admit Date: 10/25/2024    Plan: DCP pending pt's progress   Discharge Plan       Row Name 10/31/24 0755       Plan    Plan Comments Pt transfered to  for comfort care. Hospice has been consulted.                   Discharge Codes    No documentation.                 Expected Discharge Date and Time       Expected Discharge Date Expected Discharge Time    Nov 4, 2024               SARINA Dowell

## 2024-10-31 NOTE — PROGRESS NOTES
Dedicated to Hospital Care    [phone]   LOS: 2 days     Name: Mohamud Jarrett  Age/Sex: 74 y.o. male  :  1950        PCP: Shara Talley APRN  Chief Complaint   Patient presents with    Urinary Retention      Subjective   Lethargic and encephalopathic today.    aspirin, 81 mg, Oral, Daily  levETIRAcetam, 500 mg, Oral, BID  levETIRAcetam, 500 mg, Oral, Once per day on   metoprolol tartrate, 12.5 mg, Oral, Q12H  pantoprazole, 40 mg, Oral, BID AC  tamsulosin, 0.4 mg, Oral, Daily           Objective   Vital Signs  Temp:  [97 °F (36.1 °C)-97.8 °F (36.6 °C)] 97.2 °F (36.2 °C)  Heart Rate:  [54-72] 72  Resp:  [18] 18  BP: (100-136)/(53-72) 100/58  Body mass index is 23.53 kg/m².  No intake or output data in the 24 hours ending 10/31/24 0812    Physical Exam  Vitals and nursing note reviewed.   Constitutional:       General: He is in acute distress.      Appearance: He is ill-appearing.   Cardiovascular:      Rate and Rhythm: Regular rhythm. Tachycardia present.   Pulmonary:      Effort: Respiratory distress present.      Comments: Tachypneic with prolonged expiratory phase.  Skin:     General: Skin is warm and dry.   Neurological:      Mental Status: He is disoriented.           Results Review:       I reviewed the patient's new clinical results.  Results from last 7 days   Lab Units 10/30/24  0816 10/29/24  0442 10/28/24  0602 10/27/24  0440 10/26/24  0512 10/25/24  1009   WBC 10*3/mm3 4.48 3.94 3.55 3.69 3.46 4.55   HEMOGLOBIN g/dL 10.7* 10.6* 10.2* 9.7* 9.4* 10.1*   PLATELETS 10*3/mm3 123* 128* 133* 141 147 200     Results from last 7 days   Lab Units 10/30/24  0816 10/29/24  0442 10/28/24  0602 10/27/24  0440 10/26/24  0513 10/25/24  1252   SODIUM mmol/L 139 139 139 141 141 148*   POTASSIUM mmol/L 4.1 5.3* 4.7 4.3 3.9 4.7   CHLORIDE mmol/L 99 99 98 100 101 101   CO2 mmol/L 20.6* 18.7* 21.9* 22.0 25.7 28.1   BUN mg/dL 40* 81* 77* 73* 63* 95*   CREATININE mg/dL 4.90* 9.31* 7.26*  7.07* 5.91* 8.33*   CALCIUM mg/dL 8.8 8.9 8.6 8.5* 8.5* 9.0   PHOSPHORUS mg/dL  --   --   --  5.2* 4.5  --    Estimated Creatinine Clearance: 14.3 mL/min (A) (by C-G formula based on SCr of 4.9 mg/dL (H)).      Assessment & Plan   Active Hospital Problems    Diagnosis  POA    **Ascites [R18.8]  Yes    Acute metabolic encephalopathy [G93.41]  Yes    Volume overload [E87.70]  Unknown    ESRD (end stage renal disease) [N18.6]  Yes    Dependence on renal dialysis [Z99.2]  Not Applicable    Anemia in chronic kidney disease [N18.9, D63.1]  Yes    Essential (primary) hypertension [I10]  Yes      Resolved Hospital Problems   No resolved problems to display.       PLAN  This is a 74-year-old gentleman with a fairly complicated past medical history and significant recent issues and multiple hospitalizations over the last 12 months.  He presented to the hospital here with complaints of abdominal discomfort increasing shortness of breath difficulty urinating and continued issues with decompensated heart failure.  He has underlying end-stage renal disease coronary artery disease with previous CABG, hypertension, right-sided heart failure, combined left sided heart failure.  He was recently in the hospital just a week prior to this admission but missed dialysis after discharge.  He noticed increasing abdominal swelling swelling in his legs and worsening shortness of breath and presented back to the emergency room.  Over the initial portion of the hospitalization he underwent dialysis with fluid removal.  His noncompliance greatly complicates things in the outpatient setting.  On the fourth hospital day he had issues while on dialysis became unresponsive with gaze deviation.  He was seen by neurology and underwent emergent stroke imaging.  Imaging was negative for acute stroke.  EEG showed no acute seizures.  He was found to have hyperammonemia.  Palliative care consultation occurred with the patient and with his family.  The  overall situation was discussed including chronic issues and acute issues on this admission including but not limited to Right heart failure left combined HF, ESRD, HTN, Dementia, hyperammonemia with ? Impaired liver synthetic function and portal HTN from congestive issues with HF, known ascites on imaging this admission.  It was explained that all of these issues pain a very poor prognosis moving forward.  The patient himself said he was not happy with his quality of life and this greatly drove his noncompliance.  His family has noticed him go downhill.  They know that he would not want to be resuscitated.  We discussed his overall prognosis his current condition and his overall long-term prognosis being extremely guarded.  Based on his palliative performance scale and palliative prognostic index he likely had weeks to months with continued aggressive care.  The patient had mentioned stopping dialysis early on in the hospitalization and was discussed with nephrology.  After further discussion with the patient's family on 10/30 they ultimately decided to move forward with palliative care and withdrawal of life prolonging measures and moving towards complete comfort care.  Given his hyperammonemia and end-stage renal disease with discontinuation of dialysis and discontinuation of life-prolonging treatments he likely has days remaining.  Hospice has been consulted to evaluate for hospice scatter bed status comfort medications have been initiated over the last 12 hours he has not required any medications for comfort at this time.  In discussion with his son the patient's ultimate desire would be to go home with hospice.  I think if we do well over another 24 hours this could be strongly considered.  The big thing will be whether they can provide a safe environment for him in that setting.  -Continue to monitor for medications and medication usage.  -Continue current oral medications as desired.  -Continue ongoing  discussions with family regarding planning for possible discharge versus continued hospitalization.  -Unfortunately it does seem to be there is some family issues between his 2 children.  Hopefully we can help bridge this gap between them to in order to provide adequate care for him in the outpatient setting.  -Full comfort measures      Disposition  Expected Discharge Date: 11/4/2024; Expected Discharge Time:        Waqar Singleton MD  [facility]  10/31/24  08:12 EDT

## 2024-11-01 NOTE — PLAN OF CARE
"Goal Outcome Evaluation:  Plan of Care Reviewed With: patient, family        Progress: no change  Outcome Evaluation: PPS 30%. He is alert and orientedx3, but is confused about the time; when on the phone with daughter, he seemed to get more agitated, and the daughter claimed he was confused. Pt ate turkey sandwich, pudding, and jello at the beginning of the shift. He denies pain and nausea, and voices no complaints through the evening. He appeared to sleep comfortably. Daughter and son called to check on the pt. They will be here in the AM, and daughter would like to meet with hospice to discuss the course of EoL care. The son, Cristian, would like to recieve more regular updates regarding the pt's condition and expressed frustration that he has been given the \"run around\" by doctors and staff. If a family meeting has not happened, it may be best to meet with the children to discuss the expected course of care. Family will be coming from out of town to visit the patient.                             "

## 2024-11-01 NOTE — PROGRESS NOTES
Palliative Social Work Note    Purpose of visit: Initial visit  Assessment: Patient sitting at edge of bed eating his lunch. No concerns noted at this time. PPS: 40%  Support System: Family  Psychosocial Needs Identified: Grief  Plan/Other Comments:     SW briefly met with patient this afternoon. Explained role and purpose of visit for psychosocial support. Patient eating lunch and denied any needs/concerns at this time. No family present at time of visit. SW received secure chat from patient's RN last night that patient's daughter had questions regarding additional support. SW contacted patient's daughter, Ilana, over the phone. Explained role and offered support. Ilana noted that she moved from out of town to help her father with his care, and also noted that her mother passed away not long ago. She spoke of her grief and not knowing the area well enough to know where to look for support. Provided support and discussed resources for outpatient counseling. Ilana open to SW leaving a list with patient's RN for her to  when she visits. Ilana is planning to visit with patient this afternoon. She is open to any support available. Discussed with RN. Will plan to follow-up.

## 2024-11-01 NOTE — PLAN OF CARE
Goal Outcome Evaluation:  Plan of Care Reviewed With: patient, family        Progress: no change  Outcome Evaluation: Pt denies pain, anxiety, labored breathing. Eating meals. Son visited this morning. Daughter visited in afternoon, expresses desire for patient to be comfortable. Pt increasingly confused this shift. Eating all of his meals.

## 2024-11-01 NOTE — CONSULTS
Chp requested by family, daughter to visit with Pt. Pt sitting up on side of bed during visit with family, daughter, at bedside and others on present over the phone. Pt requested Chp to return tomorrow and asked Chp to pray. Chp prayed with Pt and family and a Chp will visit with Pt tomorrow.     Chaplains remain available to Pt and family as needed.

## 2024-11-01 NOTE — CONSULTS
Arrived to unit and spoke with AYAN Horan and updated on patient and verified order for consult in chart. Spoke with daughter Ilana via telephone and provided detailed overview of services and inpatient services and that services are for acute symptom management needs and re-evaluated on a daily basis and that the patient would need a discharge plan should symptoms become managed with oral medications. Discussed that the patient does not currently have unmanaged symptoms that would qualify for inpatient services as patient not receiving care that could not be provided in a home or nursing home. Reviewed with family who is agreeable to follow up. Left overview of services booklet and  patient ID number and customer support number and encouraged to call with any questions or concerns. Updated AYAN Horan on outcome of visit and CHRISTAL Larose . Notified Manager of non admit and notification to schedulers of plans to follow up.    ID Number #[id number]    Thank you for the referral and allowing us to participate in this patients care. Please update us with any changes in the patients condition or with any questions or concerns.    Marqiuta Fallon RN  Referral and Admissions Coordinator  [phone]

## 2024-11-01 NOTE — PROGRESS NOTES
[facility] to Hospital Care    [phone]   LOS: 3 days     Name: Mohamud Jarrett  Age/Sex: 74 y.o. male  :  1950        PCP: Shara Talley APRN  Chief Complaint   Patient presents with    Urinary Retention      Subjective   Awake and sitting up in bed talking this morning.  Slightly confused currently watching TV and his son is present at the bedside    levETIRAcetam, 500 mg, Oral, BID  levETIRAcetam, 500 mg, Oral, Once per day on   pantoprazole, 40 mg, Oral, BID AC  tamsulosin, 0.4 mg, Oral, Daily           Objective   Vital Signs  Temp:  [94.5 °F (34.7 °C)-97.3 °F (36.3 °C)] 97.3 °F (36.3 °C)  Heart Rate:  [61-67] 67  Resp:  [17-18] 18  BP: (125-134)/(63-67) 134/67  Body mass index is 23.53 kg/m².    Intake/Output Summary (Last 24 hours) at 2024 1203  Last data filed at 10/31/2024 1702  Gross per 24 hour   Intake 240 ml   Output 85 ml   Net 155 ml       Physical Exam  Vitals and nursing note reviewed.   Constitutional:       General: He is not in acute distress.     Appearance: He is ill-appearing.   Cardiovascular:      Rate and Rhythm: Regular rhythm. Tachycardia present.   Pulmonary:      Effort: Pulmonary effort is normal. No respiratory distress.   Skin:     General: Skin is warm and dry.   Neurological:      Mental Status: Mental status is at baseline.           Results Review:       I reviewed the patient's new clinical results.  Results from last 7 days   Lab Units 10/30/24  0816 10/29/24  0442 10/28/24  0602 10/27/24  0440 10/26/24  0512   WBC 10*3/mm3 4.48 3.94 3.55 3.69 3.46   HEMOGLOBIN g/dL 10.7* 10.6* 10.2* 9.7* 9.4*   PLATELETS 10*3/mm3 123* 128* 133* 141 147     Results from last 7 days   Lab Units 10/30/24  0816 10/29/24  0442 10/28/24  0602 10/27/24  0440 10/26/24  0513 10/25/24  1252   SODIUM mmol/L 139 139 139 141 141 148*   POTASSIUM mmol/L 4.1 5.3* 4.7 4.3 3.9 4.7   CHLORIDE mmol/L 99 99 98 100 101 101   CO2 mmol/L 20.6* 18.7* 21.9* 22.0 25.7 28.1    BUN mg/dL 40* 81* 77* 73* 63* 95*   CREATININE mg/dL 4.90* 9.31* 7.26* 7.07* 5.91* 8.33*   CALCIUM mg/dL 8.8 8.9 8.6 8.5* 8.5* 9.0   PHOSPHORUS mg/dL  --   --   --  5.2* 4.5  --    Estimated Creatinine Clearance: 14.3 mL/min (A) (by C-G formula based on SCr of 4.9 mg/dL (H)).      Assessment & Plan   Active Hospital Problems    Diagnosis  POA    **Ascites [R18.8]  Yes    Acute metabolic encephalopathy [G93.41]  Yes    Volume overload [E87.70]  Unknown    ESRD (end stage renal disease) [N18.6]  Yes    Dependence on renal dialysis [Z99.2]  Not Applicable    Anemia in chronic kidney disease [N18.9, D63.1]  Yes    Essential (primary) hypertension [I10]  Yes      Resolved Hospital Problems   No resolved problems to display.       PLAN  This is a 74-year-old gentleman with a fairly complicated past medical history and significant recent issues and multiple hospitalizations over the last 12 months.  He presented to the hospital here with complaints of abdominal discomfort increasing shortness of breath difficulty urinating and continued issues with decompensated heart failure.  He has underlying end-stage renal disease coronary artery disease with previous CABG, hypertension, right-sided heart failure, combined left sided heart failure.  He was recently in the hospital just a week prior to this admission but missed dialysis after discharge.  He noticed increasing abdominal swelling swelling in his legs and worsening shortness of breath and presented back to the emergency room.  Over the initial portion of the hospitalization he underwent dialysis with fluid removal.  His noncompliance greatly complicates things in the outpatient setting.  On the fourth hospital day he had issues while on dialysis became unresponsive with gaze deviation.  He was seen by neurology and underwent emergent stroke imaging.  Imaging was negative for acute stroke.  EEG showed no acute seizures.  He was found to have hyperammonemia.  Palliative  care consultation occurred with the patient and with his family.  The overall situation was discussed including chronic issues and acute issues on this admission including but not limited to Right heart failure left combined HF, ESRD, HTN, Dementia, hyperammonemia with ? Impaired liver synthetic function and portal HTN from congestive issues with HF, known ascites on imaging this admission.  It was explained that all of these issues pain a very poor prognosis moving forward.  The patient himself said he was not happy with his quality of life and this greatly drove his noncompliance.  His family has noticed him go downhill.  They know that he would not want to be resuscitated.  We discussed his overall prognosis his current condition and his overall long-term prognosis being extremely guarded.  Based on his palliative performance scale and palliative prognostic index he likely had weeks to months with continued aggressive care.  The patient had mentioned stopping dialysis early on in the hospitalization and was discussed with nephrology.  After further discussion with the patient's family on 10/30 they ultimately decided to move forward with palliative care and withdrawal of life prolonging measures and moving towards complete comfort care.  Given his hyperammonemia and end-stage renal disease with discontinuation of dialysis and discontinuation of life-prolonging treatments he likely has days remaining.  Hospice has been consulted to evaluate for hospice scatter bed status comfort medications have been initiated over the last 12 hours he has not required any medications for comfort at this time.  In discussion with his son the patient's ultimate desire would be to go home with hospice.  I think if we do well over another 24 hours this could be strongly considered.  The big thing will be whether they can provide a safe environment for him in that setting.  -Continue to monitor for medications and medication usage.   Hospice had been consulted but family canceled the consultation and meeting yesterday  -Continue current oral medications as desired.  -Continue ongoing discussions with family regarding planning for possible discharge versus continued hospitalization.  -Unfortunately it does seem to be there is some family issues between his 2 children.  Hopefully we can help bridge this gap between them to in order to provide adequate care for him in the outpatient setting.  -Full comfort measures      Disposition  Expected Discharge Date: 11/4/2024; Expected Discharge Time:        Waqar Singleton MD  [facility]  11/01/24  12:03 EDT

## 2024-11-02 PROBLEM — T80.89XA DIALYSIS-ASSOCIATED ASCITES: Status: ACTIVE | Noted: 2024-01-01

## 2024-11-02 PROBLEM — Z95.1 HX OF CABG: Status: ACTIVE | Noted: 2024-01-01

## 2024-11-02 PROBLEM — I27.20 MODERATE TO SEVERE PULMONARY HYPERTENSION: Status: ACTIVE | Noted: 2024-01-01
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
## 2024-11-02 PROBLEM — I50.43 ACUTE ON CHRONIC COMBINED SYSTOLIC AND DIASTOLIC CONGESTIVE HEART FAILURE: Status: ACTIVE | Noted: 2024-11-02

## 2024-11-02 PROBLEM — Z51.5 PALLIATIVE CARE BY SPECIALIST: Status: ACTIVE | Noted: 2024-01-01

## 2024-11-02 NOTE — PLAN OF CARE
Goal Outcome Evaluation:  Plan of Care Reviewed With: patient        Progress: no change  Outcome Evaluation: PPS 30%. He is pleasant, family at bedside at the beginning of the shift. He ate snacks and a sandwich and rested through the evening with the TV on.

## 2024-11-02 NOTE — CONSULTS
visited pt in his room this am.  Family members were not currently present in the room at the time of the visit.   provided prayer support.

## 2024-11-02 NOTE — H&P
Palliative Care/Hospice Admit/Consult Note     Referring Provider: Waqar Singleton MD   Reason for Consultation: Palliative/hospice care  Date of Admission:  10/25/2024    Patient Care Team:  Shara Talley APRN as PCP - General (Nurse Practitioner)  Daniel Lyons APRN (Family Medicine)  Silas Muniz MD as Consulting Physician (Nephrology)  Erasto Shirley MD as Consulting Physician (Cardiology)  Ivan Byrd MD (Transplant Surgery)  Brad Elmore MD as Attending Provider (Hospice and Palliative Medicine)    Chief complaint: End-stage renal disease, last hemodialysis 10/30/2024    History of present illness:  The patient is a 74 y.o. male who has known hypertension, CAD, gout and end-stage renal disease on dialysis for 2 years who presented to the ED 10/25/2024 with worsening dyspnea and difficulties urinating.  Patient says that he typically urinates every day despite being on dialysis.  However he has not been able to empty his bladder since yesterday.  Today he is having a lot of discomfort in the lower abdomen and feeling full and tender throughout the bladder area.  This is causing him to feel very short of breath.  He was supposed to go to dialysis at 9:00 this morning but missed his session because he was feeling so bad.  He denied any fevers.  Denied any cough or flulike symptoms.  Denied vomiting.  He says his last dialysis session was 10/23/2024.   Upon admission, the patient was seen by nephrology and hemodialysis initiated to remove waste and volume.  Ultrafiltration plan to remove additional volume.  Cardiology evaluated the patient and described cardiomyopathy likely due to both ischemic and nonischemic causes.  The patient had a CABG in the distant past and PCI questionable November 2023.  Nephrology reported that the patient had been noncompliant at times.  The patient was offered the option of discontinuing dialysis completely understanding that consequence is  death.  On 10/29/2024, while receiving dialysis, the patient would not respond to commands.  He had a persistent left gaze.  After workup, seizure was suspected.  Keppra initiated.  Subsequently, the patient did opt to discontinue hemodialysis with the last one being 10/30/2024.  The patient has transition to palliative/hospice care.  I was asked to assume the patient's care.    At the time of my evaluation, the patient was sitting up on the side of the bed.  The patient was eating breakfast.  He denied pain shortness of air or GI complaints presently.    Review of Systems  Pertinent items are noted in HPI, all other systems reviewed and negative    Palliative Performance Scale  Palliative Performance Scale Score: 30%  [place] Symptom Assessment System Revised  Pain Score: no pain   ESAS Tiredness Score: 1  ESAS Nausea Score: No nausea  ESAS Depression Score: No depression  ESAS Anxiety Score: No anxiety  ESAS Drowsiness Score: 1  ESAS Lack of Appetite Score: No lack of appetite  ESAS Wellbeing Score: Best wellbeing  ESAS Dyspnea Score: No shortness of breath  ESAS Other Problem Score: Best possible response  ESAS Source of Information: healthcare professional caregiver  ESAS Intervention: medicated/see MAR  ESAS Intervention Response: tolerated    History  Past Medical History:   Diagnosis Date    A-V fistula     right arm    Anemia of chronic renal failure 04/12/2021    Antiplatelet or antithrombotic long-term use     plavix    Arthritis     Bilateral leg pain 11/05/2021    CAD (coronary artery disease)     h/o CABG 2003 and stents 11/2022, Dr Shirley follows    Dialysis patient     Tues, Thursday, Saturday, has chest wall catheter currently    ESRD (end stage renal disease) 07/12/2024    GI bleed 09/04/2024    Gout     Hyperlipidemia     Hypertension     Neck pain 08/30/2022    Neuritis of lower extremity, right 12/19/2019    Proteinuria 05/23/2022    Rheumatoid factor positive 07/06/2021    Seasonal allergies  03/13/2014    Vitamin D deficiency 05/23/2022   ,   Past Surgical History:   Procedure Laterality Date    ARTERIOVENOUS FISTULA Right     CARDIAC CATHETERIZATION      CARPAL TUNNEL RELEASE      CATARACT EXTRACTION W/ INTRAOCULAR LENS IMPLANT      COLONOSCOPY N/A 2006    COLONOSCOPY N/A 12/14/2018    Procedure: COLONOSCOPY TO CECUM WITH COLD BIOPSY POLYPECTOMY;  Surgeon: Elliott Harding MD;  Location:  [facility];  Service: General    COLONOSCOPY N/A 9/9/2024    Procedure: COLONOSCOPY with cold snare polypectomies;  Surgeon: Alvarado Irvin MD;  Location:  [facility];  Service: Gastroenterology;  Laterality: N/A;  pre- anemia  post- polyps    CORONARY ANGIOPLASTY WITH STENT PLACEMENT  11/09/2022    CORONARY ARTERY BYPASS GRAFT N/A 06/20/2003    ENDOSCOPY N/A 9/9/2024    Procedure: ESOPHAGOGASTRODUODENOSCOPY with biopsies;  Surgeon: Alvarado Irvin MD;  Location:  [facility];  Service: Gastroenterology;  Laterality: N/A;  pre- anemia  post- gastritis    INGUINAL HERNIA REPAIR Right 06/04/2014    Open incarcerated inguinal hernia repair-Dr. Elliott Harding    INGUINAL HERNIA REPAIR Left 4/26/2023    Procedure: OPEN LEFT INGUINAL HERNIA REPAIR;  Surgeon: Elliott Harding MD;  Location: [facility] OR;  Service: General;  Laterality: Left;    LUMBAR DISC SURGERY N/A 1990, 1992    L4-L5, X2   ,   Family History   Problem Relation Age of Onset    Heart disease Mother     Heart disease Father     Malig Hyperthermia Neg Hx    , and   Social History     Socioeconomic History    Marital status:    Tobacco Use    Smoking status: Never    Smokeless tobacco: Never   Vaping Use    Vaping status: Never Used   Substance and Sexual Activity    Alcohol use: Yes     Comment: occassionally    Drug use: No    Sexual activity: Defer     E-cigarette/Vaping    E-cigarette/Vaping Use Never User      E-cigarette/Vaping Substances    Nicotine No     THC No     CBD No     Flavoring No      E-cigarette/Vaping  Devices    Disposable No     Pre-filled or Refillable Cartridge No     Refillable Tank No     Pre-filled Pod No       Allergy Peanut (diagnostic), Peanut butter flavor, and Simvastatin    Vital Signs   Temp:  [97.2 °F (36.2 °C)] 97.2 °F (36.2 °C)  Heart Rate:  [66-67] 67  Resp:  [18] 18  BP: (135-141)/(63) 141/63  Device (Oxygen Therapy): room air SpO2:  [99 %-100 %] 99 %    Physical Exam:  General Appearance:   Awake and appears in no acute distress sitting up on the side of the bed eating breakfast, chronically ill-appearing elderly male   Head:    Normocephalic, without obvious abnormality, atraumatic   Eyes:            Lids and lashes normal, conjunctivae and sclerae normal, no icterus   Ears:    Ears appear intact with no abnormalities noted   Throat:   No oral lesions, oral mucosa moist   Neck:   No adenopathy, supple, trachea midline, no thyromegaly   Back:     No scoliosis present   Lungs:     Clear to auscultation, respirations regular and not labored    Heart:    Regular rhythm and normal rate   Breast Exam:    Deferred   Abdomen:     Soft and non-tender, slightly rounded and non-distended   Genitalia:    Deferred   Extremities: Some upper greater than lower extremity edema, right upper extremity AV fistula pale and no cyanosis    Pulses:  Radial pulses palpable and equal bilaterally   Skin:   No bleeding         Neurologic:  No focal weaknesses       Results Review:   I reviewed the patient's new clinical results.    Impression:      Dialysis-associated ascites    ESRD (end stage renal disease)    Palliative care by specialist    Coronary artery disease    Acute on chronic combined systolic and diastolic congestive heart failure    Moderate to severe pulmonary hypertension    Essential (primary) hypertension    Anemia in chronic kidney disease    Secondary hyperparathyroidism of renal origin    Hx of CABG      Plan:  I reviewed the patient's admission and previous medical records.  I reviewed with the  patient's RN.  I reviewed with the patient at bedside.  At the time of my evaluation, no family present.  At the time of my evaluation, the patient was eating breakfast.  The patient denied shortness of breath, chest pain or generalized pain, GI complaints, or focal weaknesses.    The patient continues to receive Keppra p.o. and Protonix p.o. and Flomax p.o.    The patient has not required medications for symptom management.  Hospice evaluated the patient 11/1/2024.  Disposition will need to be addressed unless the patient demonstrates significant decline in the next 24-48 hours.      Brad Elmore MD  Hospice and Palliative Medicine  11/02/24  11:03 EDT

## 2024-11-03 NOTE — PLAN OF CARE
Goal Outcome Evaluation:           Progress: no change  Outcome Evaluation: Pt PPS 30%. Pt alept very comfortably this shift, denies pain and anxiety. No prn's given. Patient bladder scanned at 521mL, education given but pt refused. Patient became very agitated when RN gave edcuation on intermittent cathing and retention. RN encouraged patient to try to void, pt verbalized understanding. Pt's Son came to visit, however POA verbalized she wishes for the patient to rest and not receive visitors. Family asked to leave in order to let patient rest. No needs at this time, plan of kindness ongoing.

## 2024-11-03 NOTE — PROGRESS NOTES
Palliative Care/Hospice Follow Up Note       LOS: 5 days   Patient Care Team:  Shara Talley APRN as PCP - General (Nurse Practitioner)  Daniel Lyons APRN (Family Medicine)  Silas Muniz MD as Consulting Physician (Nephrology)  Erasto Shirley MD as Consulting Physician (Cardiology)  Ivan Byrd MD (Transplant Surgery)  Brad Elmore MD as Attending Provider (Hospice and Palliative Medicine)    Chief Complaint:  End-stage renal disease, last hemodialysis 10/30/2024     Interval History:     Patient Complaints: None  Patient Denies: None  History taken from: Patient and RN  Review of Systems:  As above.    Palliative Performance Scale  Palliative Performance Scale Score: 30%  [place] Symptom Assessment System Revised  Pain Score: no pain   ESAS Tiredness Score: 1  ESAS Nausea Score: No nausea  ESAS Depression Score: No depression  ESAS Anxiety Score: No anxiety  ESAS Drowsiness Score: 1  ESAS Lack of Appetite Score: No lack of appetite  ESAS Wellbeing Score: Best wellbeing  ESAS Dyspnea Score: No shortness of breath  ESAS Other Problem Score: Best possible response  ESAS Source of Information: healthcare professional caregiver  ESAS Intervention: medicated/see MAR  ESAS Intervention Response: tolerated    Vital Signs  Temp:  [97.2 °F (36.2 °C)-97.5 °F (36.4 °C)] 97.5 °F (36.4 °C)  Heart Rate:  [64] 64  Resp:  [24-28] 24  BP: (129)/(70) 129/70  Device (Oxygen Therapy): room air SpO2:  [90 %] 90 %    Physical Exam:  General Appearance:    Awakened and appears in no acute distress lying on his right side, chronically ill-appearing older appearing male   Throat:   No oral lesions, oral mucosa somewhat moist   Neck:   No adenopathy, supple, trachea midline   Lungs:     Clear to auscultation, respirations not labored but with increased respiratory rate    Heart:    Regular rhythm and normal rate   Abdomen:     Soft and non-tender, and distended some   Extremities: Some upper extremity  greater than lower extremity edema, right upper extremity AV fistula, pale and no significant cyanosis   Pulses:   Radial pulses palpable and equal bilaterally          Results Review:     I reviewed the patient's new clinical results.    Medication Reviewed.    Assessment & Plan       Dialysis-associated ascites    ESRD (end stage renal disease)    Palliative care by specialist    Coronary artery disease    Acute on chronic combined systolic and diastolic congestive heart failure    Moderate to severe pulmonary hypertension    Essential (primary) hypertension    Anemia in chronic kidney disease    Secondary hyperparathyroidism of renal origin    Hx of CABG      I reviewed with the patient at bedside.  I reviewed with the patient's RN.  After awakening him, he denied chest pain or abdominal pain, he denies shortness of breath and he denied nausea and no vomiting.  The patient continues to receive Keppra 500 mg p.o. twice daily and Protonix 40 mg p.o. twice daily and Flomax.    Nursing reported bladder scan with greater than 5-600 mL urine.  The patient is urinating 50 to 100 mL urine.  At this time, the patient is declining catheter placement.    The patient did require 1 dose 0.5 mg IV Dilaudid 1734 hrs. yesterday.  No other medicines since that time.    The patient seems slightly worse this a.m.?  However, it may be that he was awakened?    Plan for disposition: Pending    Brad Elmore MD  Hospice and Palliative Medicine  11/03/24  09:02 EST

## 2024-11-03 NOTE — PLAN OF CARE
Goal Outcome Evaluation:  Plan of Care Reviewed With: patient, child        Progress: no change  Outcome Evaluation: Pt has required/requested no PRN pain/anxiety medications today. IV Benadryl given once for itching and has slept since. Pt has eaten moderate amount of meals trays. Family at bedside first half of day. Pt voices no needs at this time.

## 2024-11-04 NOTE — PROGRESS NOTES
Palliative Care/Hospice Follow Up Note       LOS: 6 days   Patient Care Team:  Shara Talley APRN as PCP - General (Nurse Practitioner)  Daniel Lyons APRN (Family Medicine)  Silas Muniz MD as Consulting Physician (Nephrology)  Erasto Shirley MD as Consulting Physician (Cardiology)  Ivan Byrd MD (Transplant Surgery)  Brad Elmore MD as Attending Provider (Hospice and Palliative Medicine)    Chief Complaint:  End-stage renal disease, last hemodialysis 10/30/2024     Interval History:     Patient Complaints: None except for some increasing sleepiness at present and increased pruritus last night  Patient Denies: None  History taken from: Patient and RN  Review of Systems:  As above.    Palliative Performance Scale  Palliative Performance Scale Score: 30%  [place] Symptom Assessment System Revised  Pain Score: no pain   ESAS Tiredness Score: 6  ESAS Nausea Score: No nausea  ESAS Depression Score: No depression  ESAS Anxiety Score: No anxiety  ESAS Drowsiness Score: 6  ESAS Lack of Appetite Score: No lack of appetite  ESAS Wellbeing Score: 3  ESAS Dyspnea Score: No shortness of breath  ESAS Other Problem Score: Best possible response  ESAS Source of Information: healthcare professional caregiver  ESAS Intervention: other (see comment) (None requested)  ESAS Intervention Response: tolerated    Vital Signs  Temp:  [97.2 °F (36.2 °C)-98.1 °F (36.7 °C)] 98.1 °F (36.7 °C)  Heart Rate:  [69-72] 72  Resp:  [24-28] 24  BP: (147-151)/(76-77) 151/76  Device (Oxygen Therapy): room air SpO2:  [98 %-100 %] 98 %    Physical Exam:  General Appearance:    Awakened and appears in no acute distress lying on his right side, chronically ill-appearing older appearing male   Throat:   No oral lesions, oral mucosa somewhat moist   Neck:   No adenopathy, supple, trachea midline   Lungs:     Clear to auscultation, respirations not labored but with increased respiratory rate    Heart:    Regular rhythm and  normal rate   Abdomen:     Soft and non-tender, and distended some   Extremities: Some upper extremity greater than lower extremity edema, right upper extremity AV fistula, pale and no significant cyanosis   Pulses:   Radial pulses palpable and equal bilaterally          Results Review:     I reviewed the patient's new clinical results.    Medication Reviewed.    Assessment & Plan       ESRD (end stage renal disease)    Palliative care by specialist    Coronary artery disease    Acute on chronic combined systolic and diastolic congestive heart failure    Moderate to severe pulmonary hypertension    Essential (primary) hypertension    Anemia in chronic kidney disease    Secondary hyperparathyroidism of renal origin    Dialysis-associated ascites    Hx of CABG      I reviewed with the patient at bedside.  I reviewed with the patient's RN.  After awakening him, he denied chest pain or abdominal pain, he denies shortness of breath and he denied nausea and no vomiting.  However, last night he complained of increasing pruritus and diphenhydramine added.  Due to the patient's worsening renal function, 5 days out from no hemodialysis and due to diphenhydramine for pruritus, 1 dose yesterday at 1314 hrs.,  the patient demonstrates more sleepiness.  The patient continues to receive Keppra 500 mg p.o. twice daily and Protonix 40 mg p.o. twice daily and Flomax.    The patient has not required any IV Dilaudid since 11/2/2024.      The patient continues to be demonstrating some gradual decline.      Plan for disposition: Pending    Brad Elmore MD  Hospice and Palliative Medicine  11/04/24  08:47 EST

## 2024-11-04 NOTE — PLAN OF CARE
Goal Outcome Evaluation:  Plan of Care Reviewed With: patient        Progress: no change  Outcome Evaluation: PPS 30%. He slept through the shift and required no PRNs tonight. Daughter called and given an update.

## 2024-11-04 NOTE — CONSULTS
[facility] follow-up today, patient has not required IV medications for symptom management. Will continue to follow daily.    Please call [phone] with any new updates  hospitals ID# [id number]    Yvonne Chavez RN  [facility] Admissions

## 2024-11-04 NOTE — PLAN OF CARE
Goal Outcome Evaluation:      Patient has a PPS of 30%.  Alert and oriented to self with confusion at all times.  Increased difficulty finding words and answering questions.  Fatigue and lethargy are present.  As needed IV Ativan 1 mg given this evening for anxiety, restlessness and it was effective.

## 2024-11-05 NOTE — PROGRESS NOTES
Palliative Care/Hospice Follow Up Note       LOS: 7 days   Patient Care Team:  Shara Talley APRN as PCP - General (Nurse Practitioner)  Daniel Lyons APRN (Family Medicine)  Silas Muniz MD as Consulting Physician (Nephrology)  Erasto Shirley MD as Consulting Physician (Cardiology)  Ivan Byrd MD (Transplant Surgery)  Brad Elmore MD as Attending Provider (Hospice and Palliative Medicine)    Chief Complaint:  End-stage renal disease, last hemodialysis 10/30/2024     Interval History:     Patient Complaints: None   Patient Denies: None  History taken from: Nephew at bedside and daughter on the phone and RN  Review of Systems:  As above.    Palliative Performance Scale  Palliative Performance Scale Score: 30%  [place] Symptom Assessment System Revised  Pain Score: no pain   ESAS Tiredness Score: unable to assess  ESAS Nausea Score: No nausea  ESAS Depression Score: unable to assess  ESAS Anxiety Score: No anxiety  ESAS Drowsiness Score: No drowsiness  ESAS Lack of Appetite Score: No lack of appetite  ESAS Wellbeing Score: 7  ESAS Dyspnea Score: No shortness of breath  ESAS Other Problem Score: Best possible response  ESAS Source of Information: healthcare professional caregiver  ESAS Intervention: medicated/see MAR  ESAS Intervention Response: tolerated    Vital Signs  Temp:  [97.7 °F (36.5 °C)-97.9 °F (36.6 °C)] 97.9 °F (36.6 °C)  Heart Rate:  [67-73] 73  Resp:  [20] 20  BP: (138-155)/(63-81) 155/81  Device (Oxygen Therapy): room air SpO2:  [98 %-99 %] 99 %    Physical Exam:  General Appearance:    Aroused briefly and appears in no acute distress lying on his back with head of bed elevated 30 degrees, chronically ill-appearing older appearing male   Throat:   No oral lesions, oral mucosa somewhat moist   Neck:   No adenopathy, supple, trachea midline   Lungs:     Clear to auscultation, respirations diminished with increased respiratory rate    Heart:    Regular rhythm and normal  rate   Abdomen:     Soft and non-tender, and distended some   Extremities: Some upper extremity greater than lower extremity edema, right upper extremity AV fistula, pale and no significant cyanosis   Pulses:   Radial pulses palpable and equal bilaterally          Results Review:     I reviewed the patient's new clinical results.    Medication Reviewed.    Assessment & Plan       ESRD (end stage renal disease)    Palliative care by specialist    Coronary artery disease    Acute on chronic combined systolic and diastolic congestive heart failure    Moderate to severe pulmonary hypertension    Essential (primary) hypertension    Anemia in chronic kidney disease    Secondary hyperparathyroidism of renal origin    Dialysis-associated ascites    Hx of CABG      The patient aroused to my examination.  However, he did not stay awake enough to provide ROS.  The patient's nephew was at bedside.  I reviewed with the patient's daughter by phone.  I reviewed with the patient's RN.  Due to the patient's worsening renal function, 6 days out from no hemodialysis and due to diphenhydramine for pruritus, 1 dose 11/3/2024 at 1314 hrs., the patient demonstrates more sleepiness.  The patient continues to receive Keppra 500 mg p.o. twice daily and Protonix 40 mg p.o. twice daily and Flomax as he is able to take p.o.    The patient has not required any IV Dilaudid since 11/2/2024.  Patient did require 1 dose of 1 mg IV Ativan 11/4/2024 at 1802 hrs.    The patient continues to be demonstrating some gradual decline.      Hospice note 11/5/2024 at 1404 hrs. reviewed and noted.  The patient's son, Cristian,  verbalized not being in agreement for scattered bed as he stated patient wishes to pass away in home ''in his bed''.  Patient's son adamant about not agreeing to inpatient hospice and wants in person meeting to discuss patient status and goals of care.     Plan for disposition: Pending    Brad Elmore MD  Hospice and Palliative  Medicine  11/05/24  15:50 EST

## 2024-11-05 NOTE — PLAN OF CARE
Goal Outcome Evaluation:   Pt has slept for entire shift after one dose of lorazepam around 1800.  PPS 30%.  Son called for update, and informed of his dad's status.  When son asked if his dad was improving, RN was clear to state that pt is in a dying process as he has chosen to stop HD, and his goal of care is now comfort.  Son VU and asked for nursing to call with updates as his dad declines.

## 2024-11-05 NOTE — PROGRESS NOTES
"Enter Query Response Below      Query Response:   Other: Patient had ejection fraction 25 to 30% with acute on chronic systolic and diastolic heart failure.  This is likely combination of ischemic cardiomyopathy and possibly hypertensive cardiomyopathy.             If applicable, please update the problem list.   Patient: Mohamud Jarrett        : 1950  Account: [id number]           Admit Date:         How to Respond to this query:       a. Click New Note     b. Answer query within the yellow box.                c. Update the Problem List, if applicable.      If you have any questions about this query contact me at: [email or web address]      Dr. Tamez,     74-year male patient with PMHX that includes hypertension, coronary artery disease status post CABG, cardiomyopathy, ESRD on hemodialysis, combined systolic/diastolic congestive heart failure admitted 10/25/24 with ascites, volume overload, acute on chronic diastolic/systolic congestive heart failure, non-compliance with hemodialysis. 10/27/24 Cardiology consult note states, \" Congestive heart failure, cardiomyopathy coronary artery disease, pulmonary hypertension\", \" Cardiomyopathy likely due to both ischemic and nonischemic\". Treatment has included hemodialysis and Lopressor.     Please clarify the etiology of the patient’s original heart failure diagnosis:   > Heart failure due to hypertension  > Heart failure due to ischemic cardiomyopathy  > Heart failure due to hypertension and ischemic cardiomyopathy  > Other- specify__________    By submitting this query, we are merely seeking further clarification of documentation to accurately reflect all conditions that you are monitoring, evaluating, treating or that extend the hospitalization or utilize additional resources of care. Please utilize your independent clinical judgment when addressing the question(s) above.     This query and your response, once completed, will be entered into the legal medical " record.    Sincerely,  Carole Bobo RN, CCDS  Clinical Documentation Integrity Program   [email or web address]

## 2024-11-05 NOTE — NURSING NOTE
Mohamud Jarrett [facility] ID [id number] is a 75 y/o male Dx ESRD, Heart Failure. Patient has stopped dialysis 6 days ago. Now mostly asleep most of day. Increased difficulty with oral medications, dysphagia. Some agitation last night had IV ativan x1. PPS 20-10%. Reviewed hospice services with patient daughter Ilana over the phone as she was unable to make it in person, reviewed goals of care and she is agreeable to inpatient hospice. Per review with hospice physician patient is eligible for inpatient hospice, this requires daily verbal certification. Called son Cristian to verify if he was also in agreement to inpatient hospice, Cristian verbalized not being in agreement as he states patient wish was to pass away in home ''in his bed''. Reviewed with both Cristian and Ilana over phone call that [facility] does not serve [facility] for home hospice and reviewed hospice nurse frequency and differences from inpatient hospice, current need for patient IV ativan due to dysphagia, likely to present difficulty to manage symptoms at home setting. Patient son adamant about not agreeing to inpatient hospice and wants in person meeting to discuss patient status and goals of care. Requesting re-scheduling to [facility] [place] scheduling team who will call son and daughter to re-arrange meeting for tomorrow. Updated nurse on meeting postponed until tomorrow per patient son request. Please call with any questions, concerns or updates.     Thank You for allowing me to evaluate this patient.     Soledad Santacruz RN BSN - Referrals Admissions Coordinator   [phone]

## 2024-11-05 NOTE — PLAN OF CARE
Goal Outcome Evaluation:      Patient has a PPS of 10%.  Responsive to repositioning and movement.  He is now unable to swallow anything po.  Increased lethargy and fatigue and sleeping all the time.  No pre medications given this day.  Appears calm and relaxed, comfortable.  Supportive family at the bedside this evening.

## 2024-11-06 NOTE — CONSULTS
Assessed patient at bedside. Patients are sleeping, but will respond to voice.     Met with Cristian (son) at bedside.  He now has a clear understanding of the patient's clinical condition, as before he stated he didn't know what was going on and just needed someone to tell him. Goal is scattered bed.     Explanation of services provided with focus on [facility] Scattered Bed and [facility]patient [facility] center. Answered all questions, discussed medications, symptom management needs, and goals of care.  Son wants to time to think about it and wants [facility] to call back at 5pm today to see what he decides.     Please call with any questions, concerns, or changes in patient's status. Thank you for allowing us the opportunity to participate in the care of this patient/family.     Herminia Ibarra RN  Admission and Referral Coordinator  [facility]  [phone].

## 2024-11-06 NOTE — PROGRESS NOTES
Palliative Care/Hospice Follow Up Note       LOS: 8 days   Patient Care Team:  Shara Talley APRN as PCP - General (Nurse Practitioner)  Daniel Lyons APRN (Family Medicine)  Silas Muniz MD as Consulting Physician (Nephrology)  Erasto Shirley MD as Consulting Physician (Cardiology)  Ivan Byrd MD (Transplant Surgery)  Brad Elmore MD as Attending Provider (Hospice and Palliative Medicine)    Chief Complaint:  End-stage renal disease, last hemodialysis 10/30/2024     Interval History:     Patient Complaints: None   Patient Denies: None  History taken from: Son and his friend and and RN  Review of Systems:  As above.    Palliative Performance Scale  Palliative Performance Scale Score: 10%  [place] Symptom Assessment System Revised  Pain Score: 4   ESAS Tiredness Score: Worst possible tiredness  ESAS Nausea Score: No nausea  ESAS Depression Score: 2  ESAS Anxiety Score: 2  ESAS Drowsiness Score: Worst possible drowsiness  ESAS Lack of Appetite Score: 6  ESAS Wellbeing Score: 6  ESAS Dyspnea Score: No shortness of breath  ESAS Other Problem Score: Best possible response  ESAS Source of Information: healthcare professional caregiver  ESAS Intervention: medicated/see MAR  ESAS Intervention Response: tolerated    Vital Signs  Temp:  [97 °F (36.1 °C)-97.9 °F (36.6 °C)] 97 °F (36.1 °C)  Heart Rate:  [71-73] 71  Resp:  [18-20] 18  BP: (131-146)/(59-71) 146/71  Device (Oxygen Therapy): room air SpO2:  [98 %-99 %] 99 %    Physical Exam:  General Appearance:    Not awake and appears in no acute distress lying on his back with head of bed elevated 20-30 degrees, chronically ill-appearing older appearing male   Throat:   No oral lesions, oral mucosa somewhat moist   Neck:   No adenopathy, supple, trachea midline   Lungs:     Clear to auscultation with occasional mild scattered expiratory rhonchi, respirations diminished with slight increased inspiratory effort and rate respiratory rate     Heart:    Regular rhythm and normal rate   Abdomen:     Soft and non-tender, and distended some   Extremities: Some upper extremity greater than lower extremity edema, right upper extremity AV fistula, pale and no significant cyanosis   Pulses:   Radial pulses palpable and equal bilaterally          Results Review:     I reviewed the patient's new clinical results.    Medication Reviewed.    Assessment & Plan       ESRD (end stage renal disease)    Palliative care by specialist    Coronary artery disease    Acute on chronic combined systolic and diastolic congestive heart failure    Moderate to severe pulmonary hypertension    Essential (primary) hypertension    Anemia in chronic kidney disease    Secondary hyperparathyroidism of renal origin    Dialysis-associated ascites    Hx of CABG      I reviewed all with the patient's son at bedside.  The patient did not awaken during my examination.  I reviewed with the patient's RN.  Due to the patient's worsening renal function, 7 days out from no hemodialysis, the patient demonstrates more sleepiness.  The patient continues to receive Keppra 500 mg (changed to IV) twice daily and Protonix and Flomax will be discontinued.      The patient has required glycopyrrolate for some airway congestion.  The patient has required 2 doses of 2 mg IV morphine, 1 dose last night, and 1 dose of 1 mg IV Ativan, 1 dose last night, thus far today.  Medications will be continued and adjusted as needed for symptom management for comfort.  No attempts at resuscitation will be made. The patient continues to be demonstrate some gradual decline.      I did review with the hospice RN admitting nurse.  I also reviewed with the patient's son, Cristian, at bedside today and encouraged hospice scattered bed status.  I answered all of his questions.  4 Erin nurse manager, VERONICA during our conversation.  The patient's son did verbalize understanding for scattered bed status.  I explained to him that the  patient has declined in the last 7 days since stopping dialysis and I would expect his death to occur within the next week.  I answered all of his questions.      Plan for disposition: Pending    Brad Elmore MD  Hospice and Palliative Medicine  11/06/24  14:58 EST

## 2024-11-06 NOTE — PLAN OF CARE
Goal Outcome Evaluation:      Pt maintains PPS 10%; responsive to pain. PRNs: 2 mg morphine, 2 mg ativan and 0.2 mg robinul. Pt tolerating turns well. PIV in TIAGO flushed and saline locked. Plan of comfort ongoing.

## 2024-11-06 NOTE — PLAN OF CARE
Goal Outcome Evaluation:  Plan of Care Reviewed With: child        Progress: declining  Outcome Evaluation: PPS 20%. Pt is responsive to voice. Gave PRN ativan/morphine for restlessness and pain. He rested through the shift. Son Cristian called and given an update, spent time educating him on the ongoing process since the pt has been off dialysis. He voiced understanding.

## 2024-11-07 NOTE — PLAN OF CARE
Goal Outcome Evaluation:  Plan of Care Reviewed With: patient        Progress: declining  Outcome Evaluation: PPS 10%. Pt is responsive to pain. He required no PRN medications this shift to manager his sxs, IV keppra given as ordered. His daughter and son share decisionmaking.

## 2024-11-07 NOTE — PLAN OF CARE
Goal Outcome Evaluation:      Pt maintains PPS 10%;minimally responsive to pain. Discharge/readmit HSB today. Family bedside today and agreeable with premedicating pt prior to activities and turns. PRNs: 2 mg morphine, 2 mg ativan and 0.2 mg robinul. Pt tolerating turns well. PIV in TIAGO flushed and saline locked. Plan of comfort ongoing.

## 2024-11-07 NOTE — PROGRESS NOTES
Discharge Planning Assessment  [facility]     Patient Name: Mohamud Jarrett  MRN: [id number]  Today's Date: 11/7/2024    Admit Date: 10/25/2024    Plan: [facility] scattered bed on 11/7/24. LAURYN Cash RN, CCP   Discharge Needs Assessment    No documentation.                  Discharge Plan       Row Name 11/07/24 1321       Plan    Plan [facility] scattered bed on 11/7/24. LAURYN Cash RN, CCP    Plan Comments Admitted to a [facility] scattered bed on 11/7/24. LAURYN Cash RN, CCP    Final Discharge Disposition Code 51 - hospice medical facility    Final Note Admitted to a [facility] scattered bed on 11/7/24. LAURYN Cash RN, CCP                  Continued Care and Services - Admitted Since 10/25/2024       Destination Coordination complete.      Service Provider Request Status Services Address Phone Fax Patient Preferred    [facility]  Selected Inpatient Hospice [address], [place] [address] [phone] --              Dialysis/Infusion       Service Provider Request Status Services Address Phone Fax Patient Preferred    [facility] [place] Pending - Request Sent -- [address], [address], [facility] [facility] [address] [phone] --              Home Medical Care       Service Provider Request Status Services Address Phone Fax Patient Preferred    [facility] Declined  unable to staff -- [address], [address], [place] [id number] [phone] --                  Selected Continued Care - Prior Encounters Includes continued care and service providers with selected services from prior encounters from 7/27/2024 to 11/7/2024      Discharged on 9/17/2024 Admission date: 9/3/2024 - Discharge disposition: Home-Health Care Svc      Dialysis/Infusion       Service Provider Services Address Phone Fax Patient Preferred    [facility] [place] [facility] [address], [facility] [address], [facility] [address]  [phone] --              Home Medical Care       Service Provider Services Address Phone Fax Patient Preferred    [facility] [address], [address], [place] [address] [phone] --                          Expected Discharge Date and Time       Expected Discharge Date Expected Discharge Time    Nov 7, 2024            Demographic Summary    No documentation.                  Functional Status    No documentation.                  Psychosocial    No documentation.                  Abuse/Neglect    No documentation.                  Legal    No documentation.                  Substance Abuse    No documentation.                  Patient Forms    No documentation.                     Lisa Cash, AYAN

## 2024-11-07 NOTE — PROGRESS NOTES
Case Management Discharge Note      Final Note: Admitted to a [facility] scattered bed on 11/7/24. LAURYN Cash RN, CCP    Provided Post Acute Provider List?: N/A  Provided Post Acute Provider Quality & Resource List?: N/A    Selected Continued Care - Admitted Since 10/25/2024       Destination Coordination complete.      Service Provider Services Address Phone Fax Patient Preferred    [facility] [address], [facility] [address] [phone] --              Durable Medical Equipment    No services have been selected for the patient.                Dialysis/Infusion    No services have been selected for the patient.                Home Medical Care    No services have been selected for the patient.                Therapy    No services have been selected for the patient.                Community Resources    No services have been selected for the patient.                Community & DME    No services have been selected for the patient.                    Selected Continued Care - Prior Encounters Includes continued care and service providers with selected services from prior encounters from 7/27/2024 to 11/7/2024      Discharged on 9/17/2024 Admission date: 9/3/2024 - Discharge disposition: Home-Health Care Svc      Dialysis/Infusion       Service Provider Services Address Phone Fax Patient Preferred    [facility] [address] [place] [address], [address], [facility] [address] [phone] --              Home Medical Care       Service Provider Services Address Phone Fax Patient Preferred    [facility] [address] [facility] [facility], [address], [facility] [address] [phone] --                               Final Discharge Disposition Code: 51 - hospice medical facility

## 2024-11-07 NOTE — PROGRESS NOTES
[facility] Visit Report    Mohamud Jarrett  [id number]  11/7/2024    [facility] Dx: yes    Reason for [facility] Admission: ESRD    Symptom  Management: Pain Control, Respiratory Distress, and Other congestion    Nursing/Medication Recommendations: patient being admitted as [facility] scattered bed today    Psychosocial Issues and Recommendations:    Spiritual Concerns and Recommendations:    [facility] Discharge Plans:  incomplete; patient remains HSB and [facility] will round daily    Review of Visit (Include All Collaboration- including names of hospital and family involved during admission/visit): met with family, daughter Ilana present and I spoke to son Cristian via phone. Family wishes to proceed with [facility] scattered bed today. I spoke with Dr Melanie Everett with [facility] and patient continues to meet GIP eligibility at this time. He is receiving scheduled IV Keppra for seizure prophylaxis. He is also receiving IV Lorazepam, IV Morphine and IV Robinul for symptom management of pain, dyspnea, anxiety and congestion. Spoke to AYAN Villanueva and she notified Dr Elmore. Dr Elmore is on his way to see patient. Will admit as a [facility] scattered bed today. Thank you for allowing us to participate in the care of this patient        Viktoriya Agudelo RN RAC  [facility] -[phone]

## 2024-11-07 NOTE — PROGRESS NOTES
Palliative Care/Hospice Follow Up Note       LOS: 9 days   Patient Care Team:  Shara Talley APRN as PCP - General (Nurse Practitioner)  Daniel Lyons APRN (Family Medicine)  Silas Muniz MD as Consulting Physician (Nephrology)  Erasto Shirley MD as Consulting Physician (Cardiology)  Ivan Byrd MD (Transplant Surgery)  Brad Elmore MD as Attending Provider (Hospice and Palliative Medicine)    Chief Complaint:  End-stage renal disease, last hemodialysis 10/30/2024     Interval History:     Patient Complaints: None   Patient Denies: None  History taken from: Son and his friend and RN  Review of Systems:  As above.    Palliative Performance Scale  Palliative Performance Scale Score: 10%  [place] Symptom Assessment System Revised  Pain Score: 1   ESAS Tiredness Score: Worst possible tiredness  ESAS Nausea Score: No nausea  ESAS Depression Score: No depression  ESAS Anxiety Score: 1  ESAS Drowsiness Score: Worst possible drowsiness  ESAS Lack of Appetite Score: Worst lack of appetite  ESAS Wellbeing Score: 5  ESAS Dyspnea Score: No shortness of breath  ESAS Other Problem Score: Best possible response  ESAS Source of Information: healthcare professional caregiver  ESAS Intervention: medicated/see MAR  ESAS Intervention Response: tolerated    Vital Signs  Temp:  [96.7 °F (35.9 °C)-97 °F (36.1 °C)] 97 °F (36.1 °C)  Heart Rate:  [84-97] 84  Resp:  [18] 18  BP: (142)/(72-87) 142/72  Device (Oxygen Therapy): room air SpO2:  [93 %-96 %] 96 %    Physical Exam:  General Appearance:    Not awake and appears in no acute distress lying on his right side with head of bed elevated 10-20 degrees, chronically ill-appearing older appearing male   Throat:   No oral lesions, oral mucosa somewhat moist   Neck:   No adenopathy, supple, trachea midline   Lungs:     Clear to auscultation with occasional scattered expiratory rhonchi, respirations diminished with slight increased inspiratory effort and  respiratory rate    Heart:    Regular rhythm and normal rate   Abdomen:     Soft and non-tender, and distended some   Extremities: Some upper extremity greater than lower extremity edema, right upper extremity AV fistula, pale and no significant cyanosis   Pulses:   Radial pulses palpable and equal bilaterally          Results Review:     I reviewed the patient's new clinical results.    Medication Reviewed.    Assessment & Plan       ESRD (end stage renal disease)    Palliative care by specialist    Coronary artery disease    Acute on chronic combined systolic and diastolic congestive heart failure    Moderate to severe pulmonary hypertension    Essential (primary) hypertension    Anemia in chronic kidney disease    Secondary hyperparathyroidism of renal origin    Dialysis-associated ascites    Hx of CABG      I reviewed all with the patient's son at bedside.  The patient did not awaken during my examination.  Due to the patient's worsening renal function, 8 days out from no hemodialysis, the patient demonstrates more sleepiness.  The patient continues to receive Keppra 500 mg IV twice daily.      The patient has required glycopyrrolate for airway congestion.  The patient has required 1 doses of 2 mg IV morphine, 3 doses yesterday, and 1 dose of 2 mg IV Ativan, 2 doses yesterday, thus far today.  Medications will be continued and adjusted as needed for symptom management for comfort.  No attempts at resuscitation will be made. The patient continues to demonstrate some gradual decline.      I did review with the hospice RN admitting nurse.  I also reviewed with the patient's son, Cristian, at bedside.   I answered all of his questions.  The patient is to be discharged from acute care and readmitted as a hospice scattered bed patient.    Plan for disposition: Pending    Brad Elmore MD  Hospice and Palliative Medicine  11/07/24  15:35 EST

## 2024-11-07 NOTE — DISCHARGE SUMMARY
Date of Admission:   10/25/2024  Date of Discharge:   11/7/2024    Patient Care Team:  Shara Talley APRN as PCP - General (Nurse Practitioner)  Daniel Lyons APRN (Family Medicine)  Silas Muniz MD as Consulting Physician (Nephrology)  Erasto Shirley MD as Consulting Physician (Cardiology)  Ivan Byrd MD (Transplant Surgery)  Brad Elmore MD as Attending Provider (Hospice and Palliative Medicine)    Discharge Diagnosis:     ESRD (end stage renal disease)    Palliative care by specialist    Coronary artery disease    Acute on chronic combined systolic and diastolic congestive heart failure    Moderate to severe pulmonary hypertension    Essential (primary) hypertension    Anemia in chronic kidney disease    Secondary hyperparathyroidism of renal origin    Dialysis-associated ascites    Hx of CABG      Hospital Course  Patient is a 74 y.o. male who has known hypertension, CAD, gout and end-stage renal disease on dialysis for 2 years who presented to the ED 10/25/2024 with worsening dyspnea and difficulties urinating.  Patient says that he typically urinates every day despite being on dialysis.  However he has not been able to empty his bladder since yesterday.  Today he is having a lot of discomfort in the lower abdomen and feeling full and tender throughout the bladder area.  This is causing him to feel very short of breath.  He was supposed to go to dialysis at 9:00 this morning but missed his session because he was feeling so bad.  He denied any fevers.  Denied any cough or flulike symptoms.  Denied vomiting.  He says his last dialysis session was 10/23/2024.   Upon admission, the patient was seen by nephrology and hemodialysis initiated to remove waste and volume.  Ultrafiltration plan to remove additional volume.  Cardiology evaluated the patient and described cardiomyopathy likely due to both ischemic and nonischemic causes.  The patient had a CABG in the distant past and  PCI questionable November 2023.  Nephrology reported that the patient had been noncompliant at times.  The patient was offered the option of discontinuing dialysis completely understanding that consequence is death.  On 10/29/2024, while receiving dialysis, the patient would not respond to commands.  He had a persistent left gaze.  After workup, seizure was suspected.  Keppra initiated.  Subsequently, the patient did opt to discontinue hemodialysis with the last one being 10/30/2024.  The patient has transition to palliative/hospice care.  I was asked to assume the patient's care.  In the last 2 days, medications provided for symptom management for comfort.  Today is day 8 since last hemodialysis.  Previously I have talked with the patient's daughter by phone.  I have discussed with the patient's son yesterday and today.  With medications being administered, the patient appears comfortable.  Please see my note from earlier today at 1405 hrs.  I was called that hospice evaluated the patient and all agreed to discharge the patient from acute care and readmit him as a hospice scattered bed patient.    Procedures Performed: None       Consults:   Consults       Date and Time Order Name Status Description    10/30/2024  4:14 PM Inpatient Palliative Care MD Consult Completed     10/28/2024  3:59 PM Inpatient Neurology Consult General Completed     10/25/2024  2:35 PM Nephrology (on -call MD unless specified) Completed     10/22/2024  1:47 AM Inpatient Nephrology Consult Completed             Pertinent Test Results: Reviewed    Condition on Discharge: Poor    Palliative Performance Scale  Palliative Performance Scale Score: 10%  [place] Symptom Assessment System Revised  Pain Score: 1   ESAS Tiredness Score: Worst possible tiredness  ESAS Nausea Score: No nausea  ESAS Depression Score: No depression  ESAS Anxiety Score: 1  ESAS Drowsiness Score: Worst possible drowsiness  ESAS Lack of Appetite Score: Worst lack of  appetite  ESAS Wellbeing Score: 5  ESAS Dyspnea Score: No shortness of breath  ESAS Other Problem Score: Best possible response  ESAS Source of Information: healthcare professional caregiver  ESAS Intervention: medicated/see MAR  ESAS Intervention Response: tolerated    Vital Signs  Temp:  [96.7 °F (35.9 °C)-97 °F (36.1 °C)] 97 °F (36.1 °C)  Heart Rate:  [84-97] 84  Resp:  [18] 18  BP: (142)/(72-87) 142/72  Device (Oxygen Therapy): room air SpO2:  [93 %-96 %] 96 %    Physical Exam: Please see my progress note dated 11/7/2024 at 1405 hrs.       Discharge Disposition  Hospice scattered bed    Discharge Medications: Same MAR      Discharge Diet: As tolerated      Activity at Discharge: As tolerated      Follow-up Appointments  No future appointments.  Additional Instructions for the Follow-ups that You Need to Schedule       Discharge Follow-up with PCP   As directed       Currently Documented PCP:    Shara Talley APRN    PCP Phone Number:    [phone]     Follow Up Details: Follow-up with PCP and nephrology in 7 to 10 days.                Test Results Pending at Discharge: None       Brad Elmore MD  11/07/24  15:39 EST    Time: I spent 45 minutes on this discharge activity which included: face-to-face encounter with the patient, face-to-face encounter with the patient's son, reviewing the data in the system, coordination of the care with the nursing staff as well as documentation and entering orders.

## 2024-11-08 NOTE — PROGRESS NOTES
[facility] Visit Report    Mohamud Jarrett  [id number]  11/8/2024    Admission R/T [facility] Dx: yes    Reason for [facility] Admission:   Patient is a 74 y.o. male with a primary [facility] diagnosis of ESRD. Admitted to [facility] for GIP for symptom management of pain, dyspnea, restlessness, anxiety, congestion. No active isolations     PPS: 10%    Medications in 24 hours:  -IV Robinul 0.2mg PRN x5  -IV morphine 2mg PRN x4  -IV Keppra 500mg q12h routine  -IV Ativan 2mg PRN x3    Recommendations:  Continue to monitor for signs of decline and provide comfort measures. Contact [facility] at [phone] with any questions or concerns and at TOD.     Assessment:  Patient is in bed on his left right side. He was unresponsive for this visit. Lungs congested with a slight increase in RR and medications are due at this time. Rt arm with 3+ edema, generalized trace edema to upper body. No UOP. Hypoactive b/s. Weak peripheral pulses and mottling noted to bases of feet. Pt was cool to touch and appeared very comfortable with no nonverbal s/s of pain/distress/ soa.     Collaboration:  No family at bedside and call placed to Ilana with no answer and Vm box full. She did call the unit earlier and was able to speak with the facility nurse that gave her an updated.  Collaborated with [facility] RN and CCP about pts condition and recommendations.    Disposition:  Patient meets GIP criteria, requiring frequent administration and continued titration of parenteral medications to achieve and maintain symptom management. Patient appears to be unsafe for transport at this time, requiring increasing symptom management and frequent RN assessment. If patient were to stabilize he would require LTC placement due to increased daily care needs.  Will continue [facility] RN visits to monitor for changes, assess needs and provide support.       Liliana Wise, RN  [facility] Visit Nurse  Scattered Bed  Team

## 2024-11-08 NOTE — H&P
Palliative Care/Hospice Admit/Consult Note     Referring Provider: Waqar Singleton MD   Reason for Consultation: Palliative/hospice care  Date of Admission:  11/7/2024    Patient Care Team:  Shara Talley APRN as PCP - General (Nurse Practitioner)  Daniel Lyons APRN (Family Medicine)  Silas Muniz MD as Consulting Physician (Nephrology)  Erasto Shirley MD as Consulting Physician (Cardiology)  Ivan Byrd MD (Transplant Surgery)  Brad Elmore MD as Attending Provider (Hospice and Palliative Medicine)    Chief complaint: End-stage renal disease, last hemodialysis 10/30/2024    History of present illness:  The patient is a 74 y.o. male who has known hypertension, CAD, gout and end-stage renal disease on dialysis for 2 years who presented to the ED 10/25/2024 with worsening dyspnea and difficulties urinating.  Patient says that he typically urinates every day despite being on dialysis.  However he has not been able to empty his bladder since yesterday.  Today he is having a lot of discomfort in the lower abdomen and feeling full and tender throughout the bladder area.  This is causing him to feel very short of breath.  He was supposed to go to dialysis at 9:00 this morning but missed his session because he was feeling so bad.  He denied any fevers.  Denied any cough or flulike symptoms.  Denied vomiting.  He says his last dialysis session was 10/23/2024.   Upon admission, the patient was seen by nephrology and hemodialysis initiated to remove waste and volume.  Ultrafiltration plan to remove additional volume.  Cardiology evaluated the patient and described cardiomyopathy likely due to both ischemic and nonischemic causes.  The patient had a CABG in the distant past and PCI questionable November 2023.  Nephrology reported that the patient had been noncompliant at times.  The patient was offered the option of discontinuing dialysis completely understanding that consequence is  death.  On 10/29/2024, while receiving dialysis, the patient would not respond to commands.  He had a persistent left gaze.  After workup, seizure was suspected.  Keppra initiated.  Subsequently, the patient did opt to discontinue hemodialysis with the last one being 10/30/2024.  The patient has transition to palliative/hospice care.  I was asked to assume the patient's care.    I reviewed with the patient's daughter by phone 11/5/2024.  I reviewed with the patient's son at bedside 11/6 and 11/7/2024.  Subsequently, all agreed to discharge the patient from acute care and readmit him as a hospice scattered bed patient.    At the time of my evaluation, no family at bedside.  I reviewed with the patient's RN at bedside.  The RN reported the patient did have increased secretions/questionable regurgitation.  The patient was recently medicated.  Slight expiratory moan noted.  Due to the patient's end-stage renal disease, not receiving dialysis, and due to medications previously administered, the patient does appear comfortable.  No other ROS obtainable from the patient.    Review of Systems  No ROS obtainable due to his unresponsiveness due to end-stage renal disease and due to sedation    Palliative Performance Scale  Palliative Performance Scale Score: 10%  [place] Symptom Assessment System Revised  Pain Score: 2   ESAS Tiredness Score: Worst possible tiredness  ESAS Nausea Score: No nausea  ESAS Depression Score: No depression  ESAS Anxiety Score: 2  ESAS Drowsiness Score: Worst possible drowsiness  ESAS Lack of Appetite Score: unable to assess  ESAS Wellbeing Score: 3  ESAS Dyspnea Score: No shortness of breath  ESAS Other Problem Score: 7 (congestion/secretions)  ESAS Source of Information: healthcare professional caregiver  ESAS Intervention: medicated/see MAR  ESAS Intervention Response: tolerated    History  Past Medical History:   Diagnosis Date    A-V fistula     right arm    Anemia of chronic renal failure  04/12/2021    Antiplatelet or antithrombotic long-term use     plavix    Arthritis     Bilateral leg pain 11/05/2021    CAD (coronary artery disease)     h/o CABG 2003 and stents 11/2022, Dr Shirley follows    Dialysis patient     Tues, Thursday, Saturday, has chest wall catheter currently    ESRD (end stage renal disease) 07/12/2024    GI bleed 09/04/2024    Gout     Hyperlipidemia     Hypertension     Neck pain 08/30/2022    Neuritis of lower extremity, right 12/19/2019    Proteinuria 05/23/2022    Rheumatoid factor positive 07/06/2021    Seasonal allergies 03/13/2014    Vitamin D deficiency 05/23/2022   ,   Past Surgical History:   Procedure Laterality Date    ARTERIOVENOUS FISTULA Right     CARDIAC CATHETERIZATION      CARPAL TUNNEL RELEASE      CATARACT EXTRACTION W/ INTRAOCULAR LENS IMPLANT      COLONOSCOPY N/A 2006    COLONOSCOPY N/A 12/14/2018    Procedure: COLONOSCOPY TO CECUM WITH COLD BIOPSY POLYPECTOMY;  Surgeon: Elliott Harding MD;  [facility];  Service: General    COLONOSCOPY N/A 9/9/2024    Procedure: COLONOSCOPY with cold snare polypectomies;  Surgeon: Alvarado Irvin MD;  Location:  [facility];  Service: Gastroenterology;  Laterality: N/A;  pre- anemia  post- polyps    CORONARY ANGIOPLASTY WITH STENT PLACEMENT  11/09/2022    CORONARY ARTERY BYPASS GRAFT N/A 06/20/2003    ENDOSCOPY N/A 9/9/2024    Procedure: ESOPHAGOGASTRODUODENOSCOPY with biopsies;  Surgeon: Alvarado Irvin MD;  [facility];  Service: Gastroenterology;  Laterality: N/A;  pre- anemia  post- gastritis    INGUINAL HERNIA REPAIR Right 06/04/2014    Open incarcerated inguinal hernia repair-Dr. Elliott Harding    INGUINAL HERNIA REPAIR Left 4/26/2023    Procedure: OPEN LEFT INGUINAL HERNIA REPAIR;  Surgeon: Elliott Harding MD;  [facility];  Service: General;  Laterality: Left;    LUMBAR DISC SURGERY N/A 1990, 1992    L4-L5, X2   ,   Family History   Problem Relation Age of Onset     Heart disease Mother     Heart disease Father     Malig Hyperthermia Neg Hx    , and   Social History     Socioeconomic History    Marital status:    Tobacco Use    Smoking status: Never    Smokeless tobacco: Never   Vaping Use    Vaping status: Never Used   Substance and Sexual Activity    Alcohol use: Yes     Comment: occassionally    Drug use: No    Sexual activity: Defer     E-cigarette/Vaping    E-cigarette/Vaping Use Never User      E-cigarette/Vaping Substances    Nicotine No     THC No     CBD No     Flavoring No      E-cigarette/Vaping Devices    Disposable No     Pre-filled or Refillable Cartridge No     Refillable Tank No     Pre-filled Pod No       Allergy Peanut (diagnostic), Peanut butter flavor, and Simvastatin    Vital Signs   Temp:  [97.2 °F (36.2 °C)-99.1 °F (37.3 °C)] 99.1 °F (37.3 °C)  Heart Rate:  [80-97] 97  Resp:  [17-18] 18  BP: (127)/(65) 127/65  Device (Oxygen Therapy): room air SpO2:  [91 %-96 %] 91 %    Physical Exam:  General Appearance:  Not awake and appears in no acute distress lying slightly on his left side, chronically ill-appearing elderly male   Head:    Normocephalic, without obvious abnormality, atraumatic   Eyes:            Lids and lashes normal, conjunctivae and sclerae normal, no icterus   Ears:    Ears appear intact with no abnormalities noted   Throat:   No oral lesions, oral mucosa somewhat moist   Neck:   No adenopathy, supple, trachea midline, no thyromegaly   Back:     No scoliosis present   Lungs:     Clear to auscultation with occasional scattered rhonchi and occasional expiratory forced moan, respirations with slight increase inspiratory effort and rate     Heart:    Regular rhythm and normal rate       Abdomen:     Soft and non-tender, slightly rounded and non-distended   Genitalia:    Deferred   Extremities: Some upper greater than lower extremity edema, right upper extremity AV fistula pale and no cyanosis    Pulses:  Radial pulses palpable and equal  bilaterally   Skin:   No bleeding         Neurologic:  No focal weaknesses       Results Review:   I reviewed the patient's new clinical results.    Impression:      ESRD (end stage renal disease)    Hospice care patient    Coronary artery disease    Acute on chronic combined systolic and diastolic congestive heart failure    Moderate to severe pulmonary hypertension    Essential (primary) hypertension    Anemia in chronic kidney disease    Secondary hyperparathyroidism of renal origin    Dialysis-associated ascites    Hx of CABG      Plan:  I reviewed the patient's admission and previous medical records.  I reviewed with the patient's RN.  No family present at the time of my evaluation.  Today is day 9 from his last hemodialysis treatment.    The patient continues to receive IV Keppra 500 mg every 12 hours.      The patient has required glycopyrrolate for airway congestion.  The patient has required 2 doses of 2 mg IV morphine and 1 dose of 2 mg IV Ativan thus far today.  Medications will be continued and adjusted as needed for symptom management for comfort.  No attempts at resuscitation will be made.    The patient has demonstrated decline over the last several days.  This a.m., the patient continues to demonstrate decline and adjustment of medications will be required for symptom management.      Brad Elmore MD  Hospice and Palliative Medicine  11/08/24  08:36 EST

## 2024-11-08 NOTE — PLAN OF CARE
No new acute issues this shift, pt remains on Hospice Palliative care comfort measures.  Q1H checks in place, pt made comfortable per nursing judgement observations.  Will continue to monitor and observe.     Goal Outcome Evaluation:      Problem: Adult Inpatient Plan of Care  Goal: Plan of Care Review  Outcome: Progressing     Problem: Palliative Care  Goal: Enhanced Quality of Life  Outcome: Progressing     Problem: Fall Injury Risk  Goal: Absence of Fall and Fall-Related Injury  Outcome: Progressing

## 2024-11-08 NOTE — PROGRESS NOTES
Pt was unresponsive and appeared comfortable.  No family present.  Pt contacted pt's daughter Ilana Jarrett per phone.  Pt is [employer].  Pt's daughter sounded shocked when  told her that pt was unresponsive.  Daughter asked that I have Rai RN to call her upon assessment.   shared with team RN Latoya Wise.  Daughter stated she has support from her Uncle (pt's brother) and her children.  Daughter did not express any spiritual concerns during this conversation.

## 2024-11-09 NOTE — DISCHARGE SUMMARY
Discharge As      Date of Admisssion:  2024  Date of Death:  2024  Time of Death:  3:39 PM    Patient Care Team:  Shara Talley APRN as PCP - General (Nurse Practitioner)  Daniel Lyons APRN (Family Medicine)  Silas Muniz MD as Consulting Physician (Nephrology)  Erasto Shirley MD as Consulting Physician (Cardiology)  Ivan Byrd MD (Transplant Surgery)  Brad Elmore MD as Attending Provider (Hospice and Palliative Medicine)    Final Diagnosis:     ESRD (end stage renal disease)    Hospice care patient    Coronary artery disease    Acute on chronic combined systolic and diastolic congestive heart failure    Moderate to severe pulmonary hypertension    Essential (primary) hypertension    Anemia in chronic kidney disease    Secondary hyperparathyroidism of renal origin    Dialysis-associated ascites    Hx of CABG      Hospital Course  Patient was a 74 y.o. male who has known hypertension, CAD, gout and end-stage renal disease on dialysis for 2 years who presented to the ED 10/25/2024 with worsening dyspnea and difficulties urinating. Patient says that he typically urinates every day despite being on dialysis. However he has not been able to empty his bladder since yesterday. Today he is having a lot of discomfort in the lower abdomen and feeling full and tender throughout the bladder area. This is causing him to feel very short of breath. He was supposed to go to dialysis at 9:00 this morning but missed his session because he was feeling so bad. He denied any fevers. Denied any cough or flulike symptoms. Denied vomiting. He says his last dialysis session was 10/23/2024. Upon admission, the patient was seen by nephrology and hemodialysis initiated to remove waste and volume. Ultrafiltration plan to remove additional volume. Cardiology evaluated the patient and described cardiomyopathy likely due to both ischemic and nonischemic causes. The patient had a CABG in the  distant past and PCI questionable November 2023. Nephrology reported that the patient had been noncompliant at times. The patient was offered the option of discontinuing dialysis completely understanding that consequence is death. On 10/29/2024, while receiving dialysis, the patient would not respond to commands. He had a persistent left gaze. After workup, seizure was suspected. Keppra initiated. Subsequently, the patient did opt to discontinue hemodialysis with the last one being 10/30/2024. The patient transitioned to hospice care 11/7/2024.  Medications were provided and adjusted as needed for symptom management for comfort.  Please see my note from earlier today, 11/8/2024 at 0800 hrs.  The patient's condition had did deteriorated over the last 2 to 3 days.  I did discuss with the patient's son at bedside yesterday.  Subsequently, I was called that the patient's respirations ceased and no pulse palpable. No heart sounds audible. I pronounced the patient at 1539 hours.    Time: I spent 40 minutes on this discharge activity which included: face-to-face encounter with the patient, reviewing the data in the system, coordination of the care with the nursing staff as well as documentation and entering orders.       Brad Elmore MD  Hospice and Palliative Medicine  11/08/24  19:38 EST

## 2024-11-11 NOTE — PROGRESS NOTES
Case Management Discharge Note      Final Note: The patient  on 24 @ 15:39. B. AYAN Cash CCP.         Selected Continued Care - Discharged on 2024 Admission date: 2024 - Discharge disposition:       Destination Coordination complete.      Service Provider Services Address Phone Fax Patient Preferred    [facility] [address], [place] [id number] [phone] --              Durable Medical Equipment    No services have been selected for the patient.                Dialysis/Infusion    No services have been selected for the patient.                Home Medical Care    No services have been selected for the patient.                Therapy    No services have been selected for the patient.                Community Resources    No services have been selected for the patient.                Community & DME    No services have been selected for the patient.                    Selected Continued Care - Prior Encounters Includes continued care and service providers with selected services from prior encounters from 2024 to 2024      Discharged on 2024 Admission date: 10/25/2024 - Discharge disposition: Still a Patient      Destination       Service Provider Services Address Phone Fax Patient Preferred    [facility] [address], [place] [address] [phone] --                      Discharged on 2024 Admission date: 9/3/2024 - Discharge disposition: Home-Health Care Svc      Dialysis/Infusion       Service Provider Services Address Phone Fax Patient Preferred    [facility] [address] [place] [address], [facility] [address] [address] [phone] --              Home Medical Care       Service Provider Services Address Phone Fax Patient Preferred    [facility] [address]  [facility] [address] [address] [phone] --                               Final Discharge Disposition Code: 20 -

## (undated) DEVICE — TRAP FLD MINIVAC MEGADYNE 100ML

## (undated) DEVICE — MSK ENDO PORT O2 POM ELITE CURAPLEX A/

## (undated) DEVICE — ELECTRD BLD EZ CLN MOD XLNG 2.75IN

## (undated) DEVICE — SUT VIC 3/0 TIES 18IN J110T

## (undated) DEVICE — CANN O2 ETCO2 FITS ALL CONN CO2 SMPL A/ 7IN DISP LF

## (undated) DEVICE — ANTIBACTERIAL UNDYED BRAIDED (POLYGLACTIN 910), SYNTHETIC ABSORBABLE SUTURE: Brand: COATED VICRYL

## (undated) DEVICE — THE TORRENT IRRIGATION SCOPE CONNECTOR IS USED WITH THE TORRENT IRRIGATION TUBING TO PROVIDE IRRIGATION FLUIDS SUCH AS STERILE WATER DURING GASTROINTESTINAL ENDOSCOPIC PROCEDURES WHEN USED IN CONJUNCTION WITH AN IRRIGATION PUMP (OR ELECTROSURGICAL UNIT).: Brand: TORRENT

## (undated) DEVICE — SINGLE-USE BIOPSY FORCEPS: Brand: RADIAL JAW 4

## (undated) DEVICE — ADHS SKIN SURG TISS VISC PREMIERPRO EXOFIN HI/VISC FAST/DRY

## (undated) DEVICE — PENROSE DRAIN 18" X 5/8": Brand: CARDINAL HEALTH

## (undated) DEVICE — SENSR O2 OXIMAX FNGR A/ 18IN NONSTR

## (undated) DEVICE — CANNULA,ADULT,SOFT-TOUCH,7'TUBE,UC: Brand: PENDING

## (undated) DEVICE — SUT VIC 5/0 PS2 18IN J495H

## (undated) DEVICE — BLCK/BITE BLOX W/DENTL/RIM W/STRAP 54F

## (undated) DEVICE — LN SMPL CO2 SHTRM SD STREAM W/M LUER

## (undated) DEVICE — ADHS SKIN PREMIERPRO EXOFIN TOPICAL HI/VISC .5ML

## (undated) DEVICE — SKIN PREP TRAY W/CHG: Brand: MEDLINE INDUSTRIES, INC.

## (undated) DEVICE — TUBING, SUCTION, 1/4" X 10', STRAIGHT: Brand: MEDLINE

## (undated) DEVICE — GLV SURG BIOGEL LTX PF 6

## (undated) DEVICE — ADAPT CLN BIOGUARD AIR/H2O DISP

## (undated) DEVICE — KT ORCA ORCAPOD DISP STRL

## (undated) DEVICE — SUT ETHIB 0/0 MO6 I8IN CX45D

## (undated) DEVICE — LAG MINOR PROCEDURE: Brand: MEDLINE INDUSTRIES, INC.

## (undated) DEVICE — GLV SURG PREMIERPRO ORTHO LTX PF SZ7.5 BRN

## (undated) DEVICE — Device: Brand: DEFENDO AIR/WATER/SUCTION AND BIOPSY VALVE

## (undated) DEVICE — THE SINGLE USE ETRAP – POLYP TRAP IS USED FOR SUCTION RETRIEVAL OF ENDOSCOPICALLY REMOVED POLYPS.: Brand: ETRAP

## (undated) DEVICE — SNAR POLYP SENSATION STDOVL 27 240 BX40

## (undated) DEVICE — FRCP BX RADJAW4 NDL 2.8 240CM LG OG BX40

## (undated) DEVICE — GOWN ,SIRUS,NONREINFORCED SMALL: Brand: MEDLINE

## (undated) DEVICE — TBG PENCL TELESCP MEGADYNE SMOKE EVAC 10FT